# Patient Record
Sex: FEMALE | Race: BLACK OR AFRICAN AMERICAN | NOT HISPANIC OR LATINO | Employment: OTHER | URBAN - METROPOLITAN AREA
[De-identification: names, ages, dates, MRNs, and addresses within clinical notes are randomized per-mention and may not be internally consistent; named-entity substitution may affect disease eponyms.]

---

## 2017-06-09 DIAGNOSIS — M51.36 OTHER INTERVERTEBRAL DISC DEGENERATION, LUMBAR REGION: Primary | ICD-10-CM

## 2017-06-20 ENCOUNTER — HOSPITAL ENCOUNTER (OUTPATIENT)
Dept: RADIOLOGY | Facility: OTHER | Age: 72
Discharge: HOME | End: 2017-06-20
Attending: INTERNAL MEDICINE
Payer: MEDICARE

## 2017-06-20 DIAGNOSIS — M51.36 OTHER INTERVERTEBRAL DISC DEGENERATION, LUMBAR REGION: ICD-10-CM

## 2017-06-20 PROCEDURE — 72148 MRI LUMBAR SPINE W/O DYE: CPT | Mod: 26,,, | Performed by: RADIOLOGY

## 2017-06-20 PROCEDURE — 72148 MRI LUMBAR SPINE W/O DYE: CPT | Mod: TC

## 2018-01-09 ENCOUNTER — HOSPITAL ENCOUNTER (INPATIENT)
Facility: HOSPITAL | Age: 73
LOS: 9 days | Discharge: HOME HEALTH | DRG: 057 | End: 2018-01-18
Attending: EMERGENCY MEDICINE | Admitting: HOSPITALIST
Payer: MEDICARE

## 2018-01-09 DIAGNOSIS — K59.1 FUNCTIONAL DIARRHEA: ICD-10-CM

## 2018-01-09 DIAGNOSIS — R29.2 AREFLEXIA: ICD-10-CM

## 2018-01-09 DIAGNOSIS — E86.1 ACUTE RENAL INJURY DUE TO HYPOVOLEMIA: ICD-10-CM

## 2018-01-09 DIAGNOSIS — R48.2 APRAXIA: ICD-10-CM

## 2018-01-09 DIAGNOSIS — I63.9 STROKE: ICD-10-CM

## 2018-01-09 DIAGNOSIS — G89.4 CHRONIC PAIN DISORDER: ICD-10-CM

## 2018-01-09 DIAGNOSIS — E51.9 VITAMIN B1 DEFICIENCY: ICD-10-CM

## 2018-01-09 DIAGNOSIS — R29.898 WEAKNESS OF BOTH LOWER EXTREMITIES: ICD-10-CM

## 2018-01-09 DIAGNOSIS — G20.A1 PD (PARKINSON'S DISEASE): ICD-10-CM

## 2018-01-09 DIAGNOSIS — G61.0 AIDP (ACUTE INFLAMMATORY DEMYELINATING POLYNEUROPATHY): ICD-10-CM

## 2018-01-09 DIAGNOSIS — R06.02 SOB (SHORTNESS OF BREATH): ICD-10-CM

## 2018-01-09 DIAGNOSIS — N17.9 ACUTE RENAL INJURY DUE TO HYPOVOLEMIA: ICD-10-CM

## 2018-01-09 DIAGNOSIS — G60.8 PERIPHERAL SENSORY NEUROPATHY: ICD-10-CM

## 2018-01-09 DIAGNOSIS — R53.1 WEAKNESS: Primary | ICD-10-CM

## 2018-01-09 DIAGNOSIS — E51.11 VITAMIN B1 DEFICIENCY NEUROPATHY: ICD-10-CM

## 2018-01-09 PROBLEM — H90.3 SENSORY HEARING LOSS, BILATERAL: Status: ACTIVE | Noted: 2018-01-09

## 2018-01-09 LAB
ALBUMIN SERPL BCP-MCNC: 3.2 G/DL
ALP SERPL-CCNC: 78 U/L
ALT SERPL W/O P-5'-P-CCNC: 10 U/L
ANION GAP SERPL CALC-SCNC: 7 MMOL/L
AST SERPL-CCNC: 26 U/L
BACTERIA #/AREA URNS AUTO: ABNORMAL /HPF
BASOPHILS # BLD AUTO: 0.03 K/UL
BASOPHILS NFR BLD: 0.6 %
BILIRUB SERPL-MCNC: 0.6 MG/DL
BILIRUB UR QL STRIP: NEGATIVE
BUN SERPL-MCNC: 21 MG/DL
CALCIUM SERPL-MCNC: 8.4 MG/DL
CHLORIDE SERPL-SCNC: 107 MMOL/L
CHOLEST SERPL-MCNC: 165 MG/DL
CHOLEST/HDLC SERPL: 3.1 {RATIO}
CK MB SERPL-MCNC: 1.4 NG/ML
CK MB SERPL-RTO: 1.3 %
CK SERPL-CCNC: 105 U/L
CLARITY UR REFRACT.AUTO: ABNORMAL
CO2 SERPL-SCNC: 23 MMOL/L
COLOR UR AUTO: YELLOW
CREAT SERPL-MCNC: 1.3 MG/DL
CRP SERPL-MCNC: 9.6 MG/L
DIFFERENTIAL METHOD: ABNORMAL
EOSINOPHIL # BLD AUTO: 0.2 K/UL
EOSINOPHIL NFR BLD: 3.2 %
ERYTHROCYTE [DISTWIDTH] IN BLOOD BY AUTOMATED COUNT: 13.3 %
ERYTHROCYTE [SEDIMENTATION RATE] IN BLOOD BY WESTERGREN METHOD: 20 MM/HR
EST. GFR  (AFRICAN AMERICAN): 47.7 ML/MIN/1.73 M^2
EST. GFR  (NON AFRICAN AMERICAN): 41.4 ML/MIN/1.73 M^2
GLUCOSE SERPL-MCNC: 101 MG/DL
GLUCOSE UR QL STRIP: NEGATIVE
HCT VFR BLD AUTO: 33.2 %
HDLC SERPL-MCNC: 53 MG/DL
HDLC SERPL: 32.1 %
HGB BLD-MCNC: 11.2 G/DL
HGB UR QL STRIP: ABNORMAL
IMM GRANULOCYTES # BLD AUTO: 0.04 K/UL
IMM GRANULOCYTES NFR BLD AUTO: 0.7 %
INR PPP: 1.1
KETONES UR QL STRIP: NEGATIVE
LDLC SERPL CALC-MCNC: 101.2 MG/DL
LEUKOCYTE ESTERASE UR QL STRIP: ABNORMAL
LYMPHOCYTES # BLD AUTO: 1.2 K/UL
LYMPHOCYTES NFR BLD: 21.6 %
MAGNESIUM SERPL-MCNC: 2.5 MG/DL
MCH RBC QN AUTO: 33.4 PG
MCHC RBC AUTO-ENTMCNC: 33.7 G/DL
MCV RBC AUTO: 99 FL
MICROSCOPIC COMMENT: ABNORMAL
MONOCYTES # BLD AUTO: 0.7 K/UL
MONOCYTES NFR BLD: 13.4 %
NEUTROPHILS # BLD AUTO: 3.2 K/UL
NEUTROPHILS NFR BLD: 60.5 %
NITRITE UR QL STRIP: NEGATIVE
NONHDLC SERPL-MCNC: 112 MG/DL
NRBC BLD-RTO: 0 /100 WBC
PH UR STRIP: 5 [PH] (ref 5–8)
PHOSPHATE SERPL-MCNC: 2.9 MG/DL
PLATELET # BLD AUTO: 164 K/UL
PMV BLD AUTO: 10.5 FL
POCT GLUCOSE: 137 MG/DL (ref 70–110)
POTASSIUM SERPL-SCNC: 5.1 MMOL/L
PROT SERPL-MCNC: 7.8 G/DL
PROT UR QL STRIP: NEGATIVE
PROTHROMBIN TIME: 11.2 SEC
RBC # BLD AUTO: 3.35 M/UL
RBC #/AREA URNS AUTO: 1 /HPF (ref 0–4)
SODIUM SERPL-SCNC: 137 MMOL/L
SP GR UR STRIP: 1.01 (ref 1–1.03)
SQUAMOUS #/AREA URNS AUTO: 6 /HPF
T4 FREE SERPL-MCNC: 1.19 NG/DL
TRIGL SERPL-MCNC: 54 MG/DL
TSH SERPL DL<=0.005 MIU/L-ACNC: 0.06 UIU/ML
URN SPEC COLLECT METH UR: ABNORMAL
UROBILINOGEN UR STRIP-ACNC: 4 EU/DL
WBC # BLD AUTO: 5.36 K/UL
WBC #/AREA URNS AUTO: 50 /HPF (ref 0–5)

## 2018-01-09 PROCEDURE — 84439 ASSAY OF FREE THYROXINE: CPT

## 2018-01-09 PROCEDURE — 83735 ASSAY OF MAGNESIUM: CPT

## 2018-01-09 PROCEDURE — 99233 SBSQ HOSP IP/OBS HIGH 50: CPT | Mod: ,,, | Performed by: PSYCHIATRY & NEUROLOGY

## 2018-01-09 PROCEDURE — 80061 LIPID PANEL: CPT

## 2018-01-09 PROCEDURE — 25500020 PHARM REV CODE 255: Performed by: HOSPITALIST

## 2018-01-09 PROCEDURE — 87077 CULTURE AEROBIC IDENTIFY: CPT

## 2018-01-09 PROCEDURE — 87186 SC STD MICRODIL/AGAR DIL: CPT

## 2018-01-09 PROCEDURE — 93005 ELECTROCARDIOGRAM TRACING: CPT

## 2018-01-09 PROCEDURE — 99285 EMERGENCY DEPT VISIT HI MDM: CPT | Mod: ,,, | Performed by: PHYSICIAN ASSISTANT

## 2018-01-09 PROCEDURE — 87088 URINE BACTERIA CULTURE: CPT

## 2018-01-09 PROCEDURE — 84443 ASSAY THYROID STIM HORMONE: CPT

## 2018-01-09 PROCEDURE — 85651 RBC SED RATE NONAUTOMATED: CPT

## 2018-01-09 PROCEDURE — 81001 URINALYSIS AUTO W/SCOPE: CPT

## 2018-01-09 PROCEDURE — 82553 CREATINE MB FRACTION: CPT

## 2018-01-09 PROCEDURE — 87086 URINE CULTURE/COLONY COUNT: CPT

## 2018-01-09 PROCEDURE — 99223 1ST HOSP IP/OBS HIGH 75: CPT | Mod: AI,,, | Performed by: HOSPITALIST

## 2018-01-09 PROCEDURE — 99285 EMERGENCY DEPT VISIT HI MDM: CPT | Mod: 25

## 2018-01-09 PROCEDURE — 20600001 HC STEP DOWN PRIVATE ROOM

## 2018-01-09 PROCEDURE — 80053 COMPREHEN METABOLIC PANEL: CPT

## 2018-01-09 PROCEDURE — 85610 PROTHROMBIN TIME: CPT

## 2018-01-09 PROCEDURE — 25000003 PHARM REV CODE 250: Performed by: HOSPITALIST

## 2018-01-09 PROCEDURE — 93010 ELECTROCARDIOGRAM REPORT: CPT | Mod: ,,, | Performed by: INTERNAL MEDICINE

## 2018-01-09 PROCEDURE — 84100 ASSAY OF PHOSPHORUS: CPT

## 2018-01-09 PROCEDURE — A9585 GADOBUTROL INJECTION: HCPCS | Performed by: HOSPITALIST

## 2018-01-09 PROCEDURE — 86140 C-REACTIVE PROTEIN: CPT

## 2018-01-09 PROCEDURE — 82962 GLUCOSE BLOOD TEST: CPT

## 2018-01-09 PROCEDURE — 85025 COMPLETE CBC W/AUTO DIFF WBC: CPT

## 2018-01-09 RX ORDER — SODIUM CHLORIDE 0.9 % (FLUSH) 0.9 %
3 SYRINGE (ML) INJECTION
Status: DISCONTINUED | OUTPATIENT
Start: 2018-01-09 | End: 2018-01-19 | Stop reason: HOSPADM

## 2018-01-09 RX ORDER — OXYCODONE AND ACETAMINOPHEN 10; 325 MG/1; MG/1
1 TABLET ORAL EVERY 4 HOURS PRN
Status: ON HOLD | COMMUNITY
End: 2018-01-17 | Stop reason: HOSPADM

## 2018-01-09 RX ORDER — LEVOTHYROXINE SODIUM 50 UG/1
50 TABLET ORAL
Status: DISCONTINUED | OUTPATIENT
Start: 2018-01-10 | End: 2018-01-19 | Stop reason: HOSPADM

## 2018-01-09 RX ORDER — LISINOPRIL 20 MG/1
40 TABLET ORAL DAILY
Status: DISCONTINUED | OUTPATIENT
Start: 2018-01-10 | End: 2018-01-15

## 2018-01-09 RX ORDER — GADOBUTROL 604.72 MG/ML
10 INJECTION INTRAVENOUS
Status: COMPLETED | OUTPATIENT
Start: 2018-01-09 | End: 2018-01-09

## 2018-01-09 RX ORDER — AMITRIPTYLINE HYDROCHLORIDE 25 MG/1
100 TABLET, FILM COATED ORAL NIGHTLY
Status: DISCONTINUED | OUTPATIENT
Start: 2018-01-09 | End: 2018-01-19 | Stop reason: HOSPADM

## 2018-01-09 RX ORDER — HYDROCODONE BITARTRATE AND ACETAMINOPHEN 7.5; 325 MG/1; MG/1
1 TABLET ORAL EVERY 6 HOURS PRN
Status: DISCONTINUED | OUTPATIENT
Start: 2018-01-09 | End: 2018-01-10

## 2018-01-09 RX ADMIN — AMITRIPTYLINE HYDROCHLORIDE 100 MG: 25 TABLET, FILM COATED ORAL at 09:01

## 2018-01-09 RX ADMIN — HYDROCODONE BITARTRATE AND ACETAMINOPHEN 1 TABLET: 7.5; 325 TABLET ORAL at 11:01

## 2018-01-09 RX ADMIN — GADOBUTROL 10 ML: 604.72 INJECTION INTRAVENOUS at 05:01

## 2018-01-09 NOTE — ED NOTES
"Pt found eating popeyes in room and stated that she "was fucking hungry and my child brought me this"   "

## 2018-01-09 NOTE — ASSESSMENT & PLAN NOTE
-Exam is most concerning for AIDP vs transverse myelitis.  -MRI Lumbar Spine w/ w/o contrast pending.  -LP 01/10/18 with Neurology--cell count & diff, protein, glucose, VDRL, MS profile, freeze & hold.  -ESR, CRP, CPK pending.  Low suspicion for myopathy or myositis causing weakness.  -Recommend PT/OT for ROM, deconditioning.

## 2018-01-09 NOTE — ED NOTES
LOC:   · The pt is awake, alert, and aware of environment; AAOx3 to person, place, and situation  APPEARANCE:   · Pt resting comfortably and in no acute distress; clean and well groomed  SKIN:   · Skin is warm and dry; color consistent with ethnicity; pt has normal skin turgor and moist mucus membranes  MUSCULOSKELETAL:   · absent of obvious swelling or deformities; amputated left great toe; pt presents with an inability to move LE bilaterally   RESPIRATORY:   · Airway is open and patent; respirations are spontaneous; normal effort and rate noted; absent of accessory-muscle use; breath sounds auscultated in all lung fields are noted to be clear and absent of adventitious sounds  CARDIAC:   · Pt denies chest pain; normal heart sounds auscultated; Pt has no peripheral edema noted; capillary refill < 3 seconds  ABDOMEN:   · Soft and non-tender to palpation; no abnormal distention noted/reported; bowel sounds present x 4  NEUROLOGIC:   · PERRL, 3mm bilaterally, eyes open spontaneously; behavior appropriate to situation and pt follows commands; facial expression weak; equal hand  bilaterally; diminished touch sensation noted to lower extremities

## 2018-01-09 NOTE — SUBJECTIVE & OBJECTIVE
Past Medical History:   Diagnosis Date    Anemia     Colitis     hospitalized July 2014    Diabetes mellitus     Encounter for blood transfusion     Goiter     Hypertension     Left hip pain 10/12/14    Syncope      Past Surgical History:   Procedure Laterality Date    BACK SURGERY      THYROIDECTOMY      TOE AMPUTATION Left      Review of patient's allergies indicates:  No Known Allergies    Current Neurological Medications: Gabapentin 300 mg po qAM/600 mg po qHS, amitriptyline 100 mg po qHS, quetiapine 400 mg po qHS, tramadol 50 mg po bid    No current facility-administered medications on file prior to encounter.      Current Outpatient Prescriptions on File Prior to Encounter   Medication Sig    gabapentin (NEURONTIN) 300 mg tablet Take 300 mg by mouth every morning.     lisinopril (PRINIVIL,ZESTRIL) 40 MG tablet Take 1 tablet (40 mg total) by mouth once daily.    amitriptyline (ELAVIL) 100 MG tablet Take 100 mg by mouth every evening.    gabapentin (NEURONTIN) 600 MG tablet Take 600 mg by mouth every evening.    hydrocodone-acetaminophen 7.5-325mg (NORCO) 7.5-325 mg per tablet Take 1 tablet by mouth every 8 (eight) hours as needed for Pain.    lactobacillus acidophilus & bulgar (LACTINEX) 100 million cell packet Take 1 packet (1 each total) by mouth 2 (two) times daily.    levothyroxine (SYNTHROID) 50 MCG tablet Take 1 tablet (50 mcg total) by mouth before breakfast.    quetiapine (SEROQUEL) 300 MG Tab Take 400 mg by mouth every evening.     tramadol (ULTRAM) 50 mg tablet Take 50 mg by mouth 2 (two) times daily.     Family History     Problem Relation (Age of Onset)    Cancer Son, Mother    Coronary artery disease     Diabetes         Social History Main Topics    Smoking status: Former Smoker     Years: 30.00     Types: Cigarettes     Quit date: 5/20/2014    Smokeless tobacco: Not on file    Alcohol use Yes      Comment: occassionally     Drug use: No    Sexual activity: Not Currently      Review of Systems   Constitutional: Negative for chills and fever.   HENT: Negative for congestion and sore throat.    Eyes: Negative for photophobia and visual disturbance.   Respiratory: Negative for cough and shortness of breath.    Cardiovascular: Negative for chest pain and palpitations.   Gastrointestinal: Negative for constipation, diarrhea, nausea and vomiting.   Genitourinary: Negative for dysuria and frequency.   Musculoskeletal: Positive for back pain and gait problem. Negative for arthralgias and myalgias.   Skin: Negative for rash and wound.   Neurological: Positive for weakness.   Hematological: Negative for adenopathy. Does not bruise/bleed easily.   Psychiatric/Behavioral: Negative for agitation and confusion.     Objective:     Vital Signs (Most Recent):  Temp: 97.7 °F (36.5 °C) (01/09/18 1013)  Pulse: 88 (01/09/18 1446)  Resp: 18 (01/09/18 1446)  BP: (!) 171/75 (01/09/18 1446)  SpO2: 100 % (01/09/18 1446) Vital Signs (24h Range):  Temp:  [97.7 °F (36.5 °C)] 97.7 °F (36.5 °C)  Pulse:  [83-88] 88  Resp:  [18] 18  SpO2:  [100 %] 100 %  BP: (141-171)/(62-77) 171/75     Weight: 99.8 kg (220 lb)  Body mass index is 33.45 kg/m².    Physical Exam  General:  Well-developed, well-nourished, nad  HEENT:  NCAT, PERRLA, EOMI, oropharyngeal membranes non-erythematous/without exudate  Neck:  Supple, normal ROM without nuchal rigidity  Resp:  Symmetric expansion, no increased wob  CVS:  No LE edema, peripheral pulses 2+ (radial, dorsalis pedis)  GI:  Abd soft, non-distended, non-tender to palpation  Neurologic Exam:  Mental Status:  AAOx3.  Speech, thought content appropriate.  Recent, remote recall 3/3.  Cranial Nerves:  VFs intact on moving fingers in all quadrants bilaterally.  PERRLA, EOMI.  Facial movement, sensation intact and symmetric.  Palate raises symmetrically, tongue protrudes midline.  Trapezius, SCM strength 5/5 bilaterally.  Motor:  Normal bulk and tone, no apparent atrophy or fasciculations.   BUE shoulder abduction, biceps/triceps, wrist flexion/extension,  strength 4-/5.  BLE hip flexors 2/5, knee flexion/extension 1/5, plantarflexion/dorsiflexion 1/5.    Sensory:  Patient has no sensation to light touch or temperature in BLE up to upper thigh.  Light touch at BUE intact without inattention.  Vibratory sensation diminished with patient reported no sensation at BLE knees.  Vibratory sensation intact at BUE digits.  Reflexes:  Areflexic patellar, Achilles. Biceps, brachioradialis 2+ and symmetric.  No ankle clonus.  Equivocal toe bilaterally.  Coordination:  FNF, KAMRON intact with no dysmetria/ataxia/dysdiadochokinesia.  HTS deferred 2/2 weakness.  No resting tremor.  Gait:  Deferred 2/2 fall precautions     Significant Labs:     Recent Labs  Lab 18  1258   WBC 5.36   RBC 3.35*   HGB 11.2*   HCT 33.2*      MCV 99*   MCH 33.4*   MCHC 33.7       Recent Labs  Lab 18  1258   CALCIUM 8.4*   PROT 7.8      K 5.1   CO2 23      BUN 21   CREATININE 1.3   ALKPHOS 78   ALT 10   AST 26   BILITOT 0.6     Significant Imagin18 MRI Lumbar Spine w/ w/o contrast:  PENDING

## 2018-01-09 NOTE — ASSESSMENT & PLAN NOTE
-Per above, concern for AIDP vs transverse myelitis  -Possible component of chronic peripheral neuropathy

## 2018-01-09 NOTE — PROVIDER PROGRESS NOTES - EMERGENCY DEPT.
Encounter Date: 1/9/2018    ED Physician Progress Notes         EKG - STEMI Decision  Initial Reading: No STEMI present.    I, Huma Tran, am scribing for, and in the presence of, Dr. Becerril. I performed the above scribed service and the documentation accurately describes the services I performed. I attest to the accuracy of the note.

## 2018-01-09 NOTE — CONSULTS
"[facility]  Neurology  Consult Note    Patient Name: Melissa Anand  MRN: [id number]  Admission Date: 1/9/2018  Hospital Length of Stay: 0 days  Code Status: Prior   Attending Provider: Loi Becerril, *   Consulting Provider: Ana Servin MD  Primary Care Physician: Dana Castellon MD  Principal Problem:<principal problem not specified>    Inpatient consult to neurology  Consult performed by: ANA SERVIN  Consult ordered by: SHAHEEN HOLLOWAY         Subjective:     Chief Complaint:  BLE weakness     HPI:   71 yo F with PMHx DM II not on medications, HTN, and chronic low back pain who presents with progression of BLE weakness.  Patient's daughter at bedside assists with history.  Patient has been walking with a cane at baseline for past 2 years and has had worsening of BLE weakness for past 2-3 months with inability to get out of bed over the past weekend.  She denies recent infections--no SOB/cough, no n/v/c/d, no sick contacts.  Has some diabetic peripheral neuropathy at baseline, but states that she hadn't had falls with her neuropathy prior to last two months.  Denies bowel/bladder incontinence, saddle anesthesia, sensory levels.  Patient has been getting injections in her back for chronic low back pain.  Daughter states that she had gotten some steroid injections but they did try an "epidural medication" a couple weeks ago.  She has been getting injections approximately every two weeks for past 3 months.  Patient states she still has low back pain with some radiation into the BLE but is unable to characterize the pain.       Past Medical History:   Diagnosis Date    Anemia     Colitis     hospitalized July 2014    Diabetes mellitus     Encounter for blood transfusion     Goiter     Hypertension     Left hip pain 10/12/14    Syncope      Past Surgical History:   Procedure Laterality Date    BACK SURGERY      THYROIDECTOMY      TOE AMPUTATION Left      Review of " patient's allergies indicates:  No Known Allergies    Current Neurological Medications: Gabapentin 300 mg po qAM/600 mg po qHS, amitriptyline 100 mg po qHS, quetiapine 400 mg po qHS, tramadol 50 mg po bid    No current facility-administered medications on file prior to encounter.      Current Outpatient Prescriptions on File Prior to Encounter   Medication Sig    gabapentin (NEURONTIN) 300 mg tablet Take 300 mg by mouth every morning.     lisinopril (PRINIVIL,ZESTRIL) 40 MG tablet Take 1 tablet (40 mg total) by mouth once daily.    amitriptyline (ELAVIL) 100 MG tablet Take 100 mg by mouth every evening.    gabapentin (NEURONTIN) 600 MG tablet Take 600 mg by mouth every evening.    hydrocodone-acetaminophen 7.5-325mg (NORCO) 7.5-325 mg per tablet Take 1 tablet by mouth every 8 (eight) hours as needed for Pain.    lactobacillus acidophilus & bulgar (LACTINEX) 100 million cell packet Take 1 packet (1 each total) by mouth 2 (two) times daily.    levothyroxine (SYNTHROID) 50 MCG tablet Take 1 tablet (50 mcg total) by mouth before breakfast.    quetiapine (SEROQUEL) 300 MG Tab Take 400 mg by mouth every evening.     tramadol (ULTRAM) 50 mg tablet Take 50 mg by mouth 2 (two) times daily.     Family History     Problem Relation (Age of Onset)    Cancer Son, Mother    Coronary artery disease     Diabetes         Social History Main Topics    Smoking status: Former Smoker     Years: 30.00     Types: Cigarettes     Quit date: 5/20/2014    Smokeless tobacco: Not on file    Alcohol use Yes      Comment: occassionally     Drug use: No    Sexual activity: Not Currently     Review of Systems   Constitutional: Negative for chills and fever.   HENT: Negative for congestion and sore throat.    Eyes: Negative for photophobia and visual disturbance.   Respiratory: Negative for cough and shortness of breath.    Cardiovascular: Negative for chest pain and palpitations.   Gastrointestinal: Negative for constipation, diarrhea,  nausea and vomiting.   Genitourinary: Negative for dysuria and frequency.   Musculoskeletal: Positive for back pain and gait problem. Negative for arthralgias and myalgias.   Skin: Negative for rash and wound.   Neurological: Positive for weakness.   Hematological: Negative for adenopathy. Does not bruise/bleed easily.   Psychiatric/Behavioral: Negative for agitation and confusion.     Objective:     Vital Signs (Most Recent):  Temp: 97.7 °F (36.5 °C) (01/09/18 1013)  Pulse: 88 (01/09/18 1446)  Resp: 18 (01/09/18 1446)  BP: (!) 171/75 (01/09/18 1446)  SpO2: 100 % (01/09/18 1446) Vital Signs (24h Range):  Temp:  [97.7 °F (36.5 °C)] 97.7 °F (36.5 °C)  Pulse:  [83-88] 88  Resp:  [18] 18  SpO2:  [100 %] 100 %  BP: (141-171)/(62-77) 171/75     Weight: 99.8 kg (220 lb)  Body mass index is 33.45 kg/m².    Physical Exam  General:  Well-developed, well-nourished, nad  HEENT:  NCAT, PERRLA, EOMI, oropharyngeal membranes non-erythematous/without exudate  Neck:  Supple, normal ROM without nuchal rigidity  Resp:  Symmetric expansion, no increased wob  CVS:  No LE edema, peripheral pulses 2+ (radial, dorsalis pedis)  GI:  Abd soft, non-distended, non-tender to palpation  Neurologic Exam:  Mental Status:  AAOx3.  Speech, thought content appropriate.  Recent, remote recall 3/3.  Cranial Nerves:  VFs intact on moving fingers in all quadrants bilaterally.  PERRLA, EOMI.  Facial movement, sensation intact and symmetric.  Palate raises symmetrically, tongue protrudes midline.  Trapezius, SCM strength 5/5 bilaterally.  Motor:  Normal bulk and tone, no apparent atrophy or fasciculations.  BUE shoulder abduction, biceps/triceps, wrist flexion/extension,  strength 4-/5.  BLE hip flexors 2/5, knee flexion/extension 1/5, plantarflexion/dorsiflexion 1/5.    Sensory:  Patient has no sensation to light touch or temperature in BLE up to upper thigh.  Light touch at BUE intact without inattention.  Vibratory sensation diminished with patient  reported no sensation at BLE knees.  Vibratory sensation intact at BUE digits.  Reflexes:  Areflexic patellar, Achilles. Biceps, brachioradialis 2+ and symmetric.  No ankle clonus.  Equivocal toe bilaterally.  Coordination:  FNF, KAMRON intact with no dysmetria/ataxia/dysdiadochokinesia.  HTS deferred 2/2 weakness.  No resting tremor.  Gait:  Deferred 2/2 fall precautions     Significant Labs:     Recent Labs  Lab 18  1258   WBC 5.36   RBC 3.35*   HGB 11.2*   HCT 33.2*      MCV 99*   MCH 33.4*   MCHC 33.7       Recent Labs  Lab 18  1258   CALCIUM 8.4*   PROT 7.8      K 5.1   CO2 23      BUN 21   CREATININE 1.3   ALKPHOS 78   ALT 10   AST 26   BILITOT 0.6     Significant Imagin18 MRI Lumbar Spine w/ w/o contrast:  PENDING    Assessment and Plan:     Weakness    -Exam is most concerning for AIDP vs transverse myelitis.  -MRI Lumbar Spine w/ w/o contrast pending.  -LP 01/10/18 with Neurology--cell count & diff, protein, glucose, VDRL, MS profile, freeze & hold.  -ESR, CRP, CPK pending.  Low suspicion for myopathy or myositis causing weakness.  -Recommend PT/OT for ROM, deconditioning.  -Further recs pending results of imaging and labs.      Areflexia    -Per above, concern for AIDP vs transverse myelitis  -Possible component of chronic peripheral neuropathy      Peripheral sensory neuropathy    -Chronic diabetic neuropathy with more severe acute sensory loss in BLE.  -Check Hgb A1c, vitamin B1, vitamin B12.  -ESR, CRP pending.  TSH low with normal FT4--appropriate for levothyroxine use.  -MRI Lumbar Spine w/ w/o contrast, LP 01/10/18 per above.     VTE Risk Mitigation     None        Thank you for your consult. I will follow-up with patient. Please contact us if you have any additional questions.    Elva Diana MD  Neurology  [facility]

## 2018-01-09 NOTE — HPI
"71 yo F with PMHx DM II not on medications, HTN, and chronic low back pain who presents with progression of BLE weakness.  Patient's daughter at bedside assists with history.  Patient has been walking with a cane at baseline for past 2 years and has had worsening of BLE weakness for past 2-3 months with inability to get out of bed over the past weekend.  She denies recent infections--no SOB/cough, no n/v/c/d, no sick contacts.  Has some diabetic peripheral neuropathy at baseline, but states that she hadn't had falls with her neuropathy prior to last two months.  Denies bowel/bladder incontinence, saddle anesthesia, sensory levels.  Patient has been getting injections in her back for chronic low back pain.  Daughter states that she had gotten some steroid injections but they did try an "epidural medication" a couple weeks ago.  She has been getting injections approximately every two weeks for past 3 months.  Patient states she still has low back pain with some radiation into the BLE but is unable to characterize the pain.    "

## 2018-01-09 NOTE — ASSESSMENT & PLAN NOTE
-Chronic diabetic neuropathy with more severe acute sensory loss in BLE.  -Check Hgb A1c, vitamin B1, vitamin B12.  -ESR, CRP pending.  TSH low with normal FT4--appropriate for levothyroxine use.  -MRI Lumbar Spine w/ w/o contrast, LP 01/10/18 per above.

## 2018-01-09 NOTE — ED PROVIDER NOTES
Encounter Date: 1/9/2018       History     Chief Complaint   Patient presents with    Fatigue     Generalized weakness and slurred speech started at 1700 yesterday.      68 yo F with PMH DM, HTN, chronic back pain seen by pain management and receives back injections monthly who presents to the emergency department due to a 2 day history of weakness.  Patient daughter who is at bedside states that since Sunday patient has progressively gotten more weak in her lower extremities bilaterally.  Patient's daughter states that this morning patient awoke and was unable to get out of bed or move her legs.  Daughter did wheel the patient to the restroom and she was able to pull herself onto the toilet, however after that was unable to ambulate.  Patient denies any fevers, chills, chest pain, shortness of breath, or any other complaints.  No recent infections.  No loss of bowel or bladder.          Review of patient's allergies indicates:  No Known Allergies  Past Medical History:   Diagnosis Date    Anemia     Colitis     hospitalized July 2014    Diabetes mellitus     Encounter for blood transfusion     Goiter     Hypertension     Left hip pain 10/12/14    Syncope      Past Surgical History:   Procedure Laterality Date    BACK SURGERY      THYROIDECTOMY      TOE AMPUTATION Left      Family History   Problem Relation Age of Onset    Diabetes      Coronary artery disease      Cancer Son      testicular    Cancer Mother      Social History   Substance Use Topics    Smoking status: Former Smoker     Years: 30.00     Types: Cigarettes     Quit date: 5/20/2014    Smokeless tobacco: Not on file    Alcohol use Yes      Comment: occassionally      Review of Systems   Constitutional: Positive for fatigue. Negative for activity change, appetite change, diaphoresis and fever.   HENT: Negative for congestion, dental problem, drooling, ear pain, facial swelling, sore throat and trouble swallowing.    Eyes: Negative for  pain, discharge and visual disturbance.   Respiratory: Negative for apnea, cough, chest tightness and shortness of breath.    Cardiovascular: Negative for chest pain and palpitations.   Gastrointestinal: Negative for abdominal distention, anal bleeding, blood in stool, diarrhea, nausea and vomiting.   Endocrine: Negative for cold intolerance and polydipsia.   Genitourinary: Negative for decreased urine volume, difficulty urinating, enuresis, frequency and hematuria.   Musculoskeletal: Positive for gait problem. Negative for arthralgias, myalgias and neck stiffness.   Skin: Negative for color change and pallor.   Allergic/Immunologic: Negative for environmental allergies.   Neurological: Positive for weakness. Negative for dizziness, syncope, numbness and headaches.   Psychiatric/Behavioral: Negative for agitation, confusion and dysphoric mood.       Physical Exam     Initial Vitals [01/09/18 1013]   BP Pulse Resp Temp SpO2   (!) 141/62 86 18 97.7 °F (36.5 °C) 100 %      MAP       88.33         Physical Exam    Nursing note and vitals reviewed.  Constitutional: She appears well-developed and well-nourished. She is not diaphoretic. No distress.   HENT:   Head: Normocephalic and atraumatic.   Neck: Normal range of motion. Neck supple.   Cardiovascular: Normal rate, regular rhythm and normal heart sounds. Exam reveals no gallop and no friction rub.    No murmur heard.  Pulmonary/Chest: Breath sounds normal. She has no wheezes. She has no rhonchi. She has no rales.   Abdominal: Soft. Bowel sounds are normal. There is no tenderness. There is no rebound and no guarding.   Neurological: She is alert and oriented to person, place, and time.   Skin: Skin is warm and dry. No rash noted. No erythema.   Psychiatric: She has a normal mood and affect.         ED Course   Procedures  Labs Reviewed   CBC W/ AUTO DIFFERENTIAL - Abnormal; Notable for the following:        Result Value    RBC 3.35 (*)     Hemoglobin 11.2 (*)      Hematocrit 33.2 (*)     MCV 99 (*)     MCH 33.4 (*)     Immature Granulocytes 0.7 (*)     All other components within normal limits    Narrative:     ADD ON PER DR SUSY ZIEGLER ORDER [id number] MAG, ORDER [id number] PHOS,   ORDER [id number] CPK, ORDER [id number] CRP @1346 1/9/18  Add on order [id number] ESR. Per Dr. Ziegler  01/09/2018  13:57    COMPREHENSIVE METABOLIC PANEL - Abnormal; Notable for the following:     Calcium 8.4 (*)     Albumin 3.2 (*)     Anion Gap 7 (*)     eGFR if  47.7 (*)     eGFR if non  41.4 (*)     All other components within normal limits    Narrative:     ADD ON PER DR SUSY ZIEGLER ORDER [id number] MAG, ORDER [id number] PHOS,   ORDER [id number] CPK, ORDER [id number] CRP @1346 1/9/18  Add on order [id number] ESR. Per Dr. Ziegler  01/09/2018  13:57    TSH - Abnormal; Notable for the following:     TSH 0.064 (*)     All other components within normal limits    Narrative:     ADD ON PER DR SUSY ZIEGLER ORDER [id number] MAG, ORDER [id number] PHOS,   ORDER [id number] CPK, ORDER [id number] CRP @1346 1/9/18  Add on order [id number] ESR. Per Dr. Ziegler  01/09/2018  13:57    URINALYSIS, REFLEX TO URINE CULTURE - Abnormal; Notable for the following:     Appearance, UA Cloudy (*)     Occult Blood UA 1+ (*)     Leukocytes, UA 2+ (*)     All other components within normal limits   C-REACTIVE PROTEIN - Abnormal; Notable for the following:     CRP 9.6 (*)     All other components within normal limits    Narrative:     ADD ON PER DR SUSY ZIEGLER ORDER [id number] MAG, ORDER [id number] PHOS,   ORDER [id number] CPK, ORDER [id number] CRP @1346 1/9/18  Add on order [id number] ESR. Per Dr. Ziegler  01/09/2018  13:57    URINALYSIS MICROSCOPIC - Abnormal; Notable for the following:     WBC, UA 50 (*)     Bacteria, UA Many (*)     All other components within normal limits   POCT GLUCOSE - Abnormal; Notable for the following:     POCT Glucose 137 (*)     All other components within normal limits   CULTURE,  URINE   PROTIME-INR    Narrative:     ADD ON PER DR SUSY ZIEGLER ORDER [id number] MAG, ORDER [id number] PHOS,   ORDER [id number] CPK, ORDER [id number] CRP @1346 1/9/18   LIPID PANEL    Narrative:     ADD ON PER DR SUSY ZIEGLER ORDER [id number] MAG, ORDER [id number] PHOS,   ORDER [id number] CPK, ORDER [id number] CRP @1346 1/9/18  Add on order [id number] ESR. Per Dr. Ziegler  01/09/2018  13:57    SEDIMENTATION RATE, MANUAL   PHOSPHORUS   MAGNESIUM   CK   C-REACTIVE PROTEIN   CK-MB    Narrative:     ADD ON PER DR SUSY ZIEGLER ORDER [id number] MAG, ORDER [id number] PHOS,   ORDER [id number] CPK, ORDER [id number] CRP @1346 1/9/18  Add on order [id number] ESR. Per Dr. Ziegler  01/09/2018  13:57    PHOSPHORUS    Narrative:     ADD ON PER DR SUSY ZIEGLER ORDER [id number] MAG, ORDER [id number] PHOS,   ORDER [id number] CPK, ORDER [id number] CRP @1346 1/9/18  Add on order [id number] ESR. Per Dr. iZegler  01/09/2018  13:57    MAGNESIUM    Narrative:     ADD ON PER DR SUSY ZIEGLER ORDER [id number] MAG, ORDER [id number] PHOS,   ORDER [id number] CPK, ORDER [id number] CRP @1346 1/9/18  Add on order [id number] ESR. Per Dr. Ziegler  01/09/2018  13:57    SEDIMENTATION RATE, MANUAL    Narrative:     ADD ON PER DR SUSY ZIEGLER ORDER [id number] MAG, ORDER [id number] PHOS,   ORDER [id number] CPK, ORDER [id number] CRP @1346 1/9/18  Add on order [id number] ESR. Per Dr. Ziegler  01/09/2018  13:57    T4, FREE    Narrative:     ADD ON PER DR SUSY ZIEGLER ORDER [id number] MAG, ORDER [id number] PHOS,   ORDER [id number] CPK, ORDER [id number] CRP @1346 1/9/18  Add on order [id number] ESR. Per Dr. Ziegler  01/09/2018  13:57    POCT GLUCOSE                   APC / Resident Notes:   70 yo F with PMH DM, HTN, chronic back pain seen by pain management and receives pain shots monthly presents to the emergency department due to a 2 day history of weakness.  Physical exam reveals female in no acute distress.  Heart regular rate and rhythm.  Lungs clear to auscultation bilaterally.  Abdomen soft  nontender nondistended.  Will obtain lab work and consult neurology.    Neurology did see and examine the patient and feel that the exam isconcerning for Guillain-Barré versus transverse myelitis.  Neurology is requesting a MRI and a lumbar puncture.  Patient will be admitted to medicine for further workup of bilateral lower extremity weakness.  Further treatment pending clinical course.           Attending Attestation:     Physician Attestation Statement for NP/PA:   I discussed this assessment and plan of this patient with the NP/PA, but I did not personally examine the patient. The face to face encounter was performed by the NP/PA.                  ED Course      Clinical Impression:   The primary encounter diagnosis was Weakness. Diagnoses of Stroke and SOB (shortness of breath) were also pertinent to this visit.    Disposition:   Disposition: Admitted  Condition: Serious                        Aliya Matamoros PA-C  01/09/18 1640       Loi Becerril MD  01/11/18 5793

## 2018-01-10 LAB
ANION GAP SERPL CALC-SCNC: 8 MMOL/L
BASOPHILS # BLD AUTO: 0.02 K/UL
BASOPHILS NFR BLD: 0.4 %
BUN SERPL-MCNC: 16 MG/DL
CALCIUM SERPL-MCNC: 8.1 MG/DL
CHLORIDE SERPL-SCNC: 107 MMOL/L
CO2 SERPL-SCNC: 26 MMOL/L
CREAT SERPL-MCNC: 0.9 MG/DL
DIFFERENTIAL METHOD: ABNORMAL
EOSINOPHIL # BLD AUTO: 0.2 K/UL
EOSINOPHIL NFR BLD: 4.9 %
ERYTHROCYTE [DISTWIDTH] IN BLOOD BY AUTOMATED COUNT: 12.7 %
EST. GFR  (AFRICAN AMERICAN): >60 ML/MIN/1.73 M^2
EST. GFR  (NON AFRICAN AMERICAN): >60 ML/MIN/1.73 M^2
ESTIMATED AVG GLUCOSE: 103 MG/DL
GLUCOSE SERPL-MCNC: 66 MG/DL
HBA1C MFR BLD HPLC: 5.2 %
HCT VFR BLD AUTO: 28.1 %
HGB BLD-MCNC: 9.2 G/DL
IMM GRANULOCYTES # BLD AUTO: 0.02 K/UL
IMM GRANULOCYTES NFR BLD AUTO: 0.4 %
LYMPHOCYTES # BLD AUTO: 1.1 K/UL
LYMPHOCYTES NFR BLD: 24.3 %
MCH RBC QN AUTO: 32.7 PG
MCHC RBC AUTO-ENTMCNC: 32.7 G/DL
MCV RBC AUTO: 100 FL
MONOCYTES # BLD AUTO: 0.6 K/UL
MONOCYTES NFR BLD: 12.8 %
NEUTROPHILS # BLD AUTO: 2.5 K/UL
NEUTROPHILS NFR BLD: 57.2 %
NRBC BLD-RTO: 0 /100 WBC
PLATELET # BLD AUTO: 168 K/UL
PMV BLD AUTO: 9.5 FL
POCT GLUCOSE: 117 MG/DL (ref 70–110)
POTASSIUM SERPL-SCNC: 3.9 MMOL/L
RBC # BLD AUTO: 2.81 M/UL
SODIUM SERPL-SCNC: 141 MMOL/L
VIT B12 SERPL-MCNC: 1228 PG/ML
WBC # BLD AUTO: 4.45 K/UL

## 2018-01-10 PROCEDURE — 85025 COMPLETE CBC W/AUTO DIFF WBC: CPT

## 2018-01-10 PROCEDURE — 00JU3ZZ INSPECTION OF SPINAL CANAL, PERCUTANEOUS APPROACH: ICD-10-PCS | Performed by: PSYCHIATRY & NEUROLOGY

## 2018-01-10 PROCEDURE — 99233 SBSQ HOSP IP/OBS HIGH 50: CPT | Mod: 25,,, | Performed by: PSYCHIATRY & NEUROLOGY

## 2018-01-10 PROCEDURE — 20600001 HC STEP DOWN PRIVATE ROOM

## 2018-01-10 PROCEDURE — 84425 ASSAY OF VITAMIN B-1: CPT

## 2018-01-10 PROCEDURE — 82607 VITAMIN B-12: CPT

## 2018-01-10 PROCEDURE — 99232 SBSQ HOSP IP/OBS MODERATE 35: CPT | Mod: ,,, | Performed by: HOSPITALIST

## 2018-01-10 PROCEDURE — 80048 BASIC METABOLIC PNL TOTAL CA: CPT

## 2018-01-10 PROCEDURE — 97161 PT EVAL LOW COMPLEX 20 MIN: CPT

## 2018-01-10 PROCEDURE — 97165 OT EVAL LOW COMPLEX 30 MIN: CPT

## 2018-01-10 PROCEDURE — 83036 HEMOGLOBIN GLYCOSYLATED A1C: CPT

## 2018-01-10 PROCEDURE — 25000003 PHARM REV CODE 250: Performed by: HOSPITALIST

## 2018-01-10 PROCEDURE — 62270 DX LMBR SPI PNXR: CPT | Mod: ,,, | Performed by: PSYCHIATRY & NEUROLOGY

## 2018-01-10 PROCEDURE — 36415 COLL VENOUS BLD VENIPUNCTURE: CPT

## 2018-01-10 RX ORDER — HYDROCODONE BITARTRATE AND ACETAMINOPHEN 10; 325 MG/1; MG/1
1 TABLET ORAL EVERY 6 HOURS PRN
Status: DISCONTINUED | OUTPATIENT
Start: 2018-01-10 | End: 2018-01-14

## 2018-01-10 RX ORDER — DIPHENOXYLATE HYDROCHLORIDE AND ATROPINE SULFATE 2.5; .025 MG/1; MG/1
1 TABLET ORAL 4 TIMES DAILY PRN
Status: DISCONTINUED | OUTPATIENT
Start: 2018-01-10 | End: 2018-01-19 | Stop reason: HOSPADM

## 2018-01-10 RX ADMIN — LEVOTHYROXINE SODIUM 50 MCG: 50 TABLET ORAL at 05:01

## 2018-01-10 RX ADMIN — DIPHENOXYLATE HYDROCHLORIDE AND ATROPINE SULFATE 1 TABLET: 2.5; .025 TABLET ORAL at 03:01

## 2018-01-10 RX ADMIN — HYDROCODONE BITARTRATE AND ACETAMINOPHEN 1 TABLET: 7.5; 325 TABLET ORAL at 12:01

## 2018-01-10 RX ADMIN — LISINOPRIL 40 MG: 20 TABLET ORAL at 09:01

## 2018-01-10 RX ADMIN — AMITRIPTYLINE HYDROCHLORIDE 100 MG: 25 TABLET, FILM COATED ORAL at 08:01

## 2018-01-10 NOTE — ASSESSMENT & PLAN NOTE
-Per above, concern for AIDP  -Possible component of chronic peripheral neuropathy though Hgb A1c 5.2%, appears to have been well-controlled in the past.  B12 wnl, B1 pending.

## 2018-01-10 NOTE — PROGRESS NOTES
[facility]  Neurology  Progress Note    Patient Name: Melissa Anand  MRN: [id number]  Admission Date: 1/9/2018  Hospital Length of Stay: 1 days  Code Status: Prior   Attending Provider: Titi Perez MD  Primary Care Physician: Dana Castellon MD   Principal Problem:<principal problem not specified>      Subjective:     Interval History:   Patient notes no changes this am.  Denies change in movement of BLE.  Discussed MRI L spine findings of post-operative changes and bilateral foraminal narrowing not consistent with exam findings. Plan for LP, patient and family member at bedside agreeable.      Current Neurological Medications: Amitriptyline 100 mg po qHS, hydrocodone-APAP  mg po q6h prn pain    Current Facility-Administered Medications   Medication Dose Route Frequency Provider Last Rate Last Dose    amitriptyline tablet 100 mg  100 mg Oral QHS Titi Perez MD   100 mg at 01/09/18 2109    diphenoxylate-atropine 2.5-0.025 mg per tablet 1 tablet  1 tablet Oral QID PRN Titi Perez MD   1 tablet at 01/10/18 1537    hydrocodone-acetaminophen 10-325mg per tablet 1 tablet  1 tablet Oral Q6H PRN Titi Perez MD        levothyroxine tablet 50 mcg  50 mcg Oral Before breakfast Titi Perez MD   50 mcg at 01/10/18 0532    lisinopril tablet 40 mg  40 mg Oral Daily Titi Perez MD   40 mg at 01/10/18 0922    sodium chloride 0.9% flush 3 mL  3 mL Intravenous PRN Titi Perez MD         Review of Systems   Constitutional: Negative for chills and fever.   Eyes: Negative for photophobia and visual disturbance.   Respiratory: Negative for cough and shortness of breath.    Gastrointestinal: Negative for constipation, diarrhea, nausea and vomiting.   Musculoskeletal: Positive for back pain and gait problem.   Skin: Negative for rash and wound.   Neurological: Positive for weakness and numbness. Negative for headaches.   Hematological: Negative for adenopathy. Does not bruise/bleed  easily.   Psychiatric/Behavioral: Positive for decreased concentration. Negative for agitation and confusion.     Objective:     Vital Signs (Most Recent):  Temp: 97.5 °F (36.4 °C) (01/10/18 1548)  Pulse: 78 (01/10/18 1548)  Resp: 18 (01/10/18 1548)  BP: (!) 121/57 (01/10/18 1548)  SpO2: 95 % (01/10/18 1548) Vital Signs (24h Range):  Temp:  [97.5 °F (36.4 °C)-98.6 °F (37 °C)] 97.5 °F (36.4 °C)  Pulse:  [71-88] 78  Resp:  [18-20] 18  SpO2:  [95 %-98 %] 95 %  BP: (121-170)/(57-74) 121/57     Weight: 112.6 kg (248 lb 3.8 oz)  Body mass index is 34.62 kg/m².    Physical Exam  General:  Well-developed, well-nourished, nad  HEENT:  NCAT, PERRLA, EOMI, oropharyngeal membranes non-erythematous/without exudate  Neck:  Supple, normal ROM without nuchal rigidity  Resp:  Symmetric expansion, no increased wob  CVS:  No LE edema, peripheral pulses 2+ (radial, dorsalis pedis)  GI:  Abd soft, non-distended, non-tender to palpation  Neurologic Exam:  Mental Status:  AAOx3.  Speech, thought content appropriate.  Recent, remote recall 3/3.  Cranial Nerves:  VFs intact on moving fingers in all quadrants bilaterally.  PERRLA, EOMI.  Facial movement, sensation intact and symmetric.  Palate raises symmetrically, tongue protrudes midline.  Trapezius, SCM strength 5/5 bilaterally.  Motor:  Normal bulk and tone, no apparent atrophy or fasciculations.  BUE shoulder abduction, biceps/triceps, wrist flexion/extension,  strength 4-/5.  BLE hip flexors 2/5, knee flexion/extension 1/5, plantarflexion/dorsiflexion 1/5.    Sensory:  Patient has no sensation to light touch or temperature in BLE up to upper thigh.  Light touch at BUE intact without inattention.  Vibratory sensation diminished with patient reported no sensation at BLE knees.  Vibratory sensation intact at BUE digits.  Reflexes:  Areflexic patellar L, Achilles bilaterally. R patellar reflex 1+ today, not appreciated on initial exam. Biceps, brachioradialis 2+ and symmetric.  No  ankle clonus.  Equivocal toe bilaterally.  Coordination:  FNF, KAMRON intact with no dysmetria/ataxia/dysdiadochokinesia.  HTS deferred 2/2 weakness.  No resting tremor.  Gait:  Deferred 2/2 fall precautions     Significant Labs:     Recent Labs  Lab 01/10/18  0447   WBC 4.45   RBC 2.81*   HGB 9.2*   HCT 28.1*      *   MCH 32.7*   MCHC 32.7       Recent Labs  Lab 01/10/18  0447   CALCIUM 8.1*      K 3.9   CO2 26      BUN 16   CREATININE 0.9     Significant Imagin18 MRI L spine w/ w/o contrast:  Postoperative changes from L3-L4 laminectomy.  Multilevel degenerative changes of the lumbar spine most significant at L3-L4 with moderate to severe bilateral neuroforaminal narrowing.  Additional findings discussed level by level above.     18 CT head w/o contrast:  No evidence of acute hemorrhage or major vascular distribution infarct.  Mild chronic ischemic change as above.  Prominence ossification lateral to the left anterior clinoid process and cavernous sinus suggesting the possibility of a calcified meningioma. This is without significant mass effect on the brain parenchyma.    Assessment and Plan:     Weakness    -Exam is most concerning for AIDP, less likely transverse myelitis.  -MRI Lumbar Spine w/ w/o contrast with chronic changes, none consistent with current exam.  -LP 18 under Fluoro--cell count & diff, protein, glucose, VDRL, MS profile, freeze & hold.  -CRP elevated to 9.6.  Low suspicion for myopathy or myositis causing weakness.     -CPK, ESR ordered in ED never collected.  -Recommend PT/OT for ROM, deconditioning.      Areflexia    -Per above, concern for AIDP  -Possible component of chronic peripheral neuropathy though Hgb A1c 5.2%, appears well-controlled in the past.  B12 wnl, B1 pending.      Peripheral sensory neuropathy    -Chronic diabetic neuropathy reported by pt, recent severe sensory loss in BLE.  -Hgb A1c 5.2%, vitamin B12 wnl.  Vitamin B1  appropriate.    -TSH low with normal FT4--appropriate for levothyroxine use.     VTE Risk Mitigation         Ordered     Low Risk of VTE  Once      01/09/18 2057        Elva Diana MD  Neurology  [facility]

## 2018-01-10 NOTE — PT/OT/SLP EVAL
Occupational Therapy   Evaluation    Name: Melissa Anand  MRN: [id number]  Admitting Diagnosis:  <principal problem not specified>      Recommendations:     Discharge Recommendations: nursing facility, skilled  Discharge Equipment Recommendations:  none  Barriers to discharge:  None    History:     Occupational Profile:  Living Environment: Pt lives in a [facility] w/ daughter, granddaughter, and son-in-law.   Previous level of function: assist w/ ADLs and mod I w/ spc for household distances.   Roles and Routines: N/A  Equipment Owned:  cane, straight, 3-in-1 commode  Assistance upon Discharge: pt's frediyl can assist    Past Medical History:   Diagnosis Date    Anemia     Colitis     hospitalized July 2014    Diabetes mellitus     Encounter for blood transfusion     Goiter     Hypertension     Left hip pain 10/12/14    Syncope        Past Surgical History:   Procedure Laterality Date    BACK SURGERY      THYROIDECTOMY      TOE AMPUTATION Left        Subjective     Chief Complaint: debility  Patient/Family stated goals: return to PLOF  Communicated with: RN prior to session.  Pain/Comfort:  · Pain Rating 1: 0/10  · Pain Rating Post-Intervention 1: 0/10    Objective:     Patient found with:      General Precautions: Standard, fall   Orthopedic Precautions:N/A   Braces: N/A     Occupational Performance:    Bed Mobility:    · Patient completed Rolling/Turning to Left with  total assistance  · Patient completed Rolling/Turning to Right with total assistance  · Patient completed Scooting/Bridging with total assistance  · Patient completed Supine to Sit with total assistance  · Patient completed Sit to Supine with total assistance    Functional Mobility/Transfers:  · Not appropriate at this time d/t poor trunk control.     Activities of Daily Living:  · LB Dressing: total assistance donned/doffed socks    Cognitive/Visual Perceptual:  Cognitive/Psychosocial Skills:     -       Oriented to: Person and Place   -        "Follows Commands/attention:Follows one-step commands  -       Communication: clear/fluent and pt did appear confused   -       Memory: pt appeared confused and disoriented  -       Safety awareness/insight to disability: impaired   -       Mood/Affect/Coping skills/emotional control: Flat affect  Visual/Perceptual:      -Intact    Physical Exam:  Balance:    -       pt displayed poor overall sitting balance   Postural examination/scapula alignment:    -       Rounded shoulders  -       Forward head  Skin integrity: Visible skin intact  Upper Extremity Range of Motion:     -       Right Upper Extremity: ~10 degrees of AROM for flx  -       Left Upper Extremity: ~5 degrees of AROM for flx  Upper Extremity Strength:    -       Right Upper Extremity: 2+/5  -       Left Upper Extremity: 2+/5    Patient left HOB elevated with all lines intact, call button in reach and daughter present    [facility] 6 Click:  [facility] Total Score: 6    Treatment & Education:  Pt sat EOB ~89 minutes at total assist.  Pt and pt's daughter were educated on POC.  Education:    Assessment:     Melissa Anand is a 72 y.o. female with a medical diagnosis of <principal problem not specified>.  She presents with impairments listed below.  Pt would benefit from skilled OT services to improve independence and overall occupational functioning.      Performance deficits affecting function are weakness, impaired endurance, impaired self care skills, impaired functional mobilty, gait instability, impaired balance, decreased upper extremity function, decreased lower extremity function.      Rehab Prognosis:  fair; patient would benefit from acute skilled OT services to address these deficits and reach maximum level of function.         Clinical Decision Makin.  OT Low:  "Pt evaluation falls under low complexity for evaluation coding due to performance deficits noted in 1-3 areas as stated above and 0 co-morbities affecting current functional status. Data " "obtained from problem focused assessments. No modifications or assistance was required for completion of evaluation. Only brief occupational profile and history review completed."     Plan:     Patient to be seen 3 x/week to address the above listed problems via self-care/home management, therapeutic exercises, therapeutic activities  · Plan of Care Expires: 02/10/18  · Plan of Care Reviewed with: patient, daughter    This Plan of care has been discussed with the patient who was involved in its development and understands and is in agreement with the identified goals and treatment plan    GOALS:    Occupational Therapy Goals        Problem: Occupational Therapy Goal    Goal Priority Disciplines Outcome Interventions   Occupational Therapy Goal     OT, PT/OT     Description:  Goals to be met by: 1/17/2017     Patient will increase functional independence with ADLs by performing:    UE Dressing with Moderate Assistance.  LE Dressing with Maximum Assistance.  Grooming while seated with Minimal Assistance.  Toileting from bedside commode with Maximum Assistance for hygiene and clothing management.   Toilet transfer to bedside commode with Maximum Assistance.                      Time Tracking:     OT Date of Treatment: 01/10/18  OT Start Time: 0821  OT Stop Time: 0840  OT Total Time (min): 19 min    Billable Minutes:Evaluation 19 minutes    Roc Cruz, OT  1/10/2018    "

## 2018-01-10 NOTE — PT/OT/SLP EVAL
Physical Therapy Evaluation    Patient Name:  Melissa Anand   MRN:  [id number]    Recommendations:     Discharge Recommendations:  nursing facility, skilled   Discharge Equipment Recommendations: wheelchair, walker, rolling   Barriers to discharge: Inaccessible home and Decreased caregiver support    Assessment:     Melissa Anand is a 72 y.o. female admitted with a medical diagnosis of <principal problem not specified>.  She presents with the following impairments/functional limitations:  weakness, impaired functional mobilty, impaired endurance, impaired sensation, impaired self care skills, gait instability, impaired balance, decreased lower extremity function, decreased safety awareness, decreased ROM.  Pt demonstrates decreased functional mobility and decreased tolerance for activity secondary to BLE weakness and fatigue.  Pt required total assist for all bed mobility during evaluation.  Pt demonstrates no active movement of BLE and unable to sit EOB without total assistance.  Pt also demonstrates impaired light touch sensation below B knees.  Pt would benefit from skilled acute PT while admitted to improve functional mobility and independence with activities.  Pt would also benefit from SNF following d/c from hospital once medically cleared to improve to PLOF.    Rehab Prognosis:  fair; patient would benefit from acute skilled PT services to address these deficits and reach maximum level of function.      Recent Surgery: * No surgery found *      Plan:     During this hospitalization, patient to be seen 3 x/week to address the above listed problems via gait training, therapeutic activities, therapeutic exercises, neuromuscular re-education  · Plan of Care Expires:  02/10/18   Plan of Care Reviewed with: patient, daughter    Subjective     Communicated with nursing and OT prior to session.  Patient found supine and daughter in room upon PT entry to room, agreeable to evaluation.      Chief Complaint: BLE weakness;  decreased functional  mobility  Patient comments/goals: to get better and return to PLOF  Pain/Comfort:  · Pain Rating 1: 0/10    Patients cultural, spiritual, Mormon conflicts given the current situation: none stated    Living Environment:  Pt lives in 1 story home [address] with daughter, son-in-law and granddaughter.    Prior to admission, patients level of function was mod I with mobility of household distances w cane.  Patient has the following equipment: 3-in-1 commode, cane, straight.  DME owned (not currently used): none.  Upon discharge, patient will have assistance from daughter as needed.    Objective:     Patient found with: peripheral IV     General Precautions: Standard, fall   Orthopedic Precautions:N/A   Braces: N/A     Exams:  · Cognitive Exam:  Patient is oriented to Person and Place and follows 100% of 1 step  commands   · Sensation:    · -       Impaired  light/touch below B knees  · RLE ROM: WFL  · RLE Strength: 1/5  · LLE ROM: WFL  · LLE Strength: 1/5    Functional Mobility:  · Bed Mobility:     · Rolling Left:  total assistance  · Rolling Right: total assistance  · Scooting: total assistance  · Supine to Sit: total assistance    AM-PAC 6 CLICK MOBILITY  Total Score:6       Patient left supine with all lines intact, call button in reach, nursing notified and daughter present.    GOALS:    Physical Therapy Goals        Problem: Physical Therapy Goal    Goal Priority Disciplines Outcome Goal Variances Interventions   Physical Therapy Goal     PT/OT, PT Ongoing (interventions implemented as appropriate)     Description:  Goals to be met by: 2018     Patient will increase functional independence with mobility by performin. Supine to sit with Maximum Assistance  2. Rolling to Left with Maximum Assistance.  3. Sit to stand transfer with Maximum Assistance  4. Sitting at edge of bed x5 minutes with Moderate Assistance                      History:     Past Medical History:   Diagnosis  Date    Anemia     Colitis     hospitalized July 2014    Diabetes mellitus     Encounter for blood transfusion     Goiter     Hypertension     Left hip pain 10/12/14    Syncope        Past Surgical History:   Procedure Laterality Date    BACK SURGERY      THYROIDECTOMY      TOE AMPUTATION Left        Clinical Decision Making:     History  Co-morbidities and personal factors that may impact the plan of care Examination  Body Structures and Functions, activity limitations and participation restrictions that may impact the plan of care Clinical Presentation   Decision Making/ Complexity Score   Co-morbidities:   [] Time since onset of injury / illness / exacerbation  [] Status of current condition  []Patient's cognitive status and safety concerns    [] Multiple Medical Problems (see med hx)  Personal Factors:   [] Patient's age  [x] Prior Level of function   [] Patient's home situation (environment and family support)  [] Patient's level of motivation  [] Expected progression of patient      HISTORY:(criteria)    [x] 26682 - no personal factors/history    [] 96488 - has 1-2 personal factor/comorbidity     [] 14806 - has >3 personal factor/comorbidity     Body Regions:  [] Objective examination findings  [] Head     []  Neck  [] Trunk   [] Upper Extremity  [x] Lower Extremity    Body Systems:  [] For communication ability, affect, cognition, language, and learning style: the assessment of the ability to make needs known, consciousness, orientation (person, place, and time), expected emotional /behavioral responses, and learning preferences (eg, learning barriers, education  needs)  [] For the neuromuscular system: a general assessment of gross coordinated movement (eg, balance, gait, locomotion, transfers, and transitions) and motor function  (motor control and motor learning)  [x] For the musculoskeletal system: the assessment of gross symmetry, gross range of motion, gross strength, height, and weight  []  For the integumentary system: the assessment of pliability(texture), presence of scar formation, skin color, and skin integrity  [] For cardiovascular/pulmonary system: the assessment of heart rate, respiratory rate, blood pressure, and edema     Activity limitations:    [] Patient's cognitive status and saf ety concerns          [] Status of current condition      [] Weight bearing restriction  [] Cardiopulmunary Restriction    Participation Restrictions:   [] Goals and goal agreement with the patient     [] Rehab potential (prognosis) and probable outcome      Examination of Body System: (criteria)    [x] 92183 - addressing 1-2 elements    [] 27092 - addressing a total of 3 or more elements     [] 67675 -  Addressing a total of 4 or more elements         Clinical Presentation: (criteria)  Stable - 64788     On examination of body system using standardized tests and measures patient presents with 1-2 elements from any of the following: body structures and functions, activity limitations, and/or participation restrictions.  Leading to a clinical presentation that is considered stable and/or uncomplicated                              Clinical Decision Making  (Eval Complexity):  Low- 61662     Time Tracking:     PT Received On: 01/10/18  PT Start Time: 0820     PT Stop Time: 0840  PT Total Time (min): 20 min     Billable Minutes: Evaluation 20 min      Mike Calvin, PT  01/10/2018

## 2018-01-10 NOTE — PLAN OF CARE
Problem: Occupational Therapy Goal  Goal: Occupational Therapy Goal  Goals to be met by: 1/17/2017     Patient will increase functional independence with ADLs by performing:    UE Dressing with Moderate Assistance.  LE Dressing with Maximum Assistance.  Grooming while seated with Minimal Assistance.  Toileting from bedside commode with Maximum Assistance for hygiene and clothing management.   Toilet transfer to bedside commode with Maximum Assistance.    Initiate OT POC     Comments: Roc Cruz OTR/L  1/10/2018

## 2018-01-10 NOTE — ASSESSMENT & PLAN NOTE
-Chronic diabetic neuropathy reported by pt, recent severe sensory loss in BLE.  -Hgb A1c 5.2%, vitamin B12 wnl.  Vitamin B1 appropriate.    -TSH low with normal FT4--appropriate for levothyroxine use.

## 2018-01-10 NOTE — PLAN OF CARE
Spoke w pt and daughter. Daughter Marilia reports that pt has services  [phone] cm left ms and somebody is going to call either me or MSW back w more info about services provided to the pt. ?Medicaid Waiver perhaps  Pt wants a rollator; dr rogers notified at [facility]  Pt lives w daughter, daughter's , and grandchildren x 2  Pt has ride when needed  Uses a walker  PT/OT ordered . Neuro cx    Update: 11:04 CM spoke with rep from [phone] who provide 120 hrs a month or 30 hrs a week of basic care/ADLs needs service. They see pt qd  They provide longterm care from her insurance provider  HEAVEN Velázquez notified       01/10/18 1027   Discharge Assessment   Assessment Type Discharge Planning Assessment   Confirmed/corrected address and phone number on facesheet? Yes   Assessment information obtained from? Patient;Caregiver   Communicated expected length of stay with patient/caregiver yes   Prior to hospitilization cognitive status: Alert/Oriented   Prior to hospitalization functional status: Assistive Equipment;Needs Assistance   Current cognitive status: Alert/Oriented   Current Functional Status: Assistive Equipment;Needs Assistance   Lives With child(autumn), adult   Able to Return to Prior Arrangements yes   Is patient able to care for self after discharge? No   Who are your caregiver(s) and their phone number(s)? rené washington daughters  [phone]       Readmission Within The Last 30 Days no previous admission in last 30 days   Patient currently being followed by outpatient case management? No   Patient currently receives any other outside agency services? Yes   Equipment Currently Used at Home 3-in-1 commode   Do you have any problems affording any of your prescribed medications? No   Is the patient taking medications as prescribed? yes   Does the patient have transportation home? Yes  (medicaid transportation/family)   Does the patient receive services at the [facility]? No    Discharge Plan A Other   Discharge Plan B Other   Patient/Family In Agreement With Plan yes        01/10/18 1027   Discharge Assessment   Assessment Type Discharge Planning Assessment   Confirmed/corrected address and phone number on facesheet? Yes   Assessment information obtained from? Patient;Caregiver   Communicated expected length of stay with patient/caregiver yes   Prior to hospitilization cognitive status: Alert/Oriented   Prior to hospitalization functional status: Assistive Equipment;Needs Assistance   Current cognitive status: Alert/Oriented   Current Functional Status: Assistive Equipment;Needs Assistance   Lives With child(autumn), adult   Able to Return to Prior Arrangements yes   Is patient able to care for self after discharge? No   Who are your caregiver(s) and their phone number(s)? shanda jennifer iglesia zamarripa  [phone]       Readmission Within The Last 30 Days no previous admission in last 30 days   Patient currently being followed by outpatient case management? No   Patient currently receives any other outside agency services? Yes   Equipment Currently Used at Home 3-in-1 commode   Do you have any problems affording any of your prescribed medications? No   Is the patient taking medications as prescribed? yes   Does the patient have transportation home? Yes  (medicaid transportation/family)   Does the patient receive services at the [facility]? No   Discharge Plan A Other   Discharge Plan B Other   Patient/Family In Agreement With Plan yes

## 2018-01-10 NOTE — NURSING
Pt arrived via transport by hospital stretcher via transport. Pt oriented to room and new surroundings, no acute distress noted. No complaints of pain or discomfort.

## 2018-01-10 NOTE — PROCEDURES
"Melissa Anand is a 72 y.o. female patient.    Temp: 98.6 °F (37 °C) (01/10/18 0811)  Pulse: 74 (01/10/18 0811)  Resp: 18 (01/10/18 0811)  BP: 123/60 (01/10/18 0811)  SpO2: 96 % (01/10/18 0811)  Weight: 112.6 kg (248 lb 3.8 oz) (01/09/18 2100)  Height: 5' 11" (180.3 cm) (01/09/18 2100)       Lumbar Puncture  Date/Time: 1/10/2018 11:59 AM  Location procedure was performed: [facility] NEUROLOGY  Performed by: ANA DIANA  Authorized by: ANA DIANA   Assisting provider: MALISSA PEREZ III  Pre-operative diagnosis:  BLE weakness  Post-operative diagnosis: unchanged  Consent Done: Yes  Indications: evaluation for infection (Possible AIDP)  Anesthesia: local infiltration    Anesthesia:  Local Anesthetic: lidocaine 2% without epinephrine  Anesthetic total: 5 mL  Patient sedated: no  Lumbar space: L4-L5 interspace  Patient's position: left lateral decubitus  Needle gauge: 18  Needle type: spinal needle - Quincke tip  Needle length: 3.5 in  Number of attempts: 1  Fluid appearance: Did not obtain CSF, blood return from Corrie's plexus.  Tubes of fluid: 0.  Post-procedure: site cleaned and pressure dressing applied  Complications: No  Estimated blood loss (mL): 0.5  Specimens: No  Implants: No  Patient tolerance: Patient tolerated the procedure well with no immediate complications  Comments: Will recommend IR Lumbar Puncture due to technically difficult procedure related to body habitus, previous surgeries        Ana Diana  1/10/2018    "

## 2018-01-10 NOTE — H&P
"[facility] Hospital Medicine  History & Physical    Patient Name: Melissa Anand  MRN: [id number]  Admission Date: 1/9/2018  Attending Physician: Titi Perez MD  Primary Care Provider: Dana Castellon MD    [facility] Medicine Team: [facility] HOSP MED A Titi Perez MD     Patient information was obtained from patient and ER records.     Subjective:     Principal Problem:<principal problem not specified>    Chief Complaint:   Chief Complaint   Patient presents with    Fatigue     Generalized weakness and slurred speech started at 1700 yesterday.         HPI: 73 yo F with PMHx pre-DM, HTN, and chronic low back pain who presents with progression of BLE weakness.  Patient's daughter at bedside assists with history.  Patient has been walking with a cane at baseline for past 2 years and has had worsening of BLE weakness for past 2-3 months with inability to get out of bed over the past weekend.  She denies recent infections--no SOB/cough, no n/v/c/d, no sick contacts, no URI Sx. Pt w/ acute worsening this am, she could not move her Les and needed assistance to raise them off the bed. Called EMS when daughter had difficulty assisting.     Has some diabetic peripheral neuropathy at baseline, but states that she hadn't had falls with her neuropathy prior to last two months.  Denies bowel/bladder incontinence (has chronic problems w/ constipation and Bms often loose when able to go, will go days w/o urinating and has gone 3x times since ED presentation), no saddle anesthesia, has sensory level to knee b/l. Patient has been getting injections in her back for chronic low back pain every 2 wks, last was few wks ago.  Daughter states that she had gotten some steroid injections but they did try an "epidural medication" a couple weeks ago.  She has been getting injections approximately every two weeks for past 3 months.  Patient states she still has low back pain with some radiation into the BLE but is unable to " characterize the pain. States injections not working, but no sig pain now.     Past Medical History:   Diagnosis Date    Anemia     Colitis     hospitalized July 2014    Diabetes mellitus     Encounter for blood transfusion     Goiter     Hypertension     Left hip pain 10/12/14    Syncope        Past Surgical History:   Procedure Laterality Date    BACK SURGERY      THYROIDECTOMY      TOE AMPUTATION Left        Review of patient's allergies indicates:  No Known Allergies    No current facility-administered medications on file prior to encounter.      Current Outpatient Prescriptions on File Prior to Encounter   Medication Sig    gabapentin (NEURONTIN) 300 mg tablet Take 300 mg by mouth every morning.     lisinopril (PRINIVIL,ZESTRIL) 40 MG tablet Take 1 tablet (40 mg total) by mouth once daily.    amitriptyline (ELAVIL) 100 MG tablet Take 100 mg by mouth every evening.    gabapentin (NEURONTIN) 600 MG tablet Take 600 mg by mouth every evening.    hydrocodone-acetaminophen 7.5-325mg (NORCO) 7.5-325 mg per tablet Take 1 tablet by mouth every 8 (eight) hours as needed for Pain.    lactobacillus acidophilus & bulgar (LACTINEX) 100 million cell packet Take 1 packet (1 each total) by mouth 2 (two) times daily.    levothyroxine (SYNTHROID) 50 MCG tablet Take 1 tablet (50 mcg total) by mouth before breakfast.    quetiapine (SEROQUEL) 300 MG Tab Take 400 mg by mouth every evening.     tramadol (ULTRAM) 50 mg tablet Take 50 mg by mouth 2 (two) times daily.     Family History     Problem Relation (Age of Onset)    Cancer Son, Mother    Coronary artery disease     Diabetes         Social History Main Topics    Smoking status: Former Smoker     Years: 30.00     Types: Cigarettes     Quit date: 5/20/2014    Smokeless tobacco: Not on file    Alcohol use Yes      Comment: occassionally     Drug use: No    Sexual activity: Not Currently     Review of Systems   Constitutional: Positive for activity change.  Negative for appetite change, chills, diaphoresis and fever.   HENT: Negative for congestion, rhinorrhea, sinus pain, sneezing and sore throat.    Eyes: Negative for itching.   Respiratory: Negative for cough and shortness of breath.    Cardiovascular: Negative for chest pain.   Gastrointestinal: Positive for constipation. Negative for abdominal pain, blood in stool, nausea and vomiting.   Genitourinary: Negative for dysuria and flank pain.   Musculoskeletal: Positive for back pain.   Skin: Negative for color change and rash.   Neurological: Positive for weakness and numbness. Negative for dizziness, facial asymmetry, light-headedness and headaches.     Objective:     Vital Signs (Most Recent):  Temp: 97.6 °F (36.4 °C) (01/09/18 2006)  Pulse: 88 (01/09/18 2006)  Resp: 18 (01/09/18 2006)  BP: (!) 170/74 (01/09/18 2006)  SpO2: 98 % (01/09/18 2006) Vital Signs (24h Range):  Temp:  [97.6 °F (36.4 °C)-97.7 °F (36.5 °C)] 97.6 °F (36.4 °C)  Pulse:  [83-92] 88  Resp:  [18] 18  SpO2:  [98 %-100 %] 98 %  BP: (131-171)/(61-77) 170/74     Weight: 99.8 kg (220 lb)  Body mass index is 33.45 kg/m².    Physical Exam    General:  Well-developed, well-nourished, nad  HEENT:  NCAT, PERRLA, EOMI, oropharyngeal membranes non-erythematous/without exudate  Neck:  Supple, normal ROM without nuchal rigidity  Resp:  Symmetric expansion, no increased wob  CVS:  No LE edema, peripheral pulses 2+ (radial, dorsalis pedis)  GI:  Abd soft, non-distended, non-tender to palpation  Neurologic Exam:  Mental Status:  AAOx3.  Speech, thought content appropriate.   Cranial Nerves:  mostly intact. Weak trapezius, SCM strength bilaterally.  Motor:  Normal bulk and tone, no apparent atrophy or fasciculations.  BUE shoulder abduction, biceps/triceps, wrist flexion/extension,  strength 4-/5.  BLE hip flexors 2/5, knee flexion/extension 1/5, plantarflexion/dorsiflexion 1/5.    Sensory:  Patient has no sensation to light touch or temperature in BLE up to  knee (lower on R, upper on L)  Reflexes:  Areflexic patellar, Achilles  Gait not assessed    Significant Labs: All pertinent labs within the past 24 hours have been reviewed.    Significant Imaging: I have reviewed all pertinent imaging results/findings within the past 24 hours.    Assessment/Plan:     Active Diagnoses:    Diagnosis Date Noted POA    Weakness [R53.1] 01/09/2018 Yes    Peripheral sensory neuropathy [G62.9] 01/09/2018 Unknown    Areflexia [R29.2] 01/09/2018 Unknown      Problems Resolved During this Admission:    Diagnosis Date Noted Date Resolved POA     VTE Risk Mitigation         Ordered     Low Risk of VTE  Once      01/09/18 2057        Weakness     -Exam is most concerning for AIDP vs transverse myelitis. Possible epidural or verterbal abscess related to recent injection but ESR nl and CRP elevated to 9 w/o tenderness at back.   -MRI Lumbar Spine w/ w/o contrast: degen changes, e/o prior laminectomy (greater than 7y ago), mod-severe b/l foraminal narrowing at L3-4.   -LP 01/10/18 with Neurology--cell count & diff, protein, glucose, VDRL, MS profile, freeze & hold.  -PT/OT for ROM, deconditioning.  -Further recs pending results of imaging and labs.                 Peripheral sensory neuropathy     -Neuro concerned for chronic diabetic neuropathy with more severe acute sensory loss in BLE. But pt never w/ elevated A1c in our system above 5.8  -Hgb A1c, vitamin B1, vitamin B12.  -TSH low with normal FT4--appropriate for levothyroxine use.      Hypothyroid  Synthroid 50    Chronic pain  Gabapentin 300mg tid, elavil 100 QHS, percocet 7.5 Q6h prn - home meds listed in system, pt and daughter not entirely clear on dosing.     HTN  Lisinopril 40          VTE Risk Mitigation      None         Titi Perez MD  Department of Hospital Medicine   [facility]

## 2018-01-10 NOTE — ASSESSMENT & PLAN NOTE
-Exam is most concerning for AIDP, less likely transverse myelitis.  -MRI Lumbar Spine w/ w/o contrast with chronic changes, none consistent with current exam.  -LP 01/11/18 under Fluoro--cell count & diff, protein, glucose, VDRL, MS profile, freeze & hold.  -CRP elevated to 9.6.  Low suspicion for myopathy or myositis causing weakness.     -CPK, ESR ordered in ED never collected.  -Recommend PT/OT for ROM, deconditioning.

## 2018-01-10 NOTE — PLAN OF CARE
Problem: Patient Care Overview  Goal: Plan of Care Review  Outcome: Ongoing (interventions implemented as appropriate)  Patient remains free from falls and injury this shift. Bed in low, locked position with call light in reach. Family at bedside. Patient encouraged to call for assistance when getting out of bed. Patient verbalized understanding. Pt had complaints of left leg/foot pain, given pain medication per MAR. Pt continued to experience chronic pain. Heat packs applied with mild relief. Pure wick started as pt was uncomfortable using the blue pads. All belongings within reach will continue to monitor.'

## 2018-01-10 NOTE — SUBJECTIVE & OBJECTIVE
Subjective:     Interval History:   Patient notes no changes this am.  Denies change in movement of BLE.  Discussed MRI L spine findings of post-operative changes and bilateral foraminal narrowing not consistent with exam findings. Plan for LP, patient and family member at bedside agreeable.      Current Neurological Medications: Amitriptyline 100 mg po qHS, hydrocodone-APAP  mg po q6h prn pain    Current Facility-Administered Medications   Medication Dose Route Frequency Provider Last Rate Last Dose    amitriptyline tablet 100 mg  100 mg Oral QHS Titi Perez MD   100 mg at 01/09/18 2109    diphenoxylate-atropine 2.5-0.025 mg per tablet 1 tablet  1 tablet Oral QID PRN Titi Perez MD   1 tablet at 01/10/18 1537    hydrocodone-acetaminophen 10-325mg per tablet 1 tablet  1 tablet Oral Q6H PRN Titi Perez MD        levothyroxine tablet 50 mcg  50 mcg Oral Before breakfast Titi Perez MD   50 mcg at 01/10/18 0532    lisinopril tablet 40 mg  40 mg Oral Daily Titi Perez MD   40 mg at 01/10/18 0922    sodium chloride 0.9% flush 3 mL  3 mL Intravenous PRN Titi Perez MD         Review of Systems   Constitutional: Negative for chills and fever.   Eyes: Negative for photophobia and visual disturbance.   Respiratory: Negative for cough and shortness of breath.    Gastrointestinal: Negative for constipation, diarrhea, nausea and vomiting.   Musculoskeletal: Positive for back pain and gait problem.   Skin: Negative for rash and wound.   Neurological: Positive for weakness and numbness. Negative for headaches.   Hematological: Negative for adenopathy. Does not bruise/bleed easily.   Psychiatric/Behavioral: Positive for decreased concentration. Negative for agitation and confusion.     Objective:     Vital Signs (Most Recent):  Temp: 97.5 °F (36.4 °C) (01/10/18 1548)  Pulse: 78 (01/10/18 1548)  Resp: 18 (01/10/18 1548)  BP: (!) 121/57 (01/10/18 1548)  SpO2: 95 % (01/10/18 1548) Vital Signs  (24h Range):  Temp:  [97.5 °F (36.4 °C)-98.6 °F (37 °C)] 97.5 °F (36.4 °C)  Pulse:  [71-88] 78  Resp:  [18-20] 18  SpO2:  [95 %-98 %] 95 %  BP: (121-170)/(57-74) 121/57     Weight: 112.6 kg (248 lb 3.8 oz)  Body mass index is 34.62 kg/m².    Physical Exam  General:  Well-developed, well-nourished, nad  HEENT:  NCAT, PERRLA, EOMI, oropharyngeal membranes non-erythematous/without exudate  Neck:  Supple, normal ROM without nuchal rigidity  Resp:  Symmetric expansion, no increased wob  CVS:  No LE edema, peripheral pulses 2+ (radial, dorsalis pedis)  GI:  Abd soft, non-distended, non-tender to palpation  Neurologic Exam:  Mental Status:  AAOx3.  Speech, thought content appropriate.  Recent, remote recall 3/3.  Cranial Nerves:  VFs intact on moving fingers in all quadrants bilaterally.  PERRLA, EOMI.  Facial movement, sensation intact and symmetric.  Palate raises symmetrically, tongue protrudes midline.  Trapezius, SCM strength 5/5 bilaterally.  Motor:  Normal bulk and tone, no apparent atrophy or fasciculations.  BUE shoulder abduction, biceps/triceps, wrist flexion/extension,  strength 4-/5.  BLE hip flexors 2/5, knee flexion/extension 1/5, plantarflexion/dorsiflexion 1/5.    Sensory:  Patient has no sensation to light touch or temperature in BLE up to upper thigh.  Light touch at BUE intact without inattention.  Vibratory sensation diminished with patient reported no sensation at BLE knees.  Vibratory sensation intact at BUE digits.  Reflexes:  Areflexic patellar L, Achilles bilaterally. R patellar reflex 1+ today, not appreciated on initial exam. Biceps, brachioradialis 2+ and symmetric.  No ankle clonus.  Equivocal toe bilaterally.  Coordination:  FNF, KAMRON intact with no dysmetria/ataxia/dysdiadochokinesia.  HTS deferred 2/2 weakness.  No resting tremor.  Gait:  Deferred 2/2 fall precautions     Significant Labs:     Recent Labs  Lab 01/10/18  0447   WBC 4.45   RBC 2.81*   HGB 9.2*   HCT 28.1*      MCV  100*   MCH 32.7*   MCHC 32.7       Recent Labs  Lab 01/10/18  0447   CALCIUM 8.1*      K 3.9   CO2 26      BUN 16   CREATININE 0.9     Significant Imagin18 MRI L spine w/ w/o contrast:  Postoperative changes from L3-L4 laminectomy.  Multilevel degenerative changes of the lumbar spine most significant at L3-L4 with moderate to severe bilateral neuroforaminal narrowing.  Additional findings discussed level by level above.     18 CT head w/o contrast:  No evidence of acute hemorrhage or major vascular distribution infarct.  Mild chronic ischemic change as above.  Prominence ossification lateral to the left anterior clinoid process and cavernous sinus suggesting the possibility of a calcified meningioma. This is without significant mass effect on the brain parenchyma.

## 2018-01-10 NOTE — PROGRESS NOTES
"[facility] Hospital Medicine  Progress Note    Patient Name: Melissa Anand  MRN: [id number]  Patient Class: IP- Inpatient   Admission Date: 1/9/2018  Length of Stay: 1 days  Attending Physician: Titi Perez MD  Primary Care Provider: Dana Castellon MD    [facility] Medicine Team: [facility] HOSP MED A Titi Perez MD    Subjective:     Principal Problem:<principal problem not specified>    HPI: 73 yo F with PMHx pre-DM, HTN, and chronic low back pain who presents with progression of BLE weakness.  Patient's daughter at bedside assists with history.  Patient has been walking with a cane at baseline for past 2 years and has had worsening of BLE weakness for past 2-3 months with inability to get out of bed over the past weekend.  She denies recent infections--no SOB/cough, no n/v/c/d, no sick contacts, no URI Sx. Pt w/ acute worsening this am, she could not move her Les and needed assistance to raise them off the bed. Called EMS when daughter had difficulty assisting.      Has some diabetic peripheral neuropathy at baseline, but states that she hadn't had falls with her neuropathy prior to last two months.  Denies bowel/bladder incontinence (has chronic problems w/ constipation and Bms often loose when able to go, will go days w/o urinating and has gone 3x times since ED presentation), no saddle anesthesia, has sensory level to knee b/l. Patient has been getting injections in her back for chronic low back pain every 2 wks, last was few wks ago.  Daughter states that she had gotten some steroid injections but they did try an "epidural medication" a couple weeks ago.  She has been getting injections approximately every two weeks for past 3 months.  Patient states she still has low back pain with some radiation into the BLE but is unable to characterize the pain. States injections not working, but no sig pain now.     1/10: no change in lack of sensation or inability to move Les. Pt w/ worsening of chronic L " heel pain and having severe pain not controlled by home percocet and worse w/ palpation. Having frequent diarrhea, which she states is typical in appearance, non-bloody. Pt take OTC meds for this at home and would like med now.     Review of Systems  Objective:     Vital Signs (Most Recent):  Temp: 97.8 °F (36.6 °C) (01/10/18 1221)  Pulse: 71 (01/10/18 1221)  Resp: 18 (01/10/18 1221)  BP: 138/60 (01/10/18 1221)  SpO2: 98 % (01/10/18 1221) Vital Signs (24h Range):  Temp:  [97.6 °F (36.4 °C)-98.6 °F (37 °C)] 97.8 °F (36.6 °C)  Pulse:  [71-92] 71  Resp:  [18-20] 18  SpO2:  [95 %-99 %] 98 %  BP: (123-170)/(60-74) 138/60     Weight: 112.6 kg (248 lb 3.8 oz)  Body mass index is 34.62 kg/m².    Intake/Output Summary (Last 24 hours) at 01/10/18 1527  Last data filed at 01/10/18 0800   Gross per 24 hour   Intake              490 ml   Output                0 ml   Net              490 ml      Physical Exam   General:  Well-developed, well-nourished, nad  HEENT:  NCAT, PERRLA, EOMI, oropharyngeal membranes non-erythematous/without exudate  Neck:  Supple, normal ROM without nuchal rigidity  Resp:  Symmetric expansion, no increased wob  CVS:  No LE edema, peripheral pulses 2+ (radial, dorsalis pedis)  GI:  Abd soft, non-distended, non-tender to palpation. Yellow-brown, loose stool in bed and on hospital gown  Neurologic Exam:  Mental Status:  AAOx3.  Speech, thought content appropriate.   Cranial Nerves:  mostly intact. Weak trapezius, SCM strength bilaterally.  Motor:  Normal bulk and tone, no apparent atrophy or fasciculations.  BUE shoulder abduction, biceps/triceps, wrist flexion/extension,  strength 4-/5.  BLE hip flexors 2/5, knee flexion/extension 1/5, plantarflexion/dorsiflexion 1/5.    Sensory:  Patient has no sensation to light touch or temperature in BLE up to knee (lower on R, upper on L). Pain worse w/ palpation on L heel, no skin breaks or e/o infection  Reflexes:  Areflexic patellar, Achilles  Gait not  assessed    Significant Labs: All pertinent labs within the past 24 hours have been reviewed.    Significant Imaging: I have reviewed all pertinent imaging results/findings within the past 24 hours.    Assessment/Plan:      Active Diagnoses:    Diagnosis Date Noted POA    Weakness [R53.1] 01/09/2018 Yes    Peripheral sensory neuropathy [G62.9] 01/09/2018 Unknown    Areflexia [R29.2] 01/09/2018 Unknown      Problems Resolved During this Admission:    Diagnosis Date Noted Date Resolved POA     VTE Risk Mitigation         Ordered     Low Risk of VTE  Once      01/09/18 2057           Weakness     -Exam is most concerning for AIDP vs transverse myelitis. Possible epidural or verterbal abscess related to recent injection but ESR nl and CRP elevated to 9 w/o tenderness at back.   -MRI Lumbar Spine w/ w/o contrast: degen changes, e/o prior laminectomy (greater than 7y ago), mod-severe b/l foraminal narrowing at L3-4.   -LP 01/10/18 with Neurology; unable to obtain d/t prev surg. Radiology consulted to perform under flouro--cell count & diff, protein, glucose, VDRL, MS profile, freeze & hold.  -PT/OT for ROM, deconditioning.  -Further recs pending results of imaging and labs.                     Peripheral sensory neuropathy     -Neuro concerned for chronic diabetic neuropathy with more severe acute sensory loss in BLE. But pt never w/ elevated A1c in our system above 5.8  -Hgb A1c 5.2, vitamin B1 in process, vitamin B12 nl. TSH low with normal FT4--appropriate for levothyroxine use.      Diarrhea  Pt states this is chronic problem and no worse today. Non-bloody and no other concerning changes. Takes OTC meds at home. Wishes to take lomitil and not imodium; ordered.     Hypothyroid  Synthroid 50     Chronic pain  Gabapentin 300mg tid, elavil 100 QHS, percocet 7.5 Q6h prn (inc to 10 today d/t inc pain) - home meds listed in system, pt and daughter not entirely clear on dosing.      HTN  Lisinopril 40      Titi S Chris,  MD  Department of Hospital Medicine   [facility]

## 2018-01-11 LAB
ANION GAP SERPL CALC-SCNC: 8 MMOL/L
BASOPHILS # BLD AUTO: 0.02 K/UL
BASOPHILS NFR BLD: 0.4 %
BUN SERPL-MCNC: 18 MG/DL
CALCIUM SERPL-MCNC: 8.3 MG/DL
CHLORIDE SERPL-SCNC: 107 MMOL/L
CLARITY CSF: CLEAR
CO2 SERPL-SCNC: 24 MMOL/L
COLOR CSF: COLORLESS
CREAT SERPL-MCNC: 1 MG/DL
DIFFERENTIAL METHOD: ABNORMAL
EOSINOPHIL # BLD AUTO: 0.2 K/UL
EOSINOPHIL NFR BLD: 4.3 %
ERYTHROCYTE [DISTWIDTH] IN BLOOD BY AUTOMATED COUNT: 12.8 %
EST. GFR  (AFRICAN AMERICAN): >60 ML/MIN/1.73 M^2
EST. GFR  (NON AFRICAN AMERICAN): 56.8 ML/MIN/1.73 M^2
GLUCOSE CSF-MCNC: 55 MG/DL
GLUCOSE SERPL-MCNC: 104 MG/DL
HCT VFR BLD AUTO: 29.5 %
HGB BLD-MCNC: 9.4 G/DL
IMM GRANULOCYTES # BLD AUTO: 0.01 K/UL
IMM GRANULOCYTES NFR BLD AUTO: 0.2 %
LYMPHOCYTES # BLD AUTO: 1.2 K/UL
LYMPHOCYTES NFR BLD: 27.1 %
LYMPHOCYTES NFR CSF MANUAL: 92 %
MCH RBC QN AUTO: 32.5 PG
MCHC RBC AUTO-ENTMCNC: 31.9 G/DL
MCV RBC AUTO: 102 FL
MONOCYTES # BLD AUTO: 0.7 K/UL
MONOCYTES NFR BLD: 16.6 %
MONOS+MACROS NFR CSF MANUAL: 5 %
NEUTROPHILS # BLD AUTO: 2.3 K/UL
NEUTROPHILS NFR BLD: 51.4 %
NEUTROPHILS NFR CSF MANUAL: 3 %
NRBC BLD-RTO: 0 /100 WBC
PLATELET # BLD AUTO: 166 K/UL
PMV BLD AUTO: 9.6 FL
POTASSIUM SERPL-SCNC: 4.2 MMOL/L
PROT CSF-MCNC: 56 MG/DL
RBC # BLD AUTO: 2.89 M/UL
RBC # CSF: 285 /CU MM
SODIUM SERPL-SCNC: 139 MMOL/L
SPECIMEN VOL CSF: 2 ML
VIT B1 SERPL-MCNC: 25 UG/L (ref 38–122)
WBC # BLD AUTO: 4.46 K/UL
WBC # CSF: 22 /CU MM

## 2018-01-11 PROCEDURE — 99000 SPECIMEN HANDLING OFFICE-LAB: CPT

## 2018-01-11 PROCEDURE — 36415 COLL VENOUS BLD VENIPUNCTURE: CPT

## 2018-01-11 PROCEDURE — 99233 SBSQ HOSP IP/OBS HIGH 50: CPT | Mod: ,,, | Performed by: PSYCHIATRY & NEUROLOGY

## 2018-01-11 PROCEDURE — 25000003 PHARM REV CODE 250: Performed by: HOSPITALIST

## 2018-01-11 PROCEDURE — 86580 TB INTRADERMAL TEST: CPT | Performed by: HOSPITALIST

## 2018-01-11 PROCEDURE — 009U3ZX DRAINAGE OF SPINAL CANAL, PERCUTANEOUS APPROACH, DIAGNOSTIC: ICD-10-PCS | Performed by: RADIOLOGY

## 2018-01-11 PROCEDURE — 89051 BODY FLUID CELL COUNT: CPT

## 2018-01-11 PROCEDURE — 87205 SMEAR GRAM STAIN: CPT

## 2018-01-11 PROCEDURE — 99232 SBSQ HOSP IP/OBS MODERATE 35: CPT | Mod: ,,, | Performed by: HOSPITALIST

## 2018-01-11 PROCEDURE — 20600001 HC STEP DOWN PRIVATE ROOM

## 2018-01-11 PROCEDURE — 87070 CULTURE OTHR SPECIMN AEROBIC: CPT

## 2018-01-11 PROCEDURE — 80048 BASIC METABOLIC PNL TOTAL CA: CPT

## 2018-01-11 PROCEDURE — 63600175 PHARM REV CODE 636 W HCPCS: Performed by: HOSPITALIST

## 2018-01-11 PROCEDURE — 86592 SYPHILIS TEST NON-TREP QUAL: CPT

## 2018-01-11 PROCEDURE — 82945 GLUCOSE OTHER FLUID: CPT

## 2018-01-11 PROCEDURE — 85025 COMPLETE CBC W/AUTO DIFF WBC: CPT

## 2018-01-11 PROCEDURE — 84157 ASSAY OF PROTEIN OTHER: CPT

## 2018-01-11 PROCEDURE — 82040 ASSAY OF SERUM ALBUMIN: CPT

## 2018-01-11 RX ORDER — THIAMINE HCL 100 MG
500 TABLET ORAL NIGHTLY
Status: DISCONTINUED | OUTPATIENT
Start: 2018-01-11 | End: 2018-01-11

## 2018-01-11 RX ORDER — SODIUM CHLORIDE 9 MG/ML
INJECTION, SOLUTION INTRAVENOUS CONTINUOUS
Status: ACTIVE | OUTPATIENT
Start: 2018-01-11 | End: 2018-01-11

## 2018-01-11 RX ORDER — THIAMINE HCL 100 MG
500 TABLET ORAL NIGHTLY
Status: COMPLETED | OUTPATIENT
Start: 2018-01-11 | End: 2018-01-13

## 2018-01-11 RX ADMIN — HYDROCODONE BITARTRATE AND ACETAMINOPHEN 1 TABLET: 10; 325 TABLET ORAL at 09:01

## 2018-01-11 RX ADMIN — DIPHENOXYLATE HYDROCHLORIDE AND ATROPINE SULFATE 1 TABLET: 2.5; .025 TABLET ORAL at 11:01

## 2018-01-11 RX ADMIN — LEVOTHYROXINE SODIUM 50 MCG: 50 TABLET ORAL at 05:01

## 2018-01-11 RX ADMIN — TUBERCULIN PURIFIED PROTEIN DERIVATIVE 5 UNITS: 5 INJECTION, SOLUTION INTRADERMAL at 10:01

## 2018-01-11 RX ADMIN — SODIUM CHLORIDE: 0.9 INJECTION, SOLUTION INTRAVENOUS at 10:01

## 2018-01-11 RX ADMIN — AMITRIPTYLINE HYDROCHLORIDE 100 MG: 25 TABLET, FILM COATED ORAL at 09:01

## 2018-01-11 RX ADMIN — LISINOPRIL 40 MG: 20 TABLET ORAL at 09:01

## 2018-01-11 RX ADMIN — DIPHENOXYLATE HYDROCHLORIDE AND ATROPINE SULFATE 1 TABLET: 2.5; .025 TABLET ORAL at 05:01

## 2018-01-11 RX ADMIN — Medication 500 MG: at 10:01

## 2018-01-11 NOTE — PLAN OF CARE
Therapy rec snf  snf cx entered        Discharge Recommendations:  nursing facility, skilled   Discharge Equipment Recommendations: wheelchair, walker, rolling   Barriers to discharge: Inaccessible home and Decreased caregiver support     01/11/18 0708   Right Care Assessment   Can the patient answer the patient profile reliably? Yes, cognitively intact   How often would a person be available to care for the patient? Whenever needed   Describe the patient's ability to walk at the present time. Major restrictions/daily assistance from another person   How does the patient rate their overall health at the present time? Fair   Number of comorbid conditions (as recorded on the chart) Two   During the past month, has the patient often been bothered by feeling down, depressed or hopeless? No   During the past month, has the patient often been bothered by little interest or pleasure in doing things? No

## 2018-01-11 NOTE — PLAN OF CARE
Problem: Patient Care Overview  Goal: Plan of Care Review  Outcome: Ongoing (interventions implemented as appropriate)  Side rails up x2; call bell in place; bed in lowest, locked position; skid proof socks on; no evidence of skin breakdown; care plan explained to patient; pt remains free of injury. Pt NPO most of day, LP completed without incident, pt is incontinent, purewick applied, lomotil given for diarrhea prevention, MD aware pt has not had a stool today.  Pt continues with no movement to ble. VSS and afebrile. Pt turned and repositioned every 2 hours and prn, barrier cream applied to sacral skin shearing.

## 2018-01-11 NOTE — SUBJECTIVE & OBJECTIVE
Subjective:     Interval History:   Patient had an episode of incontinence since yesterday, involving bowel and bladder.  Feels that the weakness has possibly worsened a bit.  Discussed plan for LP under fluoro scheduled today.  Understands that treatment will likely depend on results of the LP.  In the interim, denies any worsening of low back pain.  No additional questions or concerns at this time.       Current Neurological Medications: Amitriptyline 100 mg po qHS, hydrocodone-APAP  mg po q6h prn pain    Current Facility-Administered Medications   Medication Dose Route Frequency Provider Last Rate Last Dose    0.9%  NaCl infusion   Intravenous Continuous Titi Perez  mL/hr at 01/11/18 1053      amitriptyline tablet 100 mg  100 mg Oral QHS Titi Perez MD   100 mg at 01/10/18 2040    diphenoxylate-atropine 2.5-0.025 mg per tablet 1 tablet  1 tablet Oral QID PRN Titi Perez MD   1 tablet at 01/11/18 1145    hydrocodone-acetaminophen 10-325mg per tablet 1 tablet  1 tablet Oral Q6H PRN Titi Perez MD        levothyroxine tablet 50 mcg  50 mcg Oral Before breakfast Titi Perez MD   50 mcg at 01/11/18 0530    lisinopril tablet 40 mg  40 mg Oral Daily Titi Perez MD   40 mg at 01/11/18 0918    sodium chloride 0.9% flush 3 mL  3 mL Intravenous PRN Titi Perez MD         Review of Systems   Constitutional: Negative for chills and fever.   Eyes: Negative for photophobia and visual disturbance.   Respiratory: Negative for cough and shortness of breath.    Gastrointestinal: Negative for constipation, diarrhea, nausea and vomiting.   Musculoskeletal: Positive for back pain and gait problem.   Skin: Negative for rash and wound.   Neurological: Positive for weakness and numbness. Negative for headaches.   Hematological: Negative for adenopathy. Does not bruise/bleed easily.   Psychiatric/Behavioral: Positive for decreased concentration. Negative for agitation and confusion.      Objective:     Vital Signs (Most Recent):  Temp: 98.5 °F (36.9 °C) (01/11/18 1156)  Pulse: 78 (01/11/18 1156)  Resp: 20 (01/11/18 1156)  BP: (!) 144/63 (01/11/18 1156)  SpO2: 95 % (01/11/18 1156) Vital Signs (24h Range):  Temp:  [97.5 °F (36.4 °C)-99.3 °F (37.4 °C)] 98.5 °F (36.9 °C)  Pulse:  [75-80] 78  Resp:  [18-20] 20  SpO2:  [93 %-97 %] 95 %  BP: (121-153)/(57-70) 144/63     Weight: 112.6 kg (248 lb 3.8 oz)  Body mass index is 34.62 kg/m².    Physical Exam  General:  Well-developed, well-nourished, nad  HEENT:  NCAT, PERRLA, EOMI, oropharyngeal membranes non-erythematous/without exudate  Neck:  Supple, normal ROM without nuchal rigidity  Resp:  Symmetric expansion, no increased wob  CVS:  No LE edema, peripheral pulses 2+ (radial, dorsalis pedis)  GI:  Abd soft, non-distended, non-tender to palpation  Neurologic Exam:  Mental Status:  AAOx3.  Speech, thought content appropriate.  Recent, remote recall 3/3.  Cranial Nerves:  VFs intact on moving fingers in all quadrants bilaterally.  PERRLA, EOMI.  Facial movement, sensation intact and symmetric.  Palate raises symmetrically, tongue protrudes midline.  Trapezius 4/5 bilaterally.  Motor:  Normal bulk and tone, no apparent atrophy or fasciculations.  BUE shoulder abduction, biceps/triceps, wrist flexion/extension,  strength 4-/5.  BLE hip flexors 2/5, knee flexion/extension 1/5, plantarflexion/dorsiflexion 1/5.    Sensory:  Patient has no sensation to light touch or temperature in BLE up to upper thigh.  Light touch at BUE intact without inattention.  Vibratory sensation diminished with patient reported no sensation at BLE knees.  Vibratory sensation intact at BUE digits.  Reflexes:  Areflexic patellar, Achilles bilaterally. Biceps, brachioradialis 2+ and symmetric.  No ankle clonus.  Equivocal toe bilaterally.  Coordination:  FNF, KAMRON intact with no dysmetria/ataxia/dysdiadochokinesia.  HTS deferred 2/2 weakness.  No resting tremor.  Gait:  Deferred 2/2  fall precautions     Significant Labs:     Recent Labs  Lab 18  0533   WBC 4.46   RBC 2.89*   HGB 9.4*   HCT 29.5*      *   MCH 32.5*   MCHC 31.9*       Recent Labs  Lab 18  0533   CALCIUM 8.3*      K 4.2   CO2 24      BUN 18   CREATININE 1.0     Significant Imagin18 MRI L spine w/ w/o contrast:  Postoperative changes from L3-L4 laminectomy.  Multilevel degenerative changes of the lumbar spine most significant at L3-L4 with moderate to severe bilateral neuroforaminal narrowing.  Additional findings discussed level by level above.     18 CT head w/o contrast:  No evidence of acute hemorrhage or major vascular distribution infarct.  Mild chronic ischemic change as above.  Prominence ossification lateral to the left anterior clinoid process and cavernous sinus suggesting the possibility of a calcified meningioma. This is without significant mass effect on the brain parenchyma.

## 2018-01-11 NOTE — PROGRESS NOTES
[facility]  Neurology  Progress Note    Patient Name: Melissa Anand  MRN: [id number]  Admission Date: 1/9/2018  Hospital Length of Stay: 2 days  Code Status: Prior   Attending Provider: Titi Perez MD  Primary Care Physician: Dana Castellon MD   Principal Problem:<principal problem not specified>      Subjective:     Interval History:   Patient had an episode of incontinence since yesterday, involving bowel and bladder.  Feels that the weakness has possibly worsened a bit.  Discussed plan for LP under fluoro scheduled today.  Understands that treatment will likely depend on results of the LP.  In the interim, denies any worsening of low back pain.  No additional questions or concerns at this time.       Current Neurological Medications: Amitriptyline 100 mg po qHS, hydrocodone-APAP  mg po q6h prn pain    Current Facility-Administered Medications   Medication Dose Route Frequency Provider Last Rate Last Dose    0.9%  NaCl infusion   Intravenous Continuous Titi Perez  mL/hr at 01/11/18 1053      amitriptyline tablet 100 mg  100 mg Oral QHS Titi Perez MD   100 mg at 01/10/18 2040    diphenoxylate-atropine 2.5-0.025 mg per tablet 1 tablet  1 tablet Oral QID PRN Titi Perez MD   1 tablet at 01/11/18 1145    hydrocodone-acetaminophen 10-325mg per tablet 1 tablet  1 tablet Oral Q6H PRN Titi Perez MD        levothyroxine tablet 50 mcg  50 mcg Oral Before breakfast Titi Perez MD   50 mcg at 01/11/18 0530    lisinopril tablet 40 mg  40 mg Oral Daily Titi Perez MD   40 mg at 01/11/18 0918    sodium chloride 0.9% flush 3 mL  3 mL Intravenous PRN Titi Perez MD         Review of Systems   Constitutional: Negative for chills and fever.   Eyes: Negative for photophobia and visual disturbance.   Respiratory: Negative for cough and shortness of breath.    Gastrointestinal: Negative for constipation, diarrhea, nausea and vomiting.   Musculoskeletal: Positive for  back pain and gait problem.   Skin: Negative for rash and wound.   Neurological: Positive for weakness and numbness. Negative for headaches.   Hematological: Negative for adenopathy. Does not bruise/bleed easily.   Psychiatric/Behavioral: Positive for decreased concentration. Negative for agitation and confusion.     Objective:     Vital Signs (Most Recent):  Temp: 98.5 °F (36.9 °C) (01/11/18 1156)  Pulse: 78 (01/11/18 1156)  Resp: 20 (01/11/18 1156)  BP: (!) 144/63 (01/11/18 1156)  SpO2: 95 % (01/11/18 1156) Vital Signs (24h Range):  Temp:  [97.5 °F (36.4 °C)-99.3 °F (37.4 °C)] 98.5 °F (36.9 °C)  Pulse:  [75-80] 78  Resp:  [18-20] 20  SpO2:  [93 %-97 %] 95 %  BP: (121-153)/(57-70) 144/63     Weight: 112.6 kg (248 lb 3.8 oz)  Body mass index is 34.62 kg/m².    Physical Exam  General:  Well-developed, well-nourished, nad  HEENT:  NCAT, PERRLA, EOMI, oropharyngeal membranes non-erythematous/without exudate  Neck:  Supple, normal ROM without nuchal rigidity  Resp:  Symmetric expansion, no increased wob  CVS:  No LE edema, peripheral pulses 2+ (radial, dorsalis pedis)  GI:  Abd soft, non-distended, non-tender to palpation  Neurologic Exam:  Mental Status:  AAOx3.  Speech, thought content appropriate.  Recent, remote recall 3/3.  Cranial Nerves:  VFs intact on moving fingers in all quadrants bilaterally.  PERRLA, EOMI.  Facial movement, sensation intact and symmetric.  Palate raises symmetrically, tongue protrudes midline.  Trapezius  4/5 bilaterally.  Motor:  Normal bulk and tone, no apparent atrophy or fasciculations.  BUE shoulder abduction, biceps/triceps, wrist flexion/extension,  strength 4-/5.  BLE hip flexors 2/5, knee flexion/extension 1/5, plantarflexion/dorsiflexion 1/5.    Sensory:  Patient has no sensation to light touch or temperature in BLE up to upper thigh.  Light touch at BUE intact without inattention.  Vibratory sensation diminished with patient reported no sensation at BLE knees.  Vibratory  sensation intact at BUE digits.  Reflexes:  Areflexic patellar, Achilles bilaterally. Biceps, brachioradialis 2+ and symmetric.  No ankle clonus.  Equivocal toe bilaterally.  Coordination:  FNF, KAMRON intact with no dysmetria/ataxia/dysdiadochokinesia.  HTS deferred 2/2 weakness.  No resting tremor.  Gait:  Deferred 2/2 fall precautions     Significant Labs:     Recent Labs  Lab 18  0533   WBC 4.46   RBC 2.89*   HGB 9.4*   HCT 29.5*      *   MCH 32.5*   MCHC 31.9*       Recent Labs  Lab 18  0533   CALCIUM 8.3*      K 4.2   CO2 24      BUN 18   CREATININE 1.0     Significant Imagin18 MRI L spine w/ w/o contrast:  Postoperative changes from L3-L4 laminectomy.  Multilevel degenerative changes of the lumbar spine most significant at L3-L4 with moderate to severe bilateral neuroforaminal narrowing.  Additional findings discussed level by level above.     18 CT head w/o contrast:  No evidence of acute hemorrhage or major vascular distribution infarct.  Mild chronic ischemic change as above.  Prominence ossification lateral to the left anterior clinoid process and cavernous sinus suggesting the possibility of a calcified meningioma. This is without significant mass effect on the brain parenchyma.    Assessment and Plan:     Weakness    -Exam is most concerning for AIDP, less likely transverse myelitis.  -MRI Lumbar Spine w/ w/o contrast with chronic changes, none consistent with current exam.  -LP 18 under Fluoro--cell count & diff, protein, glucose, VDRL, MS profile, freeze & hold.  Will watch for results to guide further recs.  -CRP elevated to 9.6.  Low suspicion for myopathy or myositis causing weakness.  CPK, ESR ordered in ED never collected.  -Recommend PT/OT for ROM, deconditioning.      Areflexia    -Per above, concern for AIDP  -Possible component of chronic peripheral neuropathy though Hgb A1c 5.2%, appears to have been well-controlled in the past.  B12  wnl.  -B1 25--deficient.  Recommend replacing with thiamine 500 mg qd x 3 d, thiamine 250 mg x 5 d, thiamine 100 mg po qd after.      Peripheral sensory neuropathy    -Chronic diabetic neuropathy reported by pt, recent severe sensory loss in BLE.  -Hgb A1c 5.2%, vitamin B12 wnl. TSH low with normal FT4--appropriate for levothyroxine use.  -Vitamin B1 deficient, replace per above with 500 mg daily followed by 250 mg daily.  Maintain on discharge with thiamine 100 mg po qd.       VTE Risk Mitigation         Ordered     Low Risk of VTE  Once      01/09/18 2057        Elva Diana MD  Neurology  [facility]

## 2018-01-11 NOTE — PROCEDURES
Radiology Post-Procedure Note    Pre Op Diagnosis: GBS    Post Op Diagnosis: Same    Procedure: lumbar puncture    Procedure performed by: Nikolas Phillips MD and Deshaun Vega MD    Written Informed Consent Obtained: Yes    Specimen Removed: 14 mL CSF    Estimated Blood Loss: Minimal    Findings: Following written informed consent and sterile prep and drape, a 22 gauge spinal needle was inserted at L4 - L5 intralaminar space under fluoroscopic surveillance.  14 mL blood-tinged CSF removed and sent to the lab for further analysis.  Of note, first attempt was made at L5-S1 with no CSF obtained. There were no complications.    Patient tolerated procedure well.    Deshaun Vega M.D.  PGY-2 Radiology  Pager# [phone]

## 2018-01-11 NOTE — ASSESSMENT & PLAN NOTE
-Per above, concern for AIDP  -Possible component of chronic peripheral neuropathy though Hgb A1c 5.2%, appears to have been well-controlled in the past.  B12 wnl.  -B1 25--deficient.  Recommend replacing with thiamine 500 mg qd x 3 d, thiamine 250 mg x 5 d, thiamine 100 mg po qd after.

## 2018-01-11 NOTE — ASSESSMENT & PLAN NOTE
-Exam is most concerning for AIDP, less likely transverse myelitis.  -MRI Lumbar Spine w/ w/o contrast with chronic changes, none consistent with current exam.  -LP 01/11/18 under Fluoro--cell count & diff, protein, glucose, VDRL, MS profile, freeze & hold.  Will watch for results to make any further recs.  -CRP elevated to 9.6.  Low suspicion for myopathy or myositis causing weakness.  CPK, ESR ordered in ED never collected.  -Recommend PT/OT for ROM, deconditioning.

## 2018-01-11 NOTE — H&P
Inpatient Radiology Pre-procedure Note    History of Present Illness:    Melissa Anand is a 72 y.o. female who presents for fluoroscopically guided lumbar puncture.    Admission H&P reviewed.  Past Medical History:   Diagnosis Date    Anemia     Colitis     hospitalized July 2014    Diabetes mellitus     Encounter for blood transfusion     Goiter     Hypertension     Left hip pain 10/12/14    Syncope      Past Surgical History:   Procedure Laterality Date    BACK SURGERY      THYROIDECTOMY      TOE AMPUTATION Left        Review of Systems:   As documented in primary team H&P    Home Meds:   Prior to Admission medications    Medication Sig Start Date End Date Taking? Authorizing Provider   gabapentin (NEURONTIN) 300 mg tablet Take 300 mg by mouth every morning.    Yes Historical Provider, MD   lisinopril (PRINIVIL,ZESTRIL) 40 MG tablet Take 1 tablet (40 mg total) by mouth once daily. 1/29/15  Yes Nino Clayton MD   oxyCODONE-acetaminophen (PERCOCET)  mg per tablet Take 1 tablet by mouth every 4 (four) hours as needed for Pain.   Yes Historical Provider, MD   amitriptyline (ELAVIL) 100 MG tablet Take 100 mg by mouth every evening.    Historical Provider, MD   gabapentin (NEURONTIN) 600 MG tablet Take 600 mg by mouth every evening.    Historical Provider, MD   hydrocodone-acetaminophen 7.5-325mg (NORCO) 7.5-325 mg per tablet Take 1 tablet by mouth every 8 (eight) hours as needed for Pain. 1/29/15   Nino Clayton MD   lactobacillus acidophilus & bulgar (LACTINEX) 100 million cell packet Take 1 packet (1 each total) by mouth 2 (two) times daily. 1/29/15   Nino Clayton MD   levothyroxine (SYNTHROID) 50 MCG tablet Take 1 tablet (50 mcg total) by mouth before breakfast. 10/22/14 10/22/15  Casey Maloney MD   quetiapine (SEROQUEL) 300 MG Tab Take 400 mg by mouth every evening.     Historical Provider, MD   tramadol (ULTRAM) 50 mg tablet Take 50 mg by mouth 2 (two) times daily.     Historical Provider, MD     Scheduled Meds:    amitriptyline  100 mg Oral QHS    levothyroxine  50 mcg Oral Before breakfast    lisinopril  40 mg Oral Daily     Continuous Infusions:    sodium chloride 0.9% 250 mL/hr at 01/11/18 1053     PRN Meds:diphenoxylate-atropine 2.5-0.025 mg, hydrocodone-acetaminophen 10-325mg, sodium chloride 0.9%  Anticoagulants/Antiplatelets: no anticoagulation    Allergies: Review of patient's allergies indicates:  No Known Allergies  Sedation Hx: have not been any systemic reactions    Labs:    Recent Labs  Lab 01/09/18  1258   INR 1.1       Recent Labs  Lab 01/11/18  0533   WBC 4.46   HGB 9.4*   HCT 29.5*   *         Recent Labs  Lab 01/09/18  1258  01/11/18  0533     < > 104     < > 139   K 5.1  < > 4.2     < > 107   CO2 23  < > 24   BUN 21  < > 18   CREATININE 1.3  < > 1.0   CALCIUM 8.4*  < > 8.3*   MG 2.5  --   --    ALT 10  --   --    AST 26  --   --    ALBUMIN 3.2*  --   --    BILITOT 0.6  --   --    < > = values in this interval not displayed.      Vitals:  Temp: 98.5 °F (36.9 °C) (01/11/18 1156)  Pulse: 78 (01/11/18 1156)  Resp: 20 (01/11/18 1156)  BP: (!) 144/63 (01/11/18 1156)  SpO2: 95 % (01/11/18 1156)     Physical Exam:  ASA: 3  Mallampati: 2    General: no acute distress  Mental Status: alert and oriented to person, place and time  HEENT: normocephalic, atraumatic  Chest: unlabored breathing  Abdomen: nondistended  Extremity: moves all extremities    Plan: Fluoroscopically guided lumbar puncture  Sedation Plan: Local    Deshaun Vega M.D.  PGY-2 Radiology  Pager# [phone]

## 2018-01-11 NOTE — PLAN OF CARE
Problem: Patient Care Overview  Goal: Plan of Care Review  Outcome: Unable to achieve outcome(s) by discharge  Side rails up x2; call bell in place; bed in lowest, locked position; skid proof socks on; no evidence of skin breakdown; care plan explained to patient; pt remains free of injury. Pt with poor appetite but tolerates po, pt is incontinent, purewick applied, large loose BM X 1 and incontinent, lomotil given. LP schedule for tomorrow. Pt continues with no movement to ble. VSS and afebrile. Pt with c/o pain norco given.

## 2018-01-11 NOTE — ASSESSMENT & PLAN NOTE
-Chronic diabetic neuropathy reported by pt, recent severe sensory loss in BLE.  -Hgb A1c 5.2%, vitamin B12 wnl. TSH low with normal FT4--appropriate for levothyroxine use.  -Vitamin B1 deficient, replace per above with 500 mg daily followed by 250 mg daily.  Maintain on discharge with thiamine 100 mg po qd.

## 2018-01-12 PROBLEM — R68.89 SUSPECTED DEEP TISSUE INJURY: Status: ACTIVE | Noted: 2018-01-12

## 2018-01-12 PROBLEM — L89.92 PRESSURE INJURY OF SKIN, STAGE 2: Status: ACTIVE | Noted: 2018-01-12

## 2018-01-12 LAB
ANION GAP SERPL CALC-SCNC: 6 MMOL/L
BACTERIA UR CULT: NORMAL
BASOPHILS # BLD AUTO: 0.02 K/UL
BASOPHILS NFR BLD: 0.4 %
BUN SERPL-MCNC: 13 MG/DL
CALCIUM SERPL-MCNC: 8.1 MG/DL
CHLORIDE SERPL-SCNC: 108 MMOL/L
CLARITY CSF: ABNORMAL
CO2 SERPL-SCNC: 25 MMOL/L
COLOR CSF: ABNORMAL
CREAT SERPL-MCNC: 1.1 MG/DL
DIFFERENTIAL METHOD: ABNORMAL
EOSINOPHIL # BLD AUTO: 0.3 K/UL
EOSINOPHIL NFR BLD: 5.7 %
ERYTHROCYTE [DISTWIDTH] IN BLOOD BY AUTOMATED COUNT: 12.5 %
EST. GFR  (AFRICAN AMERICAN): 58.4 ML/MIN/1.73 M^2
EST. GFR  (NON AFRICAN AMERICAN): 50.6 ML/MIN/1.73 M^2
FOLATE SERPL-MCNC: 5.7 NG/ML
GLUCOSE SERPL-MCNC: 151 MG/DL
HCT VFR BLD AUTO: 29.8 %
HGB BLD-MCNC: 9.8 G/DL
IMM GRANULOCYTES # BLD AUTO: 0.01 K/UL
IMM GRANULOCYTES NFR BLD AUTO: 0.2 %
LYMPHOCYTES # BLD AUTO: 1.5 K/UL
LYMPHOCYTES NFR BLD: 32.7 %
LYMPHOCYTES NFR CSF MANUAL: 40 %
MCH RBC QN AUTO: 32.8 PG
MCHC RBC AUTO-ENTMCNC: 32.9 G/DL
MCV RBC AUTO: 100 FL
MONOCYTES # BLD AUTO: 0.8 K/UL
MONOCYTES NFR BLD: 16.4 %
MONOS+MACROS NFR CSF MANUAL: 20 %
NEUTROPHILS # BLD AUTO: 2 K/UL
NEUTROPHILS NFR BLD: 44.6 %
NEUTROPHILS NFR CSF MANUAL: 40 %
NRBC BLD-RTO: 0 /100 WBC
PLATELET # BLD AUTO: 160 K/UL
PMV BLD AUTO: 9.5 FL
POTASSIUM SERPL-SCNC: 4.4 MMOL/L
RBC # BLD AUTO: 2.99 M/UL
RBC # CSF: 3000 /CU MM
SODIUM SERPL-SCNC: 139 MMOL/L
SPECIMEN VOL CSF: 6 ML
WBC # BLD AUTO: 4.56 K/UL
WBC # CSF: 5 /CU MM

## 2018-01-12 PROCEDURE — 25000003 PHARM REV CODE 250: Performed by: HOSPITALIST

## 2018-01-12 PROCEDURE — 36415 COLL VENOUS BLD VENIPUNCTURE: CPT

## 2018-01-12 PROCEDURE — 80048 BASIC METABOLIC PNL TOTAL CA: CPT

## 2018-01-12 PROCEDURE — 99232 SBSQ HOSP IP/OBS MODERATE 35: CPT | Mod: ,,, | Performed by: HOSPITALIST

## 2018-01-12 PROCEDURE — 82746 ASSAY OF FOLIC ACID SERUM: CPT

## 2018-01-12 PROCEDURE — 63600175 PHARM REV CODE 636 W HCPCS: Performed by: HOSPITALIST

## 2018-01-12 PROCEDURE — 85025 COMPLETE CBC W/AUTO DIFF WBC: CPT

## 2018-01-12 PROCEDURE — 99233 SBSQ HOSP IP/OBS HIGH 50: CPT | Mod: GC,,, | Performed by: PSYCHIATRY & NEUROLOGY

## 2018-01-12 PROCEDURE — 20600001 HC STEP DOWN PRIVATE ROOM

## 2018-01-12 RX ORDER — ENOXAPARIN SODIUM 100 MG/ML
40 INJECTION SUBCUTANEOUS EVERY 24 HOURS
Status: DISCONTINUED | OUTPATIENT
Start: 2018-01-12 | End: 2018-01-19 | Stop reason: HOSPADM

## 2018-01-12 RX ORDER — GADOBUTROL 604.72 MG/ML
10 INJECTION INTRAVENOUS
Status: COMPLETED | OUTPATIENT
Start: 2018-01-13 | End: 2018-01-12

## 2018-01-12 RX ADMIN — ENOXAPARIN SODIUM 40 MG: 100 INJECTION SUBCUTANEOUS at 05:01

## 2018-01-12 RX ADMIN — LISINOPRIL 40 MG: 20 TABLET ORAL at 09:01

## 2018-01-12 RX ADMIN — HYDROCODONE BITARTRATE AND ACETAMINOPHEN 1 TABLET: 10; 325 TABLET ORAL at 12:01

## 2018-01-12 RX ADMIN — HYDROCODONE BITARTRATE AND ACETAMINOPHEN 1 TABLET: 10; 325 TABLET ORAL at 08:01

## 2018-01-12 RX ADMIN — DIPHENOXYLATE HYDROCHLORIDE AND ATROPINE SULFATE 1 TABLET: 2.5; .025 TABLET ORAL at 08:01

## 2018-01-12 RX ADMIN — AMITRIPTYLINE HYDROCHLORIDE 100 MG: 25 TABLET, FILM COATED ORAL at 08:01

## 2018-01-12 RX ADMIN — GADOBUTROL 10 ML: 604.72 INJECTION INTRAVENOUS at 11:01

## 2018-01-12 RX ADMIN — LEVOTHYROXINE SODIUM 50 MCG: 50 TABLET ORAL at 04:01

## 2018-01-12 NOTE — PROGRESS NOTES
[facility]  Neurology  Progress Note    Patient Name: Melissa Anand  MRN: [id number]  Admission Date: 1/9/2018  Hospital Length of Stay: 3 days  Code Status: Prior   Attending Provider: Titi Perez MD  Primary Care Physician: Dana Castellon MD   Principal Problem:<principal problem not specified>      Subjective:     Interval History:   Patient notes no major changes.  Discussed LP results with patient and resulting uncertainty about current diagnosis.  Patient and daughter at bedside understanding that clinically she look more consistent with an AIDP, but her WBC count on the LP was concerning for infection, lymphocyte predominant.  We will check MRI Brain, C/T spine prior to proceeding with IV Ig.  Patient and daughter at bedside both note some small improvements in movement of BLE as well as sensation of BLE.  This would also be more consistent with an AIDP as opposed to infection.  Patient agreeable to additional testing prior to decision on treatment, no further questions/concerns at this time.    Current Neurological Medications: Amitriptyline 100 mg po qHS, hydrocodone-APAP  mg po q6h prn pain    Current Facility-Administered Medications   Medication Dose Route Frequency Provider Last Rate Last Dose    amitriptyline tablet 100 mg  100 mg Oral QHS Titi Perez MD   100 mg at 01/11/18 2131    diphenoxylate-atropine 2.5-0.025 mg per tablet 1 tablet  1 tablet Oral QID PRN Titi Perez MD   1 tablet at 01/11/18 1753    enoxaparin injection 40 mg  40 mg Subcutaneous Daily Titi Perez MD   40 mg at 01/12/18 1731    hydrocodone-acetaminophen 10-325mg per tablet 1 tablet  1 tablet Oral Q6H PRN Titi Perez MD   1 tablet at 01/12/18 1218    levothyroxine tablet 50 mcg  50 mcg Oral Before breakfast Titi Perez MD   50 mcg at 01/12/18 0414    lisinopril tablet 40 mg  40 mg Oral Daily Titi Perez MD   40 mg at 01/12/18 0905    sodium chloride 0.9% flush 3 mL  3 mL  Intravenous PRN Titi Perez MD        thiamine tablet 500 mg  500 mg Oral QHS Titi Perez MD   500 mg at 01/11/18 4557     Review of Systems   Constitutional: Negative for chills and fever.   Eyes: Negative for photophobia and visual disturbance.   Respiratory: Negative for cough and shortness of breath.    Gastrointestinal: Negative for constipation, diarrhea, nausea and vomiting.   Musculoskeletal: Positive for back pain and gait problem.   Skin: Negative for rash and wound.   Neurological: Positive for weakness and numbness. Negative for headaches.   Hematological: Negative for adenopathy. Does not bruise/bleed easily.   Psychiatric/Behavioral: Positive for decreased concentration. Negative for agitation and confusion.     Objective:     Vital Signs (Most Recent):  Temp: 98.6 °F (37 °C) (01/12/18 1534)  Pulse: 71 (01/12/18 1534)  Resp: 18 (01/12/18 1534)  BP: (!) 123/57 (01/12/18 1534)  SpO2: (!) 93 % (01/12/18 1534) Vital Signs (24h Range):  Temp:  [97.4 °F (36.3 °C)-98.9 °F (37.2 °C)] 98.6 °F (37 °C)  Pulse:  [71-83] 71  Resp:  [18-20] 18  SpO2:  [91 %-94 %] 93 %  BP: (121-194)/(57-78) 123/57     Weight: 112.6 kg (248 lb 3.8 oz)  Body mass index is 34.62 kg/m².    Physical Exam  General:  Well-developed, well-nourished, nad  HEENT:  NCAT, PERRLA, EOMI, oropharyngeal membranes non-erythematous/without exudate  Neck:  Supple, normal ROM without nuchal rigidity  Resp:  Symmetric expansion, no increased wob  CVS:  No LE edema, peripheral pulses 2+ (radial, dorsalis pedis)  GI:  Abd soft, non-distended, non-tender to palpation  Neurologic Exam:  Mental Status:  AAOx3.  Speech, thought content appropriate.  Recent, remote recall 3/3.  Cranial Nerves:  VFs intact on moving fingers in all quadrants bilaterally.  PERRLA, EOMI.  Facial movement, sensation intact and symmetric.  Palate raises symmetrically, tongue protrudes midline.  Trapezius 4/5 bilaterally.  Motor:  Normal bulk and tone, no apparent atrophy or  fasciculations.  BUE shoulder abduction, biceps/triceps, wrist flexion/extension,  strength 4-/5.  BLE hip flexors 2/5, knee flexion/extension 1/5, plantarflexion/dorsiflexion 1/5.    Sensory:  Patient has no sensation to light touch or temperature in BLE up to upper thigh.  Light touch at BUE intact without inattention.  Vibratory sensation diminished with patient reported no sensation at BLE knees.  Vibratory sensation intact at BUE digits.  Reflexes:  Areflexic patellar, Achilles bilaterally. Biceps, brachioradialis 2+ and symmetric.  No ankle clonus.  Equivocal toe bilaterally.  Coordination:  FNF, KAMRON intact with no dysmetria/ataxia/dysdiadochokinesia.  HTS deferred 2/2 weakness.  No resting tremor.  Gait:  Deferred 2/2 fall precautions     Significant Labs:     Recent Labs  Lab 18  0507   WBC 4.56   RBC 2.99*   HGB 9.8*   HCT 29.8*      *   MCH 32.8*   MCHC 32.9       Recent Labs  Lab 18  0507   CALCIUM 8.1*      K 4.4   CO2 25      BUN 13   CREATININE 1.1     CSF:   Tube 1--WBCs 5, RBCs 3000, 40% Seg Neutrophils, 40% Lymphocytes, 20% Monos/Macros  Tube 2--WBCs 22, RBCs 285, 3% Seg Neutrophils, 92% Lymphocytes, 5% Monos/Macros.  Protein 56 (corrected for RBCs = 56), Glucose 55.    Significant Imagin18 MRI L spine w/ w/o contrast:  Postoperative changes from L3-L4 laminectomy.  Multilevel degenerative changes of the lumbar spine most significant at L3-L4 with moderate to severe bilateral neuroforaminal narrowing.  Additional findings discussed level by level above.     18 CT head w/o contrast:  No evidence of acute hemorrhage or major vascular distribution infarct.  Mild chronic ischemic change as above.  Prominence ossification lateral to the left anterior clinoid process and cavernous sinus suggesting the possibility of a calcified meningioma. This is without significant mass effect on the brain parenchyma.    Assessment and Plan:     Weakness     -Exam is most concerning for AIDP, less likely transverse myelitis.  -MRI Lumbar Spine w/ w/o contrast with chronic changes, none consistent with current exam.  -LP 01/11/18 under Fluoro:  CSF concerning for possible infection on cell count/diff in Tube 2 analysis   -MRI Brain, C/T spine w/ w/o contrast to look for infection.     -If work up negative, will likely proceed with IV Ig to treat presumed AIDP.  -Continue PT/OT for ROM, deconditioning, dispo recs.      Areflexia    -Per above, concern for AIDP  -Possible component of chronic peripheral neuropathy though Hgb A1c 5.2%, well-controlled in the past.  B12 wnl.  -B1 25--deficient.  Recommend replacing with thiamine 500 mg qd x 3 d, thiamine 250 mg x 5 d, thiamine 100 mg po qd after.      Peripheral sensory neuropathy    -Chronic diabetic neuropathy reported by pt, recent severe sensory loss in BLE.  -Hgb A1c 5.2%, vitamin B12 wnl. TSH low with normal FT4--appropriate for levothyroxine use.  -Vitamin B1 deficient, replace per above--500 mg qd followed by 250 mg qd. Maintain on discharge w/ thiamine 100 mg po qd.  -Peripheral neuropathy is improving per patient report     VTE Risk Mitigation         Ordered     enoxaparin injection 40 mg  Daily     Route:  Subcutaneous        01/12/18 1615     Low Risk of VTE  Once      01/09/18 2057        Elva Diana MD  Neurology  [facility]

## 2018-01-12 NOTE — ASSESSMENT & PLAN NOTE
-Chronic diabetic neuropathy reported by pt, recent severe sensory loss in BLE.  -Hgb A1c 5.2%, vitamin B12 wnl. TSH low with normal FT4--appropriate for levothyroxine use.  -Vitamin B1 deficient, replace per above with 500 mg daily followed by 250 mg daily.  Maintain on discharge with thiamine 100 mg po qd.  -Peripheral neuropathy is improving per patient report

## 2018-01-12 NOTE — SUBJECTIVE & OBJECTIVE
Subjective:     Interval History:   Patient notes no major changes.  Discussed LP results with patient and resulting uncertainty about current diagnosis.  Patient and daughter at bedside understanding that clinically she look more consistent with an AIDP, but her WBC count on the LP was concerning for infection, lymphocyte predominant.  We will check MRI Brain, C/T spine prior to proceeding with IV Ig.  Patient and daughter at bedside both note some small improvements in movement of BLE as well as sensation of BLE.  This would also be more consistent with an AIDP as opposed to infection.  Patient agreeable to additional testing prior to decision on treatment, no further questions/concerns at this time.    Current Neurological Medications: Amitriptyline 100 mg po qHS, hydrocodone-APAP  mg po q6h prn pain    Current Facility-Administered Medications   Medication Dose Route Frequency Provider Last Rate Last Dose    amitriptyline tablet 100 mg  100 mg Oral QHS Titi Perez MD   100 mg at 01/11/18 2131    diphenoxylate-atropine 2.5-0.025 mg per tablet 1 tablet  1 tablet Oral QID PRN Titi Perez MD   1 tablet at 01/11/18 1753    enoxaparin injection 40 mg  40 mg Subcutaneous Daily Titi Perez MD   40 mg at 01/12/18 1731    hydrocodone-acetaminophen 10-325mg per tablet 1 tablet  1 tablet Oral Q6H PRN Titi Perez MD   1 tablet at 01/12/18 1218    levothyroxine tablet 50 mcg  50 mcg Oral Before breakfast Titi Perez MD   50 mcg at 01/12/18 0414    lisinopril tablet 40 mg  40 mg Oral Daily Titi Perez MD   40 mg at 01/12/18 0905    sodium chloride 0.9% flush 3 mL  3 mL Intravenous PRN Titi Perez MD        thiamine tablet 500 mg  500 mg Oral QHS Titi Perez MD   500 mg at 01/11/18 2219     Review of Systems   Constitutional: Negative for chills and fever.   Eyes: Negative for photophobia and visual disturbance.   Respiratory: Negative for cough and shortness of breath.     Gastrointestinal: Negative for constipation, diarrhea, nausea and vomiting.   Musculoskeletal: Positive for back pain and gait problem.   Skin: Negative for rash and wound.   Neurological: Positive for weakness and numbness. Negative for headaches.   Hematological: Negative for adenopathy. Does not bruise/bleed easily.   Psychiatric/Behavioral: Positive for decreased concentration. Negative for agitation and confusion.     Objective:     Vital Signs (Most Recent):  Temp: 98.6 °F (37 °C) (01/12/18 1534)  Pulse: 71 (01/12/18 1534)  Resp: 18 (01/12/18 1534)  BP: (!) 123/57 (01/12/18 1534)  SpO2: (!) 93 % (01/12/18 1534) Vital Signs (24h Range):  Temp:  [97.4 °F (36.3 °C)-98.9 °F (37.2 °C)] 98.6 °F (37 °C)  Pulse:  [71-83] 71  Resp:  [18-20] 18  SpO2:  [91 %-94 %] 93 %  BP: (121-194)/(57-78) 123/57     Weight: 112.6 kg (248 lb 3.8 oz)  Body mass index is 34.62 kg/m².    Physical Exam  General:  Well-developed, well-nourished, nad  HEENT:  NCAT, PERRLA, EOMI, oropharyngeal membranes non-erythematous/without exudate  Neck:  Supple, normal ROM without nuchal rigidity  Resp:  Symmetric expansion, no increased wob  CVS:  No LE edema, peripheral pulses 2+ (radial, dorsalis pedis)  GI:  Abd soft, non-distended, non-tender to palpation  Neurologic Exam:  Mental Status:  AAOx3.  Speech, thought content appropriate.  Recent, remote recall 3/3.  Cranial Nerves:  VFs intact on moving fingers in all quadrants bilaterally.  PERRLA, EOMI.  Facial movement, sensation intact and symmetric.  Palate raises symmetrically, tongue protrudes midline.  Trapezius 4/5 bilaterally.  Motor:  Normal bulk and tone, no apparent atrophy or fasciculations.  BUE shoulder abduction, biceps/triceps, wrist flexion/extension,  strength 4-/5.  BLE hip flexors 2/5, knee flexion/extension 1/5, plantarflexion/dorsiflexion 1/5.    Sensory:  Patient has no sensation to light touch or temperature in BLE up to upper thigh.  Light touch at BUE intact without  inattention.  Vibratory sensation diminished with patient reported no sensation at BLE knees.  Vibratory sensation intact at BUE digits.  Reflexes:  Areflexic patellar, Achilles bilaterally. Biceps, brachioradialis 2+ and symmetric.  No ankle clonus.  Equivocal toe bilaterally.  Coordination:  FNF, KAMRON intact with no dysmetria/ataxia/dysdiadochokinesia.  HTS deferred 2/2 weakness.  No resting tremor.  Gait:  Deferred 2/2 fall precautions     Significant Labs:     Recent Labs  Lab 18  0507   WBC 4.56   RBC 2.99*   HGB 9.8*   HCT 29.8*      *   MCH 32.8*   MCHC 32.9       Recent Labs  Lab 18  0507   CALCIUM 8.1*      K 4.4   CO2 25      BUN 13   CREATININE 1.1     CSF:   Tube 1--WBCs 5, RBCs 3000, 40% Seg Neutrophils, 40% Lymphocytes, 20% Monos/Macros  Tube 2--WBCs 22, RBCs 285, 3% Seg Neutrophils, 92% Lymphocytes, 5% Monos/Macros.  Protein 56 (corrected for RBCs = 56), Glucose 55.    Significant Imagin18 MRI L spine w/ w/o contrast:  Postoperative changes from L3-L4 laminectomy.  Multilevel degenerative changes of the lumbar spine most significant at L3-L4 with moderate to severe bilateral neuroforaminal narrowing.  Additional findings discussed level by level above.     18 CT head w/o contrast:  No evidence of acute hemorrhage or major vascular distribution infarct.  Mild chronic ischemic change as above.  Prominence ossification lateral to the left anterior clinoid process and cavernous sinus suggesting the possibility of a calcified meningioma. This is without significant mass effect on the brain parenchyma.

## 2018-01-12 NOTE — PROGRESS NOTES
Wound care consult received for sacrum ulceration.  Pt presents with daughter at bedside reporting pt has had 2 pressure injuries to coccyx and left heel.  Coccyx- Stage 2, left heel healing DTI.  Recommended pressure injury prevention interventions to include MELISSA bed surface, heel protectors, mepilex border to left and right heel, and Crit[employer] ointment to coccyx.  Discussed plan of care with daughter and pt and will notified Nurse Geraldine.  Wound care team to follow prn.  [id number]       01/12/18 1228       Pressure Ulcer 01/12/18 coccyx Stage II   Date First Assessed: 01/12/18   Location: coccyx  Staging: Stage II  Wound Length (cm): 1  Wound Width (cm): 0.3  Depth (cm): .1   Wound Image    Staging Stage II   Pressure Ulcer Risk Factors shear/friction;moisture;nutrition;mobility;activity   Dressing Appearance no dressing   Drainage Amount none   Drainage Characteristics/Odor no odor   Appearance moist;pink   Periwound Area intact   Wound Edges intact   Wound Length (cm) 1   Wound Width (cm) 0.3   Depth (cm) .1   Cleansed W/ sterile normal saline   Interventions barrier applied   Dressing Change Due 01/12/18       Pressure Ulcer 01/12/18 Left heel suspected deep tissue injury   Date First Assessed: 01/12/18   Pressure Ulcer Present on Admission: yes  Side: Left  Location: heel  Staging: suspected deep tissue injury  Wound Length (cm): 5  Wound Width (cm): 5  Depth (cm): 0   Wound Image    Staging suspected deep tissue injury   Healing Pressure Ulcer yes   Pressure Ulcer Risk Factors nutrition;mobility;activity;shear/friction   Dressing Appearance no dressing   Drainage Amount none   Drainage Characteristics/Odor no odor   Appearance other (see comments)  (dark discoloration)   Periwound Area intact   Wound Edges intact   Wound Length (cm) 5   Wound Width (cm) 5   Depth (cm) 0   Dressing other (see comments)  (ordered mepilex border)   Dressing Change Due 01/12/18

## 2018-01-12 NOTE — PLAN OF CARE
Pt has medicaid of [place] insurance but therapy is rec snf  Per dr rogers snf vs rehab  Perhaps single case agreement  Melania gonzalez to make referrals to both.  Not medically stable     01/12/18 1007   Right Care Assessment   Can the patient answer the patient profile reliably? Yes, cognitively intact   How often would a person be available to care for the patient? Whenever needed   Describe the patient's ability to walk at the present time. Major restrictions/daily assistance from another person   How does the patient rate their overall health at the present time? Fair   Number of comorbid conditions (as recorded on the chart) Two   During the past month, has the patient often been bothered by feeling down, depressed or hopeless? No   During the past month, has the patient often been bothered by little interest or pleasure in doing things? No

## 2018-01-12 NOTE — ASSESSMENT & PLAN NOTE
-Per above, concern for AIDP  -Possible component of chronic peripheral neuropathy though Hgb A1c 5.2%, well-controlled in the past.  B12 wnl.  -B1 25--deficient.  Recommend replacing with thiamine 500 mg qd x 3 d, thiamine 250 mg x 5 d, thiamine 100 mg po qd after.

## 2018-01-12 NOTE — PLAN OF CARE
2:30 PM  SW received notification that pt will need SNF at discharge. Pt has medicaid, meaning most SNF's will not be in contract unless one is willing to do a case by case contract with medicaid of [place]. Faxed ref[facility] to [facility], [facility], [facility], [facility], [facility], [facility], [facility], [facility] and [facility].     Melania Crawley, LULÚ   [facility]  Ext [phone]

## 2018-01-12 NOTE — ASSESSMENT & PLAN NOTE
-Exam is most concerning for AIDP, less likely transverse myelitis.  -MRI Lumbar Spine w/ w/o contrast with chronic changes, none consistent with current exam.  -LP 01/11/18 under Fluoro:  CSF concerning for possible infection on cell count/diff in Tube 2 analysis   -MRI Brain, C/T spine w/ w/o contrast to look for infection.     -If work up negative, will likely proceed with IV Ig to treat presumed AIDP.  -Continue PT/OT for ROM, deconditioning, dispo recs.

## 2018-01-12 NOTE — PLAN OF CARE
Problem: Patient Care Overview  Goal: Plan of Care Review  Outcome: Ongoing (interventions implemented as appropriate)  AAOX4.  At beginning of shift, BP was high.  Pt complained of headache, prn pain med administered and BP checked one hour later.  BP went down from 194/98 to 145/66.  Pt using purewick.  Sister is at the bedside.  Bed is in lowest position, call bell is in reach, and pt instructed to call nurse when needing assistance.  Will continue to monitor.

## 2018-01-12 NOTE — PROGRESS NOTES
"[facility] Hospital Medicine  Progress Note    Patient Name: Melissa Anand  MRN: [id number]  Patient Class: IP- Inpatient   Admission Date: 1/9/2018  Length of Stay: 2 days  Attending Physician: Titi Perez MD  Primary Care Provider: Dana Castellon MD    [facility] Medicine Team: [facility]P MED A Titi Perez MD    Subjective:     Principal Problem:<principal problem not specified>    HPI: 73 yo F with PMHx pre-DM, HTN, and chronic low back pain who presents with progression of BLE weakness.  Patient's daughter at bedside assists with history.  Patient has been walking with a cane at baseline for past 2 years and has had worsening of BLE weakness for past 2-3 months with inability to get out of bed over the past weekend.  She denies recent infections--no SOB/cough, no n/v/c/d, no sick contacts, no URI Sx. Pt w/ acute worsening this am, she could not move her Les and needed assistance to raise them off the bed. Called EMS when daughter had difficulty assisting.      Has some diabetic peripheral neuropathy at baseline, but states that she hadn't had falls with her neuropathy prior to last two months.  Denies bowel/bladder incontinence (has chronic problems w/ constipation and Bms often loose when able to go, will go days w/o urinating and has gone 3x times since ED presentation), no saddle anesthesia, has sensory level to knee b/l. Patient has been getting injections in her back for chronic low back pain every 2 wks, last was few wks ago.  Daughter states that she had gotten some steroid injections but they did try an "epidural medication" a couple weeks ago.  She has been getting injections approximately every two weeks for past 3 months.  Patient states she still has low back pain with some radiation into the BLE but is unable to characterize the pain. States injections not working, but no sig pain now.     1/10: no change in lack of sensation or inability to move Les. Pt w/ worsening of chronic L " heel pain and having severe pain not controlled by home percocet and worse w/ palpation. Having frequent diarrhea, which she states is typical in appearance, non-bloody. Pt take OTC meds for this at home and would like med now.     1/11: less loose Bms, lomotil working. Sensation inconsistent and pt feeling pinches intermittently w/ loss of sensation above and below area. No return of motor function. Daughter states that no movement in feet for 4wks.     Review of Systems  Objective:     Vital Signs (Most Recent):  Temp: 98.3 °F (36.8 °C) (01/11/18 1643)  Pulse: 79 (01/11/18 1643)  Resp: 18 (01/11/18 1643)  BP: (!) 150/60 (01/11/18 1643)  SpO2: (!) 93 % (01/11/18 1643) Vital Signs (24h Range):  Temp:  [98.3 °F (36.8 °C)-99.3 °F (37.4 °C)] 98.3 °F (36.8 °C)  Pulse:  [75-80] 79  Resp:  [18-20] 18  SpO2:  [93 %-97 %] 93 %  BP: (144-153)/(60-70) 150/60     Weight: 112.6 kg (248 lb 3.8 oz)  Body mass index is 34.62 kg/m².    Intake/Output Summary (Last 24 hours) at 01/11/18 2054  Last data filed at 01/11/18 1800   Gross per 24 hour   Intake             1120 ml   Output             1450 ml   Net             -330 ml      Physical Exam   General:  Well-developed, well-nourished, nad  HEENT:  NCAT, PERRLA, EOMI, oropharyngeal membranes non-erythematous/without exudate  Neck:  Supple, normal ROM without nuchal rigidity  Resp:  Symmetric expansion, no increased wob  CVS:  No LE edema, peripheral pulses 2+ (radial, dorsalis pedis)  GI:  Abd soft, non-distended, non-tender to palpation. Yellow-brown, loose stool in bed and on hospital gown  Neurologic Exam:  Mental Status:  AAOx3.  Speech, thought content appropriate.   Cranial Nerves:  mostly intact. Weak trapezius, SCM strength bilaterally.  Motor:  Normal bulk and tone, no apparent atrophy or fasciculations.  BUE shoulder abduction, biceps/triceps, wrist flexion/extension,  strength 4-/5.  BLE hip flexors 2/5, knee flexion/extension 1/5, plantarflexion/dorsiflexion 1/5.  "   Sensory:  Patient had no sensation to light touch or temperature in BLE up to knee (lower on R, upper on L), now occasional "ouch" to pinch but not consistent.No pain w/ palpation on L heel, no skin breaks or e/o infection  Reflexes:  Areflexic patellar, Achilles  Gait not assessed    Significant Labs: All pertinent labs within the past 24 hours have been reviewed.    Significant Imaging: I have reviewed all pertinent imaging results/findings within the past 24 hours.    Assessment/Plan:      Active Diagnoses:    Diagnosis Date Noted POA    Weakness [R53.1] 01/09/2018 Yes    Peripheral sensory neuropathy [G62.9] 01/09/2018 Unknown    Areflexia [R29.2] 01/09/2018 Unknown      Problems Resolved During this Admission:    Diagnosis Date Noted Date Resolved POA     VTE Risk Mitigation         Ordered     Low Risk of VTE  Once      01/09/18 2057           Weakness     -Exam is most concerning for AIDP vs transverse myelitis. Possible epidural or verterbal abscess related to recent injection but ESR nl and CRP elevated to 9 w/o tenderness at back.   -MRI Lumbar Spine w/ w/o contrast: degen changes, e/o prior laminectomy (greater than 7y ago), mod-severe b/l foraminal narrowing at L3-4.   -LP 01/10/18 with Neurology; unable to obtain d/t prev surg. Radiology consulted to perform under flouro--cell count & diff (lymphocyte predominant w/ WBC 22 and , 3k on first result), protein 56, glucose 55, VDRL, MS profile, freeze & hold ordered. L5-S1 w/o fluid.   -PT/OT for ROM, deconditioning: SNF w/ RW  -Further recs pending results of imaging and labs.                     Peripheral sensory neuropathy     -Neuro concerned for chronic diabetic neuropathy with more severe acute sensory loss in BLE. But pt never w/ elevated A1c in our system above 5.8  -Hgb A1c 5.2, vitamin B1 deficient, vitamin B12 nl. TSH low with normal FT4--appropriate for levothyroxine use.  -thiamine replacement ordered      Diarrhea  Pt states this " is chronic problem and no worse than usual. Non-bloody and no other concerning changes. Takes OTC meds at home. Wishes to take lomitil and not imodium; ordered. Improved w/ lomotil    Hypothyroid  Synthroid 50     Chronic pain  Gabapentin 300mg tid, elavil 100 QHS, percocet 7.5 Q6h prn (inc to 10 1/11 d/t inc pain, Sx better and may tolerate dec again) - home meds listed in system, pt and daughter not entirely clear on dosing.      HTN  Lisinopril 40      Titi Perez MD  Department of Hospital Medicine   [facility]

## 2018-01-12 NOTE — PROGRESS NOTES
"[facility] Hospital Medicine  Progress Note    Patient Name: Melissa Anand  MRN: [id number]  Patient Class: IP- Inpatient   Admission Date: 1/9/2018  Length of Stay: 3 days  Attending Physician: Titi Perez MD  Primary Care Provider: Dana Castellon MD    [facility] Medicine Team: [facility] MED A Titi Perez MD    Subjective:     Principal Problem:<principal problem not specified>    HPI: 73 yo F with PMHx pre-DM, HTN, and chronic low back pain who presents with progression of BLE weakness.  Patient's daughter at bedside assists with history.  Patient has been walking with a cane at baseline for past 2 years and has had worsening of BLE weakness for past 2-3 months with inability to get out of bed over the past weekend.  She denies recent infections--no SOB/cough, no n/v/c/d, no sick contacts, no URI Sx. Pt w/ acute worsening this am, she could not move her Les and needed assistance to raise them off the bed. Called EMS when daughter had difficulty assisting.      Has some diabetic peripheral neuropathy at baseline, but states that she hadn't had falls with her neuropathy prior to last two months.  Denies bowel/bladder incontinence (has chronic problems w/ constipation and Bms often loose when able to go, will go days w/o urinating and has gone 3x times since ED presentation), no saddle anesthesia, has sensory level to knee b/l. Patient has been getting injections in her back for chronic low back pain every 2 wks, last was few wks ago.  Daughter states that she had gotten some steroid injections but they did try an "epidural medication" a couple weeks ago.  She has been getting injections approximately every two weeks for past 3 months.  Patient states she still has low back pain with some radiation into the BLE but is unable to characterize the pain. States injections not working, but no sig pain now.     1/10: no change in lack of sensation or inability to move Les. Pt w/ worsening of chronic L " heel pain and having severe pain not controlled by home percocet and worse w/ palpation. Having frequent diarrhea, which she states is typical in appearance, non-bloody. Pt take OTC meds for this at home and would like med now.     1/11: less loose Bms, lomotil working. Sensation inconsistent and pt feeling pinches intermittently w/ loss of sensation above and below area. No return of motor function. Daughter states that no movement in feet for 4wks.     1/12: pt and daughter noticed flicker of movement w/ R foot. Inc sensation on right side as well. Bms dec in freq. Deneis neck stiff, photophobia, f/c, rash    Review of Systems  Objective:     Vital Signs (Most Recent):  Temp: 98.3 °F (36.8 °C) (01/12/18 0351)  Pulse: 72 (01/12/18 0351)  Resp: 20 (01/12/18 0351)  BP: (!) 121/58 (01/12/18 0351)  SpO2: (!) 91 % (01/12/18 0351) Vital Signs (24h Range):  Temp:  [98.2 °F (36.8 °C)-99.3 °F (37.4 °C)] 98.3 °F (36.8 °C)  Pulse:  [72-83] 72  Resp:  [18-20] 20  SpO2:  [91 %-95 %] 91 %  BP: (121-194)/(58-78) 121/58     Weight: 112.6 kg (248 lb 3.8 oz)  Body mass index is 34.62 kg/m².    Intake/Output Summary (Last 24 hours) at 01/12/18 0723  Last data filed at 01/12/18 0400   Gross per 24 hour   Intake             1280 ml   Output             2350 ml   Net            -1070 ml      Physical Exam   General:  Well-developed, well-nourished, nad  HEENT:  NCAT, PERRLA, EOMI, oropharyngeal membranes non-erythematous/without exudate  Neck:  Supple, normal ROM without nuchal rigidity  Resp:  Symmetric expansion, no increased wob  CVS:  No LE edema, peripheral pulses 2+ (radial, dorsalis pedis)  GI:  Abd soft, non-distended, non-tender to palpation. Yellow-brown, loose stool in bed and on hospital gown  Neurologic Exam:  Mental Status:  AAOx3.  Speech, thought content appropriate.   Cranial Nerves:  mostly intact. Weak trapezius, SCM strength bilaterally.  Motor:  Normal bulk and tone, no apparent atrophy or fasciculations.  ADRIAN  "shoulder abduction, biceps/triceps, wrist flexion/extension,  strength 4-/5.  BLE hip flexors 2/5, knee flexion/extension 1/5, plantarflexion/dorsiflexion 1/5, inc to 2/5 on .    Sensory:  Patient had no sensation to light touch or temperature in BLE up to knee (lower on R, upper on L), now occasional "ouch" to pinch on RLE.No pain w/ palpation on L heel, no skin breaks or e/o infection  Reflexes:  Areflexic patellar, Achilles  Gait not assessed    Significant Labs: All pertinent labs within the past 24 hours have been reviewed.    Significant Imaging: I have reviewed all pertinent imaging results/findings within the past 24 hours.    Assessment/Plan:      Active Diagnoses:    Diagnosis Date Noted POA    Weakness [R53.1] 01/09/2018 Yes    Peripheral sensory neuropathy [G62.9] 01/09/2018 Unknown    Areflexia [R29.2] 01/09/2018 Unknown      Problems Resolved During this Admission:    Diagnosis Date Noted Date Resolved POA     VTE Risk Mitigation         Ordered     Low Risk of VTE  Once      01/09/18 2057           Weakness     -Exam is most concerning for AIDP vs transverse myelitis. Possible epidural or verterbal abscess related to recent injection but ESR nl and CRP elevated to 9 w/o tenderness at back.   -MRI Lumbar Spine w/ w/o contrast: degen changes, e/o prior laminectomy (greater than 7y ago), mod-severe b/l foraminal narrowing at L3-4.   -LP 01/10/18 with Neurology; unable to obtain d/t prev surg. Radiology consulted to perform under flouro--cell count & diff (lymphocyte predominant w/ WBC 22 and , 3k on first result), protein 56, glucose 55, VDRL, MS profile, freeze & hold ordered. L5-S1 w/o fluid. Unclear significance. Protein only mildly elevated above 40 w/ 3k RBC present. WBC elevated w/ lymph predominance but clinical picture not c/w viral meningitis. May need further viral testing w/ additional CSF sample that was frozen and held. Will discuss w/ neuro. Ordered MRI head, cervical and " thoracic spine. Will hold off on IVIG now.   -PT/OT for ROM, deconditioning: SNF w/ RW  -Further recs pending results of imaging and labs.                     Peripheral sensory neuropathy     -Neuro concerned for chronic diabetic neuropathy with more severe acute sensory loss in BLE. But pt never w/ elevated A1c in our system above 5.8  -Hgb A1c 5.2, vitamin B1 deficient, vitamin B12 nl. TSH low with normal FT4--appropriate for levothyroxine use.  -thiamine replacement ordered      Diarrhea  Pt states this is chronic problem and no worse than usual. Non-bloody and no other concerning changes. Takes OTC meds at home. Wishes to take lomitil and not imodium; ordered. Improved w/ lomotil    Hypothyroid  Synthroid 50     Chronic pain  Gabapentin 300mg tid, elavil 100 QHS, percocet 7.5 Q6h prn (inc to 10 1/11 d/t inc pain, Sx better and may tolerate dec again) - home meds listed in system, pt and daughter not entirely clear on dosing.      HTN  Lisinopril 40. Mostly controlled. Single SBP reading to 194, most recent 121/58    Macrocytic anemia   B12 nl, folate pending (previous results in 2012 nl    Titi Perez MD  Department of Hospital Medicine   [facility]

## 2018-01-13 LAB
ANION GAP SERPL CALC-SCNC: 7 MMOL/L
ANISOCYTOSIS BLD QL SMEAR: SLIGHT
BASOPHILS # BLD AUTO: 0.02 K/UL
BASOPHILS NFR BLD: 0.5 %
BUN SERPL-MCNC: 13 MG/DL
CALCIUM SERPL-MCNC: 8.3 MG/DL
CHLORIDE SERPL-SCNC: 106 MMOL/L
CO2 SERPL-SCNC: 24 MMOL/L
CREAT SERPL-MCNC: 1 MG/DL
DIFFERENTIAL METHOD: ABNORMAL
EOSINOPHIL # BLD AUTO: 0.3 K/UL
EOSINOPHIL NFR BLD: 6.6 %
ERYTHROCYTE [DISTWIDTH] IN BLOOD BY AUTOMATED COUNT: 13.2 %
EST. GFR  (AFRICAN AMERICAN): >60 ML/MIN/1.73 M^2
EST. GFR  (NON AFRICAN AMERICAN): 56.8 ML/MIN/1.73 M^2
GLUCOSE SERPL-MCNC: 150 MG/DL
HCT VFR BLD AUTO: 28.5 %
HGB BLD-MCNC: 9.8 G/DL
IGA SERPL-MCNC: 234 MG/DL
IMM GRANULOCYTES # BLD AUTO: 0.02 K/UL
IMM GRANULOCYTES NFR BLD AUTO: 0.5 %
LYMPHOCYTES # BLD AUTO: 1.2 K/UL
LYMPHOCYTES NFR BLD: 31 %
MCH RBC QN AUTO: 33.6 PG
MCHC RBC AUTO-ENTMCNC: 34.4 G/DL
MCV RBC AUTO: 98 FL
MONOCYTES # BLD AUTO: 0.5 K/UL
MONOCYTES NFR BLD: 12.7 %
NEUTROPHILS # BLD AUTO: 1.9 K/UL
NEUTROPHILS NFR BLD: 48.7 %
NRBC BLD-RTO: 4 /100 WBC
OVALOCYTES BLD QL SMEAR: ABNORMAL
PLATELET # BLD AUTO: 218 K/UL
PLATELET BLD QL SMEAR: ABNORMAL
PMV BLD AUTO: 11.5 FL
POIKILOCYTOSIS BLD QL SMEAR: SLIGHT
POTASSIUM SERPL-SCNC: 4.6 MMOL/L
RBC # BLD AUTO: 2.92 M/UL
SODIUM SERPL-SCNC: 137 MMOL/L
WBC # BLD AUTO: 3.94 K/UL

## 2018-01-13 PROCEDURE — 25000003 PHARM REV CODE 250: Performed by: HOSPITALIST

## 2018-01-13 PROCEDURE — 99232 SBSQ HOSP IP/OBS MODERATE 35: CPT | Mod: ,,, | Performed by: HOSPITALIST

## 2018-01-13 PROCEDURE — A9585 GADOBUTROL INJECTION: HCPCS | Performed by: HOSPITALIST

## 2018-01-13 PROCEDURE — 20600001 HC STEP DOWN PRIVATE ROOM

## 2018-01-13 PROCEDURE — 25500020 PHARM REV CODE 255: Performed by: HOSPITALIST

## 2018-01-13 PROCEDURE — 82784 ASSAY IGA/IGD/IGG/IGM EACH: CPT

## 2018-01-13 PROCEDURE — 63600175 PHARM REV CODE 636 W HCPCS: Performed by: HOSPITALIST

## 2018-01-13 PROCEDURE — 80048 BASIC METABOLIC PNL TOTAL CA: CPT

## 2018-01-13 PROCEDURE — 36415 COLL VENOUS BLD VENIPUNCTURE: CPT

## 2018-01-13 PROCEDURE — 85025 COMPLETE CBC W/AUTO DIFF WBC: CPT

## 2018-01-13 PROCEDURE — 99233 SBSQ HOSP IP/OBS HIGH 50: CPT | Mod: ,,, | Performed by: PSYCHIATRY & NEUROLOGY

## 2018-01-13 RX ORDER — AMLODIPINE BESYLATE 5 MG/1
5 TABLET ORAL DAILY
Status: DISCONTINUED | OUTPATIENT
Start: 2018-01-13 | End: 2018-01-15

## 2018-01-13 RX ADMIN — LISINOPRIL 40 MG: 20 TABLET ORAL at 09:01

## 2018-01-13 RX ADMIN — HYDROCODONE BITARTRATE AND ACETAMINOPHEN 1 TABLET: 10; 325 TABLET ORAL at 12:01

## 2018-01-13 RX ADMIN — ENOXAPARIN SODIUM 40 MG: 100 INJECTION SUBCUTANEOUS at 04:01

## 2018-01-13 RX ADMIN — AMITRIPTYLINE HYDROCHLORIDE 100 MG: 25 TABLET, FILM COATED ORAL at 09:01

## 2018-01-13 RX ADMIN — HYDROCODONE BITARTRATE AND ACETAMINOPHEN 1 TABLET: 10; 325 TABLET ORAL at 04:01

## 2018-01-13 RX ADMIN — LEVOTHYROXINE SODIUM 50 MCG: 50 TABLET ORAL at 06:01

## 2018-01-13 RX ADMIN — Medication 500 MG: at 09:01

## 2018-01-13 RX ADMIN — Medication 500 MG: at 01:01

## 2018-01-13 RX ADMIN — AMLODIPINE BESYLATE 5 MG: 5 TABLET ORAL at 12:01

## 2018-01-13 NOTE — PROGRESS NOTES
"[facility] Hospital Medicine  Progress Note    Patient Name: Melissa Anand  MRN: [id number]  Patient Class: IP- Inpatient   Admission Date: 1/9/2018  Length of Stay: 4 days  Attending Physician: Titi Perez MD  Primary Care Provider: Dana Castellon MD    [facility] Medicine Team: [facility] MED A Titi Perez MD    Subjective:     Principal Problem:<principal problem not specified>    HPI: 73 yo F with PMHx pre-DM, HTN, and chronic low back pain who presents with progression of BLE weakness.  Patient's daughter at bedside assists with history.  Patient has been walking with a cane at baseline for past 2 years and has had worsening of BLE weakness for past 2-3 months with inability to get out of bed over the past weekend.  She denies recent infections--no SOB/cough, no n/v/c/d, no sick contacts, no URI Sx. Pt w/ acute worsening this am, she could not move her Les and needed assistance to raise them off the bed. Called EMS when daughter had difficulty assisting.      Has some diabetic peripheral neuropathy at baseline, but states that she hadn't had falls with her neuropathy prior to last two months.  Denies bowel/bladder incontinence (has chronic problems w/ constipation and Bms often loose when able to go, will go days w/o urinating and has gone 3x times since ED presentation), no saddle anesthesia, has sensory level to knee b/l. Patient has been getting injections in her back for chronic low back pain every 2 wks, last was few wks ago.  Daughter states that she had gotten some steroid injections but they did try an "epidural medication" a couple weeks ago.  She has been getting injections approximately every two weeks for past 3 months.  Patient states she still has low back pain with some radiation into the BLE but is unable to characterize the pain. States injections not working, but no sig pain now.     1/10: no change in lack of sensation or inability to move Les. Pt w/ worsening of chronic L " heel pain and having severe pain not controlled by home percocet and worse w/ palpation. Having frequent diarrhea, which she states is typical in appearance, non-bloody. Pt take OTC meds for this at home and would like med now.     1/11: less loose Bms, lomotil working. Sensation inconsistent and pt feeling pinches intermittently w/ loss of sensation above and below area. No return of motor function. Daughter states that no movement in feet for 4wks.     1/12: pt and daughter noticed flicker of movement w/ R foot. Inc sensation on right side as well. Bms dec in freq. Deneis neck stiff, photophobia, f/c, rash    1/13: better in better spirits and feeling better overall. No diarrhea. Inc movement in R foot. Dec pain in L foot.     Review of Systems  Objective:     Vital Signs (Most Recent):  Temp: 98.5 °F (36.9 °C) (01/13/18 0826)  Pulse: 78 (01/13/18 1012)  Resp: 18 (01/13/18 1012)  BP: (!) 175/76 (01/13/18 1012)  SpO2: 97 % (01/13/18 1012) Vital Signs (24h Range):  Temp:  [98.1 °F (36.7 °C)-98.9 °F (37.2 °C)] 98.5 °F (36.9 °C)  Pulse:  [71-78] 78  Resp:  [18] 18  SpO2:  [93 %-98 %] 97 %  BP: (123-195)/(57-79) 175/76     Weight: 112.6 kg (248 lb 3.8 oz)  Body mass index is 34.62 kg/m².    Intake/Output Summary (Last 24 hours) at 01/13/18 1110  Last data filed at 01/13/18 0530   Gross per 24 hour   Intake             1120 ml   Output             1350 ml   Net             -230 ml      Physical Exam   General:  Well-developed, well-nourished, nad  HEENT:  NCAT, PERRLA, EOMI, oropharyngeal membranes non-erythematous/without exudate  Neck:  Supple, normal ROM without nuchal rigidity  Resp:  Symmetric expansion, no increased wob  CVS:  No LE edema, peripheral pulses 2+ (radial, dorsalis pedis)  GI:  Abd soft, non-distended, non-tender to palpation. Yellow-brown, loose stool in bed and on hospital gown  Neurologic Exam:  Mental Status:  AAOx3.  Speech, thought content appropriate.   Cranial Nerves:  mostly intact.  "Weak trapezius, SCM strength bilaterally.  Motor:  Normal bulk and tone, no apparent atrophy or fasciculations.  BUE shoulder abduction, biceps/triceps, wrist flexion/extension,  strength 4-/5.  BLE hip flexors 2/5, knee flexion/extension 1/5, plantarflexion/dorsiflexion 1/5, inc to 2/5 on R w/ more movement today .    Sensory:  Patient had no sensation to light touch or temperature in BLE up to knee (lower on R, upper on L), occasional "ouch" to pinch on RLE.No pain w/ palpation on L heel, no skin breaks or e/o infection  Reflexes:  Areflexic patellar, Achilles  Gait not assessed    Significant Labs: All pertinent labs within the past 24 hours have been reviewed.    Significant Imaging: I have reviewed all pertinent imaging results/findings within the past 24 hours.    Assessment/Plan:      Active Diagnoses:    Diagnosis Date Noted POA    Pressure injury of skin, stage 2 [L89.92] 01/12/2018 Yes    Suspected deep tissue injury [Z04.9] 01/12/2018 Not Applicable    Weakness [R53.1] 01/09/2018 Yes    Peripheral sensory neuropathy [G62.9] 01/09/2018 Unknown    Areflexia [R29.2] 01/09/2018 Unknown      Problems Resolved During this Admission:    Diagnosis Date Noted Date Resolved POA     VTE Risk Mitigation         Ordered     enoxaparin injection 40 mg  Daily     Route:  Subcutaneous        01/12/18 1615     Low Risk of VTE  Once      01/09/18 2057           Weakness     -Exam is most concerning for AIDP vs transverse myelitis. Possible epidural or verterbal abscess related to recent injection but ESR nl and CRP elevated to 9 w/o tenderness at back.   -MRI Lumbar Spine w/ w/o contrast: degen changes, e/o prior laminectomy (greater than 7y ago), mod-severe b/l foraminal narrowing at L3-4.   -LP 01/10/18 with Neurology; unable to obtain d/t prev surg. Radiology consulted to perform under flouro--cell count & diff (lymphocyte predominant w/ WBC 22 and , 3k on first result), protein 56, glucose 55, VDRL, MS " profile, freeze & hold ordered. L5-S1 w/o fluid. Unclear significance. Protein only mildly elevated above 40 w/ 3k RBC present. WBC elevated w/ lymph predominance but clinical picture not c/w viral meningitis. May need further viral testing w/ additional CSF sample that was frozen and held. MRI head, cervical and thoracic spine w/o e/o acute findings. IVIG for 5d, IgA 254.  -PT/OT for ROM, deconditioning: SNF w/ RW  -Further recs pending results of imaging and labs.                     Peripheral sensory neuropathy     -Neuro concerned for chronic diabetic neuropathy with more severe acute sensory loss in BLE. But pt never w/ elevated A1c in our system above 5.8  -Hgb A1c 5.2, vitamin B1 deficient, vitamin B12 nl. TSH low with normal FT4--appropriate for levothyroxine use.  -thiamine 500 mg qd x 3 d, thiamine 250 mg x 5 d, thiamine 100 mg po qd after.      Diarrhea  Pt states this is chronic problem and no worse than usual. Non-bloody and no other concerning changes. Takes OTC meds at home. Wishes to take lomitil and not imodium; ordered. Improved w/ lomotil    Hypothyroid  Synthroid 50. R thyroid lobe enlarged on MRI     Chronic pain  Gabapentin 300mg tid, elavil 100 QHS, percocet 7.5 Q6h prn (inc to 10 1/11 d/t inc pain, Sx better and may tolerate dec again) - home meds listed in system, pt and daughter not entirely clear on dosing.      HTN  Lisinopril 40. Mostly controlled. Lability seen, likely from GBS. Will add norvasc 5    Macrocytic anemia   B12 nl, folate 5.7    Titi Perez MD  Department of Hospital Medicine   [facility]

## 2018-01-13 NOTE — SUBJECTIVE & OBJECTIVE
Subjective:     Interval History:   Patient notes minor improvements in distal weakness.  Discussed MRI Brain/C/T spine findings with patient.  She was informed about small T2 hyperintense, but non-enhancing lesion around T4-T5 but we discussed the lack of findings in brain or spine concerning for infection and our opinion to go ahead with IV Ig treatment.  Patient agreeable, no additional questions/concerns at this time, expresses relief about lack of findings in brain/spine consistent with infection or malignancy.    Current Neurological Medications: Amitriptyline 100 mg po qHS, hydrocodone-APAP  mg po q6h prn pain    Current Facility-Administered Medications   Medication Dose Route Frequency Provider Last Rate Last Dose    amitriptyline tablet 100 mg  100 mg Oral QHS Titi Perez MD   100 mg at 01/12/18 2011    diphenoxylate-atropine 2.5-0.025 mg per tablet 1 tablet  1 tablet Oral QID PRN Titi Perez MD   1 tablet at 01/12/18 2036    enoxaparin injection 40 mg  40 mg Subcutaneous Daily Titi Perez MD   40 mg at 01/12/18 1731    hydrocodone-acetaminophen 10-325mg per tablet 1 tablet  1 tablet Oral Q6H PRN Titi Perez MD   1 tablet at 01/13/18 0056    levothyroxine tablet 50 mcg  50 mcg Oral Before breakfast Titi Perez MD   50 mcg at 01/13/18 0627    lisinopril tablet 40 mg  40 mg Oral Daily Titi Perez MD   40 mg at 01/13/18 0919    sodium chloride 0.9% flush 3 mL  3 mL Intravenous PRN Titi Perez MD        thiamine tablet 500 mg  500 mg Oral QHS Titi Perez MD   500 mg at 01/13/18 0101     Review of Systems   Constitutional: Negative for chills and fever.   Eyes: Negative for photophobia and visual disturbance.   Respiratory: Negative for cough and shortness of breath.    Gastrointestinal: Negative for constipation, diarrhea, nausea and vomiting.   Musculoskeletal: Positive for back pain and gait problem.   Skin: Negative for rash and wound.   Neurological: Positive  for weakness and numbness. Negative for headaches.   Hematological: Negative for adenopathy. Does not bruise/bleed easily.   Psychiatric/Behavioral: Positive for decreased concentration. Negative for agitation and confusion.     Objective:     Vital Signs (Most Recent):  Temp: 98.5 °F (36.9 °C) (01/13/18 0826)  Pulse: 78 (01/13/18 1012)  Resp: 18 (01/13/18 1012)  BP: (!) 175/76 (01/13/18 1012)  SpO2: 97 % (01/13/18 1012) Vital Signs (24h Range):  Temp:  [98.1 °F (36.7 °C)-98.9 °F (37.2 °C)] 98.5 °F (36.9 °C)  Pulse:  [71-78] 78  Resp:  [18] 18  SpO2:  [93 %-98 %] 97 %  BP: (123-195)/(57-79) 175/76     Weight: 112.6 kg (248 lb 3.8 oz)  Body mass index is 34.62 kg/m².    Physical Exam  General:  Well-developed, well-nourished, nad  HEENT:  NCAT, PERRLA, EOMI, oropharyngeal membranes non-erythematous/without exudate  Neck:  Supple, normal ROM without nuchal rigidity  Resp:  Symmetric expansion, no increased wob  CVS:  No LE edema, peripheral pulses 2+ (radial, dorsalis pedis)  GI:  Abd soft, non-distended, non-tender to palpation  Neurologic Exam:  Mental Status:  AAOx3.  Speech, thought content appropriate.  Recent, remote recall 3/3.  Cranial Nerves:  VFs intact on moving fingers in all quadrants bilaterally.  PERRLA, EOMI.  Facial movement, sensation intact and symmetric.  Palate raises symmetrically, tongue protrudes midline.  Trapezius 4/5 bilaterally.  Motor:  Normal bulk and tone, no apparent atrophy or fasciculations.  BUE shoulder abduction, biceps/triceps, wrist flexion/extension,  strength 4-/5.  BLE hip flexors 2/5, knee flexion/extension 1/5, plantarflexion/dorsiflexion 1/5.    Sensory:  Patient has no sensation to light touch or temperature in BLE up to upper thigh.  Light touch at BUE intact without inattention.  Vibratory sensation diminished with patient reported no sensation at BLE knees.  Vibratory sensation intact at BUE digits.  Reflexes:  Areflexic patellar, Achilles bilaterally. Biceps,  brachioradialis 2+ and symmetric.  No ankle clonus.  Equivocal toe bilaterally.  Coordination:  FNF, KAMRON intact with no dysmetria/ataxia/dysdiadochokinesia.  HTS deferred 2/2 weakness.  No resting tremor.  Gait:  Deferred 2/2 fall precautions     Significant Labs:     Recent Labs  Lab 18  0401   WBC 3.94   RBC 2.92*   HGB 9.8*   HCT 28.5*      MCV 98   MCH 33.6*   MCHC 34.4       Recent Labs  Lab 18  0401   CALCIUM 8.3*      K 4.6   CO2 24      BUN 13   CREATININE 1.0     CSF:   Tube 1--WBCs 5, RBCs 3000, 40% Seg Neutrophils, 40% Lymphocytes, 20% Monos/Macros  Tube 2--WBCs 22, RBCs 285, 3% Seg Neutrophils, 92% Lymphocytes, 5% Monos/Macros.  Protein 56 (corrected for RBCs = 56), Glucose 55.    Significant Imagin18 MRI L spine w/ w/o contrast:  Postoperative changes from L3-L4 laminectomy.  Multilevel degenerative changes of the lumbar spine most significant at L3-L4 with moderate to severe bilateral neuroforaminal narrowing.  Additional findings discussed level by level above.     18 CT head w/o contrast:  No evidence of acute hemorrhage or major vascular distribution infarct.  Mild chronic ischemic change as above.  Prominence ossification lateral to the left anterior clinoid process and cavernous sinus suggesting the possibility of a calcified meningioma. This is without significant mass effect on the brain parenchyma.    18 MRI brain w/ w/o contrast:  1.  No acute intracranial abnormality identified on today's examination.  Specifically, no evidence of acute infarction.  2.  Multiple foci of T2/FLAIR hyperintensity in the supratentorial and periventricular white matter as well as mick, suggestive of chronic microvascular ischemic change. Remote right cerebellar infarct.    18 MRI C/T spine w/ w/o contrast:  1. Mild degenerative change of the cervical spine.  No abnormal cervical spinal cord signal or enhancement.  2. Mild degenerative change of thoracic  spine most pronounced at the T10-T11 level where there is a posterior disc bulge resulting in mild/moderate spinal canal stenosis.  There is a questionable focus of T2 cord signal hyperintensity without associated enhancement at this level possibly reflecting component of myelomalacia.  Additional questionable focus of T2 cord signal intensity at the T4-T5 level as detailed above.    3.  Enlargement heterogeneity of the right thyroid lobe.

## 2018-01-13 NOTE — NURSING
Returned from MRI - pt anxious  -talking about the test / noises ect.. - rest encouraged - medicated for pain - monitored

## 2018-01-13 NOTE — PROGRESS NOTES
[facility]  Neurology  Progress Note    Patient Name: Melissa Anand  MRN: [id number]  Admission Date: 1/9/2018  Hospital Length of Stay: 4 days  Code Status: Prior   Attending Provider: Titi Perez MD  Primary Care Physician: Dana Castellon MD   Principal Problem:<principal problem not specified>    Subjective:     Interval History:   Patient notes minor improvements in distal weakness.  Discussed MRI Brain/C/T spine findings with patient.  She was informed about small T2 hyperintense, but non-enhancing lesion around T4-T5 but we discussed the lack of findings in brain or spine concerning for infection and our opinion to go ahead with IV Ig treatment.  Patient agreeable, no additional questions/concerns at this time, expresses relief about lack of findings in brain/spine consistent with infection or malignancy.    Current Neurological Medications: Amitriptyline 100 mg po qHS, hydrocodone-APAP  mg po q6h prn pain    Current Facility-Administered Medications   Medication Dose Route Frequency Provider Last Rate Last Dose    amitriptyline tablet 100 mg  100 mg Oral QHS Titi Perez MD   100 mg at 01/12/18 2011    diphenoxylate-atropine 2.5-0.025 mg per tablet 1 tablet  1 tablet Oral QID PRN Titi Perez MD   1 tablet at 01/12/18 2036    enoxaparin injection 40 mg  40 mg Subcutaneous Daily Titi Perez MD   40 mg at 01/12/18 1731    hydrocodone-acetaminophen 10-325mg per tablet 1 tablet  1 tablet Oral Q6H PRN Titi Perez MD   1 tablet at 01/13/18 0056    levothyroxine tablet 50 mcg  50 mcg Oral Before breakfast Titi Perez MD   50 mcg at 01/13/18 0627    lisinopril tablet 40 mg  40 mg Oral Daily Titi Perez MD   40 mg at 01/13/18 0919    sodium chloride 0.9% flush 3 mL  3 mL Intravenous PRN Titi Perez MD        thiamine tablet 500 mg  500 mg Oral QHS Titi Perez MD   500 mg at 01/13/18 0101     Review of Systems   Constitutional: Negative for chills and fever.    Eyes: Negative for photophobia and visual disturbance.   Respiratory: Negative for cough and shortness of breath.    Gastrointestinal: Negative for constipation, diarrhea, nausea and vomiting.   Musculoskeletal: Positive for back pain and gait problem.   Skin: Negative for rash and wound.   Neurological: Positive for weakness and numbness. Negative for headaches.   Hematological: Negative for adenopathy. Does not bruise/bleed easily.   Psychiatric/Behavioral: Positive for decreased concentration. Negative for agitation and confusion.     Objective:     Vital Signs (Most Recent):  Temp: 98.5 °F (36.9 °C) (01/13/18 0826)  Pulse: 78 (01/13/18 1012)  Resp: 18 (01/13/18 1012)  BP: (!) 175/76 (01/13/18 1012)  SpO2: 97 % (01/13/18 1012) Vital Signs (24h Range):  Temp:  [98.1 °F (36.7 °C)-98.9 °F (37.2 °C)] 98.5 °F (36.9 °C)  Pulse:  [71-78] 78  Resp:  [18] 18  SpO2:  [93 %-98 %] 97 %  BP: (123-195)/(57-79) 175/76     Weight: 112.6 kg (248 lb 3.8 oz)  Body mass index is 34.62 kg/m².    Physical Exam  General:  Well-developed, well-nourished, nad  HEENT:  NCAT, PERRLA, EOMI, oropharyngeal membranes non-erythematous/without exudate  Neck:  Supple, normal ROM without nuchal rigidity  Resp:  Symmetric expansion, no increased wob  CVS:  No LE edema, peripheral pulses 2+ (radial, dorsalis pedis)  GI:  Abd soft, non-distended, non-tender to palpation  Neurologic Exam:  Mental Status:  AAOx3.  Speech, thought content appropriate.  Recent, remote recall 3/3.  Cranial Nerves:  VFs intact on moving fingers in all quadrants bilaterally.  PERRLA, EOMI.  Facial movement, sensation intact and symmetric.  Palate raises symmetrically, tongue protrudes midline.  Trapezius 4/5 bilaterally.  Motor:  Normal bulk and tone, no apparent atrophy or fasciculations.  BUE shoulder abduction, biceps/triceps, wrist flexion/extension,  strength 4-/5.  BLE hip flexors 2/5, knee flexion/extension 1/5, plantarflexion/dorsiflexion 1/5.    Sensory:   Patient has no sensation to light touch or temperature in BLE up to upper thigh.  Light touch at BUE intact without inattention.  Vibratory sensation diminished with patient reported no sensation at BLE knees.  Vibratory sensation intact at BUE digits.  Reflexes:  Areflexic patellar, Achilles bilaterally. Biceps, brachioradialis 2+ and symmetric.  No ankle clonus.  Equivocal toe bilaterally.  Coordination:  FNF, KAMRON intact with no dysmetria/ataxia/dysdiadochokinesia.  HTS deferred 2/2 weakness.  No resting tremor.  Gait:  Deferred 2/2 fall precautions     Significant Labs:     Recent Labs  Lab 18  0401   WBC 3.94   RBC 2.92*   HGB 9.8*   HCT 28.5*      MCV 98   MCH 33.6*   MCHC 34.4       Recent Labs  Lab 18  0401   CALCIUM 8.3*      K 4.6   CO2 24      BUN 13   CREATININE 1.0     CSF:   Tube 1--WBCs 5, RBCs 3000, 40% Seg Neutrophils, 40% Lymphocytes, 20% Monos/Macros  Tube 2--WBCs 22, RBCs 285, 3% Seg Neutrophils, 92% Lymphocytes, 5% Monos/Macros.  Protein 56 (corrected for RBCs = 56), Glucose 55.    Significant Imagin18 MRI L spine w/ w/o contrast:  Postoperative changes from L3-L4 laminectomy.  Multilevel degenerative changes of the lumbar spine most significant at L3-L4 with moderate to severe bilateral neuroforaminal narrowing.  Additional findings discussed level by level above.     18 CT head w/o contrast:  No evidence of acute hemorrhage or major vascular distribution infarct.  Mild chronic ischemic change as above.  Prominence ossification lateral to the left anterior clinoid process and cavernous sinus suggesting the possibility of a calcified meningioma. This is without significant mass effect on the brain parenchyma.    18 MRI brain w/ w/o contrast:  1.  No acute intracranial abnormality identified on today's examination.  Specifically, no evidence of acute infarction.  2.  Multiple foci of T2/FLAIR hyperintensity in the supratentorial and  periventricular white matter as well as mick, suggestive of chronic microvascular ischemic change. Remote right cerebellar infarct.    01/12/18 MRI C/T spine w/ w/o contrast:  1. Mild degenerative change of the cervical spine.  No abnormal cervical spinal cord signal or enhancement.  2. Mild degenerative change of thoracic spine most pronounced at the T10-T11 level where there is a posterior disc bulge resulting in mild/moderate spinal canal stenosis.  There is a questionable focus of T2 cord signal hyperintensity without associated enhancement at this level possibly reflecting component of myelomalacia.  Additional questionable focus of T2 cord signal intensity at the T4-T5 level as detailed above.    3.  Enlargement heterogeneity of the right thyroid lobe.      Assessment and Plan:     Weakness    -Exam is most concerning for AIDP, less likely transverse myelitis.  -MRI Brain and C/T/L spine with no signs concerning for infection despite elevated WBC count in Tube 2 CSF analysis.  -Start IV Ig 400 mg/kg/d for a 5 day course  -Continue PT/OT for ROM, deconditioning, dispo recs.      Areflexia    -Per above, concern for AIDP.  Starting IV Ig, will continue to monitor for improvement.  -Possible component of chronic peripheral neuropathy though Hgb A1c 5.2%, well-controlled in the past.  B12 wnl.  -B1 25--deficient.  Recommend replacing with thiamine 500 mg qd x 3 d, thiamine 250 mg x 5 d, thiamine 100 mg po qd after.      Peripheral sensory neuropathy    -Chronic diabetic neuropathy reported by pt, recent severe sensory loss in BLE.  -Hgb A1c 5.2%, vitamin B12 wnl. TSH low with normal FT4--appropriate for levothyroxine use.  -Vitamin B1 deficient, replace per above--500 mg qd followed by 250 mg qd.  Maintain on discharge w/ thiamine 100 mg po qd.  -Peripheral neuropathy is improving per recent patient report, will continue to evaluate as IV Ig is started.        VTE Risk Mitigation         Ordered     enoxaparin  injection 40 mg  Daily     Route:  Subcutaneous        01/12/18 1615     Low Risk of VTE  Once      01/09/18 2057        Elva Diana MD  Neurology  [facility]

## 2018-01-13 NOTE — PLAN OF CARE
Problem: Patient Care Overview  Goal: Plan of Care Review  Outcome: Ongoing (interventions implemented as appropriate)  Side rails up x2; call bell in place; bed in lowest, locked position; skid proof socks on; no evidence of skin breakdown; care plan explained to patient; pt remains free of injury. Purewick secured draining clear yellow urine, pt tolerated po, turned and repositioned frequently, wound care consult complete, air mattress applied to bed and heel protectors on. Pt with stage II decubitus to coccyx barrier cream ordered.  Pt continues with no movement to LLE, RLE with minimal movement to foot. VSS and afebrile.

## 2018-01-13 NOTE — ASSESSMENT & PLAN NOTE
-Chronic diabetic neuropathy reported by pt, recent severe sensory loss in BLE.  -Hgb A1c 5.2%, vitamin B12 wnl. TSH low with normal FT4--appropriate for levothyroxine use.  -Vitamin B1 deficient, replace per above with 500 mg daily followed by 250 mg daily.  Maintain on discharge with thiamine 100 mg po qd.  -Peripheral neuropathy is improving per recent patient report, will continue to evaluate as IV Ig is started.

## 2018-01-13 NOTE — ASSESSMENT & PLAN NOTE
-Per above, concern for AIDP.  Starting IV Ig, will continue to monitor for improvement.  -Possible component of chronic peripheral neuropathy though Hgb A1c 5.2%, well-controlled in the past.  B12 wnl.  -B1 25--deficient.  Recommend replacing with thiamine 500 mg qd x 3 d, thiamine 250 mg x 5 d, thiamine 100 mg po qd after.

## 2018-01-13 NOTE — ASSESSMENT & PLAN NOTE
-Exam is most concerning for AIDP, less likely transverse myelitis.  -MRI Brain and C/T/L spine with no signs concerning for infection despite elevated WBC count in Tube 2 CSF analysis.  -Start IV Ig 400 mg/kg/d for a 5 day course  -Continue PT/OT for ROM, deconditioning, dispo recs.

## 2018-01-13 NOTE — NURSING
MRI - patient taken down for MRI - medicated for pain / pt is anxious - emotional support provided

## 2018-01-14 LAB
ANION GAP SERPL CALC-SCNC: 6 MMOL/L
BASOPHILS # BLD AUTO: 0.03 K/UL
BASOPHILS NFR BLD: 0.6 %
BUN SERPL-MCNC: 14 MG/DL
CALCIUM SERPL-MCNC: 8.9 MG/DL
CHLORIDE SERPL-SCNC: 106 MMOL/L
CO2 SERPL-SCNC: 26 MMOL/L
CREAT SERPL-MCNC: 1 MG/DL
DIFFERENTIAL METHOD: ABNORMAL
EOSINOPHIL # BLD AUTO: 0.4 K/UL
EOSINOPHIL NFR BLD: 7.3 %
ERYTHROCYTE [DISTWIDTH] IN BLOOD BY AUTOMATED COUNT: 12.3 %
EST. GFR  (AFRICAN AMERICAN): >60 ML/MIN/1.73 M^2
EST. GFR  (NON AFRICAN AMERICAN): 56.8 ML/MIN/1.73 M^2
GLUCOSE SERPL-MCNC: 123 MG/DL
HCT VFR BLD AUTO: 31.2 %
HGB BLD-MCNC: 10.4 G/DL
IMM GRANULOCYTES # BLD AUTO: 0.02 K/UL
IMM GRANULOCYTES NFR BLD AUTO: 0.4 %
LYMPHOCYTES # BLD AUTO: 1.7 K/UL
LYMPHOCYTES NFR BLD: 34.6 %
MCH RBC QN AUTO: 32.7 PG
MCHC RBC AUTO-ENTMCNC: 33.3 G/DL
MCV RBC AUTO: 98 FL
MONOCYTES # BLD AUTO: 0.6 K/UL
MONOCYTES NFR BLD: 11.9 %
NEUTROPHILS # BLD AUTO: 2.2 K/UL
NEUTROPHILS NFR BLD: 45.2 %
NRBC BLD-RTO: 0 /100 WBC
PLATELET # BLD AUTO: 176 K/UL
PMV BLD AUTO: 8.9 FL
POTASSIUM SERPL-SCNC: 4.4 MMOL/L
RBC # BLD AUTO: 3.18 M/UL
SODIUM SERPL-SCNC: 138 MMOL/L
TB INDURATION 48 - 72 HR READ: 0 MM
WBC # BLD AUTO: 4.8 K/UL

## 2018-01-14 PROCEDURE — 63600175 PHARM REV CODE 636 W HCPCS: Mod: JG | Performed by: STUDENT IN AN ORGANIZED HEALTH CARE EDUCATION/TRAINING PROGRAM

## 2018-01-14 PROCEDURE — 80048 BASIC METABOLIC PNL TOTAL CA: CPT

## 2018-01-14 PROCEDURE — 30233S1 TRANSFUSION OF NONAUTOLOGOUS GLOBULIN INTO PERIPHERAL VEIN, PERCUTANEOUS APPROACH: ICD-10-PCS | Performed by: HOSPITALIST

## 2018-01-14 PROCEDURE — 20600001 HC STEP DOWN PRIVATE ROOM

## 2018-01-14 PROCEDURE — 85025 COMPLETE CBC W/AUTO DIFF WBC: CPT

## 2018-01-14 PROCEDURE — 99232 SBSQ HOSP IP/OBS MODERATE 35: CPT | Mod: ,,, | Performed by: HOSPITALIST

## 2018-01-14 PROCEDURE — 63600175 PHARM REV CODE 636 W HCPCS: Performed by: HOSPITALIST

## 2018-01-14 PROCEDURE — 25000003 PHARM REV CODE 250: Performed by: HOSPITALIST

## 2018-01-14 PROCEDURE — 36415 COLL VENOUS BLD VENIPUNCTURE: CPT

## 2018-01-14 RX ORDER — HYDROCODONE BITARTRATE AND ACETAMINOPHEN 7.5; 325 MG/1; MG/1
1 TABLET ORAL EVERY 6 HOURS PRN
Status: DISCONTINUED | OUTPATIENT
Start: 2018-01-14 | End: 2018-01-19 | Stop reason: HOSPADM

## 2018-01-14 RX ADMIN — LISINOPRIL 40 MG: 20 TABLET ORAL at 10:01

## 2018-01-14 RX ADMIN — AMLODIPINE BESYLATE 5 MG: 5 TABLET ORAL at 10:01

## 2018-01-14 RX ADMIN — HYDROCODONE BITARTRATE AND ACETAMINOPHEN 1 TABLET: 7.5; 325 TABLET ORAL at 07:01

## 2018-01-14 RX ADMIN — AMITRIPTYLINE HYDROCHLORIDE 100 MG: 25 TABLET, FILM COATED ORAL at 09:01

## 2018-01-14 RX ADMIN — Medication 250 MG: at 10:01

## 2018-01-14 RX ADMIN — HYDROCODONE BITARTRATE AND ACETAMINOPHEN 1 TABLET: 10; 325 TABLET ORAL at 01:01

## 2018-01-14 RX ADMIN — ENOXAPARIN SODIUM 40 MG: 100 INJECTION SUBCUTANEOUS at 05:01

## 2018-01-14 RX ADMIN — LEVOTHYROXINE SODIUM 50 MCG: 50 TABLET ORAL at 06:01

## 2018-01-14 RX ADMIN — HYDROCODONE BITARTRATE AND ACETAMINOPHEN 1 TABLET: 10; 325 TABLET ORAL at 06:01

## 2018-01-14 RX ADMIN — HUMAN IMMUNOGLOBULIN G 45 G: 20 LIQUID INTRAVENOUS at 11:01

## 2018-01-14 NOTE — PROGRESS NOTES
"[facility] Medicine  Progress Note    Patient Name: Melissa Anand  MRN: [id number]  Patient Class: IP- Inpatient   Admission Date: 1/9/2018  Length of Stay: 5 days  Attending Physician: Titi Perez MD  Primary Care Provider: Dana Castellon MD    [facility] Medicine Team: [facility] MED A Titi Perez MD    Subjective:     Principal Problem:<principal problem not specified>    HPI: 71 yo F with PMHx pre-DM, HTN, and chronic low back pain who presents with progression of BLE weakness.  Patient's daughter at bedside assists with history.  Patient has been walking with a cane at baseline for past 2 years and has had worsening of BLE weakness for past 2-3 months with inability to get out of bed over the past weekend.  She denies recent infections--no SOB/cough, no n/v/c/d, no sick contacts, no URI Sx. Pt w/ acute worsening this am, she could not move her Les and needed assistance to raise them off the bed. Called EMS when daughter had difficulty assisting.      Has some diabetic peripheral neuropathy at baseline, but states that she hadn't had falls with her neuropathy prior to last two months.  Denies bowel/bladder incontinence (has chronic problems w/ constipation and Bms often loose when able to go, will go days w/o urinating and has gone 3x times since ED presentation), no saddle anesthesia, has sensory level to knee b/l. Patient has been getting injections in her back for chronic low back pain every 2 wks, last was few wks ago.  Daughter states that she had gotten some steroid injections but they did try an "epidural medication" a couple weeks ago.  She has been getting injections approximately every two weeks for past 3 months.  Patient states she still has low back pain with some radiation into the BLE but is unable to characterize the pain. States injections not working, but no sig pain now.     1/10: no change in lack of sensation or inability to move Les. Pt w/ worsening of chronic L " heel pain and having severe pain not controlled by home percocet and worse w/ palpation. Having frequent diarrhea, which she states is typical in appearance, non-bloody. Pt take OTC meds for this at home and would like med now.     1/11: less loose Bms, lomotil working. Sensation inconsistent and pt feeling pinches intermittently w/ loss of sensation above and below area. No return of motor function. Daughter states that no movement in feet for 4wks.     1/12: pt and daughter noticed flicker of movement w/ R foot. Inc sensation on right side as well. Bms dec in freq. Deneis neck stiff, photophobia, f/c, rash    1/13: better in better spirits and feeling better overall. No diarrhea. Inc movement in R foot. Dec pain in L foot.     1/14: improved movement, daughter states pt able to swing R leg over side of bed. Pt does not recall doing this. Feeling sleepy w/ starting IVIG. BP dropped during initiation but improved after. No BM    Review of Systems  Objective:     Vital Signs (Most Recent):  Temp: 98.1 °F (36.7 °C) (01/14/18 1337)  Pulse: 76 (01/14/18 1337)  Resp: 18 (01/14/18 1337)  BP: (!) 126/59 (01/14/18 1337)  SpO2: 96 % (01/14/18 1337) Vital Signs (24h Range):  Temp:  [97.8 °F (36.6 °C)-98.6 °F (37 °C)] 98.1 °F (36.7 °C)  Pulse:  [71-80] 76  Resp:  [18-20] 18  SpO2:  [92 %-98 %] 96 %  BP: (116-192)/(56-79) 126/59     Weight: 112.6 kg (248 lb 3.8 oz)  Body mass index is 34.62 kg/m².    Intake/Output Summary (Last 24 hours) at 01/14/18 1426  Last data filed at 01/14/18 1106   Gross per 24 hour   Intake              200 ml   Output              900 ml   Net             -700 ml      Physical Exam   General:  Well-developed, well-nourished, nad  HEENT:  NCAT, PERRLA, EOMI, oropharyngeal membranes non-erythematous/without exudate  Neck:  Supple, normal ROM without nuchal rigidity  Resp:  Symmetric expansion, no increased wob  CVS:  No LE edema, peripheral pulses 2+ (radial, dorsalis pedis)  GI:  Abd soft,  "non-distended, non-tender to palpation. Yellow-brown, loose stool in bed and on hospital gown  Neurologic Exam:  Mental Status:  AAOx3.  Speech, thought content appropriate.   Cranial Nerves:  mostly intact. Weak trapezius, SCM strength bilaterally.  Motor:  Normal bulk and tone, no apparent atrophy or fasciculations.  BUE shoulder abduction, biceps/triceps, wrist flexion/extension,  strength 4-/5.  BLE hip flexors 2/5, knee flexion/extension 1/5, plantarflexion/dorsiflexion 1/5, inc to 2/5 on R w/ more movement today. Contraction of anterior calf muscle.   Sensory:  Patient had no sensation to light touch or temperature in BLE up to knee (lower on R, upper on L), occasional "ouch" to pinch on RLE.No pain w/ palpation on L heel, no skin breaks or e/o infection  Reflexes:  Areflexic patellar, Achilles  Gait not assessed    Significant Labs: All pertinent labs within the past 24 hours have been reviewed.    Significant Imaging: I have reviewed all pertinent imaging results/findings within the past 24 hours.    Assessment/Plan:      Active Diagnoses:    Diagnosis Date Noted POA    Pressure injury of skin, stage 2 [L89.92] 01/12/2018 Yes    Suspected deep tissue injury [Z04.9] 01/12/2018 Not Applicable    Weakness [R53.1] 01/09/2018 Yes    Peripheral sensory neuropathy [G62.9] 01/09/2018 Unknown    Areflexia [R29.2] 01/09/2018 Unknown      Problems Resolved During this Admission:    Diagnosis Date Noted Date Resolved POA     VTE Risk Mitigation         Ordered     enoxaparin injection 40 mg  Daily     Route:  Subcutaneous        01/12/18 1615     Low Risk of VTE  Once      01/09/18 2057           Weakness     -Exam is most concerning for AIDP vs transverse myelitis. Possible epidural or verterbal abscess related to recent injection but ESR nl and CRP elevated to 9 w/o tenderness at back.   -MRI Lumbar Spine w/ w/o contrast: degen changes, e/o prior laminectomy (greater than 7y ago), mod-severe b/l foraminal " narrowing at L3-4.   -LP 01/10/18 with Neurology; unable to obtain d/t prev surg. Radiology consulted to perform under flouro--cell count & diff (lymphocyte predominant w/ WBC 22 and , 3k on first result), protein 56, glucose 55, VDRL, MS profile, freeze & hold ordered. L5-S1 w/o fluid. Unclear significance. Protein only mildly elevated above 40 w/ 3k RBC present. WBC elevated w/ lymph predominance but clinical picture not c/w viral meningitis. May need further viral testing w/ additional CSF sample that was frozen and held. MRI head, cervical and thoracic spine w/o e/o acute findings. IVIG for 5d day 1 1/14, IgA 254.  -PT/OT for ROM, deconditioning: SNF w/ RW  -Further recs pending results of imaging and labs.                     Peripheral sensory neuropathy     -Neuro concerned for chronic diabetic neuropathy with more severe acute sensory loss in BLE. But pt never w/ elevated A1c in our system above 5.8  -Hgb A1c 5.2, vitamin B1 deficient, vitamin B12 nl. TSH low with normal FT4--appropriate for levothyroxine use.  -thiamine 500 mg qd x 3 d, thiamine 250 mg x 5 d, thiamine 100 mg po qd after.      Diarrhea  Pt states this is chronic problem and no worse than usual. Non-bloody and no other concerning changes. Takes OTC meds at home. Wishes to take lomitil and not imodium; ordered. Improved w/ lomotil    Hypothyroid  Synthroid 50. R thyroid lobe enlarged on MRI     Chronic pain  Gabapentin 300mg tid, elavil 100 QHS, percocet 7.5 Q6h prn (inc to 10 1/11 d/t inc pain, Sx better and may tolerate dec again) - home meds listed in system, pt and daughter not entirely clear on dosing.      HTN  Lisinopril 40. Mostly controlled. Lability seen, likely from GBS. Will add norvasc 5    Macrocytic anemia   B12 nl, folate 5.7    Titi Perez MD  Department of Hospital Medicine   [facility]

## 2018-01-14 NOTE — PROGRESS NOTES
01/14/18 1148   Vital Signs   Temp 97.9 °F (36.6 °C)   Temp src Oral   Pulse 71   Heart Rate Source Monitor   Resp 18   SpO2 97 %   O2 Device (Oxygen Therapy) room air   BP (!) 116/56   MAP (mmHg) 81   Patient Position Lying   MD notified of BP drop. Neuro resident Ades instructed to keep infusing IVIG and continue to monitor.

## 2018-01-14 NOTE — NURSING
Care plan - resting in bed - noted no c/o pain - purwick in use - turned Q2H - barrier cream applied to sacrum - educated patient and family of fall precautions and prevention - VS WNL - call bell in reach - monitored

## 2018-01-14 NOTE — PLAN OF CARE
Problem: Patient Care Overview  Goal: Plan of Care Review  Outcome: Ongoing (interventions implemented as appropriate)  Patient remains free from falls and injury this shift. Bed in low, locked position with call bell in reach. Family at bedside. Bed alarm active. Patient encouraged to call for assistance when getting out of bed. Patient verbalized understanding. Purwick in place. Wound care performed. All belongings within reach will continue to monitor.

## 2018-01-15 PROBLEM — G89.4 CHRONIC PAIN DISORDER: Status: ACTIVE | Noted: 2018-01-15

## 2018-01-15 LAB
ALBUMIN CSF-MCNC: 25.2 MG/DL
ALBUMIN SERPL-MCNC: 3230 MG/DL
ANION GAP SERPL CALC-SCNC: 6 MMOL/L
BASOPHILS # BLD AUTO: 0.04 K/UL
BASOPHILS NFR BLD: 1.1 %
BUN SERPL-MCNC: 17 MG/DL
CALCIUM SERPL-MCNC: 8.3 MG/DL
CHLORIDE SERPL-SCNC: 106 MMOL/L
CO2 SERPL-SCNC: 23 MMOL/L
CREAT SERPL-MCNC: 1 MG/DL
DIFFERENTIAL METHOD: ABNORMAL
EOSINOPHIL # BLD AUTO: 0.3 K/UL
EOSINOPHIL NFR BLD: 7.3 %
ERYTHROCYTE [DISTWIDTH] IN BLOOD BY AUTOMATED COUNT: 12.4 %
EST. GFR  (AFRICAN AMERICAN): >60 ML/MIN/1.73 M^2
EST. GFR  (NON AFRICAN AMERICAN): 56.8 ML/MIN/1.73 M^2
GLUCOSE SERPL-MCNC: 120 MG/DL
HCT VFR BLD AUTO: 30.7 %
HGB BLD-MCNC: 9.8 G/DL
IGG CSF-MCNC: 7.5 MG/DL
IGG SERPL-MCNC: 1820 MG/DL
IGG SYNTH RATE SER+CSF CALC-MRATE: 3.44 MG/24 H
IGG/ALB CLEAR SER+CSF-RTO: 0.54
IGG/ALB CSF: 0.3 {RATIO}
IGG/ALB SER: 0.56 {RATIO}
IMM GRANULOCYTES # BLD AUTO: 0.01 K/UL
IMM GRANULOCYTES NFR BLD AUTO: 0.3 %
LYMPHOCYTES # BLD AUTO: 1.2 K/UL
LYMPHOCYTES NFR BLD: 32.3 %
MCH RBC QN AUTO: 32.7 PG
MCHC RBC AUTO-ENTMCNC: 31.9 G/DL
MCV RBC AUTO: 102 FL
MONOCYTES # BLD AUTO: 0.5 K/UL
MONOCYTES NFR BLD: 12.5 %
NEUTROPHILS # BLD AUTO: 1.7 K/UL
NEUTROPHILS NFR BLD: 46.5 %
NRBC BLD-RTO: 0 /100 WBC
OLIGOCLONAL BANDS CSF ELPH-IMP: 0 BANDS
OLIGOCLONAL BANDS CSF ELPH-IMP: 0 BANDS
OLIGOCLONAL BANDS SERPL: 0 BANDS
PLATELET # BLD AUTO: 154 K/UL
PMV BLD AUTO: 9.5 FL
POTASSIUM SERPL-SCNC: 4.3 MMOL/L
RBC # BLD AUTO: 3 M/UL
SODIUM SERPL-SCNC: 135 MMOL/L
WBC # BLD AUTO: 3.68 K/UL

## 2018-01-15 PROCEDURE — 80048 BASIC METABOLIC PNL TOTAL CA: CPT

## 2018-01-15 PROCEDURE — 97110 THERAPEUTIC EXERCISES: CPT

## 2018-01-15 PROCEDURE — 99233 SBSQ HOSP IP/OBS HIGH 50: CPT | Mod: GC,,, | Performed by: PSYCHIATRY & NEUROLOGY

## 2018-01-15 PROCEDURE — 20600001 HC STEP DOWN PRIVATE ROOM

## 2018-01-15 PROCEDURE — 63600175 PHARM REV CODE 636 W HCPCS: Performed by: HOSPITALIST

## 2018-01-15 PROCEDURE — 97535 SELF CARE MNGMENT TRAINING: CPT

## 2018-01-15 PROCEDURE — 97530 THERAPEUTIC ACTIVITIES: CPT

## 2018-01-15 PROCEDURE — 25000003 PHARM REV CODE 250: Performed by: HOSPITALIST

## 2018-01-15 PROCEDURE — 63600175 PHARM REV CODE 636 W HCPCS: Mod: JG | Performed by: STUDENT IN AN ORGANIZED HEALTH CARE EDUCATION/TRAINING PROGRAM

## 2018-01-15 PROCEDURE — 99232 SBSQ HOSP IP/OBS MODERATE 35: CPT | Mod: ,,, | Performed by: HOSPITALIST

## 2018-01-15 PROCEDURE — 85025 COMPLETE CBC W/AUTO DIFF WBC: CPT

## 2018-01-15 PROCEDURE — 25000003 PHARM REV CODE 250: Performed by: NURSE PRACTITIONER

## 2018-01-15 PROCEDURE — 36415 COLL VENOUS BLD VENIPUNCTURE: CPT

## 2018-01-15 RX ORDER — IBUPROFEN 400 MG/1
400 TABLET ORAL ONCE
Status: COMPLETED | OUTPATIENT
Start: 2018-01-15 | End: 2018-01-15

## 2018-01-15 RX ORDER — LISINOPRIL 20 MG/1
20 TABLET ORAL DAILY
Status: DISCONTINUED | OUTPATIENT
Start: 2018-01-16 | End: 2018-01-15

## 2018-01-15 RX ADMIN — SODIUM CHLORIDE 500 ML: 0.9 INJECTION, SOLUTION INTRAVENOUS at 05:01

## 2018-01-15 RX ADMIN — IBUPROFEN 400 MG: 400 TABLET, FILM COATED ORAL at 10:01

## 2018-01-15 RX ADMIN — HYDROCODONE BITARTRATE AND ACETAMINOPHEN 1 TABLET: 7.5; 325 TABLET ORAL at 02:01

## 2018-01-15 RX ADMIN — AMLODIPINE BESYLATE 5 MG: 5 TABLET ORAL at 08:01

## 2018-01-15 RX ADMIN — LISINOPRIL 40 MG: 20 TABLET ORAL at 08:01

## 2018-01-15 RX ADMIN — AMITRIPTYLINE HYDROCHLORIDE 100 MG: 25 TABLET, FILM COATED ORAL at 09:01

## 2018-01-15 RX ADMIN — HUMAN IMMUNOGLOBULIN G 45 G: 20 LIQUID INTRAVENOUS at 11:01

## 2018-01-15 RX ADMIN — Medication 250 MG: at 08:01

## 2018-01-15 RX ADMIN — SODIUM CHLORIDE 500 ML: 900 INJECTION, SOLUTION INTRAVENOUS at 10:01

## 2018-01-15 RX ADMIN — LEVOTHYROXINE SODIUM 50 MCG: 50 TABLET ORAL at 07:01

## 2018-01-15 RX ADMIN — HYDROCODONE BITARTRATE AND ACETAMINOPHEN 1 TABLET: 7.5; 325 TABLET ORAL at 05:01

## 2018-01-15 RX ADMIN — ENOXAPARIN SODIUM 40 MG: 100 INJECTION SUBCUTANEOUS at 05:01

## 2018-01-15 NOTE — HPI
"71 yo F with PMHx pre-DM, HTN, and chronic low back pain who presents with progression of BLE weakness.  Patient's daughter at bedside assists with history.  Patient has been walking with a cane at baseline for past 2 years and has had worsening of BLE weakness for past 2-3 months with inability to get out of bed over the past weekend.  She denies recent infections--no SOB/cough, no n/v/c/d, no sick contacts, no URI Sx. Pt w/ acute worsening this am, she could not move her Les and needed assistance to raise them off the bed. Called EMS when daughter had difficulty assisting.      Has some diabetic peripheral neuropathy at baseline, but states that she hadn't had falls with her neuropathy prior to last two months.  Denies bowel/bladder incontinence (has chronic problems w/ constipation and Bms often loose when able to go, will go days w/o urinating and has gone 3x times since ED presentation), no saddle anesthesia, has sensory level to knee b/l. Patient has been getting injections in her back for chronic low back pain every 2 wks, last was few wks ago.  Daughter states that she had gotten some steroid injections but they did try an "epidural medication" a couple weeks ago.  She has been getting injections approximately every two weeks for past 3 months.  Patient states she still has low back pain with some radiation into the BLE but is unable to characterize the pain. States injections not working, but no sig pain now.   "

## 2018-01-15 NOTE — ASSESSMENT & PLAN NOTE
Gabapentin 300mg tid, elavil 100 QHS, percocet 7.5 Q6h prn (inc to 10 1/11 d/t inc pain, Sx better and may tolerate dec again) - home meds listed in system, pt and daughter not entirely clear on dosing.

## 2018-01-15 NOTE — ASSESSMENT & PLAN NOTE
-Exam is most concerning for AIDP, less likely transverse myelitis.  -MRI Brain and C/T/L spine with no signs concerning for infection despite 22 WBCs in Tube 2 CSF.  -Day 2 of 5 IV Ig 400 mg/kg/d.  Continue PT/OT for ROM, deconditioning, dispo recs.

## 2018-01-15 NOTE — NURSING
Patient set off bed alarm - found sitting on the side of the bed stuck between the side rails - assisted back in the bed - pt talking about the [employer] game and thinks that it is day time and that she is in her home - reoriented patient to time and place - instructed edita to not climb OOB - bed alarm placed on - monitored

## 2018-01-15 NOTE — HOSPITAL COURSE
1/10: no change in lack of sensation or inability to move Les. Pt w/ worsening of chronic L heel pain and having severe pain not controlled by home percocet and worse w/ palpation. Having frequent diarrhea, which she states is typical in appearance, non-bloody. Pt take OTC meds for this at home and would like med now.      1/11: less loose Bms, lomotil working. Sensation inconsistent and pt feeling pinches intermittently w/ loss of sensation above and below area. No return of motor function. Daughter states that no movement in feet for 4wks.      1/12: pt and daughter noticed flicker of movement w/ R foot. Inc sensation on right side as well. Bms dec in freq. Deneis neck stiff, photophobia, f/c, rash     1/13: better in better spirits and feeling better overall. No diarrhea. Inc movement in R foot. Dec pain in L foot.      1/14: improved movement, daughter states pt able to swing R leg over side of bed. Pt does not recall doing this. Feeling sleepy w/ starting IVIG. BP dropped during initiation but improved after. No BM    1/15: strength impressively improved and pt able to move to side of bed for PT session. Pt helped getting pt up and she was able to stand on her own, able to side step as well.     1/16: pt continue to report improvement in LE. Pt with asymptomatic hypotension and MADHAVI noted on lab work today. Start her on IV fluid replacement.     1/17: patient report continue improvement in lower extremities. Hypotension and MADHAVI resolved with IVF yesterday. 4/5 IVIG today, neuro is ok with discharge home tomorrow after last dose of IVIG. Discussed with patient, she wants to go home rather go to SNF with her daughter. CM/SW are contact daughter at this time.     1/18: Continue improvement, pt sat up at chair overnight per nursing report. Day 5/5 of IVIG, neurology is concern for possible parkinson dx and started her on a trial of Carbadopa-levadopa. Stable to be discharge per their stand point. Pt declines SNF and  rehab. Will D/C with home with home health, will need follow up with neuro in clinic.

## 2018-01-15 NOTE — ASSESSMENT & PLAN NOTE
Lisinopril 40 home med. Mostly controlled.   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   meds stopped on 1/15

## 2018-01-15 NOTE — NURSING
Pt found sitting on the side of the bed - confused about where she was trying to go - assisted back into the bed - bed alarm placed on for safety

## 2018-01-15 NOTE — SUBJECTIVE & OBJECTIVE
Subjective:     Interval History:   Patient notes improvements in movement and sensation since starting IV Ig.  Per RN notes and per patient, she was up early am sitting on the side of her bed with legs crossed and talking about the football game, disoriented, saying something about going home.  Patient does not recall these events.  Had headache with IV Ig infusion 01/14/18, will slow rate of infusion with prns for headache.  Also received a call about hypotension during infusion which is not typical for IV Ig--appears that BP has been fairly labile and hypotension reported was SBP 110s and soon returned to SBP 130s.    Current Neurological Medications: Amitriptyline 100 mg po qHS, hydrocodone-APAP  mg po q6h prn pain    Current Facility-Administered Medications   Medication Dose Route Frequency Provider Last Rate Last Dose    amitriptyline tablet 100 mg  100 mg Oral QHS Titi Perez MD   100 mg at 01/14/18 2144    amLODIPine tablet 5 mg  5 mg Oral Daily Titi Perez MD   5 mg at 01/15/18 0815    diphenoxylate-atropine 2.5-0.025 mg per tablet 1 tablet  1 tablet Oral QID PRN Titi Perez MD   1 tablet at 01/12/18 2036    enoxaparin injection 40 mg  40 mg Subcutaneous Daily Titi Perez MD   40 mg at 01/14/18 1756    hydrocodone-acetaminophen 7.5-325mg per tablet 1 tablet  1 tablet Oral Q6H PRN Titi Perez MD   1 tablet at 01/15/18 0229    Immune Globulin G (IGG)-PRO-IGA 10 % injection (Privigen) 10 % injection 45 g  400 mg/kg/day Intravenous Q24H Elva Diana  mL/hr at 01/15/18 1245 45 g at 01/15/18 1245    levothyroxine tablet 50 mcg  50 mcg Oral Before breakfast Titi Perez MD   50 mcg at 01/15/18 0746    lisinopril tablet 40 mg  40 mg Oral Daily Titi Perez MD   40 mg at 01/15/18 0816    sodium chloride 0.9% flush 3 mL  3 mL Intravenous PRN Titi Perez MD        thiamine tablet 250 mg  250 mg Oral Daily Titi Perez MD   250 mg at 01/15/18 0816      Review of Systems   Constitutional: Negative for chills and fever.   Eyes: Negative for photophobia and visual disturbance.   Respiratory: Negative for cough and shortness of breath.    Gastrointestinal: Negative for constipation, diarrhea, nausea and vomiting.   Musculoskeletal: Positive for back pain and gait problem.   Skin: Negative for rash and wound.   Neurological: Positive for weakness and numbness. Negative for headaches.   Hematological: Negative for adenopathy. Does not bruise/bleed easily.   Psychiatric/Behavioral: Positive for decreased concentration. Negative for agitation and confusion.     Objective:     Vital Signs (Most Recent):  Temp: 98.6 °F (37 °C) (01/15/18 1325)  Pulse: 72 (01/15/18 1325)  Resp: 16 (01/15/18 1325)  BP: (!) 140/65 (01/15/18 1325)  SpO2: (!) 94 % (01/15/18 1325) Vital Signs (24h Range):  Temp:  [97.3 °F (36.3 °C)-98.6 °F (37 °C)] 98.6 °F (37 °C)  Pulse:  [69-82] 72  Resp:  [14-20] 16  SpO2:  [92 %-98 %] 94 %  BP: (108-154)/(54-68) 140/65     Weight: 112.6 kg (248 lb 3.8 oz)  Body mass index is 34.62 kg/m².    Physical Exam  General:  Well-developed, well-nourished, nad  HEENT:  NCAT, PERRLA, EOMI, oropharyngeal membranes non-erythematous/without exudate  Neck:  Supple, normal ROM without nuchal rigidity  Resp:  Symmetric expansion, no increased wob  CVS:  No LE edema, peripheral pulses 2+ (radial, dorsalis pedis)  GI:  Abd soft, non-distended, non-tender to palpation  Neurologic Exam:  Mental Status:  AAOx3.  Speech, thought content appropriate.  Recent, remote recall 3/3.  Cranial Nerves:  VFs intact on moving fingers in all quadrants bilaterally.  PERRLA, EOMI.  Facial movement, sensation intact and symmetric.  Palate raises symmetrically, tongue protrudes midline.  Trapezius 4/5 bilaterally.  Motor:  Normal bulk and tone, no apparent atrophy or fasciculations.  BUE shoulder abduction, biceps/triceps, wrist flexion/extension,  strength 4-/5.  BLE hip flexors 2/5, knee  flexion/extension 1/5, plantarflexion/dorsiflexion 1/5.    Sensory:  Patient has some sensation to light touch in BLE with some inattention to RLE.  Light touch at BUE intact without inattention.  Vibratory sensation diminished to mid-shin bilaterally.  Vibratory sensation intact at BUE digits.  Reflexes:  Areflexic patellar, Achilles bilaterally. Biceps, brachioradialis 2+ and symmetric. No ankle clonus. Equivocal toe bilaterally.  Coordination:  FNF, KAMRON intact with no dysmetria/ataxia/dysdiadochokinesia.  HTS deferred 2/2 weakness.  No resting tremor.  Gait:  Deferred 2/2 fall precautions     Significant Labs:     Recent Labs  Lab 01/15/18  0334   WBC 3.68*   RBC 3.00*   HGB 9.8*   HCT 30.7*      *   MCH 32.7*   MCHC 31.9*       Recent Labs  Lab 01/15/18  0334   CALCIUM 8.3*   *   K 4.3   CO2 23      BUN 17   CREATININE 1.0     CSF:   Tube 1--WBCs 5, RBCs 3000, 40% Seg Neutrophils, 40% Lymphocytes, 20% Monos/Macros  Tube 2--WBCs 22, RBCs 285, 3% Seg Neutrophils, 92% Lymphocytes, 5% Monos/Macros.  Protein 56 (corrected for RBCs = 56), Glucose 55.    Significant Imagin18 MRI L spine w/ w/o contrast:  Postoperative changes from L3-L4 laminectomy.  Multilevel degenerative changes of the lumbar spine most significant at L3-L4 with moderate to severe bilateral neuroforaminal narrowing.  Additional findings discussed level by level above.     18 CT head w/o contrast:  No evidence of acute hemorrhage or major vascular distribution infarct.  Mild chronic ischemic change as above.  Prominence ossification lateral to the left anterior clinoid process and cavernous sinus suggesting the possibility of a calcified meningioma. This is without significant mass effect on the brain parenchyma.    18 MRI brain w/ w/o contrast:  1.  No acute intracranial abnormality identified on today's examination.  Specifically, no evidence of acute infarction.  2.  Multiple foci of T2/FLAIR  hyperintensity in the supratentorial and periventricular white matter as well as mick, suggestive of chronic microvascular ischemic change. Remote right cerebellar infarct.    01/12/18 MRI C/T spine w/ w/o contrast:  1. Mild degenerative change of the cervical spine.  No abnormal cervical spinal cord signal or enhancement.  2. Mild degenerative change of thoracic spine most pronounced at the T10-T11 level where there is a posterior disc bulge resulting in mild/moderate spinal canal stenosis.  There is a questionable focus of T2 cord signal hyperintensity without associated enhancement at this level possibly reflecting component of myelomalacia.  Additional questionable focus of T2 cord signal intensity at the T4-T5 level as detailed above.    3.  Enlargement heterogeneity of the right thyroid lobe.

## 2018-01-15 NOTE — PT/OT/SLP PROGRESS
"Physical Therapy Treatment    Patient Name:  Melissa Anand   MRN:  [id number]    Recommendations:     Discharge Recommendations:  nursing facility, skilled   Discharge Equipment Recommendations: wheelchair, walker, rolling   Barriers to discharge: Inaccessible home and Decreased caregiver support    Assessment:     Melissa Anand is a 72 y.o. female admitted with a medical diagnosis of <principal problem not specified>.  She presents with the following impairments/functional limitations:  weakness, impaired functional mobilty, gait instability, impaired endurance, impaired balance, impaired sensation, impaired self care skills, decreased lower extremity function, decreased upper extremity function, decreased safety awareness, impaired cognition.  Pt demonstrates improvements in functional mobility in today's session.  Pt able to move BLE minimally. Pt performed bed mobility c max A and transfer c RW total A.  Pt amb 4 lateral steps towards HOB c Rw, max A.  Pt shift weight well in standing c RW and able to maintain static standing x2min c RW max A.  Pt tolerated treatment session well and would benefit from continued skilled acute PT while admitted.    Rehab Prognosis:  fair; patient would benefit from acute skilled PT services to address these deficits and reach maximum level of function.      Recent Surgery: * No surgery found *      Plan:     During this hospitalization, patient to be seen 3 x/week to address the above listed problems via gait training, therapeutic activities, therapeutic exercises, neuromuscular re-education  · Plan of Care Expires:  02/10/18   Plan of Care Reviewed with: patient    Subjective     Communicated with nursing and OT prior to session.  Patient found supine upon PT entry to room, agreeable to treatment.      Chief Complaint: decreased functional mobility  Patient comments/goals: "i'm tired"  Pain/Comfort:  · Pain Rating 1: 0/10    Patients cultural, spiritual, Yarsani conflicts given the " current situation: none stated    Objective:     Patient found with: Herson     General Precautions: Standard, fall   Orthopedic Precautions:N/A   Braces: N/A     Functional Mobility:  · Bed Mobility:     · Rolling Left:  maximal assistance  · Scooting: maximal assistance  · Supine to Sit: maximal assistance  · Sit to Supine: maximal assistance  · Transfers:     · Sit to Stand:  total assistance with rolling walker  · Gait: 4 lateral steps towards HOB c RW, max A  · Balance: fair static/dynamic sitting (min A); poor static/dynamic standing c RW (mod-max A)      AM-PAC 6 CLICK MOBILITY  Turning over in bed (including adjusting bedclothes, sheets and blankets)?: 2  Sitting down on and standing up from a chair with arms (e.g., wheelchair, bedside commode, etc.): 2  Moving from lying on back to sitting on the side of the bed?: 2  Moving to and from a bed to a chair (including a wheelchair)?: 2  Need to walk in hospital room?: 2  Climbing 3-5 steps with a railing?: 1  Total Score: 11       Therapeutic Activities and Exercises:   Pt educated on BLE exercises (hip flex, LAQ, AP) to perform while in bed and sitting EOB c assistance. Family also educated to help encourage pt to perform exercises.    BLE exercises 2x5 ea.  Sit<>Stand x3 c RW total A  Static standing c RW max A  4 lateral steps c RW max A      Patient left supine with all lines intact, call button in reach, RN notified and daughters present..    GOALS:    Physical Therapy Goals        Problem: Physical Therapy Goal    Goal Priority Disciplines Outcome Goal Variances Interventions   Physical Therapy Goal     PT/OT, PT Ongoing (interventions implemented as appropriate)     Description:  Goals to be met by: 2018     Patient will increase functional independence with mobility by performin. Supine to sit with Maximum Assistance - met  2. Rolling to Left with Maximum Assistance. - met  3. Sit to stand transfer with Maximum Assistance  4. Sitting at  edge of bed x5 minutes with Moderate Assistance - met  5. Bed to chair transfer with Maximum Assistance using Rolling Walker  6. Stand for 3 minutes with Moderate Assistance using Rolling Walker                         Time Tracking:     PT Received On: 01/15/18  PT Start Time: 1104     PT Stop Time: 1138  PT Total Time (min): 34 min     Billable Minutes: Therapeutic Activity 20 min and Therapeutic Exercise 14 min    Treatment Type: Treatment  PT/PTA: PT           Mike Calvin, PT  01/15/2018

## 2018-01-15 NOTE — PROGRESS NOTES
[facility]  Neurology  Progress Note    Patient Name: Melissa Anand  MRN: [id number]  Admission Date: 1/9/2018  Hospital Length of Stay: 6 days  Code Status: Prior   Attending Provider: Titi Perez MD  Primary Care Physician: Dana Castellon MD   Principal Problem:<principal problem not specified>    Subjective:     Interval History:   Patient notes improvements in movement and sensation since starting IV Ig.  Per RN notes and per patient, she was up early am sitting on the side of her bed with legs crossed and talking about the football game, disoriented, saying something about going home.  Patient does not recall these events.  Had headache with IV Ig infusion 01/14/18, will slow rate of infusion with prns for headache.  Also received a call about hypotension during infusion which is not typical for IV Ig--appears that BP has been fairly labile and hypotension reported was SBP 110s and soon returned to SBP 130s.    Current Neurological Medications: Amitriptyline 100 mg po qHS, hydrocodone-APAP  mg po q6h prn pain    Current Facility-Administered Medications   Medication Dose Route Frequency Provider Last Rate Last Dose    amitriptyline tablet 100 mg  100 mg Oral QHS Titi Perez MD   100 mg at 01/14/18 2144    amLODIPine tablet 5 mg  5 mg Oral Daily Titi Perez MD   5 mg at 01/15/18 0815    diphenoxylate-atropine 2.5-0.025 mg per tablet 1 tablet  1 tablet Oral QID PRN Titi Perez MD   1 tablet at 01/12/18 2036    enoxaparin injection 40 mg  40 mg Subcutaneous Daily Titi Perez MD   40 mg at 01/14/18 1756    hydrocodone-acetaminophen 7.5-325mg per tablet 1 tablet  1 tablet Oral Q6H PRN Titi Perez MD   1 tablet at 01/15/18 0229    Immune Globulin G (IGG)-PRO-IGA 10 % injection (Privigen) 10 % injection 45 g  400 mg/kg/day Intravenous Q24H Elva Diana  mL/hr at 01/15/18 1245 45 g at 01/15/18 1245    levothyroxine tablet 50 mcg  50 mcg Oral Before  breakfast Titi Perez MD   50 mcg at 01/15/18 0746    lisinopril tablet 40 mg  40 mg Oral Daily Titi Perez MD   40 mg at 01/15/18 0816    sodium chloride 0.9% flush 3 mL  3 mL Intravenous PRN Titi Perez MD        thiamine tablet 250 mg  250 mg Oral Daily Titi Perez MD   250 mg at 01/15/18 0816     Review of Systems   Constitutional: Negative for chills and fever.   Eyes: Negative for photophobia and visual disturbance.   Respiratory: Negative for cough and shortness of breath.    Gastrointestinal: Negative for constipation, diarrhea, nausea and vomiting.   Musculoskeletal: Positive for back pain and gait problem.   Skin: Negative for rash and wound.   Neurological: Positive for weakness and numbness. Negative for headaches.   Hematological: Negative for adenopathy. Does not bruise/bleed easily.   Psychiatric/Behavioral: Positive for decreased concentration. Negative for agitation and confusion.     Objective:     Vital Signs (Most Recent):  Temp: 98.6 °F (37 °C) (01/15/18 1325)  Pulse: 72 (01/15/18 1325)  Resp: 16 (01/15/18 1325)  BP: (!) 140/65 (01/15/18 1325)  SpO2: (!) 94 % (01/15/18 1325) Vital Signs (24h Range):  Temp:  [97.3 °F (36.3 °C)-98.6 °F (37 °C)] 98.6 °F (37 °C)  Pulse:  [69-82] 72  Resp:  [14-20] 16  SpO2:  [92 %-98 %] 94 %  BP: (108-154)/(54-68) 140/65     Weight: 112.6 kg (248 lb 3.8 oz)  Body mass index is 34.62 kg/m².    Physical Exam  General:  Well-developed, well-nourished, nad  HEENT:  NCAT, PERRLA, EOMI, oropharyngeal membranes non-erythematous/without exudate  Neck:  Supple, normal ROM without nuchal rigidity  Resp:  Symmetric expansion, no increased wob  CVS:  No LE edema, peripheral pulses 2+ (radial, dorsalis pedis)  GI:  Abd soft, non-distended, non-tender to palpation  Neurologic Exam:  Mental Status:  AAOx3.  Speech, thought content appropriate.  Recent, remote recall 3/3.  Cranial Nerves:  VFs intact on moving fingers in all quadrants bilaterally.  JEAN FOX.   Facial movement, sensation intact and symmetric.  Palate raises symmetrically, tongue protrudes midline.  Trapezius 4/5 bilaterally.  Motor:  Normal bulk and tone, no apparent atrophy or fasciculations.  BUE shoulder abduction, biceps/triceps, wrist flexion/extension,  strength 4-/5.  BLE hip flexors 2/5, knee flexion/extension 1/5, plantarflexion/dorsiflexion 1/5.    Sensory:  Patient has some sensation to light touch in BLE with some inattention to RLE.  Light touch at BUE intact without inattention.  Vibratory sensation diminished to mid-shin bilaterally.  Vibratory sensation intact at BUE digits.  Reflexes:  Areflexic patellar, Achilles bilaterally. Biceps, brachioradialis 2+ and symmetric. No ankle clonus. Equivocal toe bilaterally.  Coordination:  FNF, KAMRON intact with no dysmetria/ataxia/dysdiadochokinesia.  HTS deferred 2/2 weakness.  No resting tremor.  Gait:  Deferred 2/2 fall precautions     Significant Labs:     Recent Labs  Lab 01/15/18  0334   WBC 3.68*   RBC 3.00*   HGB 9.8*   HCT 30.7*      *   MCH 32.7*   MCHC 31.9*       Recent Labs  Lab 01/15/18  0334   CALCIUM 8.3*   *   K 4.3   CO2 23      BUN 17   CREATININE 1.0     CSF:   Tube 1--WBCs 5, RBCs 3000, 40% Seg Neutrophils, 40% Lymphocytes, 20% Monos/Macros  Tube 2--WBCs 22, RBCs 285, 3% Seg Neutrophils, 92% Lymphocytes, 5% Monos/Macros.  Protein 56 (corrected for RBCs = 56), Glucose 55.    Significant Imagin18 MRI L spine w/ w/o contrast:  Postoperative changes from L3-L4 laminectomy.  Multilevel degenerative changes of the lumbar spine most significant at L3-L4 with moderate to severe bilateral neuroforaminal narrowing.  Additional findings discussed level by level above.     18 CT head w/o contrast:  No evidence of acute hemorrhage or major vascular distribution infarct.  Mild chronic ischemic change as above.  Prominence ossification lateral to the left anterior clinoid process and cavernous sinus  suggesting the possibility of a calcified meningioma. This is without significant mass effect on the brain parenchyma.    01/12/18 MRI brain w/ w/o contrast:  1.  No acute intracranial abnormality identified on today's examination.  Specifically, no evidence of acute infarction.  2.  Multiple foci of T2/FLAIR hyperintensity in the supratentorial and periventricular white matter as well as mick, suggestive of chronic microvascular ischemic change. Remote right cerebellar infarct.    01/12/18 MRI C/T spine w/ w/o contrast:  1. Mild degenerative change of the cervical spine.  No abnormal cervical spinal cord signal or enhancement.  2. Mild degenerative change of thoracic spine most pronounced at the T10-T11 level where there is a posterior disc bulge resulting in mild/moderate spinal canal stenosis.  There is a questionable focus of T2 cord signal hyperintensity without associated enhancement at this level possibly reflecting component of myelomalacia.  Additional questionable focus of T2 cord signal intensity at the T4-T5 level as detailed above.    3.  Enlargement heterogeneity of the right thyroid lobe.      Assessment and Plan:     Weakness    -Exam is most concerning for AIDP, less likely transverse myelitis.  -MRI Brain and C/T/L spine with no signs concerning for infection despite 22 WBCs in Tube 2 CSF.  -Day 2 of 5 IV Ig 400 mg/kg/d.  Continue PT/OT for ROM, deconditioning, dispo recs.      Areflexia    -Per above, concern for AIDP.  Starting IV Ig, will continue to monitor for improvement.  -Possible component of chronic peripheral neuropathy though Hgb A1c 5.2%, well-controlled in the past.  B12 wnl.  -B1 25--deficient.  Recommend replacing with thiamine 500 mg qd x 3 d, thiamine 250 mg x 5 d, thiamine 100 mg po qd after.      Peripheral sensory neuropathy    -Chronic diabetic neuropathy reported by pt, recent severe sensory loss in BLE above previous baseline.  -Hgb A1c 5.2%, vitamin B12 wnl. TSH low with  normal FT4--appropriate for levothyroxine use.  -Vitamin B1 deficient, replace per above with 500 mg qd followed by 250 mg qd.  Discharge with thiamine 100 mg po qd.  -Peripheral neuropathy is improving per recent patient report, will continue to evaluate with continued IV Ig treatments.     VTE Risk Mitigation         Ordered     enoxaparin injection 40 mg  Daily     Route:  Subcutaneous        01/12/18 1615     Low Risk of VTE  Once      01/09/18 2057        Elva Diana MD  Neurology  [facility]

## 2018-01-15 NOTE — ASSESSMENT & PLAN NOTE
-Chronic diabetic neuropathy reported by pt, recent severe sensory loss in BLE above previous baseline.  -Hgb A1c 5.2%, vitamin B12 wnl. TSH low with normal FT4--appropriate for levothyroxine use.  -Vitamin B1 deficient, replace per above with 500 mg qd followed by 250 mg qd.  Discharge with thiamine 100 mg po qd.  -Peripheral neuropathy is improving per recent patient report, will continue to evaluate with continued IV Ig treatments.

## 2018-01-15 NOTE — ASSESSMENT & PLAN NOTE
-Neuro concerned for chronic diabetic neuropathy with more severe acute sensory loss in BLE. But pt never w/ elevated A1c in our system above 5.8  -Chronic diabetic neuropathy reported by pt, recent severe sensory loss in BLE above previous baseline.  -Hgb A1c 5.2, vitamin B1 deficient, vitamin B12 nl. TSH low with normal FT4--appropriate for levothyroxine use.  -thiamine 500 mg qd x 3 d, thiamine 250 mg x 5 d (first does Sun 1/15), thiamine 100 mg po qd after.  -Peripheral neuropathy is improving per recent patient report, will continue to evaluate with continued IV Ig treatments.

## 2018-01-15 NOTE — NURSING
Patient found sitting on side of the bed - set off the bed alarm - unable to explain where she was going - thought she could walk - assisted back to bed - bed alarm placed on again - monitored

## 2018-01-15 NOTE — PLAN OF CARE
01/15/18 1012   Right Care Assessment   Can the patient answer the patient profile reliably? (per nurse's notes, pt confused early this am)   Pt has [place] of [place] insurance but therapy is rec snf  Per dr rogers snf vs rehab  Perhaps single case agreement  Melania gonzalez to make referrals to both.  Not medically stable       01/12/18 1007   Right Care Assessment   Can the patient answer the patient profile reliably? Yes, cognitively intact   How often would a person be available to care for the patient? Whenever needed   Describe the patient's ability to walk at the present time. Major restrictions/daily assistance from another person   How does the patient rate their overall health at the present time? Fair   Number of comorbid conditions (as recorded on the chart) Two   During the past month, has the patient often been bothered by feeling down, depressed or hopeless? No   During the past month, has the patient often been bothered by little interest or pleasure in doing things? No

## 2018-01-15 NOTE — PLAN OF CARE
Problem: Occupational Therapy Goal  Goal: Occupational Therapy Goal  Goals to be met by: 1/17/2017     Patient will increase functional independence with ADLs by performing:    UE Dressing with Moderate Assistance.  LE Dressing with Maximum Assistance.  Grooming while seated with Minimal Assistance. - MET 1/15  Revised to standing with min A   Toileting from bedside commode with Maximum Assistance for hygiene and clothing management.   Toilet transfer to bedside commode with Maximum Assistance.     Outcome: Ongoing (interventions implemented as appropriate)  Pt progressing towards goals   Continue POC     Marci Medeiros OT  1/15/2018

## 2018-01-15 NOTE — PROGRESS NOTES
"[facility] Hospital Medicine  Progress Note    Patient Name: Melissa Anand  MRN: [id number]  Patient Class: IP- Inpatient   Admission Date: 1/9/2018  Length of Stay: 6 days  Attending Physician: Titi Perez MD  Primary Care Provider: Dana Castellon MD    [facility] Medicine Team: [facility] Titi Perez MD    Subjective:     Principal Problem:<principal problem not specified>    HPI: 73 yo F with PMHx pre-DM, HTN, and chronic low back pain who presents with progression of BLE weakness.  Patient's daughter at bedside assists with history.  Patient has been walking with a cane at baseline for past 2 years and has had worsening of BLE weakness for past 2-3 months with inability to get out of bed over the past weekend.  She denies recent infections--no SOB/cough, no n/v/c/d, no sick contacts, no URI Sx. Pt w/ acute worsening this am, she could not move her Les and needed assistance to raise them off the bed. Called EMS when daughter had difficulty assisting.      Has some diabetic peripheral neuropathy at baseline, but states that she hadn't had falls with her neuropathy prior to last two months.  Denies bowel/bladder incontinence (has chronic problems w/ constipation and Bms often loose when able to go, will go days w/o urinating and has gone 3x times since ED presentation), no saddle anesthesia, has sensory level to knee b/l. Patient has been getting injections in her back for chronic low back pain every 2 wks, last was few wks ago.  Daughter states that she had gotten some steroid injections but they did try an "epidural medication" a couple weeks ago.  She has been getting injections approximately every two weeks for past 3 months.  Patient states she still has low back pain with some radiation into the BLE but is unable to characterize the pain. States injections not working, but no sig pain now.     1/10: no change in lack of sensation or inability to move Les. Pt w/ worsening of chronic L " heel pain and having severe pain not controlled by home percocet and worse w/ palpation. Having frequent diarrhea, which she states is typical in appearance, non-bloody. Pt take OTC meds for this at home and would like med now.     1/11: less loose Bms, lomotil working. Sensation inconsistent and pt feeling pinches intermittently w/ loss of sensation above and below area. No return of motor function. Daughter states that no movement in feet for 4wks.     1/12: pt and daughter noticed flicker of movement w/ R foot. Inc sensation on right side as well. Bms dec in freq. Deneis neck stiff, photophobia, f/c, rash    1/13: better in better spirits and feeling better overall. No diarrhea. Inc movement in R foot. Dec pain in L foot.     1/14: improved movement, daughter states pt able to swing R leg over side of bed. Pt does not recall doing this. Feeling sleepy w/ starting IVIG. BP dropped during initiation but improved after. No BM    1/15 strength impressively improved and pt able to move to side of bed for PT session. Pt helped getting pt up and she was able to stand on her own, able to side step as well.     Review of Systems  Objective:     Vital Signs (Most Recent):  Temp: 98.3 °F (36.8 °C) (01/15/18 1652)  Pulse: 78 (01/15/18 1652)  Resp: 18 (01/15/18 1652)  BP: (!) 92/55 (01/15/18 1652)  SpO2: 95 % (01/15/18 1652) Vital Signs (24h Range):  Temp:  [97.3 °F (36.3 °C)-98.6 °F (37 °C)] 98.3 °F (36.8 °C)  Pulse:  [69-82] 78  Resp:  [14-20] 18  SpO2:  [92 %-98 %] 95 %  BP: ()/(54-70) 92/55     Weight: 112.6 kg (248 lb 3.8 oz)  Body mass index is 34.62 kg/m².    Intake/Output Summary (Last 24 hours) at 01/15/18 1654  Last data filed at 01/15/18 1328   Gross per 24 hour   Intake             1050 ml   Output              400 ml   Net              650 ml      Physical Exam   General:  Well-developed, well-nourished, nad  HEENT:  NCAT, PERRLA, EOMI, oropharyngeal membranes non-erythematous/without exudate  Neck:  " Supple, normal ROM without nuchal rigidity  Resp:  Symmetric expansion, no increased wob  CVS:  No LE edema, peripheral pulses 2+ (radial, dorsalis pedis)  GI:  Abd soft, non-distended, non-tender to palpation. Yellow-brown, loose stool in bed and on hospital gown  Neurologic Exam:  Mental Status:  AAOx3.  Speech, thought content appropriate.   Cranial Nerves:  mostly intact. Weak trapezius, SCM strength bilaterally.  Motor:  Normal bulk and tone, no apparent atrophy or fasciculations.  BUE shoulder abduction, biceps/triceps, wrist flexion/extension,  strength 4-/5.  BLE hip flexors 2/5, knee flexion/extension 1/5, plantarflexion/dorsiflexion 1/5, inc to 2/5 on R w/ more movement today. Contraction of anterior calf muscle.   Sensory:  Patient had no sensation to light touch or temperature in BLE up to knee (lower on R, upper on L), occasional "ouch" to pinch on RLE.No pain w/ palpation on L heel, no skin breaks or e/o infection  Reflexes:  Areflexic patellar, Achilles  Gait not assessed    Significant Labs: All pertinent labs within the past 24 hours have been reviewed.    Significant Imaging: I have reviewed all pertinent imaging results/findings within the past 24 hours.    Assessment/Plan:      Active Diagnoses:    Diagnosis Date Noted POA    Pressure injury of skin, stage 2 [L89.92] 01/12/2018 Yes    Suspected deep tissue injury [Z04.9] 01/12/2018 Not Applicable    Weakness [R53.1] 01/09/2018 Yes    Peripheral sensory neuropathy [G62.9] 01/09/2018 Unknown    Areflexia [R29.2] 01/09/2018 Unknown      Problems Resolved During this Admission:    Diagnosis Date Noted Date Resolved POA     VTE Risk Mitigation         Ordered     enoxaparin injection 40 mg  Daily     Route:  Subcutaneous        01/12/18 1615     Low Risk of VTE  Once      01/09/18 2057           Weakness     -Exam is most concerning for AIDP vs transverse myelitis. Possible epidural or verterbal abscess related to recent injection but ESR nl " and CRP elevated to 9 w/o tenderness at back.   -MRI Lumbar Spine w/ w/o contrast: degen changes, e/o prior laminectomy (greater than 7y ago), mod-severe b/l foraminal narrowing at L3-4.   -LP 01/10/18 with Neurology; unable to obtain d/t prev surg. Radiology consulted to perform under flouro--cell count & diff (lymphocyte predominant w/ WBC 22 and , 3k on first result), protein 56, glucose 55, VDRL, MS profile, freeze & hold ordered. L5-S1 w/o fluid. Unclear significance. Protein only mildly elevated above 40 w/ 3k RBC present. WBC elevated w/ lymph predominance but clinical picture not c/w viral meningitis. May need further viral testing w/ additional CSF sample that was frozen and held. MRI head, cervical and thoracic spine w/o e/o acute findings. IVIG for 5d day 1 1/14, IgA 254.  -PT/OT for ROM, deconditioning: SNF w/ RW  -Further recs pending results of imaging and labs.                     Peripheral sensory neuropathy     -Neuro concerned for chronic diabetic neuropathy with more severe acute sensory loss in BLE. But pt never w/ elevated A1c in our system above 5.8  -Hgb A1c 5.2, vitamin B1 deficient, vitamin B12 nl. TSH low with normal FT4--appropriate for levothyroxine use.  -thiamine 500 mg qd x 3 d, thiamine 250 mg x 5 d (first does Sun 1/15), thiamine 100 mg po qd after.      Diarrhea  Pt states this is chronic problem and no worse than usual. Non-bloody and no other concerning changes. Takes OTC meds at home. Wishes to take lomitil and not imodium; ordered. Improved w/ lomotil    Hypothyroid  Synthroid 50. R thyroid lobe enlarged on MRI     Chronic pain  Gabapentin 300mg tid, elavil 100 QHS, percocet 7.5 Q6h prn (inc to 10 1/11 d/t inc pain, Sx better and may tolerate dec again) - home meds listed in system, pt and daughter not entirely clear on dosing.      HTN  Lisinopril 40 home med. Mostly controlled. Lability seen, likely from GBS. Will add norvasc 5. Both meds stopped on 1/15 d/t BP 90/50.  Fluid bolus ordered.     Macrocytic anemia   B12 nl, folate 5.7    Titi Perez MD  Department of Hospital Medicine   [facility]

## 2018-01-15 NOTE — PT/OT/SLP PROGRESS
Occupational Therapy   Treatment    Name: Melissa Anand  MRN: [id number]  Admitting Diagnosis:  <principal problem not specified>       Recommendations:     Discharge Recommendations: nursing facility, skilled  Discharge Equipment Recommendations:  wheelchair, walker, rolling  Barriers to discharge:  None    Subjective     Communicated with: RN prior to session.  Pain/Comfort:  · Pain Rating 1: 0/10  · Pain Rating Post-Intervention 1: 0/10    Objective:     Patient found with: peripheral IV, PureWick    General Precautions: Standard, fall   Orthopedic Precautions:N/A   Braces: N/A     Occupational Performance:    Bed Mobility:    · Patient completed Rolling/Turning to Left with  moderate assistance and with side rail  · Patient completed Scooting/Bridging with moderate assistance and towards EOB  · Patient completed Supine to Sit with maximal assistance  · Patient completed Sit to Supine with maximal assistance and with leg lift     Functional Mobility/Transfers:  · Patient completed Sit <> Stand Transfer with total assistance for 1st trial and max A for 2nd trial with  rolling walker. Verbal cues provided for AD management and hand placement   · Pt took x 3 side steps towards HOB with physical assist for wt shifting through B feet for side steps and AD management.     Activities of Daily Living:  · Grooming: minimum assistance Pt completed oral care and washed face sitting EOB with set-up (A). Pt demo'd slightly limited ROM when instructed to wash back of neck and face while sitting EOB. Pt required physical assist to bring BUEs up into shld flexion positon   · Toileting: total assistance pt found with pure wick. Pt unable to perform juan carlos-care due to body habitus     Patient left supine with all lines intact, call button in reach, RN notified and family members  present    [facility] 6 Click:  [facility] Total Score: 9    Treatment & Education:  Pt educated by therapist on:   - Pt educated on role of OT, POC, and goals for  therapy.    -  Patient agreed to participate in session.   - Patient and family aware of patient's deficits and therapy progression.   -Educated pt on being appropriate to transfer with nsg and PCT  - Time provided for therapeutic counseling and discussion of health disposition.   - Importance of OOB ax's with staff member assistance and sitting OOB majority of day.   - Pt completed ADLs and functional mobility for treatment session as noted above   - Pt verbalized understanding. Pt expressed no further concerns/questions.  - whiteboard updated     Pt completed 1 sets of 3 reps of B UE AAROM exercise program sitting unsupported EOB in order to work towards increasing UB strength/endurance and to maximize safety and (I) with mobility and self-care skills.  Pt completed the following exercises with initial demonstration from therapist: shld flexion/extension, elbow flexion/extension, and chest press. Pt required frequent rest breaks in between sets and required physical assist to complete exercises due to fatigue and decreased strength. Pt and family members educated on all exercises and instructed to perform exercises 2-3 times a day.   Education:    Assessment:     Melissa Anand is a 72 y.o. female with a medical diagnosis of <principal problem not specified>.  She presents with performance deficits affecting function are weakness, impaired endurance, impaired functional mobilty, impaired self care skills, impaired balance, decreased upper extremity function, decreased lower extremity function, gait instability, impaired cognition.  Pt demo'd improved functional mobility this date taking x 3 side steps towards HOB. Pt will continue to benefit from skilled OT in order to maximize pt's overall safety and (I) with functional mobility and ADLs. At this time pt would greatly benefit from SNF placement in order to improve return to PLOF. If pt continues to progress with mobility and ADLs pt may be able to tolerate IP Rehab.  Pt will continue to benefit from skilled OT in order to address the previously stated deficits.     Rehab Prognosis:  good; patient would benefit from acute skilled OT services to address these deficits and reach maximum level of function.       Plan:     Patient to be seen 3 x/week to address the above listed problems via self-care/home management, therapeutic activities, therapeutic exercises  · Plan of Care Expires: 02/10/18  · Plan of Care Reviewed with: patient    This Plan of care has been discussed with the patient who was involved in its development and understands and is in agreement with the identified goals and treatment plan    GOALS:    Occupational Therapy Goals        Problem: Occupational Therapy Goal    Goal Priority Disciplines Outcome Interventions   Occupational Therapy Goal     OT, PT/OT Ongoing (interventions implemented as appropriate)    Description:  Goals to be met by: 1/17/2017     Patient will increase functional independence with ADLs by performing:    UE Dressing with Moderate Assistance.  LE Dressing with Maximum Assistance.  Grooming while seated with Minimal Assistance. - MET 1/15  Revised to standing with min A   Toileting from bedside commode with Maximum Assistance for hygiene and clothing management.   Toilet transfer to bedside commode with Maximum Assistance.                       Time Tracking:     OT Date of Treatment: 01/15/18  OT Start Time: 1104  OT Stop Time: 1136  OT Total Time (min): 32 min    Billable Minutes:Self Care/Home Management 15  Therapeutic Exercise 15    Marci Medeiros OT  1/15/2018

## 2018-01-15 NOTE — PROGRESS NOTES
01/15/18 1652   Vital Signs   Temp 98.3 °F (36.8 °C)   Temp src Oral   Pulse 78   Heart Rate Source Monitor   Resp 18   SpO2 95 %   O2 Device (Oxygen Therapy) room air   BP (!) 92/55   MAP (mmHg) 71   BP Location Left arm   Patient Position Lying   Dr. Perez notified of BP. MD to order 500cc bolus NS x 1. Pt. Is asymptomatic. Will continue to monitor.

## 2018-01-15 NOTE — PLAN OF CARE
Problem: Patient Care Overview  Goal: Plan of Care Review  Outcome: Ongoing (interventions implemented as appropriate)  Patient remains free from falls and injury this shift. Bed in low, locked position with call bell in reach. Family at bedside. Bed alarm active. Patient encouraged to call for assistance when getting out of bed. Patient verbalized understanding. Purwick in place. Wound care performed. IVIG given, frequent vitals taken. One instance of blood pressure drop (pt asymptomatic but drowsy), MD notified - infusion continued. Pt complained of headache jail through infusion, MD notified  - Norco given and drip rate decreased. Headache resolved from 9/10 to 5/10. All belongings within reach will continue to monitor.

## 2018-01-15 NOTE — ASSESSMENT & PLAN NOTE
Pt states this is chronic problem and no worse than usual. Non-bloody and no other concerning changes. Takes OTC meds at home. Wishes to take lomitil and not imodium; ordered.  - Improved w/ lomotil

## 2018-01-15 NOTE — ASSESSMENT & PLAN NOTE
- Exam is most concerning for AIDP vs transverse myelitis. Possible epidural or verterbal abscess related to recent injection but ESR nl and CRP elevated to 9 w/o tenderness at back.   - MRI Lumbar Spine w/ w/o contrast: degen changes, e/o prior laminectomy (greater than 7y ago), mod-severe b/l foraminal narrowing at L3-4.   - LP 01/10/18 with Neurology; unable to obtain d/t prev surg. Radiology consulted to perform under flouro--cell count & diff (lymphocyte predominant w/ WBC 22 and , 3k on first result), protein 56, glucose 55, VDRL, MS profile, freeze & hold ordered. L5-S1 w/o fluid. Unclear significance. Protein only mildly elevated above 40 w/ 3k RBC present. WBC elevated w/ lymph predominance but clinical picture not c/w viral meningitis. May need further viral testing w/ additional CSF sample that was frozen and held. MRI head, cervical and thoracic spine w/o e/o acute findings. IVIG for 5d day 1 1/14, IgA 254.  - PT/OT for ROM, deconditioning: SNF w/ RW   - Appreciate neurology recommendations

## 2018-01-15 NOTE — PLAN OF CARE
Problem: Physical Therapy Goal  Goal: Physical Therapy Goal  Goals to be met by: 2018     Patient will increase functional independence with mobility by performin. Supine to sit with Maximum Assistance - met  2. Rolling to Left with Maximum Assistance. - met  3. Sit to stand transfer with Maximum Assistance  4. Sitting at edge of bed x5 minutes with Moderate Assistance - met  5. Bed to chair transfer with Maximum Assistance using Rolling Walker  6. Stand for 3 minutes with Moderate Assistance using Rolling Walker       Outcome: Ongoing (interventions implemented as appropriate)  Pt met 3/4 goals on this date; progressing well towards remaining goals; additional goals added to POC.    Mike Calvin DPT  1/15/2018

## 2018-01-15 NOTE — ASSESSMENT & PLAN NOTE
Per above, concern for AIDP.  Starting IV Ig, will continue to monitor for improvement.  -Possible component of chronic peripheral neuropathy though Hgb A1c 5.2%, well-controlled in the past.  B12 wnl.  -B1 25--deficient.  Replacing with thiamine 500 mg qd x 3 d, thiamine 250 mg x 5 d, thiamine 100 mg po qd after.

## 2018-01-15 NOTE — PLAN OF CARE
Problem: Patient Care Overview  Goal: Plan of Care Review  Outcome: Ongoing (interventions implemented as appropriate)  Pt remained free from falls during shift. Pt awake and alert, oriented to person and place. Unsure of month and year. Pt unable to ambulate. Pt verbalized understanding that she should call for assistance prior to attempting movement. Neuro checks performed Q4. Purwick in place. Wound care provided. IVIG given with frequent VS. Pt's BP low, asymptomatic. Physician ordered 500cc bolus. Worked with PT/OT. Bed in lowest position, bed alarm on, call light within reach. Will continue to monitor.

## 2018-01-16 PROBLEM — E51.11: Status: ACTIVE | Noted: 2018-01-16

## 2018-01-16 PROBLEM — G61.0 AIDP (ACUTE INFLAMMATORY DEMYELINATING POLYNEUROPATHY): Status: ACTIVE | Noted: 2018-01-16

## 2018-01-16 PROBLEM — N17.9 ACUTE RENAL INJURY DUE TO HYPOVOLEMIA: Status: ACTIVE | Noted: 2018-01-16

## 2018-01-16 PROBLEM — R29.898 WEAKNESS OF BOTH LOWER EXTREMITIES: Status: ACTIVE | Noted: 2018-01-09

## 2018-01-16 PROBLEM — E86.1 ACUTE RENAL INJURY DUE TO HYPOVOLEMIA: Status: ACTIVE | Noted: 2018-01-16

## 2018-01-16 LAB
ALBUMIN SERPL BCP-MCNC: 2.5 G/DL
ALP SERPL-CCNC: 55 U/L
ALT SERPL W/O P-5'-P-CCNC: 11 U/L
ANION GAP SERPL CALC-SCNC: 7 MMOL/L
ANION GAP SERPL CALC-SCNC: 7 MMOL/L
AST SERPL-CCNC: 20 U/L
BASOPHILS # BLD AUTO: 0.02 K/UL
BASOPHILS NFR BLD: 0.3 %
BILIRUB SERPL-MCNC: 0.5 MG/DL
BUN SERPL-MCNC: 30 MG/DL
BUN SERPL-MCNC: 30 MG/DL
CALCIUM SERPL-MCNC: 7.8 MG/DL
CALCIUM SERPL-MCNC: 7.8 MG/DL
CHLORIDE SERPL-SCNC: 108 MMOL/L
CHLORIDE SERPL-SCNC: 108 MMOL/L
CMV SPEC QL SHELL VIAL CULT: NO GROWTH
CO2 SERPL-SCNC: 22 MMOL/L
CO2 SERPL-SCNC: 22 MMOL/L
CREAT SERPL-MCNC: 1.9 MG/DL
CREAT SERPL-MCNC: 1.9 MG/DL
DIFFERENTIAL METHOD: ABNORMAL
EOSINOPHIL # BLD AUTO: 0.1 K/UL
EOSINOPHIL NFR BLD: 1.8 %
ERYTHROCYTE [DISTWIDTH] IN BLOOD BY AUTOMATED COUNT: 12.5 %
EST. GFR  (AFRICAN AMERICAN): 30.1 ML/MIN/1.73 M^2
EST. GFR  (AFRICAN AMERICAN): 30.1 ML/MIN/1.73 M^2
EST. GFR  (NON AFRICAN AMERICAN): 26.1 ML/MIN/1.73 M^2
EST. GFR  (NON AFRICAN AMERICAN): 26.1 ML/MIN/1.73 M^2
GLUCOSE SERPL-MCNC: 163 MG/DL
GLUCOSE SERPL-MCNC: 163 MG/DL
GRAM STN SPEC: NORMAL
GRAM STN SPEC: NORMAL
HCT VFR BLD AUTO: 32.9 %
HGB BLD-MCNC: 10.8 G/DL
IMM GRANULOCYTES # BLD AUTO: 0.03 K/UL
IMM GRANULOCYTES NFR BLD AUTO: 0.5 %
LYMPHOCYTES # BLD AUTO: 1.5 K/UL
LYMPHOCYTES NFR BLD: 23.2 %
MCH RBC QN AUTO: 32.4 PG
MCHC RBC AUTO-ENTMCNC: 32.8 G/DL
MCV RBC AUTO: 99 FL
MONOCYTES # BLD AUTO: 0.6 K/UL
MONOCYTES NFR BLD: 9.6 %
NEUTROPHILS # BLD AUTO: 4.2 K/UL
NEUTROPHILS NFR BLD: 64.6 %
NRBC BLD-RTO: 0 /100 WBC
PLATELET # BLD AUTO: 190 K/UL
PMV BLD AUTO: 9.5 FL
POCT GLUCOSE: 213 MG/DL (ref 70–110)
POTASSIUM SERPL-SCNC: 4.6 MMOL/L
POTASSIUM SERPL-SCNC: 4.6 MMOL/L
PROT SERPL-MCNC: 7.6 G/DL
RBC # BLD AUTO: 3.33 M/UL
SODIUM SERPL-SCNC: 137 MMOL/L
SODIUM SERPL-SCNC: 137 MMOL/L
WBC # BLD AUTO: 6.55 K/UL

## 2018-01-16 PROCEDURE — 85025 COMPLETE CBC W/AUTO DIFF WBC: CPT

## 2018-01-16 PROCEDURE — 25000003 PHARM REV CODE 250: Performed by: HOSPITALIST

## 2018-01-16 PROCEDURE — 25000003 PHARM REV CODE 250: Performed by: NURSE PRACTITIONER

## 2018-01-16 PROCEDURE — 63600175 PHARM REV CODE 636 W HCPCS: Performed by: HOSPITALIST

## 2018-01-16 PROCEDURE — 99233 SBSQ HOSP IP/OBS HIGH 50: CPT | Mod: GC,,, | Performed by: PSYCHIATRY & NEUROLOGY

## 2018-01-16 PROCEDURE — 36415 COLL VENOUS BLD VENIPUNCTURE: CPT

## 2018-01-16 PROCEDURE — 80053 COMPREHEN METABOLIC PANEL: CPT

## 2018-01-16 PROCEDURE — 20600001 HC STEP DOWN PRIVATE ROOM

## 2018-01-16 PROCEDURE — 63600175 PHARM REV CODE 636 W HCPCS: Mod: JG | Performed by: STUDENT IN AN ORGANIZED HEALTH CARE EDUCATION/TRAINING PROGRAM

## 2018-01-16 PROCEDURE — 99233 SBSQ HOSP IP/OBS HIGH 50: CPT | Mod: ,,, | Performed by: HOSPITALIST

## 2018-01-16 RX ORDER — ACETAMINOPHEN 325 MG/1
650 TABLET ORAL EVERY 6 HOURS PRN
Status: DISCONTINUED | OUTPATIENT
Start: 2018-01-16 | End: 2018-01-19 | Stop reason: HOSPADM

## 2018-01-16 RX ORDER — SODIUM CHLORIDE 9 MG/ML
INJECTION, SOLUTION INTRAVENOUS CONTINUOUS
Status: ACTIVE | OUTPATIENT
Start: 2018-01-16 | End: 2018-01-16

## 2018-01-16 RX ADMIN — AMITRIPTYLINE HYDROCHLORIDE 100 MG: 25 TABLET, FILM COATED ORAL at 08:01

## 2018-01-16 RX ADMIN — HYDROCODONE BITARTRATE AND ACETAMINOPHEN 1 TABLET: 7.5; 325 TABLET ORAL at 11:01

## 2018-01-16 RX ADMIN — ACETAMINOPHEN 325 MG: 325 TABLET, FILM COATED ORAL at 08:01

## 2018-01-16 RX ADMIN — LEVOTHYROXINE SODIUM 50 MCG: 50 TABLET ORAL at 06:01

## 2018-01-16 RX ADMIN — Medication 250 MG: at 08:01

## 2018-01-16 RX ADMIN — ENOXAPARIN SODIUM 40 MG: 100 INJECTION SUBCUTANEOUS at 05:01

## 2018-01-16 RX ADMIN — SODIUM CHLORIDE: 0.9 INJECTION, SOLUTION INTRAVENOUS at 03:01

## 2018-01-16 RX ADMIN — HYDROCODONE BITARTRATE AND ACETAMINOPHEN 1 TABLET: 7.5; 325 TABLET ORAL at 08:01

## 2018-01-16 RX ADMIN — SODIUM CHLORIDE: 0.9 INJECTION, SOLUTION INTRAVENOUS at 12:01

## 2018-01-16 RX ADMIN — SODIUM CHLORIDE 500 ML: 0.9 INJECTION, SOLUTION INTRAVENOUS at 11:01

## 2018-01-16 RX ADMIN — ACETAMINOPHEN 650 MG: 325 TABLET, FILM COATED ORAL at 11:01

## 2018-01-16 RX ADMIN — HUMAN IMMUNOGLOBULIN G 45 G: 20 LIQUID INTRAVENOUS at 12:01

## 2018-01-16 RX ADMIN — DIPHENOXYLATE HYDROCHLORIDE AND ATROPINE SULFATE 1 TABLET: 2.5; .025 TABLET ORAL at 11:01

## 2018-01-16 RX ADMIN — SODIUM CHLORIDE 500 ML: 900 INJECTION, SOLUTION INTRAVENOUS at 04:01

## 2018-01-16 NOTE — PROGRESS NOTES
01/16/18 1131   Vital Signs   Temp 98.3 °F (36.8 °C)   Temp src Oral   Pulse 79   Heart Rate Source Monitor   Resp 16   SpO2 (!) 94 %   BP (!) 98/48   BP Location Left arm   BP Method Manual   Patient Position Lying   Dr. Hernandez notified of pt blood pressure. Pt also reporting a headache and feeling lightheaded. MD ordered IV bolus and continuous fluids, and tylenol for pt's headache. Will continue to monitor

## 2018-01-16 NOTE — ASSESSMENT & PLAN NOTE
-Patient with chronic hx of diarrhea, less concerning for acute event triggering an AIDP  -Will plan for follow up in clinic.  Fluctuating BP on current admission, possibly dysautonomia.

## 2018-01-16 NOTE — ASSESSMENT & PLAN NOTE
-Per above, replace vitamin B1, currently on thiamine 250 mg po qd dosing  -Would like patient to continue thiamine 100 mg po qd on discharge  -Will follow up in Neurology clinic on discharge

## 2018-01-16 NOTE — SUBJECTIVE & OBJECTIVE
Interval History: Patient report continue improvement in LE strength. Denies abd pain    Review of Systems   Constitutional: Negative for chills and fever.   Eyes: Negative for photophobia and visual disturbance.   Respiratory: Negative for cough and shortness of breath.    Gastrointestinal: Negative for constipation, diarrhea, nausea and vomiting.   Musculoskeletal: Positive for back pain.   Skin: Negative for rash and wound.   Neurological: Positive for weakness, numbness and headaches.   Hematological: Negative for adenopathy. Does not bruise/bleed easily.     Objective:     Vital Signs (Most Recent):  Temp: 97.7 °F (36.5 °C) (01/16/18 0704)  Pulse: 76 (01/16/18 0704)  Resp: 16 (01/16/18 0704)  BP: 139/65 (01/16/18 0704)  SpO2: (!) 94 % (01/16/18 0704) Vital Signs (24h Range):  Temp:  [97.7 °F (36.5 °C)-98.6 °F (37 °C)] 97.7 °F (36.5 °C)  Pulse:  [69-84] 76  Resp:  [14-18] 16  SpO2:  [92 %-97 %] 94 %  BP: ()/(50-70) 139/65     Weight: 112.6 kg (248 lb 3.8 oz)  Body mass index is 34.62 kg/m².    Intake/Output Summary (Last 24 hours) at 01/16/18 1129  Last data filed at 01/16/18 0938   Gross per 24 hour   Intake             1670 ml   Output              900 ml   Net              770 ml      Physical Exam  General:  Well-developed, well-nourished, nad  HEENT:  NCAT, PERRLA, EOMI, oropharyngeal membranes non-erythematous/without exudate  Neck:  Supple, normal ROM without nuchal rigidity  Resp:  Symmetric expansion, no increased wob  CVS:  No LE edema, peripheral pulses 2+ (radial, dorsalis pedis)  GI:  Abd soft, non-distended, non-tender to palpation. Yellow-brown, loose stool in bed and on hospital gown  Neurologic Exam:  Mental Status:  AAOx3.  Speech, thought content appropriate.   Cranial Nerves:  mostly intact. Weak trapezius, SCM strength bilaterally.  Motor:  Normal bulk and tone, no apparent atrophy or fasciculations.  BUE shoulder abduction, biceps/triceps, wrist flexion/extension,  strength 4-/5.  " BLE hip flexors 2/5, knee flexion/extension 1/5, plantarflexion/dorsiflexion 1/5, inc to 2/5 on R w/ more movement today. Contraction of anterior calf muscle.   Sensory:  Patient had no sensation to light touch or temperature in BLE up to knee (lower on R, upper on L), occasional "ouch" to pinch on RLE.No pain w/ palpation on L heel, no skin breaks or e/o infection  Reflexes:  Areflexic patellar, Achilles  Gait not assessed    MELD-Na score: 7 at 1/11/2018  5:33 AM  MELD score: 7 at 1/11/2018  5:33 AM  Calculated from:  Serum Creatinine: 1.0 mg/dL at 1/11/2018  5:33 AM  Serum Sodium: 139 mmol/L (Rounded to 137) at 1/11/2018  5:33 AM  Total Bilirubin: 0.6 mg/dL (Rounded to 1) at 1/9/2018 12:58 PM  INR(ratio): 1.1 at 1/9/2018 12:58 PM  Age: 72 years    Significant Labs:  CBC:    Recent Labs  Lab 01/15/18  0334 01/16/18  0330   WBC 3.68* 6.55   HGB 9.8* 10.8*   HCT 30.7* 32.9*    190     CMP:    Recent Labs  Lab 01/15/18  0334 01/16/18  0330   * 137  137   K 4.3 4.6  4.6    108  108   CO2 23 22*  22*   * 163*  163*   BUN 17 30*  30*   CREATININE 1.0 1.9*  1.9*   CALCIUM 8.3* 7.8*  7.8*   PROT  --  7.6   ALBUMIN  --  2.5*   BILITOT  --  0.5   ALKPHOS  --  55   AST  --  20   ALT  --  11   ANIONGAP 6* 7*  7*   EGFRNONAA 56.8* 26.1*  26.1*     PTINR:  No results for input(s): INR in the last 48 hours.     "

## 2018-01-16 NOTE — PROGRESS NOTES
[facility]  Neurology  Progress Note    Patient Name: Melissa Anand  MRN: [id number]  Admission Date: 1/9/2018  Hospital Length of Stay: 7 days  Code Status: Prior   Attending Provider: Rod Hernandez MD  Primary Care Physician: Dana Castellon MD   Principal Problem:AIDP (acute inflammatory demyelinating polyneuropathy)      Subjective:     Interval History:   Patient has improvement in BLE weakness and sensation.  No events overnight as she had 01/15/18 am with confusion and sitting up on side of the bed.  Discussed plan for 2 more days IV Ig after today with continued PT/OT and being seen in outpatient clinic.  Patient agreeable, eager to get home otherwise.  Patient curious about AIDP diagnosis and wondering how she may have gotten this, discussed typical infectious source followed by autoimmune reaction.  Patient notes diarrhea but states this has been a chronic issue. No additional concerns or questions.    Current Neurological Medications: Amitriptyline 100 mg po qHS, hydrocodone-APAP  mg po q6h prn pain    Current Facility-Administered Medications   Medication Dose Route Frequency Provider Last Rate Last Dose    amitriptyline tablet 100 mg  100 mg Oral QHS Titi Perez MD   100 mg at 01/15/18 2112    diphenoxylate-atropine 2.5-0.025 mg per tablet 1 tablet  1 tablet Oral QID PRN Titi Perez MD   1 tablet at 01/12/18 2036    enoxaparin injection 40 mg  40 mg Subcutaneous Daily Titi Perez MD   40 mg at 01/15/18 1751    hydrocodone-acetaminophen 7.5-325mg per tablet 1 tablet  1 tablet Oral Q6H PRN Titi Perez MD   1 tablet at 01/15/18 1755    Immune Globulin G (IGG)-PRO-IGA 10 % injection (Privigen) 10 % injection 45 g  400 mg/kg/day Intravenous Q24H Elva Diana MD   Stopped at 01/15/18 1441    levothyroxine tablet 50 mcg  50 mcg Oral Before breakfast Titi Perez MD   50 mcg at 01/16/18 0618    sodium chloride 0.9% flush 3 mL  3 mL Intravenous PRN  Titi Perez MD        thiamine tablet 250 mg  250 mg Oral Daily Titi Perez MD   250 mg at 01/16/18 0821     Review of Systems   Constitutional: Negative for chills and fever.   Eyes: Negative for photophobia and visual disturbance.   Respiratory: Negative for cough and shortness of breath.    Gastrointestinal: Negative for constipation, diarrhea, nausea and vomiting.   Musculoskeletal: Positive for back pain and gait problem.   Skin: Negative for rash and wound.   Neurological: Positive for weakness and numbness. Negative for headaches.   Hematological: Negative for adenopathy. Does not bruise/bleed easily.   Psychiatric/Behavioral: Positive for decreased concentration. Negative for agitation and confusion.     Objective:     Vital Signs (Most Recent):  Temp: 97.7 °F (36.5 °C) (01/16/18 0704)  Pulse: 76 (01/16/18 0704)  Resp: 16 (01/16/18 0704)  BP: 139/65 (01/16/18 0704)  SpO2: (!) 94 % (01/16/18 0704) Vital Signs (24h Range):  Temp:  [97.7 °F (36.5 °C)-98.6 °F (37 °C)] 97.7 °F (36.5 °C)  Pulse:  [69-84] 76  Resp:  [14-18] 16  SpO2:  [92 %-97 %] 94 %  BP: ()/(50-70) 139/65     Weight: 112.6 kg (248 lb 3.8 oz)  Body mass index is 34.62 kg/m².    Physical Exam  General:  Well-developed, well-nourished, nad  HEENT:  NCAT, PERRLA, EOMI, oropharyngeal membranes non-erythematous/without exudate  Neck:  Supple, normal ROM without nuchal rigidity  Resp:  Symmetric expansion, no increased wob  CVS:  No LE edema, peripheral pulses 2+ (radial, dorsalis pedis)  GI:  Abd soft, non-distended, non-tender to palpation  Neurologic Exam:  Mental Status:  AAOx3.  Speech, thought content appropriate.  Recent, remote recall 3/3.  Cranial Nerves:  VFs intact on moving fingers in all quadrants bilaterally.  PERRLA, EOMI.  Facial movement, sensation intact and symmetric.  Palate raises symmetrically, tongue protrudes midline.  Trapezius 4/5 bilaterally.  Motor:  Normal bulk, increased BLE rigidity, no apparent atrophy or  fasciculations.  BUE shoulder abduction, biceps/triceps, wrist flexion/extension,  strength 4-/5.  BLE hip flexors 2/5, knee flexion/extension 1/5, plantarflexion/dorsiflexion 1/5.    Sensory:  Patient has some sensation to light touch in BLE with some inattention to RLE.  Light touch at BUE intact without inattention.  Vibratory sensation diminished to mid-shin bilaterally.  Vibratory sensation intact at BUE digits.  Reflexes:  Areflexic patellar, Achilles bilaterally. Biceps, brachioradialis 2+ and symmetric. No ankle clonus. Equivocal toe bilaterally.  Coordination:  FNF, KAMRON intact with no dysmetria/ataxia/dysdiadochokinesia.  HTS deferred 2/2 weakness.  No resting tremor.  Gait:  Deferred 2/2 fall precautions     Significant Labs:     Recent Labs  Lab 18  0330   WBC 6.55   RBC 3.33*   HGB 10.8*   HCT 32.9*      MCV 99*   MCH 32.4*   MCHC 32.8       Recent Labs  Lab 18  0330   CALCIUM 7.8*  7.8*   PROT 7.6     137   K 4.6  4.6   CO2 22*  22*     108   BUN 30*  30*   CREATININE 1.9*  1.9*   ALKPHOS 55   ALT 11   AST 20   BILITOT 0.5     CSF:   Tube 1--WBCs 5, RBCs 3000, 40% Seg Neutrophils, 40% Lymphocytes, 20% Monos/Macros  Tube 2--WBCs 22, RBCs 285, 3% Seg Neutrophils, 92% Lymphocytes, 5% Monos/Macros.  Protein 56 (corrected for RBCs = 56), Glucose 55.    Significant Imagin18 MRI L spine w/ w/o contrast:  Postoperative changes from L3-L4 laminectomy.  Multilevel degenerative changes of the lumbar spine most significant at L3-L4 with moderate to severe bilateral neuroforaminal narrowing.  Additional findings discussed level by level above.     18 CT head w/o contrast:  No evidence of acute hemorrhage or major vascular distribution infarct.  Mild chronic ischemic change as above.  Prominence ossification lateral to the left anterior clinoid process and cavernous sinus suggesting the possibility of a calcified meningioma. This is without significant mass  effect on the brain parenchyma.    01/12/18 MRI brain w/ w/o contrast:  1.  No acute intracranial abnormality identified on today's examination.  Specifically, no evidence of acute infarction.  2.  Multiple foci of T2/FLAIR hyperintensity in the supratentorial and periventricular white matter as well as mick, suggestive of chronic microvascular ischemic change. Remote right cerebellar infarct.    01/12/18 MRI C/T spine w/ w/o contrast:  1. Mild degenerative change of the cervical spine.  No abnormal cervical spinal cord signal or enhancement.  2. Mild degenerative change of thoracic spine most pronounced at the T10-T11 level where there is a posterior disc bulge resulting in mild/moderate spinal canal stenosis.  There is a questionable focus of T2 cord signal hyperintensity without associated enhancement at this level possibly reflecting component of myelomalacia.  Additional questionable focus of T2 cord signal intensity at the T4-T5 level as detailed above.    3.  Enlargement heterogeneity of the right thyroid lobe.      Assessment and Plan:     * AIDP (acute inflammatory demyelinating polyneuropathy)    -Presumed diagnosis based on elevated protein in CSF, improvement in sxs with IV Ig  -Will follow up in clinic  -Per above, day 3/5 of IV Ig      Areflexia    -Per above, concern for AIDP.  Starting IV Ig, will continue to monitor for improvement.  -Possible component of chronic peripheral neuropathy though Hgb A1c 5.2%, well-controlled in the past.  B12 wnl.  -B1 25--deficient.  Recommend replacing with thiamine 500 mg qd x 3 d, thiamine 250 mg x 5 d, thiamine 100 mg po qd after.      Weakness of both lower extremities    -Exam is most concerning for AIDP.  Component of rigidity in BLE on exam, will plan follow up in clinic.  -MRI Brain and C/T/L spine with no signs concerning for infection despite 22 WBCs in Tube 2 CSF.  -Day 3 of 5 IV Ig 400 mg/kg/d.  Continue PT/OT for gait training, deconditioning, dispo  recs.      Peripheral sensory neuropathy    -Chronic diabetic neuropathy reported by pt, recent severe sensory loss in BLE above previous baseline.  -Hgb A1c 5.2%, vitamin B12 wnl. TSH low with normal FT4--appropriate for levothyroxine use.  -Vitamin B1 deficient, replace per above with 500 mg qd followed by 250 mg qd.  Discharge with thiamine 100 mg po qd.  -Peripheral neuropathy is improving per recent patient report, will continue to evaluate with continued IV Ig treatments.      Functional diarrhea    -Patient with chronic hx of diarrhea, less concerning for acute event triggering an AIDP  -Will plan for follow up in clinic.  Fluctuating BP on current admission, possibly dysautonomia.      Vitamin B1 deficiency neuropathy    -Per above, replace vitamin B1, currently on thiamine 250 mg po qd dosing  -Would like patient to continue thiamine 100 mg po qd on discharge  -Will follow up in Neurology clinic on discharge     VTE Risk Mitigation         Ordered     enoxaparin injection 40 mg  Daily     Route:  Subcutaneous        01/12/18 1615     Low Risk of VTE  Once      01/09/18 2057        Elva Diana MD  Neurology  [facility]

## 2018-01-16 NOTE — ASSESSMENT & PLAN NOTE
Exam is most concerning for AIDP. MRI head, cervical and thoracic spine w/o e/o acute findings.   - IVIG for 5d day 1 1/14, IgA 254.  - PT/OT for ROM, deconditioning: SNF w/ RW   - Appreciate neurology recommendations

## 2018-01-16 NOTE — ASSESSMENT & PLAN NOTE
- Cr increased to 1.7, pt with hypotension noted recently also contrasted Imaging  - Suspect hypovolemia as she is fluid responsive  - aggressive IV hydration 1/16  - avoid nephrotoxin  - CTM

## 2018-01-16 NOTE — SUBJECTIVE & OBJECTIVE
Subjective:     Interval History:   Patient has improvement in BLE weakness and sensation.  No events overnight as she had 01/15/18 am with confusion and sitting up on side of the bed.  Discussed plan for 2 more days IV Ig after today with continued PT/OT and being seen in outpatient clinic.  Patient agreeable, eager to get home otherwise.  Patient curious about AIDP diagnosis and wondering how she may have gotten this, discussed typical infectious source followed by autoimmune reaction.  Patient notes diarrhea but states this has been a chronic issue. No additional concerns or questions.    Current Neurological Medications: Amitriptyline 100 mg po qHS, hydrocodone-APAP  mg po q6h prn pain    Current Facility-Administered Medications   Medication Dose Route Frequency Provider Last Rate Last Dose    amitriptyline tablet 100 mg  100 mg Oral QHS Titi Perez MD   100 mg at 01/15/18 2112    diphenoxylate-atropine 2.5-0.025 mg per tablet 1 tablet  1 tablet Oral QID PRN Titi Perez MD   1 tablet at 01/12/18 2036    enoxaparin injection 40 mg  40 mg Subcutaneous Daily Titi Perez MD   40 mg at 01/15/18 1751    hydrocodone-acetaminophen 7.5-325mg per tablet 1 tablet  1 tablet Oral Q6H PRN Titi Perez MD   1 tablet at 01/15/18 1755    Immune Globulin G (IGG)-PRO-IGA 10 % injection (Privigen) 10 % injection 45 g  400 mg/kg/day Intravenous Q24H Elva Diana MD   Stopped at 01/15/18 1441    levothyroxine tablet 50 mcg  50 mcg Oral Before breakfast Titi Perez MD   50 mcg at 01/16/18 0618    sodium chloride 0.9% flush 3 mL  3 mL Intravenous PRN Titi Perez MD        thiamine tablet 250 mg  250 mg Oral Daily Titi Perez MD   250 mg at 01/16/18 0821     Review of Systems   Constitutional: Negative for chills and fever.   Eyes: Negative for photophobia and visual disturbance.   Respiratory: Negative for cough and shortness of breath.    Gastrointestinal: Negative for  constipation, diarrhea, nausea and vomiting.   Musculoskeletal: Positive for back pain and gait problem.   Skin: Negative for rash and wound.   Neurological: Positive for weakness and numbness. Negative for headaches.   Hematological: Negative for adenopathy. Does not bruise/bleed easily.   Psychiatric/Behavioral: Positive for decreased concentration. Negative for agitation and confusion.     Objective:     Vital Signs (Most Recent):  Temp: 97.7 °F (36.5 °C) (01/16/18 0704)  Pulse: 76 (01/16/18 0704)  Resp: 16 (01/16/18 0704)  BP: 139/65 (01/16/18 0704)  SpO2: (!) 94 % (01/16/18 0704) Vital Signs (24h Range):  Temp:  [97.7 °F (36.5 °C)-98.6 °F (37 °C)] 97.7 °F (36.5 °C)  Pulse:  [69-84] 76  Resp:  [14-18] 16  SpO2:  [92 %-97 %] 94 %  BP: ()/(50-70) 139/65     Weight: 112.6 kg (248 lb 3.8 oz)  Body mass index is 34.62 kg/m².    Physical Exam  General:  Well-developed, well-nourished, nad  HEENT:  NCAT, PERRLA, EOMI, oropharyngeal membranes non-erythematous/without exudate  Neck:  Supple, normal ROM without nuchal rigidity  Resp:  Symmetric expansion, no increased wob  CVS:  No LE edema, peripheral pulses 2+ (radial, dorsalis pedis)  GI:  Abd soft, non-distended, non-tender to palpation  Neurologic Exam:  Mental Status:  AAOx3.  Speech, thought content appropriate.  Recent, remote recall 3/3.  Cranial Nerves:  VFs intact on moving fingers in all quadrants bilaterally.  PERRLA, EOMI.  Facial movement, sensation intact and symmetric.  Palate raises symmetrically, tongue protrudes midline.  Trapezius 4/5 bilaterally.  Motor:  Normal bulk and tone, no apparent atrophy or fasciculations.  BUE shoulder abduction, biceps/triceps, wrist flexion/extension,  strength 4-/5.  BLE hip flexors 2/5, knee flexion/extension 1/5, plantarflexion/dorsiflexion 1/5.    Sensory:  Patient has some sensation to light touch in BLE with some inattention to RLE.  Light touch at BUE intact without inattention.  Vibratory sensation  diminished to mid-shin bilaterally.  Vibratory sensation intact at BUE digits.  Reflexes:  Areflexic patellar, Achilles bilaterally. Biceps, brachioradialis 2+ and symmetric. No ankle clonus. Equivocal toe bilaterally.  Coordination:  FNF, KAMRON intact with no dysmetria/ataxia/dysdiadochokinesia.  HTS deferred 2/2 weakness.  No resting tremor.  Gait:  Deferred 2/2 fall precautions     Significant Labs:     Recent Labs  Lab 18  0330   WBC 6.55   RBC 3.33*   HGB 10.8*   HCT 32.9*      MCV 99*   MCH 32.4*   MCHC 32.8       Recent Labs  Lab 18  0330   CALCIUM 7.8*  7.8*   PROT 7.6     137   K 4.6  4.6   CO2 22*  22*     108   BUN 30*  30*   CREATININE 1.9*  1.9*   ALKPHOS 55   ALT 11   AST 20   BILITOT 0.5     CSF:   Tube 1--WBCs 5, RBCs 3000, 40% Seg Neutrophils, 40% Lymphocytes, 20% Monos/Macros  Tube 2--WBCs 22, RBCs 285, 3% Seg Neutrophils, 92% Lymphocytes, 5% Monos/Macros.  Protein 56 (corrected for RBCs = 56), Glucose 55.    Significant Imagin18 MRI L spine w/ w/o contrast:  Postoperative changes from L3-L4 laminectomy.  Multilevel degenerative changes of the lumbar spine most significant at L3-L4 with moderate to severe bilateral neuroforaminal narrowing.  Additional findings discussed level by level above.     18 CT head w/o contrast:  No evidence of acute hemorrhage or major vascular distribution infarct.  Mild chronic ischemic change as above.  Prominence ossification lateral to the left anterior clinoid process and cavernous sinus suggesting the possibility of a calcified meningioma. This is without significant mass effect on the brain parenchyma.    18 MRI brain w/ w/o contrast:  1.  No acute intracranial abnormality identified on today's examination.  Specifically, no evidence of acute infarction.  2.  Multiple foci of T2/FLAIR hyperintensity in the supratentorial and periventricular white matter as well as mick, suggestive of chronic microvascular  ischemic change. Remote right cerebellar infarct.    01/12/18 MRI C/T spine w/ w/o contrast:  1. Mild degenerative change of the cervical spine.  No abnormal cervical spinal cord signal or enhancement.  2. Mild degenerative change of thoracic spine most pronounced at the T10-T11 level where there is a posterior disc bulge resulting in mild/moderate spinal canal stenosis.  There is a questionable focus of T2 cord signal hyperintensity without associated enhancement at this level possibly reflecting component of myelomalacia.  Additional questionable focus of T2 cord signal intensity at the T4-T5 level as detailed above.    3.  Enlargement heterogeneity of the right thyroid lobe.

## 2018-01-16 NOTE — ASSESSMENT & PLAN NOTE
-Presumed diagnosis based on elevated protein in CSF, improvement in sxs with IV Ig  -Will follow up in clinic  -Per above, day 3/5 of IV Ig

## 2018-01-16 NOTE — PROGRESS NOTES
"[facility] Hospital Medicine  Progress Note    Patient Name: Melissa Anand  MRN: [id number]  Patient Class: IP- Inpatient   Admission Date: 1/9/2018  Length of Stay: 7 days  Attending Physician: Rod Hernandez MD  Primary Care Provider: Dana Castellon MD    [facility] Medicine Team: [facility] Rod Hernandez MD    Subjective:     Principal Problem:AIDP (acute inflammatory demyelinating polyneuropathy)    HPI:  73 yo F with PMHx pre-DM, HTN, and chronic low back pain who presents with progression of BLE weakness.  Patient's daughter at bedside assists with history.  Patient has been walking with a cane at baseline for past 2 years and has had worsening of BLE weakness for past 2-3 months with inability to get out of bed over the past weekend.  She denies recent infections--no SOB/cough, no n/v/c/d, no sick contacts, no URI Sx. Pt w/ acute worsening this am, she could not move her Les and needed assistance to raise them off the bed. Called EMS when daughter had difficulty assisting.      Has some diabetic peripheral neuropathy at baseline, but states that she hadn't had falls with her neuropathy prior to last two months.  Denies bowel/bladder incontinence (has chronic problems w/ constipation and Bms often loose when able to go, will go days w/o urinating and has gone 3x times since ED presentation), no saddle anesthesia, has sensory level to knee b/l. Patient has been getting injections in her back for chronic low back pain every 2 wks, last was few wks ago.  Daughter states that she had gotten some steroid injections but they did try an "epidural medication" a couple weeks ago.  She has been getting injections approximately every two weeks for past 3 months.  Patient states she still has low back pain with some radiation into the BLE but is unable to characterize the pain. States injections not working, but no sig pain now.     Hospital Course:  1/10: no change in lack of sensation or inability to " move Les. Pt w/ worsening of chronic L heel pain and having severe pain not controlled by home percocet and worse w/ palpation. Having frequent diarrhea, which she states is typical in appearance, non-bloody. Pt take OTC meds for this at home and would like med now.      1/11: less loose Bms, lomotil working. Sensation inconsistent and pt feeling pinches intermittently w/ loss of sensation above and below area. No return of motor function. Daughter states that no movement in feet for 4wks.      1/12: pt and daughter noticed flicker of movement w/ R foot. Inc sensation on right side as well. Bms dec in freq. Deneis neck stiff, photophobia, f/c, rash     1/13: better in better spirits and feeling better overall. No diarrhea. Inc movement in R foot. Dec pain in L foot.      1/14: improved movement, daughter states pt able to swing R leg over side of bed. Pt does not recall doing this. Feeling sleepy w/ starting IVIG. BP dropped during initiation but improved after. No BM    1/15 strength impressively improved and pt able to move to side of bed for PT session. Pt helped getting pt up and she was able to stand on her own, able to side step as well.     1/16: pt continue to report improvement in LE. Pt with asymptomatic hypotension and MADHAVI noted on lab work today. Start her on IV fluid replacement.     Interval History: Patient report continue improvement in LE strength. Denies abd pain    Review of Systems   Constitutional: Negative for chills and fever.   Eyes: Negative for photophobia and visual disturbance.   Respiratory: Negative for cough and shortness of breath.    Gastrointestinal: Negative for constipation, diarrhea, nausea and vomiting.   Musculoskeletal: Positive for back pain.   Skin: Negative for rash and wound.   Neurological: Positive for weakness, numbness and headaches.   Hematological: Negative for adenopathy. Does not bruise/bleed easily.     Objective:     Vital Signs (Most Recent):  Temp: 97.7 °F  "(36.5 °C) (01/16/18 0704)  Pulse: 76 (01/16/18 0704)  Resp: 16 (01/16/18 0704)  BP: 139/65 (01/16/18 0704)  SpO2: (!) 94 % (01/16/18 0704) Vital Signs (24h Range):  Temp:  [97.7 °F (36.5 °C)-98.6 °F (37 °C)] 97.7 °F (36.5 °C)  Pulse:  [69-84] 76  Resp:  [14-18] 16  SpO2:  [92 %-97 %] 94 %  BP: ()/(50-70) 139/65     Weight: 112.6 kg (248 lb 3.8 oz)  Body mass index is 34.62 kg/m².    Intake/Output Summary (Last 24 hours) at 01/16/18 1129  Last data filed at 01/16/18 0938   Gross per 24 hour   Intake             1670 ml   Output              900 ml   Net              770 ml      Physical Exam  General:  Well-developed, well-nourished, nad  HEENT:  NCAT, PERRLA, EOMI, oropharyngeal membranes non-erythematous/without exudate  Neck:  Supple, normal ROM without nuchal rigidity  Resp:  Symmetric expansion, no increased wob  CVS:  No LE edema, peripheral pulses 2+ (radial, dorsalis pedis)  GI:  Abd soft, non-distended, non-tender to palpation. Yellow-brown, loose stool in bed and on hospital gown  Neurologic Exam:  Mental Status:  AAOx3.  Speech, thought content appropriate.   Cranial Nerves:  mostly intact. Weak trapezius, SCM strength bilaterally.  Motor:  Normal bulk and tone, no apparent atrophy or fasciculations.  BUE shoulder abduction, biceps/triceps, wrist flexion/extension,  strength 4-/5.  BLE hip flexors 2/5, knee flexion/extension 1/5, plantarflexion/dorsiflexion 1/5, inc to 2/5 on R w/ more movement today. Contraction of anterior calf muscle.   Sensory:  Patient had no sensation to light touch or temperature in BLE up to knee (lower on R, upper on L), occasional "ouch" to pinch on RLE.No pain w/ palpation on L heel, no skin breaks or e/o infection  Reflexes:  Areflexic patellar, Achilles  Gait not assessed    MELD-Na score: 7 at 1/11/2018  5:33 AM  MELD score: 7 at 1/11/2018  5:33 AM  Calculated from:  Serum Creatinine: 1.0 mg/dL at 1/11/2018  5:33 AM  Serum Sodium: 139 mmol/L (Rounded to 137) at " 1/11/2018  5:33 AM  Total Bilirubin: 0.6 mg/dL (Rounded to 1) at 1/9/2018 12:58 PM  INR(ratio): 1.1 at 1/9/2018 12:58 PM  Age: 72 years    Significant Labs:  CBC:    Recent Labs  Lab 01/15/18  0334 01/16/18  0330   WBC 3.68* 6.55   HGB 9.8* 10.8*   HCT 30.7* 32.9*    190     CMP:    Recent Labs  Lab 01/15/18  0334 01/16/18  0330   * 137  137   K 4.3 4.6  4.6    108  108   CO2 23 22*  22*   * 163*  163*   BUN 17 30*  30*   CREATININE 1.0 1.9*  1.9*   CALCIUM 8.3* 7.8*  7.8*   PROT  --  7.6   ALBUMIN  --  2.5*   BILITOT  --  0.5   ALKPHOS  --  55   AST  --  20   ALT  --  11   ANIONGAP 6* 7*  7*   EGFRNONAA 56.8* 26.1*  26.1*     PTINR:  No results for input(s): INR in the last 48 hours.       Assessment/Plan:      * AIDP (acute inflammatory demyelinating polyneuropathy)    Exam is most concerning for AIDP. MRI head, cervical and thoracic spine w/o e/o acute findings.   - IVIG for 5d day 1 1/14, IgA 254.  - PT/OT for ROM, deconditioning: SNF w/ RW   - Appreciate neurology recommendations              Weakness of both lower extremities    - Exam is most concerning for AIDP vs transverse myelitis. Possible epidural or verterbal abscess related to recent injection but ESR nl and CRP elevated to 9 w/o tenderness at back.   - MRI Lumbar Spine w/ w/o contrast: degen changes, e/o prior laminectomy (greater than 7y ago), mod-severe b/l foraminal narrowing at L3-4.   - LP 01/10/18 with Neurology; unable to obtain d/t prev surg. Radiology consulted to perform under flouro--cell count & diff (lymphocyte predominant w/ WBC 22 and , 3k on first result), protein 56, glucose 55, VDRL, MS profile, freeze & hold ordered. L5-S1 w/o fluid. Unclear significance. Protein only mildly elevated above 40 w/ 3k RBC present. WBC elevated w/ lymph predominance but clinical picture not c/w viral meningitis. May need further viral testing w/ additional CSF sample that was frozen and held. MRI head,  cervical and thoracic spine w/o e/o acute findings. IVIG for 5d day 1 1/14, IgA 254.  - PT/OT for ROM, deconditioning: SNF w/ RW   - Appreciate neurology recommendations            Acute renal injury due to hypovolemia    - Cr increased to 1.7, pt with hypotension noted recently also contrasted Imaging  - Suspect hypovolemia as she is fluid responsive  - aggressive IV hydration 1/16  - avoid nephrotoxin  - CTM          Peripheral sensory neuropathy    -Neuro concerned for chronic diabetic neuropathy with more severe acute sensory loss in BLE. But pt never w/ elevated A1c in our system above 5.8  -Chronic diabetic neuropathy reported by pt, recent severe sensory loss in BLE above previous baseline.  -Hgb A1c 5.2, vitamin B1 deficient, vitamin B12 nl. TSH low with normal FT4--appropriate for levothyroxine use.  -thiamine 500 mg qd x 3 d, thiamine 250 mg x 5 d (first does Sun 1/15), thiamine 100 mg po qd after.  -Peripheral neuropathy is improving per recent patient report, will continue to evaluate with continued IV Ig treatments.          Chronic pain disorder    Gabapentin 300mg tid, elavil 100 QHS, percocet 7.5 Q6h prn (inc to 10 1/11 d/t inc pain, Sx better and may tolerate dec again) - home meds listed in system, pt and daughter not entirely clear on dosing.         Pressure injury of skin, stage 2    - Present on admission  - wound care orders per wound care team          Areflexia    Per above, concern for AIDP.  Starting IV Ig, will continue to monitor for improvement.  -Possible component of chronic peripheral neuropathy though Hgb A1c 5.2%, well-controlled in the past.  B12 wnl.  -B1 25--deficient.  Replacing with thiamine 500 mg qd x 3 d, thiamine 250 mg x 5 d, thiamine 100 mg po qd after.          Functional diarrhea    Pt states this is chronic problem and no worse than usual. Non-bloody and no other concerning changes. Takes OTC meds at home. Wishes to take lomitil and not imodium; ordered.  - Improved  w/ lomotil          Benign essential hypertension    Lisinopril 40 home med. Mostly controlled.   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   meds stopped on 1/15            VTE Risk Mitigation         Ordered     enoxaparin injection 40 mg  Daily     Route:  Subcutaneous        01/12/18 1615     Low Risk of VTE  Once      01/09/18 2057              Rod Hernandez MD  Department of Hospital Medicine   [facility]

## 2018-01-16 NOTE — ASSESSMENT & PLAN NOTE
-Exam is most concerning for AIDP.  Component of rigidity in BLE on exam, will plan follow up in clinic.  -MRI Brain and C/T/L spine with no signs concerning for infection despite 22 WBCs in Tube 2 CSF.  -Day 3 of 5 IV Ig 400 mg/kg/d.  Continue PT/OT for gait training, deconditioning, dispo recs.

## 2018-01-17 PROBLEM — E51.9 VITAMIN B1 DEFICIENCY: Status: ACTIVE | Noted: 2018-01-16

## 2018-01-17 PROBLEM — R48.2 APRAXIA: Status: ACTIVE | Noted: 2018-01-16

## 2018-01-17 PROBLEM — G20.A1 PD (PARKINSON'S DISEASE): Status: ACTIVE | Noted: 2018-01-17

## 2018-01-17 PROBLEM — R29.898 WEAKNESS OF BOTH LOWER EXTREMITIES: Status: RESOLVED | Noted: 2018-01-09 | Resolved: 2018-01-17

## 2018-01-17 LAB
ANION GAP SERPL CALC-SCNC: 5 MMOL/L
BASOPHILS # BLD AUTO: 0.01 K/UL
BASOPHILS NFR BLD: 0.3 %
BUN SERPL-MCNC: 21 MG/DL
CALCIUM SERPL-MCNC: 8.1 MG/DL
CHLORIDE SERPL-SCNC: 113 MMOL/L
CO2 SERPL-SCNC: 18 MMOL/L
CREAT SERPL-MCNC: 0.9 MG/DL
DIFFERENTIAL METHOD: ABNORMAL
EOSINOPHIL # BLD AUTO: 0.2 K/UL
EOSINOPHIL NFR BLD: 7 %
ERYTHROCYTE [DISTWIDTH] IN BLOOD BY AUTOMATED COUNT: 12.6 %
EST. GFR  (AFRICAN AMERICAN): >60 ML/MIN/1.73 M^2
EST. GFR  (NON AFRICAN AMERICAN): >60 ML/MIN/1.73 M^2
GLUCOSE SERPL-MCNC: 89 MG/DL
HCT VFR BLD AUTO: 29 %
HGB BLD-MCNC: 9.3 G/DL
IMM GRANULOCYTES # BLD AUTO: 0.02 K/UL
IMM GRANULOCYTES NFR BLD AUTO: 0.6 %
LYMPHOCYTES # BLD AUTO: 1.1 K/UL
LYMPHOCYTES NFR BLD: 30.7 %
MCH RBC QN AUTO: 32.2 PG
MCHC RBC AUTO-ENTMCNC: 32.1 G/DL
MCV RBC AUTO: 100 FL
MONOCYTES # BLD AUTO: 0.5 K/UL
MONOCYTES NFR BLD: 14.5 %
NEUTROPHILS # BLD AUTO: 1.6 K/UL
NEUTROPHILS NFR BLD: 46.9 %
NRBC BLD-RTO: 0 /100 WBC
PLATELET # BLD AUTO: 186 K/UL
PMV BLD AUTO: 9.6 FL
POTASSIUM SERPL-SCNC: 4.5 MMOL/L
RBC # BLD AUTO: 2.89 M/UL
SODIUM SERPL-SCNC: 136 MMOL/L
VDRL CSF QL: NORMAL
WBC # BLD AUTO: 3.45 K/UL

## 2018-01-17 PROCEDURE — 80048 BASIC METABOLIC PNL TOTAL CA: CPT

## 2018-01-17 PROCEDURE — 20600001 HC STEP DOWN PRIVATE ROOM

## 2018-01-17 PROCEDURE — 25000003 PHARM REV CODE 250: Performed by: HOSPITALIST

## 2018-01-17 PROCEDURE — 99233 SBSQ HOSP IP/OBS HIGH 50: CPT | Mod: ,,, | Performed by: PSYCHIATRY & NEUROLOGY

## 2018-01-17 PROCEDURE — 97530 THERAPEUTIC ACTIVITIES: CPT

## 2018-01-17 PROCEDURE — 36415 COLL VENOUS BLD VENIPUNCTURE: CPT

## 2018-01-17 PROCEDURE — 99232 SBSQ HOSP IP/OBS MODERATE 35: CPT | Mod: ,,, | Performed by: HOSPITALIST

## 2018-01-17 PROCEDURE — 99222 1ST HOSP IP/OBS MODERATE 55: CPT | Mod: ,,, | Performed by: NURSE PRACTITIONER

## 2018-01-17 PROCEDURE — 63600175 PHARM REV CODE 636 W HCPCS: Mod: JG | Performed by: STUDENT IN AN ORGANIZED HEALTH CARE EDUCATION/TRAINING PROGRAM

## 2018-01-17 PROCEDURE — 63600175 PHARM REV CODE 636 W HCPCS: Performed by: HOSPITALIST

## 2018-01-17 PROCEDURE — 85025 COMPLETE CBC W/AUTO DIFF WBC: CPT

## 2018-01-17 PROCEDURE — 25000003 PHARM REV CODE 250: Performed by: STUDENT IN AN ORGANIZED HEALTH CARE EDUCATION/TRAINING PROGRAM

## 2018-01-17 RX ORDER — CARBIDOPA AND LEVODOPA 25; 100 MG/1; MG/1
1 TABLET ORAL DAILY
Status: DISCONTINUED | OUTPATIENT
Start: 2018-01-17 | End: 2018-01-18

## 2018-01-17 RX ORDER — LISINOPRIL 20 MG/1
40 TABLET ORAL DAILY
Status: DISCONTINUED | OUTPATIENT
Start: 2018-01-17 | End: 2018-01-19 | Stop reason: HOSPADM

## 2018-01-17 RX ORDER — CARBIDOPA AND LEVODOPA 25; 100 MG/1; MG/1
1 TABLET ORAL 2 TIMES DAILY
Qty: 60 TABLET | Refills: 3
Start: 2018-01-17 | End: 2018-01-18

## 2018-01-17 RX ORDER — THIAMINE HCL 250 MG
250 TABLET ORAL DAILY
Start: 2018-01-18 | End: 2018-01-18

## 2018-01-17 RX ORDER — CARBIDOPA AND LEVODOPA 25; 100 MG/1; MG/1
1 TABLET ORAL DAILY
Status: DISCONTINUED | OUTPATIENT
Start: 2018-01-17 | End: 2018-01-19 | Stop reason: HOSPADM

## 2018-01-17 RX ADMIN — CARBIDOPA AND LEVODOPA 1 TABLET: 25; 100 TABLET ORAL at 02:01

## 2018-01-17 RX ADMIN — ACETAMINOPHEN 650 MG: 325 TABLET, FILM COATED ORAL at 05:01

## 2018-01-17 RX ADMIN — HUMAN IMMUNOGLOBULIN G 45 G: 20 LIQUID INTRAVENOUS at 12:01

## 2018-01-17 RX ADMIN — AMITRIPTYLINE HYDROCHLORIDE 100 MG: 25 TABLET, FILM COATED ORAL at 09:01

## 2018-01-17 RX ADMIN — Medication 250 MG: at 11:01

## 2018-01-17 RX ADMIN — LISINOPRIL 40 MG: 20 TABLET ORAL at 02:01

## 2018-01-17 RX ADMIN — HYDROCODONE BITARTRATE AND ACETAMINOPHEN 1 TABLET: 7.5; 325 TABLET ORAL at 07:01

## 2018-01-17 RX ADMIN — LEVOTHYROXINE SODIUM 50 MCG: 50 TABLET ORAL at 05:01

## 2018-01-17 RX ADMIN — DIPHENOXYLATE HYDROCHLORIDE AND ATROPINE SULFATE 1 TABLET: 2.5; .025 TABLET ORAL at 06:01

## 2018-01-17 RX ADMIN — ENOXAPARIN SODIUM 40 MG: 100 INJECTION SUBCUTANEOUS at 04:01

## 2018-01-17 NOTE — HOSPITAL COURSE
1/10/18: Evaluated by therapy.  Bed mobility totalA. LBD: totalA.   1/15/18: Participated with therapy.  Bed mobility Max.  Sit to stand totalA & RW.  Ambulated 4 lateral steps MaxA & RW.  Grooming: Luis Felipe.  Toileting totalA.   1/17/18: Participated with therapy.  Bed mobility Min-totalA.  Sit to stand and transfers ModA.  UBD Luis Felipe and LBD totalA.

## 2018-01-17 NOTE — ASSESSMENT & PLAN NOTE
-Neurology following  -presumed dx based on elevated protein in CSF  -receiving IVIG and symptoms have improved  -on Sinemet 25/100  -f/u movement disorder clinic     Recommendations  -  Encourage mobility, OOB in chair at least 3 hours per day, and early ambulation as appropriate  -  PT/OT evaluate and treat  -  Pain management  -  Monitor for and prevent skin breakdown and pressure ulcers  · Early mobility, repositioning/weight shifting every 20-30 minutes when sitting, turn patient every 2 hours, proper mattress/overlay and chair cushioning, pressure relief/heel protector boots  -  DVT prophylaxis:  Lovenox SQ  -  Reviewed discharge options (IP rehab, SNF, HH therapy, and OP therapy)

## 2018-01-17 NOTE — CONSULTS
Inpatient consult to Physical Medicine Rehab  Consult performed by: JADEN CUMMINS  Consult ordered by: PATRICIA PICHARDO  Reason for consult: assess rehab needs        Reviewed patient history and current admission.  Rehab team following.  Full consult to follow.    DAPHNIE Bal, FNP-C  Physical Medicine & Rehabilitation   01/17/2018  Spectralink: [id number]

## 2018-01-17 NOTE — PLAN OF CARE
Spoke w Smooth, pt's daughter, who will speak w her mom re: SNF or hh. She will call HEAVEN Velázquez back w final decision.

## 2018-01-17 NOTE — SUBJECTIVE & OBJECTIVE
Past Medical History:   Diagnosis Date    Anemia     Colitis     hospitalized July 2014    Diabetes mellitus     Encounter for blood transfusion     Goiter     Hypertension     Left hip pain 10/12/14    Syncope      Past Surgical History:   Procedure Laterality Date    BACK SURGERY      THYROIDECTOMY      TOE AMPUTATION Left      Review of patient's allergies indicates:  No Known Allergies    Scheduled Medications:    amitriptyline  100 mg Oral QHS    carbidopa-levodopa  mg  1 tablet Oral Daily    carbidopa-levodopa  mg  1 tablet Oral Daily    enoxaparin  40 mg Subcutaneous Daily    Immune Globulin G (IGG)-PRO-IGA 10 % injection (Privigen)  400 mg/kg/day Intravenous Q24H    levothyroxine  50 mcg Oral Before breakfast    lisinopril  40 mg Oral Daily    thiamine  250 mg Oral Daily       PRN Medications: acetaminophen, diphenoxylate-atropine 2.5-0.025 mg, hydrocodone-acetaminophen 7.5-325mg, sodium chloride 0.9%    Family History     Problem Relation (Age of Onset)    Cancer Son, Mother    Coronary artery disease     Diabetes         Social History Main Topics    Smoking status: Former Smoker     Years: 30.00     Types: Cigarettes     Quit date: 5/20/2014    Smokeless tobacco: Not on file    Alcohol use Yes      Comment: occassionally     Drug use: No    Sexual activity: Not Currently     Review of Systems   Constitutional: Positive for fatigue. Negative for chills and fever.   HENT: Negative for trouble swallowing and voice change.    Eyes: Negative for photophobia and visual disturbance.   Respiratory: Negative for cough, shortness of breath and wheezing.    Cardiovascular: Negative for chest pain and palpitations.   Gastrointestinal: Positive for diarrhea. Negative for abdominal distention and vomiting.   Genitourinary: Negative for difficulty urinating and flank pain.   Musculoskeletal: Positive for gait problem. Negative for arthralgias, joint swelling and myalgias.   Skin:  Positive for wound (pressure ulcer). Negative for color change.   Neurological: Positive for weakness, numbness and headaches. Negative for facial asymmetry and speech difficulty.   Psychiatric/Behavioral: Positive for confusion. Negative for agitation.     Objective:     Vital Signs (Most Recent):  Temp: (P) 98.1 °F (36.7 °C) (01/17/18 1330)  Pulse: (P) 87 (01/17/18 1330)  Resp: (P) 14 (01/17/18 1330)  BP: (P) 137/64 (01/17/18 1330)  SpO2: (P) 100 % (01/17/18 1330)    Vital Signs (24h Range):  Temp:  [97.8 °F (36.6 °C)-98.5 °F (36.9 °C)] (P) 98.1 °F (36.7 °C)  Pulse:  [77-93] (P) 87  Resp:  [14-20] (P) 14  SpO2:  [94 %-100 %] (P) 100 %  BP: (120-178)/(56-81) (P) 137/64     Body mass index is 34.62 kg/m².    Physical Exam   Constitutional: She appears well-developed and well-nourished.   HENT:   Head: Normocephalic and atraumatic.   Eyes: EOM are normal. Pupils are equal, round, and reactive to light.   Neck: Normal range of motion.   Cardiovascular: Normal rate and regular rhythm.    Pulmonary/Chest: Effort normal. No respiratory distress.   Abdominal: Soft. There is no tenderness.   Rectal tube in place   Musculoskeletal: Normal range of motion. She exhibits no deformity.   Neurological:   -  Mental Status:  AAOx3.  Follows commands.  Answers incorrect age and year.  -  Coordination: severe apraxia of right leg and moderate of left. Mild apraxia (hand waving, open/close) of both hands, but worse on her left.  -  Motor:  RUE: 5/5.  LUE: 5/5.  RLE: 5/5.  LLE: 5/5.   -  Tone: rigidity   -  Sensation: decreased to BLE.          Skin: Skin is warm and dry.   Psychiatric: She has a normal mood and affect. Cognition and memory are impaired.   Vitals reviewed.    NEUROLOGICAL EXAMINATION:     CRANIAL NERVES     CN III, IV, VI   Pupils are equal, round, and reactive to light.  Extraocular motions are normal.       Diagnostic Results:   Labs: Reviewed  ECG: Reviewed  X-Ray: Reviewed  CT: Reviewed  MRI: Reviewed

## 2018-01-17 NOTE — CONSULTS
[facility]  Physical Medicine & Rehab  Consult Note    Patient Name: Melissa Anand  MRN: [id number]  Admission Date: 1/9/2018  Hospital Length of Stay: 8 days  Attending Physician: Rod Hernandez MD     Inpatient consult to Physical Medicine & Rehabilitation  Consult performed by: Nupur Lyman NP  Consult requested by:  Rod Hernandez MD    Reason for Consult:  assess rehabilitation needs  Consults  Subjective:     Principal Problem: AIDP (acute inflammatory demyelinating polyneuropathy)    HPI: Melissa Anand is a 72-year-old female with PMHx of stage 2 pressure ulcer, syncope, hip pain, HTN, goiter, DM, colitis, chronic low back pain (s/p laminectomy L3-L4 and s/p MYLENE), chronic diarrhea, and anemia.  Patient presented to [facility] on 1/9 with BLE weakness for past 2-3 months with inability to get out of bed over the weekend prior to her admission. Patient denies b/b incontinence.  Upon arrival, CTH negative for acute pathology.  MRI L-spine revealed degenerative changes most significant at L3-L4 with moderate to severe bilateral neuroforaminal narrowing.  MRI brain, C-spine, and T-spine were unremarkable for acute findings. Neurology was consulted has concern for AIDP given chronic BLE weakness with profound sensory neuropathy and areflexia (Patella and Achilles bilaterally).  A LP was performed on 1/10 in IR and resulted with elevated protein in CSF.  Receiving IVIG day 4/5 with much improvement in strength.  Hospital course was further complicated by Parkinson's disease (Per Neurology, exam is consistent with PD. Possibilities are that she has AIDP in addition to PD, in which case, her weakness is fully resolved without return of reflexes or that she never had AIDP, but rather severe apraxia/ inability to move 2/2 Parkinson's disease.  Started on Sinemet-needs outpatient EMG/NCS and f/u in Movement disorder clinic), functional diarrhea (rectal tube in place), MADHAVI 2/2 hypovolemia (improved), hypotension,  HA, & Vit B1 def. neuropathy (receiving Thiamine 250 mg).     Functional History: Patient lives in [place] with her daughter, son-in-law and grand daughter in a single story home with no steps to enter.  Prior to admission, Mod (I) with cane for household ambulation and needed assistance with ADLs.  DME: cane and 3-in-1 commode.      Hospital Course: 1/10/18: Evaluated by therapy.  Bed mobility totalA. LBD: totalA.   1/15/18: Participated with therapy.  Bed mobility Max.  Sit to stand totalA & RW.  Ambulated 4 lateral steps MaxA & RW.  Grooming: Luis Felipe.  Toileting totalA.   1/17/18: Participated with therapy.  Bed mobility Min-totalA (with assist of 2).  Sit to stand ModA.  Ambulated 5 steps ModA.  UBD Luis Feilpe and LBD totalA.      Past Medical History:   Diagnosis Date    Anemia     Colitis     hospitalized July 2014    Diabetes mellitus     Encounter for blood transfusion     Goiter     Hypertension     Left hip pain 10/12/14    Syncope      Past Surgical History:   Procedure Laterality Date    BACK SURGERY      THYROIDECTOMY      TOE AMPUTATION Left      Review of patient's allergies indicates:  No Known Allergies    Scheduled Medications:    amitriptyline  100 mg Oral QHS    carbidopa-levodopa  mg  1 tablet Oral Daily    carbidopa-levodopa  mg  1 tablet Oral Daily    enoxaparin  40 mg Subcutaneous Daily    Immune Globulin G (IGG)-PRO-IGA 10 % injection (Privigen)  400 mg/kg/day Intravenous Q24H    levothyroxine  50 mcg Oral Before breakfast    lisinopril  40 mg Oral Daily    thiamine  250 mg Oral Daily       PRN Medications: acetaminophen, diphenoxylate-atropine 2.5-0.025 mg, hydrocodone-acetaminophen 7.5-325mg, sodium chloride 0.9%    Family History     Problem Relation (Age of Onset)    Cancer Son, Mother    Coronary artery disease     Diabetes         Social History Main Topics    Smoking status: Former Smoker     Years: 30.00     Types: Cigarettes     Quit date: 5/20/2014     Smokeless tobacco: Not on file    Alcohol use Yes      Comment: occassionally     Drug use: No    Sexual activity: Not Currently     Review of Systems   Constitutional: Positive for fatigue. Negative for chills and fever.   HENT: Negative for trouble swallowing and voice change.    Eyes: Negative for photophobia and visual disturbance.   Respiratory: Negative for cough, shortness of breath and wheezing.    Cardiovascular: Negative for chest pain and palpitations.   Gastrointestinal: Positive for diarrhea. Negative for abdominal distention and vomiting.   Genitourinary: Negative for difficulty urinating and flank pain.   Musculoskeletal: Positive for gait problem. Negative for arthralgias, joint swelling and myalgias.   Skin: Positive for wound (pressure ulcer). Negative for color change.   Neurological: Positive for weakness, numbness and headaches. Negative for facial asymmetry and speech difficulty.   Psychiatric/Behavioral: Positive for confusion. Negative for agitation.     Objective:     Vital Signs (Most Recent):  Temp: (P) 98.1 °F (36.7 °C) (01/17/18 1330)  Pulse: (P) 87 (01/17/18 1330)  Resp: (P) 14 (01/17/18 1330)  BP: (P) 137/64 (01/17/18 1330)  SpO2: (P) 100 % (01/17/18 1330)    Vital Signs (24h Range):  Temp:  [97.8 °F (36.6 °C)-98.5 °F (36.9 °C)] (P) 98.1 °F (36.7 °C)  Pulse:  [77-93] (P) 87  Resp:  [14-20] (P) 14  SpO2:  [94 %-100 %] (P) 100 %  BP: (120-178)/(56-81) (P) 137/64     Body mass index is 34.62 kg/m².    Physical Exam   Constitutional: She appears well-developed and well-nourished.   HENT:   Head: Normocephalic and atraumatic.   Eyes: EOM are normal. Pupils are equal, round, and reactive to light.   Neck: Normal range of motion.   Cardiovascular: Normal rate and regular rhythm.    Pulmonary/Chest: Effort normal. No respiratory distress.   Abdominal: Soft. There is no tenderness.   Rectal tube in place   Musculoskeletal: Normal range of motion. She exhibits no deformity.   Neurological:    -  Mental Status:  AAOx3.  Follows commands.  Answers incorrect age and year.  -  Coordination: severe apraxia of right leg and moderate of left. Mild apraxia of both hands.  -  Motor:  RUE: 5/5.  LUE: 5/5.  RLE: 5/5.  LLE: 5/5.   -  Tone: rigidity   -  Sensation: decreased to BLE.   Skin: Skin is warm and dry.   Psychiatric: She has a normal mood and affect. Cognition and memory are impaired.   Vitals reviewed.          Diagnostic Results:   Labs: Reviewed  ECG: Reviewed  X-Ray: Reviewed  CT: Reviewed  MRI: Reviewed    Assessment/Plan:     * AIDP (acute inflammatory demyelinating polyneuropathy)    -Neurology following  -presumed dx based on elevated protein in CSF  -receiving IVIG day 4/5 and symptoms have improved  -on Sinemet 25/100  -f/u movement disorder clinic     Recommendations  -  Encourage mobility, OOB in chair at least 3 hours per day, and early ambulation as appropriate  -  PT/OT evaluate and treat  -  Pain management  -  Monitor for and prevent skin breakdown and pressure ulcers  · Early mobility, repositioning/weight shifting every 20-30 minutes when sitting, turn patient every 2 hours, proper mattress/overlay and chair cushioning, pressure relief/heel protector boots  -  DVT prophylaxis:  Lovenox SQ  -  Reviewed discharge options (IP rehab, SNF, HH therapy, and OP therapy)          PD (Parkinson's disease)    -per Neurology, exam findings consistent with PD  -on Sinemet 25/100        Acute renal injury due to hypovolemia    -BUN 21 and Cr. 0.9 on 1/17  -improving         Chronic pain disorder    - Gabapentin 300 mg TID, Elavil 100 mg, Percocet 7.5 mg per HM         Pressure injury of skin, stage 2    -present on admission  -wound care consulted         Areflexia    -concern for AIDP  -Areflexic to bilateral patellar and Achilles   -Neurology following  -day 4/5 IVIG and receiving Thiamine        Peripheral sensory neuropathy    -severe sensory loss reported by patient  -Neurology  following  -Vitamin B1 def and now on Thiamine 250 mg PO, currently improving  -receiving IVIG day 4/5        Functional diarrhea    -h/o chronic diarrhea   -rectal tube in place        Participating with therapy.  Day 4/5 IVIG with improvement in strength.  Rectal tube in place.  Will follow and discuss with rehab team for rehab recommendation.      Thank you for your consult.     Nupur Lyman NP  Department of Physical Medicine & Rehab  [facility]

## 2018-01-17 NOTE — ASSESSMENT & PLAN NOTE
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 meds stopped on 1/15  BP elevated 1/17, restart Lisinopril 40 and monitor

## 2018-01-17 NOTE — PT/OT/SLP PROGRESS
"Occupational Therapy   Treatment    Name: Melissa Anand  MRN: [id number]  Admitting Diagnosis:  AIDP (acute inflammatory demyelinating polyneuropathy)       Recommendations:     Discharge Recommendations: nursing facility, skilled (vs Home health with 24 hour care)  Discharge Equipment Recommendations:  walker, rolling, wheelchair, hospital bed  Barriers to discharge:  Other (Comment) (level of skilled assistance required)    Subjective     Communicated with: RN prior to session. Pt reported "I'm going home tomorrow. I just have one little step on the car port to get inside. I mainly stay in my room at home. I get up to go to the bathroom. They bring me food"  Pain/Comfort:  · Pain Rating 1: 0/10  · Pain Rating Post-Intervention 1: 0/10    Objective:     Patient found with: peripheral IV, PureWick (air mattress)    General Precautions: Standard, fall   Orthopedic Precautions:N/A   Braces: N/A     Occupational Performance:    Bed Mobility:    · Patient completed Rolling/Turning to Left with  minimum assistance and with side rail  · Patient completed Rolling/Turning to Right with moderate assistance and with side rail  · Patient completed Supine to Sit with moderate assistance  · Patient completed Sit to Supine with moderate assistance   · Scooting to HOB x1 trial via drawsheet with total(A) x2 persons    Functional Mobility/Transfers:  · Patient completed Sit <> Stand Transfer with moderate assistance  with  no assistive device  x2 trials from EOB  · Functional mobility: 5 steps at EOB with moderate assistance for weight shift; support at B UE    · RN requested pt not get up to chair on this date 2/2 her pressure going up while seated in chair     Activities of Daily Living:  · UB Dressing: minimum assistance while seated EOB to doff soiled gown/don clean gown  · LB Dressing: total assistance to don B socks while seated EOB; assistance for core control and ROM    Patient left with bed in chair position with all lines " intact, call button in reach and RN present    [facility] 6 Click:  [facility] Total Score: 16    Treatment & Education:  -Pt alert and oriented x4; agreeable to OT session after minimal encouragement  -Re-edu on OT role in care and POC  -EOB: Completed B UE chest press and shoulder press x10 reps x2 sets with rolled towel for endurance training with 1 rest break required  -in standing: Completed weight shifts B with > weakness noted for R LE  -Communication board updated; no family at bedside for education   Education:    Assessment:     Melissa Anand is a 72 y.o. female with a medical diagnosis of AIDP (acute inflammatory demyelinating polyneuropathy).  She presents with overall weakness in B LE limiting her functional indep and functional mobility at this time. She demo poor insight into current situation. She demo good participation in session once encouraged. She will cont to benefit from OT services at this time. She would benefit from nursing staff assistance up to chair for meals throughout the day for cont encouragement for mobility and endurance building.  Performance deficits affecting function are weakness, impaired endurance, impaired skin, impaired functional mobilty, gait instability, impaired self care skills, impaired balance, decreased safety awareness, decreased lower extremity function.      Rehab Prognosis:  Fair+; patient would benefit from acute skilled OT services to address these deficits and reach maximum level of function.       Plan:     Patient to be seen 3 x/week to address the above listed problems via self-care/home management, therapeutic activities, therapeutic exercises  · Plan of Care Expires: 02/10/18  · Plan of Care Reviewed with: patient    This Plan of care has been discussed with the patient who was involved in its development and understands and is in agreement with the identified goals and treatment plan    GOALS:    Occupational Therapy Goals        Problem: Occupational Therapy Goal     Goal Priority Disciplines Outcome Interventions   Occupational Therapy Goal     OT, PT/OT Ongoing (interventions implemented as appropriate)    Description:  Goals to be met by: 1/24/2018    Patient will increase functional independence with ADLs by performing:    Supine to sit & sit to supine with minimal assistance.  Sit to stand with minimal assistance.  UE Dressing while seated with set up.  LE Dressing with Maximum Assistance and AD as needed.  Toileting from bedside commode with Moderate Assistance for hygiene and clothing management.   Toilet transfer to bedside commode with Moderate Assistance.                    Time Tracking:     OT Date of Treatment: 01/17/18  OT Start Time: 1237  OT Stop Time: 1308  OT Total Time (min): 31 min    Billable Minutes:Therapeutic Activity 31    CHAIM Grullon  1/17/2018

## 2018-01-17 NOTE — PLAN OF CARE
Problem: Patient Care Overview  Goal: Plan of Care Review  Outcome: Ongoing (interventions implemented as appropriate)  Pt on day 4/5 of IVIG. Pt has multiple pressure ulcers, and is on a turn schedule.     Plan of care reviewed with pt. Educated pt on any medications, and procedures that the patient would be receiving. Discussed vital sign and I&O routine. Allowed time for pt to ask any questions, and told the patient to call for any assistance. Bed low & locked, call light and personal belongings within reach.  Progressing towards discharge.

## 2018-01-17 NOTE — PLAN OF CARE
[facility]  HOME HEALTH ORDERS  FACE TO FACE ENCOUNTER      Patient Name: Melissa Anand  YOB: 1945    PCP: Dana Castellon MD   PCP Address: [address] / [place] [address]  PCP Phone Number: [phone]  PCP Fax: [phone]    Encounter Date: 01/17/2018    Admit to Home Health    Diagnoses:  Active Hospital Problems    Diagnosis  POA    *Apraxia [R48.2]  Yes     Priority: 1 - High     BLE weakness--acute progression 01/09/18 on chronic progression of several months BLE weakness requiring patient to use a cane, then walker.  01/12/18 CSF with protein 56, initial WBCs wnl.  Tube 2 CSF with WBCs 22, no signs of CNS infection on imaging or exam.  IV Ig started 01/14/18.      Acute renal injury due to hypovolemia [N17.9, E86.1]  Clinically Undetermined     Priority: 3     Peripheral sensory neuropathy [G62.9]  Yes     Priority: 3      Presented with severe sensory loss of BLE 01/09/18.  Improvement in sensation s/p first few doses of IV Ig (also B1, B12).        Pressure injury of skin, stage 2 [L89.92]  Yes     Priority: 4     PD (Parkinson's disease) [G20]  Yes     1/17/18 Akinetic rigid-type, legs worse than arms, right leg most effected.      Vitamin B1 deficiency [E51.9]  Yes     Vitamin B1 25 on 01/09/18 hospital admission.      Chronic pain disorder [G89.4]  Yes    Suspected deep tissue injury [Z04.9]  Not Applicable    Areflexia [R29.2]  Yes     01/09/18 admission with BLE weakness as well as areflexia--bilateral patellar, Achilles.  Did not improve with IVIG.      Functional diarrhea [K59.1]  Yes     Chronic (since [id number]) history of diarrhea.      Benign essential hypertension [I10]  Yes      Resolved Hospital Problems    Diagnosis Date Resolved POA    Weakness of both lower extremities [R29.898] 01/17/2018 Yes     Priority: 2      Patient presented to [facility] ED 01/09/18 with acute BLE weakness in setting of progressive BLE weakness over past 4-5  months.  Patient had LP done with protein elevated to 56, WBCs wnl on first analysis and elevated to 22 per Tube 2 analysis.  MRI Brain, C/T/L spine not concerning for infection.  No clinical signs of infection.  Proceeded with IV Ig starting 01/14/18.         No future appointments.      I have seen and examined this patient face to face today. My clinical findings that support the need for the home health skilled services and home bound status are the following:  Weakness/numbness causing balance and gait disturbance due to Weakness/Debility and AIDP/Parkinson Disease making it taxing to leave home.  Requiring assistive device to leave home due to unsteady gait caused by  Weakness/Debility and AIDP/Parkinson Disease.    Allergies:Review of patient's allergies indicates:  No Known Allergies    Diet: diabetic diet: 2200 calorie    Activities: activity as tolerated    Nursing:   SN to complete comprehensive assessment including routine vital signs. Instruct on disease process and s/s of complications to report to MD. Review/verify medication list sent home with the patient at time of discharge  and instruct patient/caregiver as needed. Frequency may be adjusted depending on start of care date.    Notify MD if SBP > 160 or < 90; DBP > 90 or < 50; HR > 120 or < 50; Temp > 101       CONSULTS:    Physical Therapy to evaluate and treat. Evaluate for home safety and equipment needs; Establish/upgrade home exercise program. Perform / instruct on therapeutic exercises, gait training, transfer training, and Range of Motion.  Occupational Therapy to evaluate and treat. Evaluate home environment for safety and equipment needs. Perform/Instruct on transfers, ADL training, ROM, and therapeutic exercises.   to evaluate for community resources/long-range planning.  Aide to provide assistance with personal care, ADLs, and vital signs.    MISCELLANEOUS CARE:  Routine Skin for Bedridden Patients: Instruct  patient/caregiver to apply moisture barrier cream to all skin folds and wet areas in perineal area daily and after baths and all bowel movements.    WOUND CARE ORDERS  yes:     Pressure Ulcer 01/12/18 coccyx Stage II  Nursing to cleanse coccyx with cleansing wipes and apply Critic-Aid Clear moisture barrier (purple top) BID and prn cleaning.     Pressure Ulcer 01/12/18 Left heel suspected deep tissue injury  Cleanse right and left heel with cleansing wipes and apply Mepilex border to both heels to protect from skin breakdown. Change dressing every 5 days.         Medications: Review discharge medications with patient and family and provide education.      Current Discharge Medication List      START taking these medications    Details   carbidopa-levodopa  mg (SINEMET)  mg per tablet Take 1 tablet by mouth 2 (two) times daily. Contact Neurologist for refill  Qty: 60 tablet, Refills: 3      thiamine 250 MG tablet Take 1 tablet (250 mg total) by mouth once daily.         CONTINUE these medications which have NOT CHANGED    Details   !! gabapentin (NEURONTIN) 300 mg tablet Take 300 mg by mouth every morning.       lisinopril (PRINIVIL,ZESTRIL) 40 MG tablet Take 1 tablet (40 mg total) by mouth once daily.  Qty: 30 tablet, Refills: 0      amitriptyline (ELAVIL) 100 MG tablet Take 100 mg by mouth every evening.      !! gabapentin (NEURONTIN) 600 MG tablet Take 600 mg by mouth every evening.      hydrocodone-acetaminophen 7.5-325mg (NORCO) 7.5-325 mg per tablet Take 1 tablet by mouth every 8 (eight) hours as needed for Pain.  Qty: 15 tablet, Refills: 0      lactobacillus acidophilus & bulgar (LACTINEX) 100 million cell packet Take 1 packet (1 each total) by mouth 2 (two) times daily.  Qty: 30 packet, Refills: 1      levothyroxine (SYNTHROID) 50 MCG tablet Take 1 tablet (50 mcg total) by mouth before breakfast.  Qty: 30 tablet, Refills: 11      quetiapine (SEROQUEL) 300 MG Tab Take 400 mg by mouth every evening.        tramadol (ULTRAM) 50 mg tablet Take 50 mg by mouth 2 (two) times daily.       !! - Potential duplicate medications found. Please discuss with provider.      STOP taking these medications       oxyCODONE-acetaminophen (PERCOCET)  mg per tablet Comments:   Reason for Stopping:               I certify that this patient is confined to her home and needs intermittent skilled nursing care, physical therapy and occupational therapy.

## 2018-01-17 NOTE — SUBJECTIVE & OBJECTIVE
Subjective:     Interval History:   Patient notes continued improvement in BLE strength and sensation.  Able to stand patient up at bedside today but notable difficulty initiating movement to take a few steps.  Still notable rigidity in BLE, discussed trial of sinemet today given disparity between improvement in strength and improvement in walking.    Current Neurological Medications: Amitriptyline 100 mg po qHS, hydrocodone-APAP  mg po q6h prn pain    Current Facility-Administered Medications   Medication Dose Route Frequency Provider Last Rate Last Dose    acetaminophen tablet 650 mg  650 mg Oral Q6H PRN Rod Hernandez MD   325 mg at 01/16/18 2032    amitriptyline tablet 100 mg  100 mg Oral QHS Titi Perez MD   100 mg at 01/16/18 2033    diphenoxylate-atropine 2.5-0.025 mg per tablet 1 tablet  1 tablet Oral QID PRN Titi Perez MD   1 tablet at 01/16/18 2324    enoxaparin injection 40 mg  40 mg Subcutaneous Daily Titi Perez MD   40 mg at 01/16/18 1755    hydrocodone-acetaminophen 7.5-325mg per tablet 1 tablet  1 tablet Oral Q6H PRN Titi Perez MD   1 tablet at 01/16/18 2033    Immune Globulin G (IGG)-PRO-IGA 10 % injection (Privigen) 10 % injection 45 g  400 mg/kg/day Intravenous Q24H Elva Diaan  mL/hr at 01/16/18 1420 45 g at 01/16/18 1420    levothyroxine tablet 50 mcg  50 mcg Oral Before breakfast Titi Perez MD   50 mcg at 01/17/18 0523    sodium chloride 0.9% flush 3 mL  3 mL Intravenous PRN Titi Perez MD        thiamine tablet 250 mg  250 mg Oral Daily Titi Perez MD   250 mg at 01/16/18 0821     Review of Systems   Constitutional: Negative for chills and fever.   Eyes: Negative for photophobia and visual disturbance.   Respiratory: Negative for cough and shortness of breath.    Gastrointestinal: Negative for constipation, diarrhea, nausea and vomiting.   Musculoskeletal: Positive for back pain and gait problem.   Skin: Negative for rash  and wound.   Neurological: Positive for weakness and numbness. Negative for headaches.   Hematological: Negative for adenopathy. Does not bruise/bleed easily.   Psychiatric/Behavioral: Positive for decreased concentration. Negative for agitation and confusion.     Objective:     Vital Signs (Most Recent):  Temp: 98.3 °F (36.8 °C) (01/17/18 0734)  Pulse: 86 (01/17/18 0734)  Resp: 14 (01/17/18 0734)  BP: (!) 148/70 (01/17/18 0734)  SpO2: 96 % (01/17/18 0734) Vital Signs (24h Range):  Temp:  [97.6 °F (36.4 °C)-98.5 °F (36.9 °C)] 98.3 °F (36.8 °C)  Pulse:  [75-87] 86  Resp:  [14-20] 14  SpO2:  [94 %-98 %] 96 %  BP: ()/(48-81) 148/70     Weight: 112.6 kg (248 lb 3.8 oz)  Body mass index is 34.62 kg/m².    Physical Exam  General:  Well-developed, well-nourished, nad  HEENT:  NCAT, PERRLA, EOMI, oropharyngeal membranes non-erythematous/without exudate  Neck:  Supple, normal ROM without nuchal rigidity  Resp:  Symmetric expansion, no increased wob  CVS:  No LE edema, peripheral pulses 2+ (radial, dorsalis pedis)  GI:  Abd soft, non-distended, non-tender to palpation  Neurologic Exam:  Mental Status:  AAOx3.  Speech, thought content appropriate.  Recent, remote recall 3/3.  Cranial Nerves:  VFs intact on moving fingers in all quadrants bilaterally.  PERRLA, EOMI.  Facial movement, sensation intact and symmetric.  Palate raises symmetrically, tongue protrudes midline.  Trapezius 4/5 bilaterally.  Motor:  Normal bulk and tone, no apparent atrophy or fasciculations.  BUE shoulder abduction, biceps/triceps, wrist flexion/extension,  strength 4-/5.  BLE hip flexors 2/5, knee flexion/extension 1/5, plantarflexion/dorsiflexion 1/5.    Sensory:  Patient has some sensation to light touch in BLE with some inattention to RLE.  Light touch at BUE intact without inattention.  Vibratory sensation diminished to mid-shin bilaterally.  Vibratory sensation intact at BUE digits.  Reflexes:  Areflexic patellar, Achilles bilaterally.  Biceps, brachioradialis 2+ and symmetric. No ankle clonus. Equivocal toe bilaterally.  Coordination:  FNF, KAMRON intact with no dysmetria/ataxia/dysdiadochokinesia.  HTS deferred 2/2 weakness.  No resting tremor.  Gait:  Deferred 2/2 fall precautions     Significant Labs:     Recent Labs  Lab 18  0525   WBC 3.45*   RBC 2.89*   HGB 9.3*   HCT 29.0*      *   MCH 32.2*   MCHC 32.1       Recent Labs  Lab 18  0525   CALCIUM 8.1*      K 4.5   CO2 18*   *   BUN 21   CREATININE 0.9     CSF:   Tube 1--WBCs 5, RBCs 3000, 40% Seg Neutrophils, 40% Lymphocytes, 20% Monos/Macros  Tube 2--WBCs 22, RBCs 285, 3% Seg Neutrophils, 92% Lymphocytes, 5% Monos/Macros.  Protein 56 (corrected for RBCs = 56), Glucose 55.    Significant Imagin18 MRI L spine w/ w/o contrast:  Postoperative changes from L3-L4 laminectomy.  Multilevel degenerative changes of the lumbar spine most significant at L3-L4 with moderate to severe bilateral neuroforaminal narrowing.  Additional findings discussed level by level above.     18 CT head w/o contrast:  No evidence of acute hemorrhage or major vascular distribution infarct.  Mild chronic ischemic change as above.  Prominence ossification lateral to the left anterior clinoid process and cavernous sinus suggesting the possibility of a calcified meningioma. This is without significant mass effect on the brain parenchyma.    18 MRI brain w/ w/o contrast:  1.  No acute intracranial abnormality identified on today's examination.  Specifically, no evidence of acute infarction.  2.  Multiple foci of T2/FLAIR hyperintensity in the supratentorial and periventricular white matter as well as mick, suggestive of chronic microvascular ischemic change. Remote right cerebellar infarct.    18 MRI C/T spine w/ w/o contrast:  1. Mild degenerative change of the cervical spine.  No abnormal cervical spinal cord signal or enhancement.  2. Mild degenerative change of  thoracic spine most pronounced at the T10-T11 level where there is a posterior disc bulge resulting in mild/moderate spinal canal stenosis.  There is a questionable focus of T2 cord signal hyperintensity without associated enhancement at this level possibly reflecting component of myelomalacia.  Additional questionable focus of T2 cord signal intensity at the T4-T5 level as detailed above.    3.  Enlargement heterogeneity of the right thyroid lobe.

## 2018-01-17 NOTE — PLAN OF CARE
11:48 AM  SW called and left voicemail for daughter informing her that [facility]una[facility], [facility] and [facility] has accepted pt.     Spoke with pt who stated she is going home and that she is not going to a SNF. Updated physician.     Melania Crawley, MICHAEL   [facility]  Ext [phone]

## 2018-01-17 NOTE — ASSESSMENT & PLAN NOTE
-severe sensory loss reported by patient  -Neurology following  -Vitamin B1 def and now on Thiamine 250 mg PO, currently improving  -receiving IVIG day 4/5

## 2018-01-17 NOTE — ASSESSMENT & PLAN NOTE
-Presumed diagnosis based on elevated protein in CSF, improvement in sxs with IV Ig  -Patient with improvement in strength on exam, but disparity in ability to get up and take a few steps  -Trial carbidopa-levodopa  mg po at 10 am, 2 pm  -Will follow up in Movement Disorders clinic   -Day 4/5 of IV Ig, continue PT/OT daily

## 2018-01-17 NOTE — PLAN OF CARE
Problem: Patient Care Overview  Goal: Plan of Care Review  Outcome: Ongoing (interventions implemented as appropriate)  Pt remained free from falls during shift. AAOx4, VS stable, plantar flexion and dorsiflexion movement mild in bilateral lower extremities. Pt remains bedfast. OT was at bedside. Neuro checks Q4. Purwick and linens changed. Wound care performed.  Pt educated to call before attempting to ambulate. Pt verbalized understanding. IVIG administered with frequent VS checks performed. Call light within reach, bed in lowest position. Will continue to monitor.

## 2018-01-17 NOTE — HPI
Melissa Anand is a 72-year-old female with PMHx of stage 2 pressure ulcer, syncope, hip pain, HTN, goiter, DM, colitis, chronic low back pain (s/p laminectomy L3-L4 and s/p MYLENE ~2 weeks ago), chronic diarrhea, and anemia.  Patient presented to [facility] on 1/9 with BLE weakness for past 2-3 months with inability to get out of bed over the weekend prior to her admission without b/b incontinence.  CTH negative for acute pathology.  MRI L-spine revealed degenerative changes most significant at L3-L4 with moderate to severe bilateral neuroforaminal narrowing.  MRI brain, C-spine, and T-spine were unremarkable for acute findings. Neurology was consulted has concern for AIDP given chronic BLE weakness with profound sensory neuropathy and arreflexia.  A LP was performed on 1/10 in IR and resulted with elevated protein in CSF.  Receiving IVIG day 4/5 with much improvement with strength.  Hospital course was further complicated by Parkinson's disease (Per Neurology, exam is consistent with PD. Possibilities are that she has AIDP in addition to PD, in which case, her weakness is fully resolved without return of reflexes or that she never had AIDP, but rather severe apraxia/ inability to move due to PD.  Started on Sinemet-needs outpatient EMG/NCS and f/u in Movement disorder clinic), functional diarrhea (rectal tube in place), MADHAVI 2/2 hypovolemia, hypotension, HA, Vit B1 def. neuropathy (receiving Thiamine).     Functional History: Patient lives in [place] with her daughter, son-in-law and grand daughter in a single story home with no steps to enter.  Prior to admission, Mod (I) with cane for household ambulation and needed assistance with ADLs.  DME: cane and 3-in-1 commode.

## 2018-01-17 NOTE — ASSESSMENT & PLAN NOTE
-Exam has been concerning for AIDP.  Component of rigidity in BLE on exam, will plan follow up in clinic.  -MRI Brain and C/T/L spine with no signs concerning for infection despite 22 WBCs in Tube 2 CSF.  -Day 4 of 5 IV Ig 400 mg/kg/d.  Continue PT/OT for gait training, deconditioning, dispo recs.

## 2018-01-17 NOTE — PLAN OF CARE
Problem: Occupational Therapy Goal  Goal: Occupational Therapy Goal  Goals to be met by: 1/24/2018    Patient will increase functional independence with ADLs by performing:    Supine to sit & sit to supine with minimal assistance.  Sit to stand with minimal assistance.  UE Dressing while seated with set up.  LE Dressing with Maximum Assistance and AD as needed.  Toileting from bedside commode with Moderate Assistance for hygiene and clothing management.   Toilet transfer to bedside commode with Moderate Assistance.  Outcome: Ongoing (interventions implemented as appropriate)  Goals revised and updated. CHAIM Grullon 1/17/2018

## 2018-01-17 NOTE — ASSESSMENT & PLAN NOTE
- Cr increased to 1.7, pt with hypotension noted recently also contrasted Imaging  - Suspect hypovolemia as she is fluid responsive  - aggressive IV hydration 1/16  - avoid nephrotoxin  - resolved  - CTM

## 2018-01-17 NOTE — ASSESSMENT & PLAN NOTE
Exam is most concerning for Presumed AIDP, also based on elevated protein in CSF, improvement in sxs with IV Ig. Patient with improvement in strength on exam, but disparity in ability to get up and take a few steps. MRI head, cervical and thoracic spine w/o e/o acute findings.   - At this time, per neuro staff, currently on her exam consistent with parkinson's disease, now somewhat suspect of the diagnosis of AIDP.  The possibilities are that she has AIDP in addition to PD, in which case, her weakness is fully resolved without return of reflexes or that she never had AIDP, but rather severe apraxia/ inability to move due to PD.  - IVIG for 5d day 1 1/14, IgA 254.  - PT/OT for ROM, deconditioning: SNF w/ RW but pt at this time request to be discharge home  - Start sinemet 25/100 at 10am and 2pm daily, plan to D/C home with current regiment  - will need close follow up with neurology in clinic, Apt 1/25 set up with neurology

## 2018-01-17 NOTE — PROGRESS NOTES
"[facility] Hospital Medicine  Progress Note    Patient Name: Melissa Anand  MRN: [id number]  Patient Class: IP- Inpatient   Admission Date: 1/9/2018  Length of Stay: 8 days  Attending Physician: Rod Hernandez MD  Primary Care Provider: Dana Castellon MD    [facility] Medicine Team: [facility] HOSP MED A Rod Hernandez MD    Subjective:     Principal Problem:Apraxia    HPI:  71 yo F with PMHx pre-DM, HTN, and chronic low back pain who presents with progression of BLE weakness.  Patient's daughter at bedside assists with history.  Patient has been walking with a cane at baseline for past 2 years and has had worsening of BLE weakness for past 2-3 months with inability to get out of bed over the past weekend.  She denies recent infections--no SOB/cough, no n/v/c/d, no sick contacts, no URI Sx. Pt w/ acute worsening this am, she could not move her Les and needed assistance to raise them off the bed. Called EMS when daughter had difficulty assisting.      Has some diabetic peripheral neuropathy at baseline, but states that she hadn't had falls with her neuropathy prior to last two months.  Denies bowel/bladder incontinence (has chronic problems w/ constipation and Bms often loose when able to go, will go days w/o urinating and has gone 3x times since ED presentation), no saddle anesthesia, has sensory level to knee b/l. Patient has been getting injections in her back for chronic low back pain every 2 wks, last was few wks ago.  Daughter states that she had gotten some steroid injections but they did try an "epidural medication" a couple weeks ago.  She has been getting injections approximately every two weeks for past 3 months.  Patient states she still has low back pain with some radiation into the BLE but is unable to characterize the pain. States injections not working, but no sig pain now.     Hospital Course:  1/10: no change in lack of sensation or inability to move Les. Pt w/ worsening of chronic L heel " pain and having severe pain not controlled by home percocet and worse w/ palpation. Having frequent diarrhea, which she states is typical in appearance, non-bloody. Pt take OTC meds for this at home and would like med now.      1/11: less loose Bms, lomotil working. Sensation inconsistent and pt feeling pinches intermittently w/ loss of sensation above and below area. No return of motor function. Daughter states that no movement in feet for 4wks.      1/12: pt and daughter noticed flicker of movement w/ R foot. Inc sensation on right side as well. Bms dec in freq. Deneis neck stiff, photophobia, f/c, rash     1/13: better in better spirits and feeling better overall. No diarrhea. Inc movement in R foot. Dec pain in L foot.      1/14: improved movement, daughter states pt able to swing R leg over side of bed. Pt does not recall doing this. Feeling sleepy w/ starting IVIG. BP dropped during initiation but improved after. No BM    1/15: strength impressively improved and pt able to move to side of bed for PT session. Pt helped getting pt up and she was able to stand on her own, able to side step as well.     1/16: pt continue to report improvement in LE. Pt with asymptomatic hypotension and MADHAVI noted on lab work today. Start her on IV fluid replacement.     1/17: patient report continue improvement in lower extremities. Hypotension and MADHAVI resolved with IVF yesterday. 4/5 IVIG today, neuro is ok with discharge home tomorrow after last dose of IVIG. Discussed with patient, she wants to go home rather go to SNF with her daughter. CM/SW are contact daughter at this time.       Interval History: Doing well today, pt has no complains. Today, feeling better      Review of Systems   Constitutional: Negative for chills and fever.   Eyes: Negative for photophobia and visual disturbance.   Respiratory: Negative for cough and shortness of breath.    Gastrointestinal: Negative for constipation, diarrhea, nausea and vomiting.  "  Musculoskeletal: Positive for back pain.   Skin: Negative for rash and wound.   Neurological: Positive for weakness, numbness and headaches.   Hematological: Negative for adenopathy. Does not bruise/bleed easily.     Objective:     Vital Signs (Most Recent):  Temp: (P) 98.1 °F (36.7 °C) (01/17/18 1330)  Pulse: (P) 87 (01/17/18 1330)  Resp: (P) 14 (01/17/18 1330)  BP: (P) 137/64 (01/17/18 1330)  SpO2: (P) 100 % (01/17/18 1330) Vital Signs (24h Range):  Temp:  [97.8 °F (36.6 °C)-98.5 °F (36.9 °C)] (P) 98.1 °F (36.7 °C)  Pulse:  [77-93] (P) 87  Resp:  [14-20] (P) 14  SpO2:  [94 %-100 %] (P) 100 %  BP: (120-178)/(56-81) (P) 137/64     Weight: 112.6 kg (248 lb 3.8 oz)  Body mass index is 34.62 kg/m².    Intake/Output Summary (Last 24 hours) at 01/17/18 1400  Last data filed at 01/17/18 1146   Gross per 24 hour   Intake             2385 ml   Output             1750 ml   Net              635 ml      Physical Exam    General:  Well-developed, well-nourished, nad  HEENT:  NCAT, PERRLA, EOMI, oropharyngeal membranes non-erythematous/without exudate  Neck:  Supple, normal ROM without nuchal rigidity  Resp:  Symmetric expansion, no increased wob  CVS:  No LE edema, peripheral pulses 2+ (radial, dorsalis pedis)  GI:  Abd soft, non-distended, non-tender to palpation. Yellow-brown, loose stool in bed and on hospital gown  Neurologic Exam:  Mental Status:  AAOx3.  Speech, thought content appropriate.   Cranial Nerves:  mostly intact. Weak trapezius, SCM strength bilaterally.  Motor:  Normal bulk and tone, no apparent atrophy or fasciculations.  BUE shoulder abduction, biceps/triceps, wrist flexion/extension,  strength 4-/5.  BLE hip flexors 2/5, knee flexion/extension 1/5, plantarflexion/dorsiflexion 1/5, inc to 2/5 on R w/ more movement today. Contraction of anterior calf muscle.   Sensory:  Patient had no sensation to light touch or temperature in BLE up to knee (lower on R, upper on L), occasional "ouch" to pinch on RLE.No " pain w/ palpation on L heel, no skin breaks or e/o infection  Reflexes:  Areflexic patellar, Achilles  Gait not assessed    MELD-Na score: 7 at 1/11/2018  5:33 AM  MELD score: 7 at 1/11/2018  5:33 AM  Calculated from:  Serum Creatinine: 1.0 mg/dL at 1/11/2018  5:33 AM  Serum Sodium: 139 mmol/L (Rounded to 137) at 1/11/2018  5:33 AM  Total Bilirubin: 0.6 mg/dL (Rounded to 1) at 1/9/2018 12:58 PM  INR(ratio): 1.1 at 1/9/2018 12:58 PM  Age: 72 years    Significant Labs:  CBC:    Recent Labs  Lab 01/16/18  0330 01/17/18  0525   WBC 6.55 3.45*   HGB 10.8* 9.3*   HCT 32.9* 29.0*    186     CMP:    Recent Labs  Lab 01/16/18  0330 01/17/18  0525     137 136   K 4.6  4.6 4.5     108 113*   CO2 22*  22* 18*   *  163* 89   BUN 30*  30* 21   CREATININE 1.9*  1.9* 0.9   CALCIUM 7.8*  7.8* 8.1*   PROT 7.6  --    ALBUMIN 2.5*  --    BILITOT 0.5  --    ALKPHOS 55  --    AST 20  --    ALT 11  --    ANIONGAP 7*  7* 5*   EGFRNONAA 26.1*  26.1* >60.0     PTINR:  No results for input(s): INR in the last 48 hours.       Assessment/Plan:      * Apraxia    Exam is most concerning for Presumed AIDP, also based on elevated protein in CSF, improvement in sxs with IV Ig. Patient with improvement in strength on exam, but disparity in ability to get up and take a few steps. MRI head, cervical and thoracic spine w/o e/o acute findings.   - At this time, per neuro staff, currently on her exam consistent with parkinson's disease, now somewhat suspect of the diagnosis of AIDP.  The possibilities are that she has AIDP in addition to PD, in which case, her weakness is fully resolved without return of reflexes or that she never had AIDP, but rather severe apraxia/ inability to move due to PD.  - IVIG for 5d day 1 1/14, IgA 254.  - PT/OT for ROM, deconditioning: SNF w/ RW but pt at this time request to be discharge home  - Start sinemet 25/100 at 10am and 2pm daily  - will need close follow up with neurology in  clinic                Acute renal injury due to hypovolemia    - Cr increased to 1.7, pt with hypotension noted recently also contrasted Imaging  - Suspect hypovolemia as she is fluid responsive  - aggressive IV hydration 1/16  - avoid nephrotoxin  - resolved  - CTM          Peripheral sensory neuropathy    -Neuro concerned for chronic diabetic neuropathy with more severe acute sensory loss in BLE. But pt never w/ elevated A1c in our system above 5.8  -Chronic diabetic neuropathy reported by pt, recent severe sensory loss in BLE above previous baseline.  -Hgb A1c 5.2, vitamin B1 deficient, vitamin B12 nl. TSH low with normal FT4--appropriate for levothyroxine use.  -thiamine 500 mg qd x 3 d, thiamine 250 mg x 5 d (first does Sun 1/15), thiamine 100 mg po qd after.  -Peripheral neuropathy is improving per recent patient report, will continue to evaluate with continued IV Ig treatments.          Pressure injury of skin, stage 2    - Present on admission  - wound care orders per wound care team  - Nursing to cleanse coccyx with cleansing wipes and apply Critic-Aid Clear moisture barrier (purple top) BID and prn cleaning. Cleanse right and left heel with cleansing wipes and apply Mepilex border to both heels to protect from skin breakdown. Change dressing every 5 days.             PD (Parkinson's disease)    1/17/18 Akinetic rigid-type, legs worse than arms, right leg most effected.  - Start sinemet 25/100 at 10am and 2pm daily            Chronic pain disorder    Gabapentin 300mg tid, elavil 100 QHS, percocet 7.5 Q6h prn (inc to 10 1/11 d/t inc pain, Sx better and may tolerate dec again) - home meds listed in system, pt and daughter not entirely clear on dosing.         Suspected deep tissue injury    - see above          Areflexia    Per above, concern for AIDP.  Starting IV Ig, will continue to monitor for improvement.  -Possible component of chronic peripheral neuropathy though Hgb A1c 5.2%, well-controlled in the  past.  B12 wnl.  -B1 25--deficient.  Replacing with thiamine 500 mg qd x 3 d, thiamine 250 mg x 5 d, thiamine 100 mg po qd after.          Functional diarrhea    Pt states this is chronic problem and no worse than usual. Non-bloody and no other concerning changes. Takes OTC meds at home. Wishes to take lomitil and not imodium; ordered.  - Improved w/ lomotil          Benign essential hypertension    Lability BP, likely from GBS. meds stopped on 1/15  BP elevated 1/17, restart Lisinopril 40 and monitor               VTE Risk Mitigation         Ordered     enoxaparin injection 40 mg  Daily     Route:  Subcutaneous        01/12/18 1615     Low Risk of VTE  Once      01/09/18 2057              Rod Hernandez MD  Department of Hospital Medicine   [facility]

## 2018-01-17 NOTE — SUBJECTIVE & OBJECTIVE
Interval History: Doing well today, pt has no complains. Today, feeling better      Review of Systems   Constitutional: Negative for chills and fever.   Eyes: Negative for photophobia and visual disturbance.   Respiratory: Negative for cough and shortness of breath.    Gastrointestinal: Negative for constipation, diarrhea, nausea and vomiting.   Musculoskeletal: Positive for back pain.   Skin: Negative for rash and wound.   Neurological: Positive for weakness, numbness and headaches.   Hematological: Negative for adenopathy. Does not bruise/bleed easily.     Objective:     Vital Signs (Most Recent):  Temp: (P) 98.1 °F (36.7 °C) (01/17/18 1330)  Pulse: (P) 87 (01/17/18 1330)  Resp: (P) 14 (01/17/18 1330)  BP: (P) 137/64 (01/17/18 1330)  SpO2: (P) 100 % (01/17/18 1330) Vital Signs (24h Range):  Temp:  [97.8 °F (36.6 °C)-98.5 °F (36.9 °C)] (P) 98.1 °F (36.7 °C)  Pulse:  [77-93] (P) 87  Resp:  [14-20] (P) 14  SpO2:  [94 %-100 %] (P) 100 %  BP: (120-178)/(56-81) (P) 137/64     Weight: 112.6 kg (248 lb 3.8 oz)  Body mass index is 34.62 kg/m².    Intake/Output Summary (Last 24 hours) at 01/17/18 1400  Last data filed at 01/17/18 1146   Gross per 24 hour   Intake             2385 ml   Output             1750 ml   Net              635 ml      Physical Exam    General:  Well-developed, well-nourished, nad  HEENT:  NCAT, PERRLA, EOMI, oropharyngeal membranes non-erythematous/without exudate  Neck:  Supple, normal ROM without nuchal rigidity  Resp:  Symmetric expansion, no increased wob  CVS:  No LE edema, peripheral pulses 2+ (radial, dorsalis pedis)  GI:  Abd soft, non-distended, non-tender to palpation. Yellow-brown, loose stool in bed and on hospital gown  Neurologic Exam:  Mental Status:  AAOx3.  Speech, thought content appropriate.   Cranial Nerves:  mostly intact. Weak trapezius, SCM strength bilaterally.  Motor:  Normal bulk and tone, no apparent atrophy or fasciculations.  BUE shoulder abduction, biceps/triceps, wrist  "flexion/extension,  strength 4-/5.  BLE hip flexors 2/5, knee flexion/extension 1/5, plantarflexion/dorsiflexion 1/5, inc to 2/5 on R w/ more movement today. Contraction of anterior calf muscle.   Sensory:  Patient had no sensation to light touch or temperature in BLE up to knee (lower on R, upper on L), occasional "ouch" to pinch on RLE.No pain w/ palpation on L heel, no skin breaks or e/o infection  Reflexes:  Areflexic patellar, Achilles  Gait not assessed    MELD-Na score: 7 at 1/11/2018  5:33 AM  MELD score: 7 at 1/11/2018  5:33 AM  Calculated from:  Serum Creatinine: 1.0 mg/dL at 1/11/2018  5:33 AM  Serum Sodium: 139 mmol/L (Rounded to 137) at 1/11/2018  5:33 AM  Total Bilirubin: 0.6 mg/dL (Rounded to 1) at 1/9/2018 12:58 PM  INR(ratio): 1.1 at 1/9/2018 12:58 PM  Age: 72 years    Significant Labs:  CBC:    Recent Labs  Lab 01/16/18  0330 01/17/18  0525   WBC 6.55 3.45*   HGB 10.8* 9.3*   HCT 32.9* 29.0*    186     CMP:    Recent Labs  Lab 01/16/18  0330 01/17/18  0525     137 136   K 4.6  4.6 4.5     108 113*   CO2 22*  22* 18*   *  163* 89   BUN 30*  30* 21   CREATININE 1.9*  1.9* 0.9   CALCIUM 7.8*  7.8* 8.1*   PROT 7.6  --    ALBUMIN 2.5*  --    BILITOT 0.5  --    ALKPHOS 55  --    AST 20  --    ALT 11  --    ANIONGAP 7*  7* 5*   EGFRNONAA 26.1*  26.1* >60.0     PTINR:  No results for input(s): INR in the last 48 hours.     "

## 2018-01-17 NOTE — ASSESSMENT & PLAN NOTE
- Present on admission  - wound care orders per wound care team  - Nursing to cleanse coccyx with cleansing wipes and apply Critic-Aid Clear moisture barrier (purple top) BID and prn cleaning. Cleanse right and left heel with cleansing wipes and apply Mepilex border to both heels to protect from skin breakdown. Change dressing every 5 days.

## 2018-01-17 NOTE — PROGRESS NOTES
[facility]  Neurology  Progress Note    Patient Name: Melissa Anand  MRN: [id number]  Admission Date: 1/9/2018  Hospital Length of Stay: 8 days  Code Status: Prior   Attending Provider: Rod Hernandez MD  Primary Care Physician: Dana Castellon MD   Principal Problem:AIDP (acute inflammatory demyelinating polyneuropathy)    Subjective:     Interval History:   Patient notes continued improvement in BLE strength and sensation.  Able to stand patient up at bedside today but notable difficulty initiating movement to take a few steps.  Still notable rigidity in BLE, discussed trial of sinemet today given disparity between improvement in strength and improvement in walking.    Current Neurological Medications: Amitriptyline 100 mg po qHS, hydrocodone-APAP  mg po q6h prn pain    Current Facility-Administered Medications   Medication Dose Route Frequency Provider Last Rate Last Dose    acetaminophen tablet 650 mg  650 mg Oral Q6H PRN Rod Hernandez MD   325 mg at 01/16/18 2032    amitriptyline tablet 100 mg  100 mg Oral QHS Titi Perez MD   100 mg at 01/16/18 2033    diphenoxylate-atropine 2.5-0.025 mg per tablet 1 tablet  1 tablet Oral QID PRN Titi Perez MD   1 tablet at 01/16/18 2324    enoxaparin injection 40 mg  40 mg Subcutaneous Daily Titi Perez MD   40 mg at 01/16/18 1755    hydrocodone-acetaminophen 7.5-325mg per tablet 1 tablet  1 tablet Oral Q6H PRN Titi Perez MD   1 tablet at 01/16/18 2033    Immune Globulin G (IGG)-PRO-IGA 10 % injection (Privigen) 10 % injection 45 g  400 mg/kg/day Intravenous Q24H Elva Diana  mL/hr at 01/16/18 1420 45 g at 01/16/18 1420    levothyroxine tablet 50 mcg  50 mcg Oral Before breakfast Titi Perez MD   50 mcg at 01/17/18 0523    sodium chloride 0.9% flush 3 mL  3 mL Intravenous PRN Titi Perez MD        thiamine tablet 250 mg  250 mg Oral Daily Titi Perez MD   250 mg at 01/16/18 0821     Review of  Systems   Constitutional: Negative for chills and fever.   Eyes: Negative for photophobia and visual disturbance.   Respiratory: Negative for cough and shortness of breath.    Gastrointestinal: Negative for constipation, diarrhea, nausea and vomiting.   Musculoskeletal: Positive for back pain and gait problem.   Skin: Negative for rash and wound.   Neurological: Positive for weakness and numbness. Negative for headaches.   Hematological: Negative for adenopathy. Does not bruise/bleed easily.   Psychiatric/Behavioral: Positive for decreased concentration. Negative for agitation and confusion.     Objective:     Vital Signs (Most Recent):  Temp: 98.3 °F (36.8 °C) (01/17/18 0734)  Pulse: 86 (01/17/18 0734)  Resp: 14 (01/17/18 0734)  BP: (!) 148/70 (01/17/18 0734)  SpO2: 96 % (01/17/18 0734) Vital Signs (24h Range):  Temp:  [97.6 °F (36.4 °C)-98.5 °F (36.9 °C)] 98.3 °F (36.8 °C)  Pulse:  [75-87] 86  Resp:  [14-20] 14  SpO2:  [94 %-98 %] 96 %  BP: ()/(48-81) 148/70     Weight: 112.6 kg (248 lb 3.8 oz)  Body mass index is 34.62 kg/m².    Physical Exam  General:  Well-developed, well-nourished, nad  HEENT:  NCAT, PERRLA, EOMI, oropharyngeal membranes non-erythematous/without exudate  Neck:  Supple, normal ROM without nuchal rigidity  Resp:  Symmetric expansion, no increased wob  CVS:  No LE edema, peripheral pulses 2+ (radial, dorsalis pedis)  GI:  Abd soft, non-distended, non-tender to palpation  Neurologic Exam:  Mental Status:  AAOx3.  Speech, thought content appropriate.  Recent, remote recall 3/3.  Cranial Nerves:  VFs intact on moving fingers in all quadrants bilaterally.  PERRLA, EOMI.  Facial movement, sensation intact and symmetric.  Palate raises symmetrically, tongue protrudes midline.  Trapezius 4/5 bilaterally.  Motor:  Normal bulk and tone, no apparent atrophy or fasciculations.  BUE shoulder abduction, biceps/triceps, wrist flexion/extension,  strength 4-/5.  BLE hip flexors 2/5, knee  flexion/extension 1/5, plantarflexion/dorsiflexion 1/5.    Sensory:  Patient has some sensation to light touch in BLE with some inattention to RLE.  Light touch at BUE intact without inattention.  Vibratory sensation diminished to mid-shin bilaterally.  Vibratory sensation intact at BUE digits.  Reflexes:  Areflexic patellar, Achilles bilaterally. Biceps, brachioradialis 2+ and symmetric. No ankle clonus. Equivocal toe bilaterally.  Coordination:  FNF, KAMRON intact with no dysmetria/ataxia/dysdiadochokinesia.  HTS deferred 2/2 weakness.  No resting tremor.  Gait:  Deferred 2/2 fall precautions     Significant Labs:     Recent Labs  Lab 18  0525   WBC 3.45*   RBC 2.89*   HGB 9.3*   HCT 29.0*      *   MCH 32.2*   MCHC 32.1       Recent Labs  Lab 18  0525   CALCIUM 8.1*      K 4.5   CO2 18*   *   BUN 21   CREATININE 0.9     CSF:   Tube 1--WBCs 5, RBCs 3000, 40% Seg Neutrophils, 40% Lymphocytes, 20% Monos/Macros  Tube 2--WBCs 22, RBCs 285, 3% Seg Neutrophils, 92% Lymphocytes, 5% Monos/Macros.  Protein 56 (corrected for RBCs = 56), Glucose 55.    Significant Imagin18 MRI L spine w/ w/o contrast:  Postoperative changes from L3-L4 laminectomy.  Multilevel degenerative changes of the lumbar spine most significant at L3-L4 with moderate to severe bilateral neuroforaminal narrowing.  Additional findings discussed level by level above.     18 CT head w/o contrast:  No evidence of acute hemorrhage or major vascular distribution infarct.  Mild chronic ischemic change as above.  Prominence ossification lateral to the left anterior clinoid process and cavernous sinus suggesting the possibility of a calcified meningioma. This is without significant mass effect on the brain parenchyma.    18 MRI brain w/ w/o contrast:  1.  No acute intracranial abnormality identified on today's examination.  Specifically, no evidence of acute infarction.  2.  Multiple foci of T2/FLAIR  hyperintensity in the supratentorial and periventricular white matter as well as mick, suggestive of chronic microvascular ischemic change. Remote right cerebellar infarct.    01/12/18 MRI C/T spine w/ w/o contrast:  1. Mild degenerative change of the cervical spine.  No abnormal cervical spinal cord signal or enhancement.  2. Mild degenerative change of thoracic spine most pronounced at the T10-T11 level where there is a posterior disc bulge resulting in mild/moderate spinal canal stenosis.  There is a questionable focus of T2 cord signal hyperintensity without associated enhancement at this level possibly reflecting component of myelomalacia.  Additional questionable focus of T2 cord signal intensity at the T4-T5 level as detailed above.    3.  Enlargement heterogeneity of the right thyroid lobe.      Assessment and Plan:     * AIDP (acute inflammatory demyelinating polyneuropathy)    -Presumed diagnosis based on elevated protein in CSF, improvement in sxs with IV Ig  -Patient with improvement in strength on exam, but disparity in ability to get up and take a few steps  -Trial carbidopa-levodopa  mg po at 10 am, 2 pm  -Will follow up in Movement Disorders clinic   -Day 4/5 of IV Ig, continue PT/OT daily      Areflexia    -Per above, concern for AIDP.  Day 4/5 IV Ig, will continue to monitor for improvement.  -Possible component of chronic peripheral neuropathy though Hgb A1c 5.2%, well-controlled in the past.  B12 wnl.  -B1 25--deficient.  Recommend replacing with thiamine 500 mg qd x 3 d, thiamine 250 mg x 5 d, thiamine 100 mg po qd after.      Weakness of both lower extremities    -Exam has been concerning for AIDP.  Component of rigidity in BLE on exam, will plan follow up in clinic.  -MRI Brain and C/T/L spine with no signs concerning for infection despite 22 WBCs in Tube 2 CSF.  -Day 4 of 5 IV Ig 400 mg/kg/d.  Continue PT/OT for gait training, deconditioning, dispo recs.      Peripheral sensory  neuropathy    -Chronic diabetic neuropathy reported by pt, recent severe sensory loss in BLE above previous baseline.  -Hgb A1c 5.2%, vitamin B12 wnl. TSH low with normal FT4--appropriate for levothyroxine use.  -Vitamin B1 deficient, replace per above with 500 mg qd followed by 250 mg qd.  Discharge with thiamine 100 mg po qd.  -Peripheral neuropathy is improving per recent patient report, will continue to evaluate with continued IV Ig treatments and thiamine replacement.      Functional diarrhea    -Patient with chronic hx of diarrhea, less concerning for acute event triggering an AIDP  -Will plan for follow up in clinic.  Fluctuating BP on current admission, possibly dysautonomia.      Vitamin B1 deficiency neuropathy    -Per above, replace vitamin B1, currently on thiamine 250 mg po qd dosing  -Would like patient to continue thiamine 100 mg po qd on discharge  -Will follow up in Neurology clinic on discharge     VTE Risk Mitigation         Ordered     enoxaparin injection 40 mg  Daily     Route:  Subcutaneous        01/12/18 1615     Low Risk of VTE  Once      01/09/18 2057        Elva Diana MD  Neurology  [facility]

## 2018-01-17 NOTE — PLAN OF CARE
Spoke with pt in person  Informed by HEAVEN Velázquez after huddle that pt has 3 accepting SNF facilities. [facility] SNF denied due to pt not having snf benefits.   HEAVEN Velázquez requests email to admit to verify insurance info.  Currently, admit has [place] primary but the SNFs have Medicare as primary. SNFs to accept if Medicare is primary. Otherwise, a singe case agreement will be needed    Update 11:09 Spoke with Shellie in Admit who will change Medicare to primary insurer and Medicaid of [place] to secondary insurer. Pt has 3 accepting SNF facilities.     01/17/18 1009   Right Care Assessment   Can the patient answer the patient profile reliably? Yes, cognitively intact   How often would a person be available to care for the patient? Whenever needed   Describe the patient's ability to walk at the present time. Major restrictions/daily assistance from another person   How does the patient rate their overall health at the present time? Fair   Number of comorbid conditions (as recorded on the chart) Two   During the past month, has the patient often been bothered by feeling down, depressed or hopeless? No   During the past month, has the patient often been bothered by little interest or pleasure in doing things? No    Email to admit to verify which is primary and secondary insurers.  PMR consulted as well just in case.

## 2018-01-17 NOTE — ASSESSMENT & PLAN NOTE
1/17/18 Akinetic rigid-type, legs worse than arms, right leg most effected.  - Start sinemet 25/100 at 10am and 2pm daily

## 2018-01-18 VITALS
RESPIRATION RATE: 18 BRPM | WEIGHT: 248.25 LBS | TEMPERATURE: 98 F | BODY MASS INDEX: 34.75 KG/M2 | OXYGEN SATURATION: 95 % | SYSTOLIC BLOOD PRESSURE: 166 MMHG | DIASTOLIC BLOOD PRESSURE: 72 MMHG | HEIGHT: 71 IN | HEART RATE: 77 BPM

## 2018-01-18 PROBLEM — G61.0 AIDP (ACUTE INFLAMMATORY DEMYELINATING POLYNEUROPATHY): Status: ACTIVE | Noted: 2018-01-18

## 2018-01-18 PROBLEM — E51.11: Status: ACTIVE | Noted: 2018-01-18

## 2018-01-18 LAB
BASOPHILS # BLD AUTO: 0.02 K/UL
BASOPHILS NFR BLD: 0.7 %
DIFFERENTIAL METHOD: ABNORMAL
EOSINOPHIL # BLD AUTO: 0.2 K/UL
EOSINOPHIL NFR BLD: 6.5 %
ERYTHROCYTE [DISTWIDTH] IN BLOOD BY AUTOMATED COUNT: 12.4 %
HCT VFR BLD AUTO: 28 %
HGB BLD-MCNC: 9.1 G/DL
IMM GRANULOCYTES # BLD AUTO: 0.01 K/UL
IMM GRANULOCYTES NFR BLD AUTO: 0.4 %
LYMPHOCYTES # BLD AUTO: 0.9 K/UL
LYMPHOCYTES NFR BLD: 30.6 %
MCH RBC QN AUTO: 32.6 PG
MCHC RBC AUTO-ENTMCNC: 32.5 G/DL
MCV RBC AUTO: 100 FL
MONOCYTES # BLD AUTO: 0.5 K/UL
MONOCYTES NFR BLD: 16.5 %
NEUTROPHILS # BLD AUTO: 1.3 K/UL
NEUTROPHILS NFR BLD: 45.3 %
NRBC BLD-RTO: 0 /100 WBC
PLATELET # BLD AUTO: 186 K/UL
PMV BLD AUTO: 9.3 FL
RBC # BLD AUTO: 2.79 M/UL
WBC # BLD AUTO: 2.78 K/UL

## 2018-01-18 PROCEDURE — 20600001 HC STEP DOWN PRIVATE ROOM

## 2018-01-18 PROCEDURE — 80048 BASIC METABOLIC PNL TOTAL CA: CPT

## 2018-01-18 PROCEDURE — 97530 THERAPEUTIC ACTIVITIES: CPT

## 2018-01-18 PROCEDURE — 97535 SELF CARE MNGMENT TRAINING: CPT

## 2018-01-18 PROCEDURE — 25000003 PHARM REV CODE 250: Performed by: HOSPITALIST

## 2018-01-18 PROCEDURE — 99232 SBSQ HOSP IP/OBS MODERATE 35: CPT | Mod: ,,, | Performed by: NURSE PRACTITIONER

## 2018-01-18 PROCEDURE — 85025 COMPLETE CBC W/AUTO DIFF WBC: CPT

## 2018-01-18 PROCEDURE — 63600175 PHARM REV CODE 636 W HCPCS: Performed by: HOSPITALIST

## 2018-01-18 PROCEDURE — 99238 HOSP IP/OBS DSCHRG MGMT 30/<: CPT | Mod: ,,, | Performed by: HOSPITALIST

## 2018-01-18 PROCEDURE — 97112 NEUROMUSCULAR REEDUCATION: CPT

## 2018-01-18 PROCEDURE — 63600175 PHARM REV CODE 636 W HCPCS: Mod: JG | Performed by: STUDENT IN AN ORGANIZED HEALTH CARE EDUCATION/TRAINING PROGRAM

## 2018-01-18 PROCEDURE — 36415 COLL VENOUS BLD VENIPUNCTURE: CPT

## 2018-01-18 PROCEDURE — 99233 SBSQ HOSP IP/OBS HIGH 50: CPT | Mod: ,,, | Performed by: PSYCHIATRY & NEUROLOGY

## 2018-01-18 RX ORDER — CARBIDOPA AND LEVODOPA 25; 100 MG/1; MG/1
0.5 TABLET ORAL 2 TIMES DAILY
Qty: 60 TABLET | Refills: 3 | Status: SHIPPED | OUTPATIENT
Start: 2018-01-18 | End: 2018-09-05 | Stop reason: SDUPTHER

## 2018-01-18 RX ORDER — CARBIDOPA AND LEVODOPA 25; 100 MG/1; MG/1
1 TABLET ORAL DAILY
Status: DISCONTINUED | OUTPATIENT
Start: 2018-01-18 | End: 2018-01-19 | Stop reason: HOSPADM

## 2018-01-18 RX ORDER — CARBIDOPA AND LEVODOPA 25; 100 MG/1; MG/1
1 TABLET ORAL 2 TIMES DAILY
Qty: 60 TABLET | Refills: 3 | Status: SHIPPED | OUTPATIENT
Start: 2018-01-18 | End: 2018-01-18

## 2018-01-18 RX ORDER — AMLODIPINE BESYLATE 5 MG/1
5 TABLET ORAL DAILY
Status: DISCONTINUED | OUTPATIENT
Start: 2018-01-18 | End: 2018-01-19 | Stop reason: HOSPADM

## 2018-01-18 RX ORDER — HYDRALAZINE HYDROCHLORIDE 25 MG/1
25 TABLET, FILM COATED ORAL EVERY 8 HOURS PRN
Status: DISCONTINUED | OUTPATIENT
Start: 2018-01-18 | End: 2018-01-19 | Stop reason: HOSPADM

## 2018-01-18 RX ORDER — ONDANSETRON 4 MG/1
4 TABLET, FILM COATED ORAL EVERY 6 HOURS PRN
Status: DISCONTINUED | OUTPATIENT
Start: 2018-01-18 | End: 2018-01-19 | Stop reason: HOSPADM

## 2018-01-18 RX ORDER — AMLODIPINE BESYLATE 5 MG/1
5 TABLET ORAL DAILY
Qty: 30 TABLET | Refills: 3 | Status: ON HOLD | OUTPATIENT
Start: 2018-01-19 | End: 2021-08-22 | Stop reason: HOSPADM

## 2018-01-18 RX ORDER — CARBIDOPA AND LEVODOPA 25; 100 MG/1; MG/1
1 TABLET, EXTENDED RELEASE ORAL DAILY
Status: DISCONTINUED | OUTPATIENT
Start: 2018-01-18 | End: 2018-01-18

## 2018-01-18 RX ORDER — THIAMINE HCL 250 MG
250 TABLET ORAL DAILY
Qty: 30 TABLET | Refills: 2 | Status: SHIPPED | OUTPATIENT
Start: 2018-01-18

## 2018-01-18 RX ADMIN — ENOXAPARIN SODIUM 40 MG: 100 INJECTION SUBCUTANEOUS at 05:01

## 2018-01-18 RX ADMIN — HYDROCODONE BITARTRATE AND ACETAMINOPHEN 1 TABLET: 7.5; 325 TABLET ORAL at 01:01

## 2018-01-18 RX ADMIN — HUMAN IMMUNOGLOBULIN G 45 G: 20 LIQUID INTRAVENOUS at 02:01

## 2018-01-18 RX ADMIN — AMLODIPINE BESYLATE 5 MG: 5 TABLET ORAL at 08:01

## 2018-01-18 RX ADMIN — DIPHENOXYLATE HYDROCHLORIDE AND ATROPINE SULFATE 1 TABLET: 2.5; .025 TABLET ORAL at 03:01

## 2018-01-18 RX ADMIN — ONDANSETRON 4 MG: 4 TABLET, FILM COATED ORAL at 10:01

## 2018-01-18 RX ADMIN — LISINOPRIL 40 MG: 20 TABLET ORAL at 08:01

## 2018-01-18 RX ADMIN — CARBIDOPA AND LEVODOPA 1 TABLET: 25; 100 TABLET ORAL at 08:01

## 2018-01-18 RX ADMIN — LEVOTHYROXINE SODIUM 50 MCG: 50 TABLET ORAL at 06:01

## 2018-01-18 NOTE — PROGRESS NOTES
Wound care follow up:  Wounds improving.  Will continue with current orders in place. Pt planning for discharge.  [id number]

## 2018-01-18 NOTE — PT/OT/SLP PROGRESS
Physical Therapy Treatment    Patient Name:  Melissa Anand   MRN:  [id number]    Recommendations:     Discharge Recommendations:  nursing facility, skilled   Discharge Equipment Recommendations: walker, rolling, hospital bed, wheelchair   Barriers to discharge: Inaccessible home and Decreased caregiver support    Assessment:     Melissa Anand is a 72 y.o. female admitted with a medical diagnosis of Apraxia.  She presents with the following impairments/functional limitations:  weakness, impaired functional mobilty, gait instability, impaired endurance, impaired balance, impaired self care skills, decreased lower extremity function, decreased safety awareness, impaired cognition, decreased upper extremity function.  Pt demonstrates improved functional mobility.  Pt demonstrates min-mod movement of BLE which has improved greatly since initial evaluation.  Pt performed rolling and supine to sit c min A while sit to supine c mod A.  Pt able to maintain static standing x4min c no AD and min A.  Pt took 4 L lateral steps no AD and min A - required assistance c weight shift to improve step.  Pt would benefit from continued skilled acute PT 3x/wk while admitted to improve functional mobility.  Recommendation for d/c would be SNF once medically cleared but pt and family does not want to go to SNF.     Rehab Prognosis:  fair; patient would benefit from acute skilled PT services to address these deficits and reach maximum level of function.      Recent Surgery: * No surgery found *      Plan:     During this hospitalization, patient to be seen 3 x/week to address the above listed problems via gait training, therapeutic activities, therapeutic exercises, neuromuscular re-education  · Plan of Care Expires:  02/10/18   Plan of Care Reviewed with: patient    Subjective     Communicated with nursing and OT prior to session.  Patient found supine upon PT entry to room, agreeable to treatment.      Chief Complaint: decreased functional  "mobility  Patient comments/goals: "i'm not going to no nursing home"  Pain/Comfort:  · Pain Rating 1: 0/10    Patients cultural, spiritual, Hinduism conflicts given the current situation: none stated    Objective:     Patient found with: Herson     General Precautions: Standard, fall   Orthopedic Precautions:N/A   Braces: N/A     Functional Mobility:  · Bed Mobility:     · Rolling Left:  minimum assistance  · Scooting: minimum assistance  · Supine to Sit: minimum assistance  · Sit to Supine: moderate assistance  · Transfers:     · Sit to Stand:  1x min A; 1x mod A with no AD  · Gait: 4 L lateral steps to HOB no AD, min A - assisted c weight shift to improve step  · Balance: good static/dynamic sitting (S); fair static standing (min A); poor dynamic standing (mod A)      AM-PAC 6 CLICK MOBILITY  Turning over in bed (including adjusting bedclothes, sheets and blankets)?: 3  Sitting down on and standing up from a chair with arms (e.g., wheelchair, bedside commode, etc.): 2  Moving from lying on back to sitting on the side of the bed?: 3  Moving to and from a bed to a chair (including a wheelchair)?: 2  Need to walk in hospital room?: 2  Climbing 3-5 steps with a railing?: 1  Total Score: 13       Therapeutic Activities and Exercises:   Educated pt on BLE exercises to perform while in bed and sitting EOB (hip flex, LAQ, Ap)  Sitting EOB functional reach BUE - S  Static standing x4min min A  Weight shifting in standing min A  Sit<>stand no AD; 1x min A, 1x mod A  4 L lateral steps no Ad, min A - required assistance c weight shift     Patient left HOB elevated with all lines intact, call button in reach and RN notified..    GOALS:    Physical Therapy Goals        Problem: Physical Therapy Goal    Goal Priority Disciplines Outcome Goal Variances Interventions   Physical Therapy Goal     PT/OT, PT Ongoing (interventions implemented as appropriate)     Description:  Goals to be met by: 1/24/2018     Patient will increase " functional independence with mobility by performin. Supine to sit with Maximum Assistance - met  2. Rolling to Left with Maximum Assistance. - met  3. Sit to stand transfer with Maximum Assistance  4. Sitting at edge of bed x5 minutes with Moderate Assistance - met  5. Bed to chair transfer with Maximum Assistance using Rolling Walker  6. Stand for 3 minutes with Moderate Assistance using Rolling Walker                         Time Tracking:     PT Received On: 18  PT Start Time: 1029     PT Stop Time: 1056  PT Total Time (min): 27 min     Billable Minutes: Therapeutic Activity 15 min and Neuromuscular Re-education 12 min    Treatment Type: Treatment  PT/PTA: PT           Mike Calvin, PT  2018

## 2018-01-18 NOTE — PLAN OF CARE
3:34 PM  SW arranged transportation with Medicaid for pt. Medicaid will arrive at 6:00PM.   Number to [facility] is [phone]. Reference number is [id number].    Informed RN, Sonya.     Melania Crawley, MICHAEL   [facility]  Ext [phone]

## 2018-01-18 NOTE — PLAN OF CARE
1:17 PM  SW received home health orders. Pt did not have a preference. Faxed orders to [facility]. Will f/u as needed.    Melania Crawley LMSW   [facility]  Ext [phone]

## 2018-01-18 NOTE — DISCHARGE SUMMARY
"[facility] Hospital Medicine  Discharge Summary      Patient Name: Melissa Anand  MRN: [id number]  Admission Date: 1/9/2018  Hospital Length of Stay: 9 days  Discharge Date and Time:  01/18/2018 12:58 PM  Attending Physician: Rod Hernandez MD   Discharging Provider: Rod Hernandez MD  Primary Care Provider: Dana Castellon MD  Hospital Medicine Team: [facility] HOSP MED A Rod Hernandez MD    HPI:   73 yo F with PMHx pre-DM, HTN, and chronic low back pain who presents with progression of BLE weakness.  Patient's daughter at bedside assists with history.  Patient has been walking with a cane at baseline for past 2 years and has had worsening of BLE weakness for past 2-3 months with inability to get out of bed over the past weekend.  She denies recent infections--no SOB/cough, no n/v/c/d, no sick contacts, no URI Sx. Pt w/ acute worsening this am, she could not move her Les and needed assistance to raise them off the bed. Called EMS when daughter had difficulty assisting.      Has some diabetic peripheral neuropathy at baseline, but states that she hadn't had falls with her neuropathy prior to last two months.  Denies bowel/bladder incontinence (has chronic problems w/ constipation and Bms often loose when able to go, will go days w/o urinating and has gone 3x times since ED presentation), no saddle anesthesia, has sensory level to knee b/l. Patient has been getting injections in her back for chronic low back pain every 2 wks, last was few wks ago.  Daughter states that she had gotten some steroid injections but they did try an "epidural medication" a couple weeks ago.  She has been getting injections approximately every two weeks for past 3 months.  Patient states she still has low back pain with some radiation into the BLE but is unable to characterize the pain. States injections not working, but no sig pain now.     * No surgery found *      Hospital Course:   1/10: no change in lack of sensation or " inability to move Les. Pt w/ worsening of chronic L heel pain and having severe pain not controlled by home percocet and worse w/ palpation. Having frequent diarrhea, which she states is typical in appearance, non-bloody. Pt take OTC meds for this at home and would like med now.      1/11: less loose Bms, lomotil working. Sensation inconsistent and pt feeling pinches intermittently w/ loss of sensation above and below area. No return of motor function. Daughter states that no movement in feet for 4wks.      1/12: pt and daughter noticed flicker of movement w/ R foot. Inc sensation on right side as well. Bms dec in freq. Deneis neck stiff, photophobia, f/c, rash     1/13: better in better spirits and feeling better overall. No diarrhea. Inc movement in R foot. Dec pain in L foot.      1/14: improved movement, daughter states pt able to swing R leg over side of bed. Pt does not recall doing this. Feeling sleepy w/ starting IVIG. BP dropped during initiation but improved after. No BM    1/15: strength impressively improved and pt able to move to side of bed for PT session. Pt helped getting pt up and she was able to stand on her own, able to side step as well.     1/16: pt continue to report improvement in LE. Pt with asymptomatic hypotension and MADHAVI noted on lab work today. Start her on IV fluid replacement.     1/17: patient report continue improvement in lower extremities. Hypotension and MADHAVI resolved with IVF yesterday. 4/5 IVIG today, neuro is ok with discharge home tomorrow after last dose of IVIG. Discussed with patient, she wants to go home rather go to SNF with her daughter. CM/SW are contact daughter at this time.     1/18: Continue improvement, pt sat up at chair overnight per nursing report. Day 5/5 of IVIG, neurology is concern for possible parkinson dx and started her on a trial of Carbadopa-levadopa. Stable to be discharge per their stand point. Pt declines SNF and rehab. Will D/C with home with home  health, will need follow up with neuro in clinic.            Consults:   Consults         Status Ordering Provider     Inpatient consult to neurology  Once     Provider:  (Not yet assigned)    Completed SHAHEEN HOLLOWAY     Inpatient consult to Physical Medicine Rehab  Once     Provider:  (Not yet assigned)    Completed PATRICIA PICHARDO     Inpatient consult to [facility]  Once     Provider:  (Not yet assigned)    Acknowledged NANCY BALL          * Apraxia    Exam is most concerning for Presumed AIDP, also based on elevated protein in CSF, improvement in sxs with IV Ig. Patient with improvement in strength on exam, but disparity in ability to get up and take a few steps. MRI head, cervical and thoracic spine w/o e/o acute findings.   - At this time, per neuro staff, currently on her exam consistent with parkinson's disease, now somewhat suspect of the diagnosis of AIDP.  The possibilities are that she has AIDP in addition to PD, in which case, her weakness is fully resolved without return of reflexes or that she never had AIDP, but rather severe apraxia/ inability to move due to PD.  - IVIG for 5d day 1 1/14, IgA 254.  - PT/OT for ROM, deconditioning: SNF w/ RW but pt at this time request to be discharge home  - Start sinemet 25/100 at 10am and 2pm daily, plan to D/C home with current regiment  - will need close follow up with neurology in clinic, Apt 1/25 set up with neurology                 Acute renal injury due to hypovolemia    - Cr increased to 1.7, pt with hypotension noted recently also contrasted Imaging  - Suspect hypovolemia as she is fluid responsive  - aggressive IV hydration 1/16  - avoid nephrotoxin  - resolved  - CTM          Peripheral sensory neuropathy    -Neuro concerned for chronic diabetic neuropathy with more severe acute sensory loss in BLE. But pt never w/ elevated A1c in our system above 5.8  -Chronic diabetic neuropathy reported by pt, recent severe sensory loss in BLE above  previous baseline.  -Hgb A1c 5.2, vitamin B1 deficient, vitamin B12 nl. TSH low with normal FT4--appropriate for levothyroxine use.  -thiamine 500 mg qd x 3 d, thiamine 250 mg x 5 d (first does Sun 1/15), thiamine 100 mg po qd after.  -Peripheral neuropathy is improving per recent patient report, will continue to evaluate with continued IV Ig treatments.          Pressure injury of skin, stage 2    - Present on admission  - wound care orders per wound care team  - Nursing to cleanse coccyx with cleansing wipes and apply Critic-Aid Clear moisture barrier (purple top) BID and prn cleaning. Cleanse right and left heel with cleansing wipes and apply Mepilex border to both heels to protect from skin breakdown. Change dressing every 5 days.             PD (Parkinson's disease)    1/17/18 Akinetic rigid-type, legs worse than arms, right leg most effected.  - Start sinemet 25/100 at 10am and 2pm daily            Chronic pain disorder    Gabapentin 300mg tid, elavil 100 QHS, percocet 7.5 Q6h prn (inc to 10 1/11 d/t inc pain, Sx better and may tolerate dec again) - home meds listed in system, pt and daughter not entirely clear on dosing.         Areflexia    Per above, concern for AIDP.  Starting IV Ig, will continue to monitor for improvement.  -Possible component of chronic peripheral neuropathy though Hgb A1c 5.2%, well-controlled in the past.  B12 wnl.  -B1 25--deficient.  Replacing with thiamine 500 mg qd x 3 d, thiamine 250 mg x 5 d, thiamine 100 mg po qd after.          Functional diarrhea    Pt states this is chronic problem and no worse than usual. Non-bloody and no other concerning changes. Takes OTC meds at home. Wishes to take lomitil and not imodium; ordered.  - Improved w/ lomotil          Benign essential hypertension    Lability BP, likely from GBS. meds stopped on 1/15  BP elevated 1/17, restart Lisinopril 40 and restart amlodipine 5mg               Final Active Diagnoses:    Diagnosis Date Noted POA     PRINCIPAL PROBLEM:  Apraxia [R48.2] 01/16/2018 Yes    Acute renal injury due to hypovolemia [N17.9, E86.1] 01/16/2018 Clinically Undetermined    Peripheral sensory neuropathy [G62.9] 01/09/2018 Yes    Pressure injury of skin, stage 2 [L89.92] 01/12/2018 Yes    PD (Parkinson's disease) [G20] 01/17/2018 Yes    Vitamin B1 deficiency [E51.9] 01/16/2018 Yes    Chronic pain disorder [G89.4] 01/15/2018 Yes    Suspected deep tissue injury [Z04.9] 01/12/2018 Not Applicable    Areflexia [R29.2] 01/09/2018 Yes    Functional diarrhea [K59.1]  Yes    Benign essential hypertension [I10] 07/23/2014 Yes      Problems Resolved During this Admission:    Diagnosis Date Noted Date Resolved POA    Weakness of both lower extremities [R29.898] 01/09/2018 01/17/2018 Yes       Discharged Condition: stable    Disposition: Home or Self Care    Follow Up:  Follow-up Information     Go to Alexis Longoria MD.    Specialty:  Neurology  Why:  1/25/2018 3:00 PM with Neurology, Office visit, Post Hospital Follow Up  Contact information:  [address] [place] [address] [id number] [phone]                 Patient Instructions:     Diet diabetic         Significant Diagnostic Studies: Labs:   BMP:   Recent Labs  Lab 01/17/18  0525   GLU 89      K 4.5   *   CO2 18*   BUN 21   CREATININE 0.9   CALCIUM 8.1*    and CMP   Recent Labs  Lab 01/17/18  0525      K 4.5   *   CO2 18*   GLU 89   BUN 21   CREATININE 0.9   CALCIUM 8.1*   ANIONGAP 5*   ESTGFRAFRICA >60.0   EGFRNONAA >60.0       Pending Diagnostic Studies:     Procedure Component Value Units Date/Time    Basic metabolic panel [[id number]] Collected:  01/18/18 0516    Order Status:  Sent Lab Status:  In process Updated:  01/18/18 0605    Specimen:  Blood from Blood     Freeze and Hold, [facility] [[id number]] Collected:  01/11/18 1457    Order Status:  Sent Lab Status:  No result     Specimen:  CSF (Spinal Fluid) from Cerebrospinal Fluid          Medications:  Reconciled  Home Medications:   Current Discharge Medication List      START taking these medications    Details   amLODIPine (NORVASC) 5 MG tablet Take 1 tablet (5 mg total) by mouth once daily. Contact PCP for refills  Qty: 30 tablet, Refills: 3      carbidopa-levodopa  mg (SINEMET)  mg per tablet Take 1 tablet by mouth 2 (two) times daily. Take 10am and 2pm daily. Contact Neurologist for refill  Qty: 60 tablet, Refills: 3      thiamine 250 MG tablet Take 1 tablet (250 mg total) by mouth once daily.  Qty: 30 tablet, Refills: 2         CONTINUE these medications which have NOT CHANGED    Details   !! gabapentin (NEURONTIN) 300 mg tablet Take 300 mg by mouth every morning.       lisinopril (PRINIVIL,ZESTRIL) 40 MG tablet Take 1 tablet (40 mg total) by mouth once daily.  Qty: 30 tablet, Refills: 0      amitriptyline (ELAVIL) 100 MG tablet Take 100 mg by mouth every evening.      !! gabapentin (NEURONTIN) 600 MG tablet Take 600 mg by mouth every evening.      hydrocodone-acetaminophen 7.5-325mg (NORCO) 7.5-325 mg per tablet Take 1 tablet by mouth every 8 (eight) hours as needed for Pain.  Qty: 15 tablet, Refills: 0      lactobacillus acidophilus & bulgar (LACTINEX) 100 million cell packet Take 1 packet (1 each total) by mouth 2 (two) times daily.  Qty: 30 packet, Refills: 1      levothyroxine (SYNTHROID) 50 MCG tablet Take 1 tablet (50 mcg total) by mouth before breakfast.  Qty: 30 tablet, Refills: 11      quetiapine (SEROQUEL) 300 MG Tab Take 400 mg by mouth every evening.       tramadol (ULTRAM) 50 mg tablet Take 50 mg by mouth 2 (two) times daily.       !! - Potential duplicate medications found. Please discuss with provider.      STOP taking these medications       oxyCODONE-acetaminophen (PERCOCET)  mg per tablet Comments:   Reason for Stopping:               Indwelling Lines/Drains at time of discharge:   Lines/Drains/Airways     Drain                 Rectal Tube 07/23/14 fecal management system 1275 days           Pressure Ulcer                 Pressure Ulcer 01/12/18 Left heel suspected deep tissue injury 6 days         Pressure Ulcer 01/12/18 coccyx Stage II 6 days                Time spent on the discharge of patient: 25 minutes  Patient was seen and examined on the date of discharge and determined to be suitable for discharge.         Rod Hernandez MD  Department of Hospital Medicine  [facility]

## 2018-01-18 NOTE — PROGRESS NOTES
[facility]  Neurology  Progress Note    Patient Name: Melissa Anand  MRN: [id number]  Admission Date: 1/9/2018  Hospital Length of Stay: 9 days  Code Status: Prior   Attending Provider: Rod Hernandez MD  Primary Care Physician: Dana Castellon MD   Principal Problem:Apraxia    Subjective:     Interval History:   Patient had an event of getting out of bed without alerting RN and sitting up on couch.  When found by RN, patient had no recollection of event.  She notes some nausea after sinemet dose this am, but is less rigid on exam.  Discussed decreasing to half pill sinemet twice daily.  Seems to have improvement in effort on motor exam.  Discussed plan for small dose sinemet on discharge with follow up in Neurology clinic.    Current Neurological Medications: Amitriptyline 100 mg po qHS, hydrocodone-APAP  mg po q6h prn pain    Current Facility-Administered Medications   Medication Dose Route Frequency Provider Last Rate Last Dose    acetaminophen tablet 650 mg  650 mg Oral Q6H PRN Rod Hernandez MD   650 mg at 01/17/18 1735    amitriptyline tablet 100 mg  100 mg Oral QHS Titi Perez MD   100 mg at 01/17/18 2100    amLODIPine tablet 5 mg  5 mg Oral Daily Rod Hernandez MD   5 mg at 01/18/18 0836    carbidopa-levodopa  mg per tablet 1 tablet  1 tablet Oral Daily Elva Diana MD   Stopped at 01/18/18 1400    carbidopa-levodopa  mg per tablet 1 tablet  1 tablet Oral Daily Rod Hernandez MD   1 tablet at 01/18/18 0836    diphenoxylate-atropine 2.5-0.025 mg per tablet 1 tablet  1 tablet Oral QID PRN Titi Perez MD   1 tablet at 01/18/18 1502    enoxaparin injection 40 mg  40 mg Subcutaneous Daily Titi Perez MD   40 mg at 01/17/18 1657    hydrALAZINE tablet 25 mg  25 mg Oral Q8H PRN Rod Hernandez MD        hydrocodone-acetaminophen 7.5-325mg per tablet 1 tablet  1 tablet Oral Q6H PRN Titi Perez MD   1 tablet at 01/18/18 1309    levothyroxine  tablet 50 mcg  50 mcg Oral Before breakfast Titi Perez MD   50 mcg at 01/18/18 0600    lisinopril tablet 40 mg  40 mg Oral Daily Rod Hernandez MD   40 mg at 01/18/18 0836    ondansetron tablet 4 mg  4 mg Oral Q6H PRN Rod Hernandez MD   4 mg at 01/18/18 1007    sodium chloride 0.9% flush 3 mL  3 mL Intravenous PRN Titi Perez MD        thiamine tablet 250 mg  250 mg Oral Daily Titi Perez MD   250 mg at 01/17/18 1133     Review of Systems   Constitutional: Negative for chills and fever.   Eyes: Negative for photophobia and visual disturbance.   Respiratory: Negative for cough and shortness of breath.    Gastrointestinal: Negative for constipation, diarrhea, nausea and vomiting.   Musculoskeletal: Positive for back pain and gait problem.   Skin: Negative for rash and wound.   Neurological: Positive for weakness and numbness. Negative for headaches.   Hematological: Negative for adenopathy. Does not bruise/bleed easily.   Psychiatric/Behavioral: Positive for decreased concentration. Negative for agitation and confusion.     Objective:     Vital Signs (Most Recent):  Temp: 98.3 °F (36.8 °C) (01/18/18 1421)  Pulse: 84 (01/18/18 1439)  Resp: 18 (01/18/18 1421)  BP: (!) 169/72 (01/18/18 1535)  SpO2: 95 % (01/18/18 1421) Vital Signs (24h Range):  Temp:  [97.8 °F (36.6 °C)-99.5 °F (37.5 °C)] 98.3 °F (36.8 °C)  Pulse:  [77-89] 84  Resp:  [14-20] 18  SpO2:  [93 %-97 %] 95 %  BP: (127-187)/(58-84) 169/72     Weight: 112.6 kg (248 lb 3.8 oz)  Body mass index is 34.62 kg/m².    Physical Exam  General:  Well-developed, well-nourished, nad  HEENT:  NCAT, PERRLA, EOMI, oropharyngeal membranes non-erythematous/without exudate  Neck:  Supple, normal ROM without nuchal rigidity  Resp:  Symmetric expansion, no increased wob  CVS:  No LE edema, peripheral pulses 2+ (radial, dorsalis pedis)  GI:  Abd soft, non-distended, non-tender to palpation  Neurologic Exam:  Mental Status:  AAOx3.  Speech, thought content  appropriate.  Recent, remote recall 3/3.  Cranial Nerves:  VFs intact on moving fingers in all quadrants bilaterally.  PERRLA, EOMI.  Facial movement, sensation intact and symmetric.  Palate raises symmetrically, tongue protrudes midline.  Trapezius 4/5 bilaterally.  Motor:  Normal bulk and tone, no apparent atrophy or fasciculations.  BUE shoulder abduction, biceps/triceps, wrist flexion/extension,  strength 4-/5.  BLE hip flexors 2/5, knee flexion/extension 1/5, plantarflexion/dorsiflexion 1/5.    Sensory:  Patient has some sensation to light touch in BLE with some inattention to RLE.  Light touch at BUE intact without inattention.  Vibratory sensation diminished to mid-shin bilaterally.  Vibratory sensation intact at BUE digits.  Reflexes:  Areflexic patellar, Achilles bilaterally. Biceps, brachioradialis 2+ and symmetric. No ankle clonus. Equivocal toe bilaterally.  Coordination:  FNF, KAMRON intact with no dysmetria/ataxia/dysdiadochokinesia.  HTS deferred 2/2 weakness.  No resting tremor.  Gait:  Deferred 2/2 fall precautions     Significant Labs:     Recent Labs  Lab 18  0516   WBC 2.78*   RBC 2.79*   HGB 9.1*   HCT 28.0*      *   MCH 32.6*   MCHC 32.5     No results for input(s): GLUCOSE, CALCIUM, PROT, NA, K, CO2, CL, BUN, CREATININE, ALKPHOS, ALT, AST, BILITOT in the last 24 hours.    Invalid input(s):  ALBUMIN  CSF:   Tube 1--WBCs 5, RBCs 3000, 40% Seg Neutrophils, 40% Lymphocytes, 20% Monos/Macros  Tube 2--WBCs 22, RBCs 285, 3% Seg Neutrophils, 92% Lymphocytes, 5% Monos/Macros.  Protein 56 (corrected for RBCs = 56), Glucose 55.    Significant Imagin18 MRI L spine w/ w/o contrast:  Postoperative changes from L3-L4 laminectomy.  Multilevel degenerative changes of the lumbar spine most significant at L3-L4 with moderate to severe bilateral neuroforaminal narrowing.  Additional findings discussed level by level above.     18 CT head w/o contrast:  No evidence of acute  hemorrhage or major vascular distribution infarct.  Mild chronic ischemic change as above.  Prominence ossification lateral to the left anterior clinoid process and cavernous sinus suggesting the possibility of a calcified meningioma. This is without significant mass effect on the brain parenchyma.    01/12/18 MRI brain w/ w/o contrast:  1.  No acute intracranial abnormality identified on today's examination.  Specifically, no evidence of acute infarction.  2.  Multiple foci of T2/FLAIR hyperintensity in the supratentorial and periventricular white matter as well as mick, suggestive of chronic microvascular ischemic change. Remote right cerebellar infarct.    01/12/18 MRI C/T spine w/ w/o contrast:  1. Mild degenerative change of the cervical spine.  No abnormal cervical spinal cord signal or enhancement.  2. Mild degenerative change of thoracic spine most pronounced at the T10-T11 level where there is a posterior disc bulge resulting in mild/moderate spinal canal stenosis.  There is a questionable focus of T2 cord signal hyperintensity without associated enhancement at this level possibly reflecting component of myelomalacia.  Additional questionable focus of T2 cord signal intensity at the T4-T5 level as detailed above.    3.  Enlargement heterogeneity of the right thyroid lobe.      Assessment and Plan:     * Apraxia    -Initial presumed diagnosis of AIDP based on elevated protein in CSF, improvement in sxs with IV Ig  -Patient with improvement in strength on exam, but disparity in ability to get up and take a few steps  -Trial carbidopa-levodopa  mg po at 10 am, 2 pm--will cut dose in half 2/2 side effect of nausea  -Will follow up in with me in clinic.  Day 5/5 of IV Ig, continue PT/OT daily, likely discharge home today        Areflexia    -Per above, concern for AIDP.  Day 5/5 IV Ig, will continue to monitor for improvement.  -Possible component of chronic peripheral neuropathy though Hgb A1c 5.2%,  well-controlled in the past.  B12 wnl.  -B1 25--deficient.  Recommend replacing with thiamine 500 mg qd x 3 d, thiamine 250 mg x 5 d, thiamine 100 mg po qd after.      Peripheral sensory neuropathy    -Chronic diabetic neuropathy reported by pt, recent severe sensory loss in BLE above previous baseline.  -Hgb A1c 5.2%, vitamin B12 wnl. TSH low with normal FT4--appropriate for levothyroxine use.  -Vitamin B1 deficient, replace per above with 500 mg qd followed by 250 mg qd.  Discharge with thiamine 100 mg po qd.  -Peripheral neuropathy is improving per recent patient report, will continue to evaluate with continued IV Ig treatments and thiamine replacement.      Functional diarrhea    -Patient with chronic hx of diarrhea, less concerning for acute event triggering an AIDP  -Will plan for follow up in clinic.  Fluctuating BP on current admission, possibly dysautonomia.      Vitamin B1 deficiency    -Per above, replace vitamin B1, currently on thiamine 250 mg po qd dosing  -Would like patient to continue thiamine 100 mg po qd on discharge  -Will follow up in Neurology clinic on discharge     VTE Risk Mitigation         Ordered     enoxaparin injection 40 mg  Daily     Route:  Subcutaneous        01/12/18 1615     Low Risk of VTE  Once      01/09/18 2057        Elva Diana MD  Neurology  [facility]

## 2018-01-18 NOTE — PLAN OF CARE
Problem: Occupational Therapy Goal  Goal: Occupational Therapy Goal  Goals to be met by: 1/24/2018    Patient will increase functional independence with ADLs by performing:    Supine to sit & sit to supine with minimal assistance.  Sit to stand with minimal assistance.  UE Dressing while seated with set up.  LE Dressing with Maximum Assistance and AD as needed.  Toileting from bedside commode with Moderate Assistance for hygiene and clothing management.   Toilet transfer to bedside commode with Moderate Assistance.   Continue OT POC    Comments: Roc Cruz OTR/L  1/18/2018

## 2018-01-18 NOTE — SUBJECTIVE & OBJECTIVE
Subjective:     Interval History:   Patient had an event of getting out of bed without alerting RN and sitting up on couch.  When found by RN, patient had no recollection of event.  She notes some nausea after sinemet dose this am, but is less rigid on exam.  Discussed decreasing to half pill sinemet twice daily.  Seems to have improvement in effort on motor exam.  Discussed plan for small dose sinemet on discharge with follow up in Neurology clinic.    Current Neurological Medications: Amitriptyline 100 mg po qHS, hydrocodone-APAP  mg po q6h prn pain    Current Facility-Administered Medications   Medication Dose Route Frequency Provider Last Rate Last Dose    acetaminophen tablet 650 mg  650 mg Oral Q6H PRN Rod Hernandez MD   650 mg at 01/17/18 1735    amitriptyline tablet 100 mg  100 mg Oral QHS Titi Perez MD   100 mg at 01/17/18 2100    amLODIPine tablet 5 mg  5 mg Oral Daily Rod Hernandez MD   5 mg at 01/18/18 0836    carbidopa-levodopa  mg per tablet 1 tablet  1 tablet Oral Daily Elva Diana MD   Stopped at 01/18/18 1400    carbidopa-levodopa  mg per tablet 1 tablet  1 tablet Oral Daily Rod Hernandez MD   1 tablet at 01/18/18 0836    diphenoxylate-atropine 2.5-0.025 mg per tablet 1 tablet  1 tablet Oral QID PRN Titi Perez MD   1 tablet at 01/18/18 1502    enoxaparin injection 40 mg  40 mg Subcutaneous Daily Titi Perez MD   40 mg at 01/17/18 1657    hydrALAZINE tablet 25 mg  25 mg Oral Q8H PRN Rod Hernandez MD        hydrocodone-acetaminophen 7.5-325mg per tablet 1 tablet  1 tablet Oral Q6H PRN Titi Perez MD   1 tablet at 01/18/18 1309    levothyroxine tablet 50 mcg  50 mcg Oral Before breakfast Titi Perez MD   50 mcg at 01/18/18 0600    lisinopril tablet 40 mg  40 mg Oral Daily Rod Hernandez MD   40 mg at 01/18/18 0836    ondansetron tablet 4 mg  4 mg Oral Q6H PRN Rod Hernandez MD   4 mg at 01/18/18 1007    sodium chloride 0.9%  flush 3 mL  3 mL Intravenous PRN Titi Perez MD        thiamine tablet 250 mg  250 mg Oral Daily Titi Perez MD   250 mg at 01/17/18 1133     Review of Systems   Constitutional: Negative for chills and fever.   Eyes: Negative for photophobia and visual disturbance.   Respiratory: Negative for cough and shortness of breath.    Gastrointestinal: Negative for constipation, diarrhea, nausea and vomiting.   Musculoskeletal: Positive for back pain and gait problem.   Skin: Negative for rash and wound.   Neurological: Positive for weakness and numbness. Negative for headaches.   Hematological: Negative for adenopathy. Does not bruise/bleed easily.   Psychiatric/Behavioral: Positive for decreased concentration. Negative for agitation and confusion.     Objective:     Vital Signs (Most Recent):  Temp: 98.3 °F (36.8 °C) (01/18/18 1421)  Pulse: 84 (01/18/18 1439)  Resp: 18 (01/18/18 1421)  BP: (!) 169/72 (01/18/18 1535)  SpO2: 95 % (01/18/18 1421) Vital Signs (24h Range):  Temp:  [97.8 °F (36.6 °C)-99.5 °F (37.5 °C)] 98.3 °F (36.8 °C)  Pulse:  [77-89] 84  Resp:  [14-20] 18  SpO2:  [93 %-97 %] 95 %  BP: (127-187)/(58-84) 169/72     Weight: 112.6 kg (248 lb 3.8 oz)  Body mass index is 34.62 kg/m².    Physical Exam  General:  Well-developed, well-nourished, nad  HEENT:  NCAT, PERRLA, EOMI, oropharyngeal membranes non-erythematous/without exudate  Neck:  Supple, normal ROM without nuchal rigidity  Resp:  Symmetric expansion, no increased wob  CVS:  No LE edema, peripheral pulses 2+ (radial, dorsalis pedis)  GI:  Abd soft, non-distended, non-tender to palpation  Neurologic Exam:  Mental Status:  AAOx3.  Speech, thought content appropriate.  Recent, remote recall 3/3.  Cranial Nerves:  VFs intact on moving fingers in all quadrants bilaterally.  PERRLA, EOMI.  Facial movement, sensation intact and symmetric.  Palate raises symmetrically, tongue protrudes midline.  Trapezius 4/5 bilaterally.  Motor:  Normal bulk and tone, no  apparent atrophy or fasciculations.  BUE shoulder abduction, biceps/triceps, wrist flexion/extension,  strength 4-/5.  BLE hip flexors 2/5, knee flexion/extension 1/5, plantarflexion/dorsiflexion 1/5.    Sensory:  Patient has some sensation to light touch in BLE with some inattention to RLE.  Light touch at BUE intact without inattention.  Vibratory sensation diminished to mid-shin bilaterally.  Vibratory sensation intact at BUE digits.  Reflexes:  Areflexic patellar, Achilles bilaterally. Biceps, brachioradialis 2+ and symmetric. No ankle clonus. Equivocal toe bilaterally.  Coordination:  FNF, KAMRON intact with no dysmetria/ataxia/dysdiadochokinesia.  HTS deferred 2/2 weakness.  No resting tremor.  Gait:  Deferred 2/2 fall precautions     Significant Labs:     Recent Labs  Lab 18  0516   WBC 2.78*   RBC 2.79*   HGB 9.1*   HCT 28.0*      *   MCH 32.6*   MCHC 32.5     No results for input(s): GLUCOSE, CALCIUM, PROT, NA, K, CO2, CL, BUN, CREATININE, ALKPHOS, ALT, AST, BILITOT in the last 24 hours.    Invalid input(s):  ALBUMIN  CSF:   Tube 1--WBCs 5, RBCs 3000, 40% Seg Neutrophils, 40% Lymphocytes, 20% Monos/Macros  Tube 2--WBCs 22, RBCs 285, 3% Seg Neutrophils, 92% Lymphocytes, 5% Monos/Macros.  Protein 56 (corrected for RBCs = 56), Glucose 55.    Significant Imagin18 MRI L spine w/ w/o contrast:  Postoperative changes from L3-L4 laminectomy.  Multilevel degenerative changes of the lumbar spine most significant at L3-L4 with moderate to severe bilateral neuroforaminal narrowing.  Additional findings discussed level by level above.     18 CT head w/o contrast:  No evidence of acute hemorrhage or major vascular distribution infarct.  Mild chronic ischemic change as above.  Prominence ossification lateral to the left anterior clinoid process and cavernous sinus suggesting the possibility of a calcified meningioma. This is without significant mass effect on the brain  parenchyma.    01/12/18 MRI brain w/ w/o contrast:  1.  No acute intracranial abnormality identified on today's examination.  Specifically, no evidence of acute infarction.  2.  Multiple foci of T2/FLAIR hyperintensity in the supratentorial and periventricular white matter as well as mick, suggestive of chronic microvascular ischemic change. Remote right cerebellar infarct.    01/12/18 MRI C/T spine w/ w/o contrast:  1. Mild degenerative change of the cervical spine.  No abnormal cervical spinal cord signal or enhancement.  2. Mild degenerative change of thoracic spine most pronounced at the T10-T11 level where there is a posterior disc bulge resulting in mild/moderate spinal canal stenosis.  There is a questionable focus of T2 cord signal hyperintensity without associated enhancement at this level possibly reflecting component of myelomalacia.  Additional questionable focus of T2 cord signal intensity at the T4-T5 level as detailed above.    3.  Enlargement heterogeneity of the right thyroid lobe.

## 2018-01-18 NOTE — ASSESSMENT & PLAN NOTE
-Per above, concern for AIDP.  Day 5/5 IV Ig, will continue to monitor for improvement.  -Possible component of chronic peripheral neuropathy though Hgb A1c 5.2%, well-controlled in the past.  B12 wnl.  -B1 25--deficient.  Recommend replacing with thiamine 500 mg qd x 3 d, thiamine 250 mg x 5 d, thiamine 100 mg po qd after.

## 2018-01-18 NOTE — PROGRESS NOTES
"[facility] Hospital Medicine  Progress Note    Patient Name: Melissa Anand  MRN: [id number]  Patient Class: IP- Inpatient   Admission Date: 1/9/2018  Length of Stay: 9 days  Attending Physician: Rod Hernandez MD  Primary Care Provider: Dana Castellon MD    [facility] Medicine Team: [facility]OS[facility] MED A Rod Hernandez MD    Subjective:     Principal Problem:Apraxia    HPI:  71 yo F with PMHx pre-DM, HTN, and chronic low back pain who presents with progression of BLE weakness.  Patient's daughter at bedside assists with history.  Patient has been walking with a cane at baseline for past 2 years and has had worsening of BLE weakness for past 2-3 months with inability to get out of bed over the past weekend.  She denies recent infections--no SOB/cough, no n/v/c/d, no sick contacts, no URI Sx. Pt w/ acute worsening this am, she could not move her Les and needed assistance to raise them off the bed. Called EMS when daughter had difficulty assisting.      Has some diabetic peripheral neuropathy at baseline, but states that she hadn't had falls with her neuropathy prior to last two months.  Denies bowel/bladder incontinence (has chronic problems w/ constipation and Bms often loose when able to go, will go days w/o urinating and has gone 3x times since ED presentation), no saddle anesthesia, has sensory level to knee b/l. Patient has been getting injections in her back for chronic low back pain every 2 wks, last was few wks ago.  Daughter states that she had gotten some steroid injections but they did try an "epidural medication" a couple weeks ago.  She has been getting injections approximately every two weeks for past 3 months.  Patient states she still has low back pain with some radiation into the BLE but is unable to characterize the pain. States injections not working, but no sig pain now.     Hospital Course:  1/10: no change in lack of sensation or inability to move Les. Pt w/ worsening of chronic L heel " pain and having severe pain not controlled by home percocet and worse w/ palpation. Having frequent diarrhea, which she states is typical in appearance, non-bloody. Pt take OTC meds for this at home and would like med now.      1/11: less loose Bms, lomotil working. Sensation inconsistent and pt feeling pinches intermittently w/ loss of sensation above and below area. No return of motor function. Daughter states that no movement in feet for 4wks.      1/12: pt and daughter noticed flicker of movement w/ R foot. Inc sensation on right side as well. Bms dec in freq. Deneis neck stiff, photophobia, f/c, rash     1/13: better in better spirits and feeling better overall. No diarrhea. Inc movement in R foot. Dec pain in L foot.      1/14: improved movement, daughter states pt able to swing R leg over side of bed. Pt does not recall doing this. Feeling sleepy w/ starting IVIG. BP dropped during initiation but improved after. No BM    1/15: strength impressively improved and pt able to move to side of bed for PT session. Pt helped getting pt up and she was able to stand on her own, able to side step as well.     1/16: pt continue to report improvement in LE. Pt with asymptomatic hypotension and MADHAVI noted on lab work today. Start her on IV fluid replacement.     1/17: patient report continue improvement in lower extremities. Hypotension and MADHAVI resolved with IVF yesterday. 4/5 IVIG today, neuro is ok with discharge home tomorrow after last dose of IVIG. Discussed with patient, she wants to go home rather go to SNF with her daughter. CM/SW are contact daughter at this time.     1/18: Continue improvement, pt sat up at chair overnight per nursing report. Day 5/5 of IVIG, neurology is concern for possible parkinson dx and started her on a trial of Carbadopa-levadopa. Stable to be discharge per their stand point. Pt declines SNF and rehab. Will D/C with home with home health, will need follow up with neuro in clinic.            Interval History: Doing well today, pt has no complains. Today, feeling better      Review of Systems   Constitutional: Negative for chills and fever.   Eyes: Negative for photophobia and visual disturbance.   Respiratory: Negative for cough and shortness of breath.    Gastrointestinal: Negative for constipation, diarrhea, nausea and vomiting.   Musculoskeletal: Positive for back pain.   Skin: Negative for rash and wound.   Neurological: Positive for weakness, numbness and headaches.   Hematological: Negative for adenopathy. Does not bruise/bleed easily.     Objective:     Vital Signs (Most Recent):  Temp: 98.8 °F (37.1 °C) (01/18/18 0836)  Pulse: 80 (01/18/18 0836)  Resp: 18 (01/18/18 0836)  BP: (!) 187/81 (01/18/18 0836)  SpO2: (!) 94 % (01/18/18 0836) Vital Signs (24h Range):  Temp:  [97.8 °F (36.6 °C)-99.5 °F (37.5 °C)] 98.8 °F (37.1 °C)  Pulse:  [77-93] 80  Resp:  [14-20] 18  SpO2:  [93 %-100 %] 94 %  BP: (137-203)/(64-85) 187/81     Weight: 112.6 kg (248 lb 3.8 oz)  Body mass index is 34.62 kg/m².    Intake/Output Summary (Last 24 hours) at 01/18/18 1249  Last data filed at 01/17/18 1659   Gross per 24 hour   Intake              690 ml   Output              150 ml   Net              540 ml      Physical Exam    General:  Well-developed, well-nourished, nad  HEENT:  NCAT, PERRLA, EOMI, oropharyngeal membranes non-erythematous/without exudate  Neck:  Supple, normal ROM without nuchal rigidity  Resp:  Symmetric expansion, no increased wob  CVS:  No LE edema, peripheral pulses 2+ (radial, dorsalis pedis)  GI:  Abd soft, non-distended, non-tender to palpation. Yellow-brown, loose stool in bed and on hospital gown  Neurologic Exam:  Mental Status:  AAOx3.  Speech, thought content appropriate.   Cranial Nerves:  mostly intact. Weak trapezius, SCM strength bilaterally.  Motor:  Normal bulk and tone, no apparent atrophy or fasciculations.  BUE shoulder abduction, biceps/triceps, wrist flexion/extension,   "strength 4-/5.  BLE hip flexors 2/5, knee flexion/extension 1/5, plantarflexion/dorsiflexion 1/5, inc to 2/5 on R w/ more movement today. Contraction of anterior calf muscle.   Sensory:  Patient had no sensation to light touch or temperature in BLE up to knee (lower on R, upper on L), occasional "ouch" to pinch on RLE.No pain w/ palpation on L heel, no skin breaks or e/o infection  Reflexes:  Areflexic patellar, Achilles  Gait not assessed    MELD-Na score: 7 at 1/11/2018  5:33 AM  MELD score: 7 at 1/11/2018  5:33 AM  Calculated from:  Serum Creatinine: 1.0 mg/dL at 1/11/2018  5:33 AM  Serum Sodium: 139 mmol/L (Rounded to 137) at 1/11/2018  5:33 AM  Total Bilirubin: 0.6 mg/dL (Rounded to 1) at 1/9/2018 12:58 PM  INR(ratio): 1.1 at 1/9/2018 12:58 PM  Age: 72 years    Significant Labs:  CBC:    Recent Labs  Lab 01/17/18  0525 01/18/18  0516   WBC 3.45* 2.78*   HGB 9.3* 9.1*   HCT 29.0* 28.0*    186     CMP:    Recent Labs  Lab 01/17/18  0525      K 4.5   *   CO2 18*   GLU 89   BUN 21   CREATININE 0.9   CALCIUM 8.1*   ANIONGAP 5*   EGFRNONAA >60.0     PTINR:  No results for input(s): INR in the last 48 hours.       Assessment/Plan:      * Apraxia    Exam is most concerning for Presumed AIDP, also based on elevated protein in CSF, improvement in sxs with IV Ig. Patient with improvement in strength on exam, but disparity in ability to get up and take a few steps. MRI head, cervical and thoracic spine w/o e/o acute findings.   - At this time, per neuro staff, currently on her exam consistent with parkinson's disease, now somewhat suspect of the diagnosis of AIDP.  The possibilities are that she has AIDP in addition to PD, in which case, her weakness is fully resolved without return of reflexes or that she never had AIDP, but rather severe apraxia/ inability to move due to PD.  - IVIG for 5d day 1 1/14, IgA 254.  - PT/OT for ROM, deconditioning: SNF w/ RW but pt at this time request to be discharge " home  - Start sinemet 25/100 at 10am and 2pm daily, plan to D/C home with current regiment  - will need close follow up with neurology in clinic, Apt 1/25 set up with neurology                 Acute renal injury due to hypovolemia    - Cr increased to 1.7, pt with hypotension noted recently also contrasted Imaging  - Suspect hypovolemia as she is fluid responsive  - aggressive IV hydration 1/16  - avoid nephrotoxin  - resolved  - CTM          Peripheral sensory neuropathy    -Neuro concerned for chronic diabetic neuropathy with more severe acute sensory loss in BLE. But pt never w/ elevated A1c in our system above 5.8  -Chronic diabetic neuropathy reported by pt, recent severe sensory loss in BLE above previous baseline.  -Hgb A1c 5.2, vitamin B1 deficient, vitamin B12 nl. TSH low with normal FT4--appropriate for levothyroxine use.  -thiamine 500 mg qd x 3 d, thiamine 250 mg x 5 d (first does Sun 1/15), thiamine 100 mg po qd after.  -Peripheral neuropathy is improving per recent patient report, will continue to evaluate with continued IV Ig treatments.          Pressure injury of skin, stage 2    - Present on admission  - wound care orders per wound care team  - Nursing to cleanse coccyx with cleansing wipes and apply Critic-Aid Clear moisture barrier (purple top) BID and prn cleaning. Cleanse right and left heel with cleansing wipes and apply Mepilex border to both heels to protect from skin breakdown. Change dressing every 5 days.             PD (Parkinson's disease)    1/17/18 Akinetic rigid-type, legs worse than arms, right leg most effected.  - Start sinemet 25/100 at 10am and 2pm daily            Chronic pain disorder    Gabapentin 300mg tid, elavil 100 QHS, percocet 7.5 Q6h prn (inc to 10 1/11 d/t inc pain, Sx better and may tolerate dec again) - home meds listed in system, pt and daughter not entirely clear on dosing.         Suspected deep tissue injury    - see above          Areflexia    Per above,  concern for AIDP.  Starting IV Ig, will continue to monitor for improvement.  -Possible component of chronic peripheral neuropathy though Hgb A1c 5.2%, well-controlled in the past.  B12 wnl.  -B1 25--deficient.  Replacing with thiamine 500 mg qd x 3 d, thiamine 250 mg x 5 d, thiamine 100 mg po qd after.          Functional diarrhea    Pt states this is chronic problem and no worse than usual. Non-bloody and no other concerning changes. Takes OTC meds at home. Wishes to take lomitil and not imodium; ordered.  - Improved w/ lomotil          Benign essential hypertension    Lability BP, likely from GBS. meds stopped on 1/15  BP elevated 1/17, restart Lisinopril 40 and restart amlodipine 5mg               VTE Risk Mitigation         Ordered     enoxaparin injection 40 mg  Daily     Route:  Subcutaneous        01/12/18 1615     Low Risk of VTE  Once      01/09/18 2057        Dispo: D/C home after IVIG and home health set up      Rod Hernandez MD  Department of Hospital Medicine   [facility]

## 2018-01-18 NOTE — ASSESSMENT & PLAN NOTE
-Chronic diabetic neuropathy reported by pt, recent severe sensory loss in BLE above previous baseline.  -Hgb A1c 5.2%, vitamin B12 wnl. TSH low with normal FT4--appropriate for levothyroxine use.  -Vitamin B1 deficient, replace per above with 500 mg qd followed by 250 mg qd.  Discharge with thiamine 100 mg po qd.  -Peripheral neuropathy is improving per recent patient report, will continue to evaluate with continued IV Ig treatments and thiamine replacement.

## 2018-01-18 NOTE — ASSESSMENT & PLAN NOTE
-severe sensory loss reported by patient  -Neurology following  -Vitamin B1 def and now on Thiamine 250 mg PO, currently improving  -receiving IVIG day 5/5 on 1/18

## 2018-01-18 NOTE — PLAN OF CARE
Per DR. Hernandez in [facility], pt wants hh , not SNF.  Pt declines SNF per MD  hh orders written. HEAVEN Andino in [facility] and will set up.  [facility] services are current offering 130 hrs of PCA care weekly.  Per pt, she has ride. But if not, HEAVEN Andino will set up Medicaid tx.       01/18/18 1000   Final Note   Assessment Type Final Discharge Note   Discharge Disposition Home-Health  (per Dr Hernandez, pt wants hh , not SNF)   Hospital Follow Up  Appt(s) scheduled? Yes   Discharge plans and expectations educations in teach back method with documentation complete? Yes   Right Care Referral Info   Post Acute Recommendation Home-care   Dr. Hernandez requests that CM make neuro f/u with DR. De Guzman in one to two weeks.    Per  Dr. Gege Restrepo doesn't see this type of pt but he's able to make another appt  Future Appointments  Date Time Provider Department Center   1/25/2018 3:00 PM Alexis Longoria MD [facility] NEURO [facility]     13:14  UPDATE   Spoke alea Rebollar, pt's daughter, who will provide her w ride home.  If not, pt has Medicaid tx.

## 2018-01-18 NOTE — PLAN OF CARE
Problem: Physical Therapy Goal  Goal: Physical Therapy Goal  Goals to be met by: 2018     Patient will increase functional independence with mobility by performin. Supine to sit with Maximum Assistance - met  2. Rolling to Left with Maximum Assistance. - met  3. Sit to stand transfer with Maximum Assistance  4. Sitting at edge of bed x5 minutes with Moderate Assistance - met  5. Bed to chair transfer with Maximum Assistance using Rolling Walker  6. Stand for 3 minutes with Moderate Assistance using Rolling Walker        Outcome: Ongoing (interventions implemented as appropriate)  Pt progressing well towards goals    Mike Calvin DPT  2018

## 2018-01-18 NOTE — PT/OT/SLP PROGRESS
Occupational Therapy   Treatment    Name: Melissa Anand  MRN: [id number]  Admitting Diagnosis:  Apraxia       Recommendations:     Discharge Recommendations: nursing facility, skilled  Discharge Equipment Recommendations:  walker, rolling, wheelchair, hospital bed  Barriers to discharge:  None    Subjective     Communicated with: RN prior to session.  Pain/Comfort:  · Pain Rating 1: 0/10  · Pain Rating Post-Intervention 1: 0/10    Objective:     Patient found with: peripheral IV, PureWick    General Precautions: Standard, fall   Orthopedic Precautions:N/A   Braces: N/A     Occupational Performance:    Bed Mobility:    · Patient completed Rolling/Turning to Right with minimum assistance  · Patient completed Scooting/Bridging with minimum assistance  · Patient completed Supine to Sit with moderate assistance     Functional Mobility/Transfers:  · Patient completed Sit <> Stand Transfer with minimum assistance and moderate assistance  with  no assistive device and 1st trial min a 2nd trial mod s     Activities of Daily Living: (All ADLs performed sitting EOB).  · Grooming: stand by assistance oral and facial hygiene   · UB Dressing: minimum assistance donnded/doffed gown in front and back    Patient left HOB elevated with all lines intact and call button in reach    AMPA 6 Click:  [facility] Total Score: 17    Treatment & Education:  Pt completed static stand for 4 min at min a for balance.  Pt ambulated 4 steps to L at min a.  Therapist educated PT on BUE HEP.   Therapist demo' d BUE HEP to pt and pt demo' d back via teach back method.   Education:    Assessment:     Melissa Anand is a 72 y.o. female with a medical diagnosis of Apraxia.  She presents with impairments listed below. Pt displayed decreased endurance w/ oob activities. Pt presents w/ limited ability to perform ADLs indep. Pt would benefit from skilled OT services to improve independence and overall occupational functioning.      Performance deficits affecting  function are weakness, impaired endurance, impaired self care skills, impaired functional mobilty, gait instability, decreased lower extremity function, decreased safety awareness.      Rehab Prognosis:  good; patient would benefit from acute skilled OT services to address these deficits and reach maximum level of function.       Plan:     Patient to be seen 3 x/week to address the above listed problems via self-care/home management, therapeutic activities, therapeutic exercises  · Plan of Care Expires: 02/10/18  · Plan of Care Reviewed with: patient    This Plan of care has been discussed with the patient who was involved in its development and understands and is in agreement with the identified goals and treatment plan    GOALS:    Occupational Therapy Goals        Problem: Occupational Therapy Goal    Goal Priority Disciplines Outcome Interventions   Occupational Therapy Goal     OT, PT/OT Ongoing (interventions implemented as appropriate)    Description:  Goals to be met by: 1/24/2018    Patient will increase functional independence with ADLs by performing:    Supine to sit & sit to supine with minimal assistance.  Sit to stand with minimal assistance.  UE Dressing while seated with set up.  LE Dressing with Maximum Assistance and AD as needed.  Toileting from bedside commode with Moderate Assistance for hygiene and clothing management.   Toilet transfer to bedside commode with Moderate Assistance.                    Time Tracking:     OT Date of Treatment: 01/18/18  OT Start Time: 1029  OT Stop Time: 1055  OT Total Time (min): 26 min    Billable Minutes:Self Care/Home Management 13 minutes  Therapeutic Activity 10 minutes    Roc Cruz, OT  1/18/2018

## 2018-01-18 NOTE — SUBJECTIVE & OBJECTIVE
(01/18/2018): Patient is seen for follow-up rehab evaluation and recommendations.  No acute events over night.  Participating with therapy.  Barriers for discharge/rehab admission: patient declined IPR.     HPI, Past Medical, Surgical, Family, and Social History remains the same as documented in the initial encounter.      Scheduled Medications:    amitriptyline  100 mg Oral QHS    amLODIPine  5 mg Oral Daily    carbidopa-levodopa  mg  1 tablet Oral Daily    carbidopa-levodopa  mg  1 tablet Oral Daily    enoxaparin  40 mg Subcutaneous Daily    Immune Globulin G (IGG)-PRO-IGA 10 % injection (Privigen)  400 mg/kg/day Intravenous Q24H    levothyroxine  50 mcg Oral Before breakfast    lisinopril  40 mg Oral Daily    thiamine  250 mg Oral Daily       Diagnostic Results:   Labs: Reviewed  ECG: Reviewed  MRI: Reviewed    PRN Medications: acetaminophen, diphenoxylate-atropine 2.5-0.025 mg, hydrocodone-acetaminophen 7.5-325mg, ondansetron, sodium chloride 0.9%    Review of Systems  Objective:     Vital Signs (Most Recent):  Temp: 98.8 °F (37.1 °C) (01/18/18 0836)  Pulse: 80 (01/18/18 0836)  Resp: 18 (01/18/18 0836)  BP: (!) 187/81 (01/18/18 0836)  SpO2: (!) 94 % (01/18/18 0836)    Vital Signs (24h Range):  Temp:  [97.8 °F (36.6 °C)-99.5 °F (37.5 °C)] 98.8 °F (37.1 °C)  Pulse:  [77-93] 80  Resp:  [14-20] 18  SpO2:  [93 %-100 %] 94 %  BP: (137-203)/(64-85) 187/81     Physical Exam   Constitutional: She appears well-developed and well-nourished.   HENT:   Head: Normocephalic and atraumatic.   Eyes: EOM are normal. Pupils are equal, round, and reactive to light.   Neck: Normal range of motion.   Cardiovascular: Normal rate and regular rhythm.    Pulmonary/Chest: Effort normal. No respiratory distress.   Abdominal: Soft. There is no tenderness.   Rectal tube in place   Musculoskeletal: Normal range of motion. She exhibits no deformity.   Neurological:   -  Mental Status:  AAOx3.  Follows commands.  Answers  incorrect age and year.  -  Coordination: severe apraxia of right leg and moderate of left. Mild apraxia (hand waving, open/close) of both hands, but worse on her left.  -  Motor:  RUE: 5/5.  LUE: 5/5.  RLE: 5/5.  LLE: 5/5.   -  Tone: rigidity   -  Sensation: decreased to BLE.          Skin: Skin is warm and dry.   Psychiatric: She has a normal mood and affect. Cognition and memory are impaired.   Vitals reviewed.    NEUROLOGICAL EXAMINATION:     CRANIAL NERVES     CN III, IV, VI   Pupils are equal, round, and reactive to light.  Extraocular motions are normal.

## 2018-01-18 NOTE — SUBJECTIVE & OBJECTIVE
Interval History: Doing well today, pt has no complains. Today, feeling better      Review of Systems   Constitutional: Negative for chills and fever.   Eyes: Negative for photophobia and visual disturbance.   Respiratory: Negative for cough and shortness of breath.    Gastrointestinal: Negative for constipation, diarrhea, nausea and vomiting.   Musculoskeletal: Positive for back pain.   Skin: Negative for rash and wound.   Neurological: Positive for weakness, numbness and headaches.   Hematological: Negative for adenopathy. Does not bruise/bleed easily.     Objective:     Vital Signs (Most Recent):  Temp: 98.8 °F (37.1 °C) (01/18/18 0836)  Pulse: 80 (01/18/18 0836)  Resp: 18 (01/18/18 0836)  BP: (!) 187/81 (01/18/18 0836)  SpO2: (!) 94 % (01/18/18 0836) Vital Signs (24h Range):  Temp:  [97.8 °F (36.6 °C)-99.5 °F (37.5 °C)] 98.8 °F (37.1 °C)  Pulse:  [77-93] 80  Resp:  [14-20] 18  SpO2:  [93 %-100 %] 94 %  BP: (137-203)/(64-85) 187/81     Weight: 112.6 kg (248 lb 3.8 oz)  Body mass index is 34.62 kg/m².    Intake/Output Summary (Last 24 hours) at 01/18/18 1249  Last data filed at 01/17/18 1659   Gross per 24 hour   Intake              690 ml   Output              150 ml   Net              540 ml      Physical Exam    General:  Well-developed, well-nourished, nad  HEENT:  NCAT, PERRLA, EOMI, oropharyngeal membranes non-erythematous/without exudate  Neck:  Supple, normal ROM without nuchal rigidity  Resp:  Symmetric expansion, no increased wob  CVS:  No LE edema, peripheral pulses 2+ (radial, dorsalis pedis)  GI:  Abd soft, non-distended, non-tender to palpation. Yellow-brown, loose stool in bed and on hospital gown  Neurologic Exam:  Mental Status:  AAOx3.  Speech, thought content appropriate.   Cranial Nerves:  mostly intact. Weak trapezius, SCM strength bilaterally.  Motor:  Normal bulk and tone, no apparent atrophy or fasciculations.  BUE shoulder abduction, biceps/triceps, wrist flexion/extension,  strength  "4-/5.  BLE hip flexors 2/5, knee flexion/extension 1/5, plantarflexion/dorsiflexion 1/5, inc to 2/5 on R w/ more movement today. Contraction of anterior calf muscle.   Sensory:  Patient had no sensation to light touch or temperature in BLE up to knee (lower on R, upper on L), occasional "ouch" to pinch on RLE.No pain w/ palpation on L heel, no skin breaks or e/o infection  Reflexes:  Areflexic patellar, Achilles  Gait not assessed    MELD-Na score: 7 at 1/11/2018  5:33 AM  MELD score: 7 at 1/11/2018  5:33 AM  Calculated from:  Serum Creatinine: 1.0 mg/dL at 1/11/2018  5:33 AM  Serum Sodium: 139 mmol/L (Rounded to 137) at 1/11/2018  5:33 AM  Total Bilirubin: 0.6 mg/dL (Rounded to 1) at 1/9/2018 12:58 PM  INR(ratio): 1.1 at 1/9/2018 12:58 PM  Age: 72 years    Significant Labs:  CBC:    Recent Labs  Lab 01/17/18  0525 01/18/18  0516   WBC 3.45* 2.78*   HGB 9.3* 9.1*   HCT 29.0* 28.0*    186     CMP:    Recent Labs  Lab 01/17/18  0525      K 4.5   *   CO2 18*   GLU 89   BUN 21   CREATININE 0.9   CALCIUM 8.1*   ANIONGAP 5*   EGFRNONAA >60.0     PTINR:  No results for input(s): INR in the last 48 hours.     "

## 2018-01-18 NOTE — PROGRESS NOTES
[facility]  Physical Medicine & Rehab  Progress Note    Patient Name: Melissa Anand  MRN: [id number]  Admission Date: 1/9/2018  Length of Stay: 9 days  Attending Physician: Rod Hernandez MD    Subjective:     Principal Problem:Apraxia    Hospital Course:   1/10/18: Evaluated by therapy.  Bed mobility totalA. LBD: totalA.   1/15/18: Participated with therapy.  Bed mobility Max.  Sit to stand totalA & RW.  Ambulated 4 lateral steps MaxA & RW.  Grooming: Luis Felipe.  Toileting totalA.   1/17/18: Participated with therapy.  Bed mobility Min-totalA.  Sit to stand and transfers ModA.  UBD Luis Felipe and LBD totalA.       (01/18/2018): Patient is seen for follow-up rehab evaluation and recommendations.  No acute events over night.  Participating with therapy.  Barriers for discharge/rehab admission: patient declined IPR.     HPI, Past Medical, Surgical, Family, and Social History remains the same as documented in the initial encounter.      Scheduled Medications:    amitriptyline  100 mg Oral QHS    amLODIPine  5 mg Oral Daily    carbidopa-levodopa  mg  1 tablet Oral Daily    carbidopa-levodopa  mg  1 tablet Oral Daily    enoxaparin  40 mg Subcutaneous Daily    Immune Globulin G (IGG)-PRO-IGA 10 % injection (Privigen)  400 mg/kg/day Intravenous Q24H    levothyroxine  50 mcg Oral Before breakfast    lisinopril  40 mg Oral Daily    thiamine  250 mg Oral Daily       Diagnostic Results:   Labs: Reviewed  ECG: Reviewed  MRI: Reviewed    PRN Medications: acetaminophen, diphenoxylate-atropine 2.5-0.025 mg, hydrocodone-acetaminophen 7.5-325mg, ondansetron, sodium chloride 0.9%    Review of Systems  Objective:     Vital Signs (Most Recent):  Temp: 98.8 °F (37.1 °C) (01/18/18 0836)  Pulse: 80 (01/18/18 0836)  Resp: 18 (01/18/18 0836)  BP: (!) 187/81 (01/18/18 0836)  SpO2: (!) 94 % (01/18/18 0836)    Vital Signs (24h Range):  Temp:  [97.8 °F (36.6 °C)-99.5 °F (37.5 °C)] 98.8 °F (37.1 °C)  Pulse:  [77-93]  80  Resp:  [14-20] 18  SpO2:  [93 %-100 %] 94 %  BP: (137-203)/(64-85) 187/81     Physical Exam   Constitutional: She appears well-developed and well-nourished.   HENT:   Head: Normocephalic and atraumatic.   Eyes: EOM are normal. Pupils are equal, round, and reactive to light.   Neck: Normal range of motion.   Cardiovascular: Normal rate and regular rhythm.    Pulmonary/Chest: Effort normal. No respiratory distress.   Abdominal: Soft. There is no tenderness.   Rectal tube in place   Musculoskeletal: Normal range of motion. She exhibits no deformity.   Neurological:   -  Mental Status:  AAOx3.  Follows commands.  Answers incorrect age and year.  -  Coordination: severe apraxia of right leg and moderate of left. Mild apraxia (hand waving, open/close) of both hands, but worse on her left.  -  Motor:  RUE: 5/5.  LUE: 5/5.  RLE: 5/5.  LLE: 5/5.   -  Tone: rigidity   -  Sensation: decreased to BLE.   Skin: Skin is warm and dry.   Psychiatric: She has a normal mood and affect. Cognition and memory are impaired.   Vitals reviewed.       Assessment/Plan:      * Apraxia    -Neurology following  -presumed dx based on elevated protein in CSF  -receiving IVIG and symptoms have improved  -on Sinemet 25/100  -f/u movement disorder clinic     Recommendations  -  Encourage mobility, OOB in chair at least 3 hours per day, and early ambulation as appropriate  -  PT/OT evaluate and treat  -  Pain management  -  Monitor for and prevent skin breakdown and pressure ulcers  · Early mobility, repositioning/weight shifting every 20-30 minutes when sitting, turn patient every 2 hours, proper mattress/overlay and chair cushioning, pressure relief/heel protector boots  -  DVT prophylaxis:  Lovenox SQ  -  Reviewed discharge options (IP rehab, SNF, HH therapy, and OP therapy)          PD (Parkinson's disease)    -per Neurology, exam findings consistent with PD  -on Sinemet 25/100        Acute renal injury due to hypovolemia    -BUN 21 and Cr.  0.9 on 1/17  -improving         Chronic pain disorder    - Gabapentin 300 mg TID, Elavil 100 mg, Percocet 7.5 mg per HM         Pressure injury of skin, stage 2    -present on admission  -wound care consulted         Areflexia    -concern for AIDP  -Neurology following  -day 5/5 IVIG and receiving Thiamine        Peripheral sensory neuropathy    -severe sensory loss reported by patient  -Neurology following  -Vitamin B1 def and now on Thiamine 250 mg PO, currently improving  -receiving IVIG day 5/5 on 1/18        Functional diarrhea    -h/o chronic diarrhea   -rectal tube in place        Patient has declined IPR and would like to return home with daughter receiving HH therapy.  Will sign off.  Please call with questions/concerns or re-consult if situation changes.      Nupur Lyman NP  Department of Physical Medicine & Rehab   [facility]

## 2018-01-18 NOTE — PLAN OF CARE
Problem: Patient Care Overview  Goal: Plan of Care Review  Outcome: Ongoing (interventions implemented as appropriate)  Pt completed day 4/5 of IVIG today. Pt started on carbidopa/levidopa.  On bed rest and a turn schedule. Pt was initially AOx3, but later was found out of bed on the sofa, and had replaced her gown backwards. When asked, pt stated that she didn't remember getting up out of bed. Bed alarm placed. Reinforced the importance of calling the nursing staff for assistance.     Plan of care reviewed with pt. Educated pt on any medications, and procedures that the patient would be receiving. Discussed vital sign and I&O routine. Allowed time for pt to ask any questions, and told the patient to call for any assistance. Bed low & locked, call light and personal belongings within reach.  Progressing towards discharge.

## 2018-01-18 NOTE — ASSESSMENT & PLAN NOTE
-Presumed diagnosis based on elevated protein in CSF, improvement in sxs with IV Ig  -Patient with improvement in strength on exam, but disparity in ability to get up and take a few steps  -Trial carbidopa-levodopa  mg po at 10 am, 2 pm--will cut dose in half 2/2 side effect of nausea  -Will follow up in with me in clinic  -Day 5/5 of IV Ig, continue PT/OT daily

## 2018-01-18 NOTE — ASSESSMENT & PLAN NOTE
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 meds stopped on 1/15  BP elevated 1/17, restart Lisinopril 40 and restart amlodipine 5mg

## 2018-01-18 NOTE — PROGRESS NOTES
IVIG started; beginning vitals as follows.       01/18/18 1405   Vital Signs   Temp 98.3 °F (36.8 °C)   Temp src Oral   Pulse 85   Heart Rate Source Monitor   Resp 18   SpO2 95 %   Pulse Oximetry Type Intermittent   O2 Device (Oxygen Therapy) room air   BP (!) 162/72   BP Location Right arm   BP Method Automatic   Patient Position Lying

## 2018-01-18 NOTE — PROGRESS NOTES
Rate increase per guidelines at 67.6 ml/hour.  WCTM       01/18/18 1439   Vital Signs   Pulse 84   BP (!) 151/69   MAP (mmHg) 99   BP Location Right arm   BP Method Automatic   Patient Position Lying

## 2018-01-19 ENCOUNTER — PATIENT OUTREACH (OUTPATIENT)
Dept: ADMINISTRATIVE | Facility: CLINIC | Age: 73
End: 2018-01-19

## 2018-01-19 DIAGNOSIS — R41.0 CONFUSION: Primary | ICD-10-CM

## 2018-01-19 LAB
ANION GAP SERPL CALC-SCNC: 5 MMOL/L
BUN SERPL-MCNC: 14 MG/DL
CALCIUM SERPL-MCNC: 8.3 MG/DL
CHLORIDE SERPL-SCNC: 108 MMOL/L
CO2 SERPL-SCNC: 22 MMOL/L
CREAT SERPL-MCNC: 0.9 MG/DL
EST. GFR  (AFRICAN AMERICAN): >60 ML/MIN/1.73 M^2
EST. GFR  (NON AFRICAN AMERICAN): >60 ML/MIN/1.73 M^2
GLUCOSE SERPL-MCNC: 99 MG/DL
POTASSIUM SERPL-SCNC: 3.7 MMOL/L
SODIUM SERPL-SCNC: 135 MMOL/L

## 2018-01-19 NOTE — PLAN OF CARE
Problem: Patient Care Overview  Goal: Plan of Care Review  Outcome: Ongoing (interventions implemented as appropriate)  Pt AAOx4; pure wick in place to collect urine. IVIG infused as ordered.PO zofran and pain meds given once during shift. Pt to go home this evening, refused transport and her daughter will pick her up. Pt remained free from falls during shift.  Bed lowest position and locked.  Call light in reach.  Pt has no further needs at this time; will continue to monitor.

## 2018-01-19 NOTE — NURSING
Roadtest  O: on room air  A: up with standby assistance. Pt uses cane at home. Also has a walker at home, per daughter.  D: IV to right forearm removed and dressing applied.  T:pt tolerating oral intake  E: incontinent of urine and stool at times. Adult Diaper applied.   S: pt requires assistance with ADLs of transferring, dressing, bathing and ambulating. Pt currently living with daughter, who is her caretaker.  T: pt given discharge paperwork. Explained to pt and daughter the new medications, which medications to stop taking, and follow-up appointments.

## 2018-01-19 NOTE — PROGRESS NOTES
[phone]  [phone]  C3 nurse attempted to contact patient. No answer. The following message was left for the patient to return the call:  Good morning I am a nurse calling on behalf of [facility] from the [facility].  This is a Transitional Care Call for Dana Castellon MD. When you have a moment please contact us at [phone] or [phone] Monday through Friday, between the hours of 8 am to 4 pm. We look forward to speaking with you. On behalf of [facility] have a nice day.    The patient does not have a scheduled HOSFU appointment within 7-14 days post hospital discharge date 01/18/18. Message sent to Physician staff to assist with HOSFU appointment scheduling.

## 2018-01-19 NOTE — NURSING
[facility] refusing transport home that was previously set up; pt stating that she just got off the phone with her daughter and her daughter will be picking her up.

## 2018-01-19 NOTE — PT/OT/SLP DISCHARGE
Physical Therapy Discharge Summary    Name: Melissa Anand  MRN: [id number]   Principal Problem: PD (Parkinson's disease)     Patient Discharged from acute Physical Therapy on 2018.  Please refer to prior PT noted date on 2018 for functional status.     Assessment:     Patient was discharged unexpectedly.  Information required to complete an accurate discharge summary is unknown.  Refer to therapy initial evaluation and last progress note for initial and most recent functional status and goal achievement.  Recommendations made may be found in medical record.    Objective:     GOALS:    Physical Therapy Goals        Problem: Physical Therapy Goal    Goal Priority Disciplines Outcome Goal Variances Interventions   Physical Therapy Goal     PT/OT, PT Ongoing (interventions implemented as appropriate)     Description:  Goals to be met by: 2018     Patient will increase functional independence with mobility by performin. Supine to sit with Maximum Assistance - met  2. Rolling to Left with Maximum Assistance. - met  3. Sit to stand transfer with Maximum Assistance  4. Sitting at edge of bed x5 minutes with Moderate Assistance - met  5. Bed to chair transfer with Maximum Assistance using Rolling Walker  6. Stand for 3 minutes with Moderate Assistance using Rolling Walker                         Reasons for Discontinuation of Therapy Services  Transfer to alternate level of care.      Plan:     Patient Discharged to: Home no PT services needed.    Mike Calvin, PT  2018

## 2018-01-22 ENCOUNTER — OUTPATIENT CASE MANAGEMENT (OUTPATIENT)
Dept: ADMINISTRATIVE | Facility: OTHER | Age: 73
End: 2018-01-22

## 2018-01-22 NOTE — PROGRESS NOTES
Thank you for the referral.  Patient has been assigned to BECKY Carrasco for low risk screening for Outpatient Case Management.     Reason for referral: Confusion    Please contact [facility] at ext. [phone] with any questions.    Thank you,    Chantale Lucio

## 2018-01-22 NOTE — PATIENT INSTRUCTIONS
Preventing Falls in the Home  An adult or child can fall for many reasons. If you are an older adult, you may fall because your reaction time slows down. Your muscles and joints may get stiff, weak, or less flexible because of illness, medicines, or a physical condition. These things can also make a child more likely to fall or be injured in a fall.  Other health problems that make falls more likely include:  · Arthritis  · Dizziness or lightheadedness when you get out of bed (orthostatic hypotension)  · History of a stroke  · Dizziness  · Anemia  · Certain medicines taken for mental illness  · Problems with balance or gait  · History of falls with or without an injury  · Changes in vision (vision impairment)  · Changes in thinking skills and memory (cognitive impairment)  Injuries from a fall can include broken bones, dislocated joints, and cuts. When these injuries are serious enough, they can make it impossible for you or a child who is injured in a fall to live on his or her own.  Prevention tips  To help prevent falls and fall-related injuries, follow the tips below.   Floors  Make floors safer by doing the following:   · Put nonskid pads under area rugs.  · Remove throw rugs.  · Replace worn floor coverings.  · Tack carpets firmly to each step on carpeted stairs. Put nonskid strips on the edges of uncarpeted stairs.  · Keep floors and stairs free of clutter and cords.  · Arrange furniture so there are clear pathways.  · Clean up any spills right away.  · Wear shoes that fit.  Bathrooms    Make bathrooms safer by doing the following:   · Install grab bars in the tub or shower.  · Apply nonskid strips or put a nonskid rubber mat in the tub or shower.  · Sit on a bath chair to bathe.  · Use bathmats with nonskid backing.  Lighting and the environment  Improve lighting in your home by doing the following:   · Keep a flashlight in each room. Or put a lamp next to the bed within easy reach.  · Put nightlights in  the bedrooms, hallways, kitchen, and bathrooms.  · Make sure all stairways have good lighting.  · Take your time when going up and down stairs.  · Put handrails on both sides of stairs and in walkways for more support. To prevent injury to your wrist or arm, dont use handrails to pull yourself up.  · Install grab bars to pull yourself up.  · Move or rearrange items that you use often. This will make them easier to find or reach.  · Look at your home to find any safety hazards. Especially look at doorways, walkways, and the driveway. Remove or repair any safety problems that you find.  Date Last Reviewed: 8/1/2016  © 2178-1860 [employer]. [address] [place] [address], [place] [address]. All rights reserved. This information is not intended as a substitute for professional medical care. Always follow your healthcare professional's instructions.

## 2018-01-23 ENCOUNTER — OUTPATIENT CASE MANAGEMENT (OUTPATIENT)
Dept: ADMINISTRATIVE | Facility: OTHER | Age: 73
End: 2018-01-23

## 2018-01-23 NOTE — PROGRESS NOTES
This CSW received a referral on the above patient.   Reason for referral:Confusion  Name of the community resource that was provided:Advance Directives  Resource given to: Patient  via [employer] Mail and Telephone    Patient gave daughter Marilia Castro permission to complete assesment on her behalf. Patient resides with her daughter. Patient is able to complete ADL's. Patient ambulates with a walker. Daughter reports patient needs assistance with ambulating. Daughter reports HH will be out to the home to complete an assessment for services. Daughter reports patient receives transportation through [employer]. This organization is paid for by the [facility] through Medicaid. Daughter reports patient does not have an advance directive on file. CSW mailed Advance Directive information . Daughter reports there are no additional needs at this time.

## 2018-03-21 ENCOUNTER — LAB VISIT (OUTPATIENT)
Dept: LAB | Facility: HOSPITAL | Age: 73
End: 2018-03-21
Payer: MEDICARE

## 2018-03-21 ENCOUNTER — OFFICE VISIT (OUTPATIENT)
Dept: NEUROLOGY | Facility: CLINIC | Age: 73
End: 2018-03-21
Payer: MEDICARE

## 2018-03-21 VITALS — HEIGHT: 71 IN | BODY MASS INDEX: 32.19 KG/M2 | WEIGHT: 229.94 LBS

## 2018-03-21 DIAGNOSIS — E51.9 VITAMIN B1 DEFICIENCY: ICD-10-CM

## 2018-03-21 DIAGNOSIS — M62.569 MUSCLE WASTING AND ATROPHY, NOT ELSEWHERE CLASSIFIED, UNSPECIFIED LOWER LEG: ICD-10-CM

## 2018-03-21 DIAGNOSIS — R29.2 AREFLEXIA: ICD-10-CM

## 2018-03-21 DIAGNOSIS — M48.061 SPINAL STENOSIS OF LUMBAR REGION WITHOUT NEUROGENIC CLAUDICATION: ICD-10-CM

## 2018-03-21 DIAGNOSIS — E21.1 SECONDARY HYPERPARATHYROIDISM, NOT ELSEWHERE CLASSIFIED: ICD-10-CM

## 2018-03-21 DIAGNOSIS — R41.89 COGNITIVE CHANGES: ICD-10-CM

## 2018-03-21 DIAGNOSIS — M63.869: ICD-10-CM

## 2018-03-21 DIAGNOSIS — M62.89 PROXIMAL WEAKNESS OF EXTREMITY: ICD-10-CM

## 2018-03-21 DIAGNOSIS — G61.0 AIDP (ACUTE INFLAMMATORY DEMYELINATING POLYNEUROPATHY): ICD-10-CM

## 2018-03-21 DIAGNOSIS — E03.9 ACQUIRED HYPOTHYROIDISM: ICD-10-CM

## 2018-03-21 DIAGNOSIS — M62.89 PROXIMAL WEAKNESS OF EXTREMITY: Primary | ICD-10-CM

## 2018-03-21 DIAGNOSIS — G20.A1 PD (PARKINSON'S DISEASE): ICD-10-CM

## 2018-03-21 DIAGNOSIS — I10 BENIGN ESSENTIAL HYPERTENSION: ICD-10-CM

## 2018-03-21 DIAGNOSIS — G60.8 PERIPHERAL SENSORY NEUROPATHY: ICD-10-CM

## 2018-03-21 PROCEDURE — 99214 OFFICE O/P EST MOD 30 MIN: CPT | Mod: S$PBB,GC,, | Performed by: PSYCHIATRY & NEUROLOGY

## 2018-03-21 PROCEDURE — 99999 PR PBB SHADOW E&M-EST. PATIENT-LVL III: CPT | Mod: PBBFAC,GC,, | Performed by: STUDENT IN AN ORGANIZED HEALTH CARE EDUCATION/TRAINING PROGRAM

## 2018-03-21 PROCEDURE — 99213 OFFICE O/P EST LOW 20 MIN: CPT | Mod: PBBFAC | Performed by: STUDENT IN AN ORGANIZED HEALTH CARE EDUCATION/TRAINING PROGRAM

## 2018-03-22 ENCOUNTER — TELEPHONE (OUTPATIENT)
Dept: NEUROLOGY | Facility: CLINIC | Age: 73
End: 2018-03-22

## 2018-03-22 DIAGNOSIS — G20.A1 PD (PARKINSON'S DISEASE): Primary | ICD-10-CM

## 2018-03-22 PROBLEM — M62.89 PROXIMAL WEAKNESS OF EXTREMITY: Status: ACTIVE | Noted: 2018-03-22

## 2018-03-22 PROBLEM — E03.9 ACQUIRED HYPOTHYROIDISM: Status: ACTIVE | Noted: 2018-03-22

## 2018-03-22 PROBLEM — R41.89 COGNITIVE CHANGES: Status: ACTIVE | Noted: 2018-03-22

## 2018-03-22 NOTE — ASSESSMENT & PLAN NOTE
1/17/18 Akinetic rigid-type, legs worse than arms, right leg most effected.    - on sinemet 20/100 1 tab 10am/2pm  - improved symptoms - continue the same  - no concerns for psychotic SE

## 2018-03-22 NOTE — ASSESSMENT & PLAN NOTE
01/09/18 admission with BLE weakness as well as areflexia--bilateral patellar, Achilles.  Did not improve with IVIG.    - see section above

## 2018-03-22 NOTE — ASSESSMENT & PLAN NOTE
- 72 y.o. female with medical history of chronic low back pain / lumbar degenerative disc disease with recent admission and management on 1/2018 for acute on chronic onset LE weakness, presenting for establishment of Neurology follow-up.      - Unclear etiology / with primary DDx of AIDP for primarily motor involvement & diminished reflexes, with no sensory affect vs primary myopathy.   - Post 5 days of IVIG / PT sessions & Sinemet 25/100 1 tab (10AM/2PM) for parkinson's symptoms   - Improvement in strength B/L LE (proximal 2/5, distal 1/5) to (B/L LE adductors & abductors / knee flexors & extensors / plantar & dorsiflexors 4+/5 >>>> hip extensors 4+/5 >>> hip flexors 4-/5)     - Pertinent investigations:   -- MRI spine revealed degenerative changes / spinal stenosis & signal intensity at the T4-T5, however with no correlation to symptom presentation  -- CSF studies revealed elevated protein / wbc count with lymphocyte predominance. CSF IgG/Albumin ratio was 0.30  -- low TSH / normal free T4 on thyroid replacement post past thyroidectomy   -- Normal B12 / Low B1 on thiamine replacement   -- Elevated glucose trends with normal A1C levels / low calcium levels / normal potassium & sodium levels  -- Elevated CRP, with high normal CPK / ESR    DDx:   - Symmetrical proximal muscle weakness of unclear etiology at this moment. Inflammatory demyelinating polyneuropathy vs autoimmune systemic diseases vs endocrine (cushings - cushinoid facies / high glucose trends with normal A1C vs hypoparathyroidism - prior thyroidectomy , low calcium levels) >>> primary inflammatory myositis >>> genetic / congenital / spinal muscular or NM disorders    Plan:   - EMG & nerve conduction of B/L LE   - CBC / CMP - electrolytes and calcium   - muscle enzymes / inflammatory markers: for trending CK / aldolase / ESR / CRP   - other etiology serum work-up: TREE / RF / SPEP / UPEP / SSA / SSB   - Evaluate for endocrine etiology - ACTH / TSH / PTH /  Cortisol / Vit D  - PT eval and treat     - if investigations unrevealing shall consider MRI thighs with biopsy

## 2018-03-22 NOTE — TELEPHONE ENCOUNTER
----- Message from Annie Burnett sent at 3/22/2018 11:13 AM CDT -----  Patient has an order in [facility] for physical therapy, she is requesting home health for this service says can not do outpatient.    Thanks

## 2018-03-22 NOTE — PROGRESS NOTES
Patient Name: Melissa Anand  MRN: [id number]    CC: B/L LE weakness     HPI: Melissa Anand is a 72 y.o. female with medical history of chronic low back pain / lumbar degenerative disc disease with recent admission and management on 1/2018 for acute onset LE weakness, presenting for establishment of Neurology follow-up.      Had admission in 1/2017 for acute on chronic onset B/L LE weakness (proximal 2/5 >> distal 1/5). No concerns for bulbar / respiratory muscle involvement. MRI spine revealed degenerative changes / spinal stenosis & signal intensity at the T4-T5, however with no correlation to symptom presentation. CSF studies revealed elevated protein / wbc count with lymphocyte predominance. CSF IgG/Albumin ratio was 0.30. Pertinent evaluation revealed low TSH / B12 / high normal CPK / ESR & low thiamine. Etiology was unclear, however the primary DDx was AIDP for primarily motor involvement & diminished reflexes, with no sensory affect. Was managed with 5 days of IVIG, that had an positive response / along with PT sessions with improvement in strength prior to Dc. Other pertinent diagnosis during the admission was parkinson's for symptoms of right UE resting tremor / masked facies / apraxia / cog wheel rigidity and was discharged on Sinemet 25/100 1 tab (10AM/2PM).     Since discharge, patient and family reported of gradual improvement in strength. Currently near-baseline, using cane, off walker for ambulation. Able to change posture - lying to sitting / sitting to standing without assistance. Denied any sensory deficits / bulbar affect / respiratory depression / falls. Has been compliant with sinemet / reports improvement in symptoms, with no concerns for psychosis. Family reported of cognitive decline, ? Attention / concentration / short term memory, however denied any concerns for affect in reasoning / language / behavior.    ROS:   Review of Systems   Constitutional: Negative for weight loss.   HENT: Negative for  hearing loss.   Eyes: Negative for blurred vision and double vision.   Respiratory: Negative for shortness of breath and stridor.   Cardiovascular: Negative for chest pain and palpitations.   Gastrointestinal: Negative for nausea, vomiting and constipation.   Genitourinary: Negative for frequency. Negative for urgency.   Musculoskeletal: Negative for joint pain. Negative for myalgias and falls.   Skin: Negative for rash.   Neurological: Negative for dizziness and tremors. Positive for weakness and negative for seizures.   Endo/Heme/Allergies: Does not bruise/bleed easily.   Psychiatric/Behavioral: Postive for memory loss. Negative for depression and hallucinations. The patient is not nervous/anxious.      Past Medical History  Past Medical History:   Diagnosis Date    Anemia     Colitis     hospitalized July 2014    Diabetes mellitus     Encounter for blood transfusion     Goiter     Hypertension     Left hip pain 10/12/14    Syncope        Medications    Current Outpatient Prescriptions:     amitriptyline (ELAVIL) 100 MG tablet, Take 100 mg by mouth every evening., Disp: , Rfl:     amLODIPine (NORVASC) 5 MG tablet, Take 1 tablet (5 mg total) by mouth once daily. Contact PCP for refills, Disp: 30 tablet, Rfl: 3    carbidopa-levodopa  mg (SINEMET)  mg per tablet, Take 0.5 tablets by mouth 2 (two) times daily. Take 10am and 2pm daily. Contact Neurologist for refill, Disp: 60 tablet, Rfl: 3    gabapentin (NEURONTIN) 300 mg tablet, Take 300 mg by mouth every morning. , Disp: , Rfl:     gabapentin (NEURONTIN) 600 MG tablet, Take 600 mg by mouth every evening., Disp: , Rfl:     hydrocodone-acetaminophen 7.5-325mg (NORCO) 7.5-325 mg per tablet, Take 1 tablet by mouth every 8 (eight) hours as needed for Pain., Disp: 15 tablet, Rfl: 0    levothyroxine (SYNTHROID) 50 MCG tablet, Take 1 tablet (50 mcg total) by mouth before breakfast., Disp: 30 tablet, Rfl: 11    lisinopril (PRINIVIL,ZESTRIL) 40 MG  "tablet, Take 1 tablet (40 mg total) by mouth once daily., Disp: 30 tablet, Rfl: 0    quetiapine (SEROQUEL) 300 MG Tab, Take 400 mg by mouth every evening. , Disp: , Rfl:     thiamine 250 MG tablet, Take 1 tablet (250 mg total) by mouth once daily., Disp: 30 tablet, Rfl: 2    tramadol (ULTRAM) 50 mg tablet, Take 50 mg by mouth 2 (two) times daily., Disp: , Rfl:     Allergies  Review of patient's allergies indicates:  No Known Allergies    Social History  Social History     Social History    Marital status:      Spouse name: N/A    Number of children: N/A    Years of education: N/A     Occupational History    Not on file.     Social History Main Topics    Smoking status: Former Smoker     Years: 30.00     Types: Cigarettes     Quit date: 5/20/2014    Smokeless tobacco: Not on file    Alcohol use Yes      Comment: occassionally     Drug use: No    Sexual activity: Not Currently     Other Topics Concern    Not on file     Social History Narrative    No narrative on file       Family History  Family History   Problem Relation Age of Onset    Diabetes      Coronary artery disease      Cancer Son      testicular    Cancer Mother        Physical Exam  Ht 5' 11" (1.803 m)   Wt 104.3 kg (229 lb 15 oz)   LMP  (LMP Unknown)   BMI 32.07 kg/m²     General appearance: Well-developed, well-groomed.     Neurologic Exam: The patient is awake, alert and oriented. Language is fluent. Recent and remote memory are normal. Attention span and concentration are normal. Fund of knowledge is appropriate.     Cranial nerves: pupils are round and reactive to light and accommodation. Visual fields are full to confrontation. Ocular motility is full in all cardinal positions of gaze. Facial sensation is normal to pinprick and light touch. Facial activation is symmetric. Hearing is normal bilaterally. Palate elevates symmetrically and gag reflex is intact bilaterally. Shoulder elevation is symmetric and full strength " bilaterally. Tongue is midline and neck rotation strength is normal bilaterally. Neck range of motion is normal.     Motor examination: B/L LE adductors & abductors / knee flexors & extensors / plantar & dorsiflexors 4+/5 >>>> hip extensors 4+/5 >>> hip flexors 4-/5. No asymmetry in strength. Strength is 5/5 in the upper extremities bilaterally without pronator drift. All extremities demonstrates normal bulk and tone in all four limbs. There are no atrophy or fasciculations.     Sensory examination is normal to pinprick, proprioception and ?reduced vibration in the upper and lower extremities bilaterally. Romberg is negative.    Deep tendon reflexes are 0-1 at knees / adductors / ankles with mute toes bilaterally + and 2+ to 3+ in the upper extremities bilaterally.     Gait: Waddling gait / uses cane for ambulation     Coordination: Finger to nose testing is normal in both upper extremities.     General exam  Cardiovascular: regular rate and rhythm with no murmurs, rubs or gallops. There are no carotid or vertebral artery bruits. Pulses in both upper and lower extremities are symmetric. There is no peripheral edema.   Head and neck: no cervical lymphadenopathy      Lab and Test Results    WBC   Date Value Ref Range Status   01/18/2018 2.78 (L) 3.90 - 12.70 K/uL Final   01/17/2018 3.45 (L) 3.90 - 12.70 K/uL Final   01/16/2018 6.55 3.90 - 12.70 K/uL Final     Hemoglobin   Date Value Ref Range Status   01/18/2018 9.1 (L) 12.0 - 16.0 g/dL Final   01/17/2018 9.3 (L) 12.0 - 16.0 g/dL Final   01/16/2018 10.8 (L) 12.0 - 16.0 g/dL Final     Hematocrit   Date Value Ref Range Status   01/18/2018 28.0 (L) 37.0 - 48.5 % Final   01/17/2018 29.0 (L) 37.0 - 48.5 % Final   01/16/2018 32.9 (L) 37.0 - 48.5 % Final     Platelets   Date Value Ref Range Status   01/18/2018 186 150 - 350 K/uL Final   01/17/2018 186 150 - 350 K/uL Final   01/16/2018 190 150 - 350 K/uL Final     Glucose   Date Value Ref Range Status   01/18/2018 99 70 - 110  mg/dL Final   01/17/2018 89 70 - 110 mg/dL Final   01/16/2018 163 (H) 70 - 110 mg/dL Final   01/16/2018 163 (H) 70 - 110 mg/dL Final     Sodium   Date Value Ref Range Status   01/18/2018 135 (L) 136 - 145 mmol/L Final   01/17/2018 136 136 - 145 mmol/L Final   01/16/2018 137 136 - 145 mmol/L Final   01/16/2018 137 136 - 145 mmol/L Final     Potassium   Date Value Ref Range Status   01/18/2018 3.7 3.5 - 5.1 mmol/L Final   01/17/2018 4.5 3.5 - 5.1 mmol/L Final   01/16/2018 4.6 3.5 - 5.1 mmol/L Final   01/16/2018 4.6 3.5 - 5.1 mmol/L Final     Chloride   Date Value Ref Range Status   01/18/2018 108 95 - 110 mmol/L Final   01/17/2018 113 (H) 95 - 110 mmol/L Final   01/16/2018 108 95 - 110 mmol/L Final   01/16/2018 108 95 - 110 mmol/L Final     CO2   Date Value Ref Range Status   01/18/2018 22 (L) 23 - 29 mmol/L Final   01/17/2018 18 (L) 23 - 29 mmol/L Final   01/16/2018 22 (L) 23 - 29 mmol/L Final   01/16/2018 22 (L) 23 - 29 mmol/L Final     BUN, Bld   Date Value Ref Range Status   01/18/2018 14 8 - 23 mg/dL Final   01/17/2018 21 8 - 23 mg/dL Final   01/16/2018 30 (H) 8 - 23 mg/dL Final   01/16/2018 30 (H) 8 - 23 mg/dL Final     Creatinine   Date Value Ref Range Status   01/18/2018 0.9 0.5 - 1.4 mg/dL Final   01/17/2018 0.9 0.5 - 1.4 mg/dL Final   01/16/2018 1.9 (H) 0.5 - 1.4 mg/dL Final   01/16/2018 1.9 (H) 0.5 - 1.4 mg/dL Final     Calcium   Date Value Ref Range Status   01/18/2018 8.3 (L) 8.7 - 10.5 mg/dL Final   01/17/2018 8.1 (L) 8.7 - 10.5 mg/dL Final   01/16/2018 7.8 (L) 8.7 - 10.5 mg/dL Final   01/16/2018 7.8 (L) 8.7 - 10.5 mg/dL Final     Magnesium   Date Value Ref Range Status   01/09/2018 2.5 1.6 - 2.6 mg/dL Final   01/27/2015 1.8 1.6 - 2.6 mg/dL Final   01/26/2015 1.8 1.6 - 2.6 mg/dL Final     Phosphorus   Date Value Ref Range Status   01/09/2018 2.9 2.7 - 4.5 mg/dL Final   01/27/2015 3.9 2.7 - 4.5 mg/dL Final   01/26/2015 3.4 2.7 - 4.5 mg/dL Final     Alkaline Phosphatase   Date Value Ref Range Status    01/16/2018 55 55 - 135 U/L Final   01/09/2018 78 55 - 135 U/L Final   01/21/2015 76 55 - 135 U/L Final     ALT   Date Value Ref Range Status   01/16/2018 11 10 - 44 U/L Final   01/09/2018 10 10 - 44 U/L Final   01/21/2015 34 10 - 44 U/L Final     AST   Date Value Ref Range Status   01/16/2018 20 10 - 40 U/L Final   01/09/2018 26 10 - 40 U/L Final     Comment:     *Result may be interfered by visible hemolysis   01/21/2015 32 10 - 40 U/L Final       Proximal weakness of extremity  - 72 y.o. female with medical history of chronic low back pain / lumbar degenerative disc disease with recent admission and management on 1/2018 for acute on chronic onset LE weakness, presenting for establishment of Neurology follow-up.      - Unclear etiology / with primary DDx of AIDP for primarily motor involvement & diminished reflexes, with no sensory affect vs primary myopathy.   - Post 5 days of IVIG / PT sessions & Sinemet 25/100 1 tab (10AM/2PM) for parkinson's symptoms   - Improvement in strength B/L LE (proximal 2/5, distal 1/5) to (B/L LE adductors & abductors / knee flexors & extensors / plantar & dorsiflexors 4+/5 >>>> hip extensors 4+/5 >>> hip flexors 4-/5)     - Pertinent investigations:   -- MRI spine revealed degenerative changes / spinal stenosis & signal intensity at the T4-T5, however with no correlation to symptom presentation  -- CSF studies revealed elevated protein / wbc count with lymphocyte predominance. CSF IgG/Albumin ratio was 0.30  -- low TSH / normal free T4 on thyroid replacement post past thyroidectomy   -- Normal B12 / Low B1 on thiamine replacement   -- Elevated glucose trends with normal A1C levels / low calcium levels / normal potassium & sodium levels  -- Elevated CRP, with high normal CPK / ESR    DDx:   - Symmetrical proximal muscle weakness of unclear etiology at this moment. Inflammatory demyelinating polyneuropathy vs autoimmune systemic diseases vs endocrine (cushings - cushinoid facies /  high glucose trends with normal A1C vs hypoparathyroidism - prior thyroidectomy , low calcium levels) >>> primary inflammatory myositis >>> genetic / congenital / spinal muscular or NM disorders    Plan:   - EMG & nerve conduction of B/L LE   - CBC / CMP - electrolytes and calcium   - muscle enzymes / inflammatory markers: for trending CK / aldolase / ESR / CRP   - other etiology serum work-up: TREE / RF / SPEP / UPEP / SSA / SSB   - Evaluate for endocrine etiology - ACTH / TSH / PTH / Cortisol / Vit D  - PT eval and treat     - if investigations unrevealing shall consider MRI thighs with biopsy     Vitamin B1 deficiency  - Vitamin B1 25 on 01/09/18 hospital admission    - continue thiamine supplementation  - f/u levels     AIDP (acute inflammatory demyelinating polyneuropathy)  - primary differential for acute on chronic LE weakness in 1/2018    - see section above       Acquired hypothyroidism  - post thyroid surgery     - continue supplementation   - no toxic myopathy or hypothyroid myopathy concerns     Areflexia  01/09/18 admission with BLE weakness as well as areflexia--bilateral patellar, Achilles.  Did not improve with IVIG.    - see section above     Benign essential hypertension  - continue home meds     Type II or unspecified type diabetes mellitus without mention of complication, not stated as uncontrolled  - unclear diagnosis    - A1C 5.8 / Not on any medications   - PCP review     Lumbar stenosis  - chronic conerns / post surgery     - MRI reviewed - no correlation for acute on chronic B/L LE weakness   - ? Epidural steroids induced myopathy at the background of endocrine path    PD (Parkinson's disease)  1/17/18 Akinetic rigid-type, legs worse than arms, right leg most effected.    - on sinemet 20/100 1 tab 10am/2pm  - improved symptoms - continue the same  - no concerns for psychotic SE    Cognitive changes  - Family reported of cognitive decline, ? Attention / concentration / short term memory  - No  concerns for any concerns for affect in reasoning / language / behavior.  - Insight ++    - No active interventions / shall consider neuropsychology testing in future if needed     Peripheral sensory neuropathy  Presented with severe sensory loss of BLE 01/09/18.  Improvement in sensation s/p first few doses of IV Ig (also B1, B12).      - no active concerns / sensory deficit      Geovany Rick MD  Neurology Resident   [facility] [address] [place] [address]  Pager: [phone]

## 2018-03-22 NOTE — ASSESSMENT & PLAN NOTE
- Vitamin B1 25 on 01/09/18 hospital admission    - continue thiamine supplementation  - f/u levels

## 2018-03-22 NOTE — ASSESSMENT & PLAN NOTE
- post thyroid surgery     - continue supplementation   - no toxic myopathy or hypothyroid myopathy concerns

## 2018-03-22 NOTE — ASSESSMENT & PLAN NOTE
- chronic conerns / post surgery     - MRI reviewed - no correlation for acute on chronic B/L LE weakness   - ? Epidural steroids induced myopathy at the background of endocrine path

## 2018-03-22 NOTE — ASSESSMENT & PLAN NOTE
Presented with severe sensory loss of BLE 01/09/18.  Improvement in sensation s/p first few doses of IV Ig (also B1, B12).      - no active concerns / sensory deficit

## 2018-03-22 NOTE — ASSESSMENT & PLAN NOTE
- Family reported of cognitive decline, ? Attention / concentration / short term memory  - No concerns for any concerns for affect in reasoning / language / behavior.  - Insight ++    - No active interventions / shall consider neuropsychology testing in future if needed

## 2018-04-03 ENCOUNTER — TELEPHONE (OUTPATIENT)
Dept: NEUROLOGY | Facility: CLINIC | Age: 73
End: 2018-04-03

## 2018-04-03 NOTE — TELEPHONE ENCOUNTER
----- Message from Rosemarie Goldman sent at 4/3/2018 11:07 AM CDT -----  Contact: Diana (Daughter)  [phone]  Diana called to speak to someone regarding the patient's lab work.

## 2018-05-29 ENCOUNTER — TELEPHONE (OUTPATIENT)
Dept: NEUROLOGY | Facility: CLINIC | Age: 73
End: 2018-05-29

## 2018-05-29 NOTE — TELEPHONE ENCOUNTER
----- Message from Dory Gaitan sent at 5/25/2018 10:55 AM CDT -----  Contact: Self  Pt is calling because she missed her lab appt today and wants to be rescheduled; however, the pt called in beyond the appt time.  Secondly, the order were attached to the appt that was scheduled for today and there is nothing the  can attached to an appt; therefore, the pt requests Staff reinitiate orders for her labs.    She can be reached at [phone].    Thank you.

## 2018-06-04 ENCOUNTER — TELEPHONE (OUTPATIENT)
Dept: NEUROLOGY | Facility: CLINIC | Age: 73
End: 2018-06-04

## 2018-06-04 NOTE — TELEPHONE ENCOUNTER
Updated on labs / Inquired on symptoms. Stable with no worsening weakness / falls    - Plans for vitamin D deficiency active management  - Ramelteon for sleep   - Repeat CRP / CK / ESR in 2-3 months.   - Follow-up with EMG/NCS

## 2018-06-07 RX ORDER — RAMELTEON 8 MG/1
8 TABLET ORAL NIGHTLY
Qty: 30 TABLET | Refills: 1 | Status: SHIPPED | OUTPATIENT
Start: 2018-06-07 | End: 2018-08-27 | Stop reason: SDUPTHER

## 2018-06-07 RX ORDER — ERGOCALCIFEROL 1.25 MG/1
50000 CAPSULE ORAL
Qty: 4 CAPSULE | Refills: 1 | Status: SHIPPED | OUTPATIENT
Start: 2018-06-07 | End: 2018-07-09 | Stop reason: SDUPTHER

## 2018-06-13 ENCOUNTER — TELEPHONE (OUTPATIENT)
Dept: NEUROLOGY | Facility: CLINIC | Age: 73
End: 2018-06-13

## 2018-06-13 NOTE — TELEPHONE ENCOUNTER
----- Message from Anahi Gill sent at 6/13/2018  9:57 AM CDT -----  Contact: Pt   Pt was calling to speak to Dr Hernandez would like a call back at [phone].    Thank you

## 2018-07-02 ENCOUNTER — TELEPHONE (OUTPATIENT)
Dept: NEUROLOGY | Facility: CLINIC | Age: 73
End: 2018-07-02

## 2018-07-02 NOTE — TELEPHONE ENCOUNTER
----- Message from William Mckay sent at 7/2/2018  9:30 AM CDT -----  Contact: Patient @ [phone]  Patient is requesting a return call about clarity on the phone call she received 6-13th, ( the call was late she was half sleep )  pls call

## 2018-07-03 ENCOUNTER — TELEPHONE (OUTPATIENT)
Dept: PSYCHIATRY | Facility: HOSPITAL | Age: 73
End: 2018-07-03

## 2018-07-09 DIAGNOSIS — E55.9 VITAMIN D DEFICIENCY: Primary | ICD-10-CM

## 2018-07-09 RX ORDER — ERGOCALCIFEROL 1.25 MG/1
50000 CAPSULE ORAL
Qty: 4 CAPSULE | Refills: 1 | Status: SHIPPED | OUTPATIENT
Start: 2018-07-09 | End: 2018-07-10 | Stop reason: SDUPTHER

## 2018-07-10 RX ORDER — ERGOCALCIFEROL 1.25 MG/1
50000 CAPSULE ORAL
Qty: 4 CAPSULE | Refills: 1 | Status: SHIPPED | OUTPATIENT
Start: 2018-07-10 | End: 2018-08-29

## 2018-08-20 ENCOUNTER — TELEPHONE (OUTPATIENT)
Dept: NEUROLOGY | Facility: CLINIC | Age: 73
End: 2018-08-20

## 2018-08-20 NOTE — TELEPHONE ENCOUNTER
Returned the patients call about an appointment she stated she had on 8/28. She was asked to return my call.

## 2018-08-21 ENCOUNTER — PROCEDURE VISIT (OUTPATIENT)
Dept: NEUROLOGY | Facility: CLINIC | Age: 73
End: 2018-08-21
Payer: MEDICARE

## 2018-08-21 DIAGNOSIS — M62.89 PROXIMAL WEAKNESS OF EXTREMITY: ICD-10-CM

## 2018-08-21 PROCEDURE — 95885 MUSC TST DONE W/NERV TST LIM: CPT | Mod: 26,S$PBB,, | Performed by: PSYCHIATRY & NEUROLOGY

## 2018-08-21 PROCEDURE — 95910 NRV CNDJ TEST 7-8 STUDIES: CPT | Mod: 26,S$PBB,, | Performed by: PSYCHIATRY & NEUROLOGY

## 2018-08-21 PROCEDURE — 95885 MUSC TST DONE W/NERV TST LIM: CPT | Mod: PBBFAC | Performed by: PSYCHIATRY & NEUROLOGY

## 2018-08-21 PROCEDURE — 95910 NRV CNDJ TEST 7-8 STUDIES: CPT | Mod: PBBFAC | Performed by: PSYCHIATRY & NEUROLOGY

## 2018-08-23 ENCOUNTER — TELEPHONE (OUTPATIENT)
Dept: NEUROLOGY | Facility: CLINIC | Age: 73
End: 2018-08-23

## 2018-08-23 NOTE — TELEPHONE ENCOUNTER
----- Message from Lauro Wolfe sent at 8/20/2018 11:46 AM CDT -----  Needs Advice    Reason for call: Pt is requesting appt letter for 09/05/18 be mailed to address on file    Communication Preference: [phone]  Additional Information:

## 2018-08-29 RX ORDER — RAMELTEON 8 MG/1
8 TABLET ORAL NIGHTLY
Qty: 30 TABLET | Refills: 1 | Status: SHIPPED | OUTPATIENT
Start: 2018-08-29 | End: 2018-10-05 | Stop reason: SDUPTHER

## 2018-09-05 ENCOUNTER — OFFICE VISIT (OUTPATIENT)
Dept: NEUROLOGY | Facility: CLINIC | Age: 73
End: 2018-09-05
Payer: MEDICARE

## 2018-09-05 VITALS — SYSTOLIC BLOOD PRESSURE: 159 MMHG | HEART RATE: 67 BPM | DIASTOLIC BLOOD PRESSURE: 75 MMHG

## 2018-09-05 DIAGNOSIS — R29.2 AREFLEXIA: ICD-10-CM

## 2018-09-05 DIAGNOSIS — G61.0 AIDP (ACUTE INFLAMMATORY DEMYELINATING POLYNEUROPATHY): Primary | ICD-10-CM

## 2018-09-05 DIAGNOSIS — E51.9 VITAMIN B1 DEFICIENCY: ICD-10-CM

## 2018-09-05 DIAGNOSIS — M62.89 PROXIMAL WEAKNESS OF EXTREMITY: ICD-10-CM

## 2018-09-05 DIAGNOSIS — E03.9 ACQUIRED HYPOTHYROIDISM: ICD-10-CM

## 2018-09-05 DIAGNOSIS — R41.89 COGNITIVE CHANGES: ICD-10-CM

## 2018-09-05 DIAGNOSIS — G20.A1 PD (PARKINSON'S DISEASE): ICD-10-CM

## 2018-09-05 DIAGNOSIS — R48.2 APRAXIA: ICD-10-CM

## 2018-09-05 DIAGNOSIS — M48.061 SPINAL STENOSIS OF LUMBAR REGION WITHOUT NEUROGENIC CLAUDICATION: ICD-10-CM

## 2018-09-05 DIAGNOSIS — I63.9 CEREBELLAR INFARCT: ICD-10-CM

## 2018-09-05 DIAGNOSIS — E51.11 VITAMIN B1 DEFICIENCY NEUROPATHY: ICD-10-CM

## 2018-09-05 PROCEDURE — 99999 PR PBB SHADOW E&M-EST. PATIENT-LVL III: CPT | Mod: PBBFAC,,, | Performed by: STUDENT IN AN ORGANIZED HEALTH CARE EDUCATION/TRAINING PROGRAM

## 2018-09-05 PROCEDURE — 99213 OFFICE O/P EST LOW 20 MIN: CPT | Mod: PBBFAC | Performed by: STUDENT IN AN ORGANIZED HEALTH CARE EDUCATION/TRAINING PROGRAM

## 2018-09-05 PROCEDURE — 99214 OFFICE O/P EST MOD 30 MIN: CPT | Mod: S$PBB,,, | Performed by: STUDENT IN AN ORGANIZED HEALTH CARE EDUCATION/TRAINING PROGRAM

## 2018-09-05 RX ORDER — CARBIDOPA AND LEVODOPA 25; 100 MG/1; MG/1
0.5 TABLET ORAL 3 TIMES DAILY
Qty: 90 TABLET | Refills: 3 | Status: SHIPPED | OUTPATIENT
Start: 2018-09-05 | End: 2019-05-31 | Stop reason: SDUPTHER

## 2018-09-06 RX ORDER — CALCIUM CARB/VITAMIN D3/VIT K1 500-500-40
800 TABLET,CHEWABLE ORAL DAILY
Refills: 0 | COMMUNITY
Start: 2018-09-06 | End: 2018-10-09 | Stop reason: SDUPTHER

## 2018-09-09 NOTE — PROGRESS NOTES
Patient Name: Melissa Anand  MRN: [id number]    CC: F/u AIDP    HPI: Melissa Anand is a 73 y.o. female with medical history of chronic low back pain / lumbar degenerative disc disease / s/p admission and management on 1/2018 for acute on chronic onset LE weakness with concerns of AIDP, presenting for follow-up    Interval history:   - Nerve conduction studies of the right upper and bilateral lower extremities revealed absence of motor and sensory responses in the lower extremities. The right ulnar motor response was absent when stimulating proximally. Concentric needle examination of selected right lower extremity muscles demonstrated enlarged MUPs in the right TA muscle. Positive electrophysiologic evidence for a chronic, length-dependent, sensory-motor peripheral neuropathy.   - Unclear etiology / with primary DDx of AIDP for primarily motor involvement & diminished reflexes in LE, with no sensory affect vs primary myopathy.   - Stable strength post improvement after acute management: B/L LE adductors & abductors / knee flexors & extensors / plantar & dorsiflexors 4+/5 >>>> hip extensors 4+/5 >>> hip flexors 4-/5. No asymmetry in strength. Strength is 5/5 in the upper extremities bilaterally without pronator drift / 4/5 in intrinsic muscles of the hand.   - On vitamin B1 / D supplementation with positive trend in levels / With negative cortisol, acth abnormalities and autoimmune studies / F/u Ca wnl / elevated PTH    - On Parkinson's management with sinmet 25/100 10 AM / 2 PM, however reports of worsening left UE tremor towards the end of the say   - No active PT management, post initial intervention     ROS:   Review of Systems   Constitutional: Negative for weight loss.   HENT: Negative for hearing loss.   Eyes: Negative for blurred vision and double vision.   Respiratory: Negative for shortness of breath and stridor.   Cardiovascular: Negative for chest pain and palpitations.   Gastrointestinal: Negative for nausea,  vomiting and constipation.   Genitourinary: Negative for frequency. Negative for urgency.   Musculoskeletal: Negative for joint pain. Negative for myalgias and falls.   Skin: Negative for rash.   Neurological: Negative for dizziness and tremors. Positive for weakness and negative for seizures.   Endo/Heme/Allergies: Does not bruise/bleed easily.   Psychiatric/Behavioral: Postive for memory loss. Negative for depression and hallucinations. The patient is not nervous/anxious.      Past Medical History  Past Medical History:   Diagnosis Date    Anemia     Colitis     hospitalized July 2014    Diabetes mellitus     Encounter for blood transfusion     Goiter     Hypertension     Left hip pain 10/12/14    Syncope        Medications    Current Outpatient Medications:     amitriptyline (ELAVIL) 100 MG tablet, Take 100 mg by mouth every evening., Disp: , Rfl:     amLODIPine (NORVASC) 5 MG tablet, Take 1 tablet (5 mg total) by mouth once daily. Contact PCP for refills, Disp: 30 tablet, Rfl: 3    carbidopa-levodopa  mg (SINEMET)  mg per tablet, Take 0.5 tablets by mouth 3 (three) times daily. Take 10am and 2pm daily. Contact Neurologist for refill, Disp: 90 tablet, Rfl: 3    gabapentin (NEURONTIN) 300 mg tablet, Take 300 mg by mouth every morning. , Disp: , Rfl:     gabapentin (NEURONTIN) 600 MG tablet, Take 600 mg by mouth every evening., Disp: , Rfl:     hydrocodone-acetaminophen 7.5-325mg (NORCO) 7.5-325 mg per tablet, Take 1 tablet by mouth every 8 (eight) hours as needed for Pain., Disp: 15 tablet, Rfl: 0    lisinopril (PRINIVIL,ZESTRIL) 40 MG tablet, Take 1 tablet (40 mg total) by mouth once daily., Disp: 30 tablet, Rfl: 0    quetiapine (SEROQUEL) 300 MG Tab, Take 400 mg by mouth every evening. , Disp: , Rfl:     ramelteon (ROZEREM) 8 mg tablet, Take 1 tablet (8 mg total) by mouth every evening., Disp: 30 tablet, Rfl: 1    thiamine 250 MG tablet, Take 1 tablet (250 mg total) by mouth once  daily., Disp: 30 tablet, Rfl: 2    tramadol (ULTRAM) 50 mg tablet, Take 50 mg by mouth 2 (two) times daily., Disp: , Rfl:     cholecalciferol, vitamin D3, 400 unit Cap, Take 2 capsules (800 Units total) by mouth once daily., Disp: , Rfl: 0    levothyroxine (SYNTHROID) 50 MCG tablet, Take 1 tablet (50 mcg total) by mouth before breakfast., Disp: 30 tablet, Rfl: 11    Allergies  Review of patient's allergies indicates:  No Known Allergies    Social History  Social History     Socioeconomic History    Marital status:      Spouse name: Not on file    Number of children: Not on file    Years of education: Not on file    Highest education level: Not on file   Social Needs    Financial resource strain: Not on file    Food insecurity - worry: Not on file    Food insecurity - inability: Not on file    Transportation needs - medical: Not on file    Transportation needs - non-medical: Not on file   Occupational History    Not on file   Tobacco Use    Smoking status: Former Smoker     Years: 30.00     Types: Cigarettes     Last attempt to quit: 2014     Years since quittin.3   Substance and Sexual Activity    Alcohol use: Yes     Comment: occassionally     Drug use: No    Sexual activity: Not Currently   Other Topics Concern    Not on file   Social History Narrative    Not on file       Family History  Family History   Problem Relation Age of Onset    Diabetes Unknown     Coronary artery disease Unknown     Cancer Son         testicular    Cancer Mother        Physical Exam  BP (!) 159/75   Pulse 67   LMP  (LMP Unknown)     General appearance: Well-developed, well-groomed.      Neurologic Exam: The patient is awake, alert and oriented. Language is fluent. Recent and remote memory are normal. Attention span and concentration are normal. Fund of knowledge is appropriate.      Cranial nerves: pupils are round and reactive to light and accommodation. Visual fields are full to confrontation.  Ocular motility is full in all cardinal positions of gaze. Facial sensation is normal to pinprick and light touch. Facial activation is symmetric. Hearing is normal bilaterally. Palate elevates symmetrically and gag reflex is intact bilaterally. Shoulder elevation is symmetric and full strength bilaterally. Tongue is midline and neck rotation strength is normal bilaterally. Neck range of motion is normal.      Motor examination: B/L LE adductors & abductors / knee flexors & extensors / plantar & dorsiflexors 4+/5 >>>> hip extensors 4+/5 >>> hip flexors 4-/5. No asymmetry in strength. Strength is 5/5 in the upper extremities bilaterally without pronator drift / 4/5 intrinsic muscle of the hand. All extremities demonstrates normal bulk and tone in all four limbs. There are no atrophy or fasciculations.      Sensory examination is normal to pinprick, proprioception and ?reduced vibration in the upper and lower extremities bilaterally. Romberg is negative.     Deep tendon reflexes are 0-1 at knees / adductors / ankles with mute toes bilaterally + and 2+ to 3+ in the upper extremities bilaterally.      Gait: Waddling gait / uses cane for ambulation      Coordination: Finger to nose testing is normal in both upper extremities.      General exam  Cardiovascular: regular rate and rhythm with no murmurs, rubs or gallops. There are no carotid or vertebral artery bruits. Pulses in both upper and lower extremities are symmetric. There is no peripheral edema.   Head and neck: no cervical lymphadenopathy      Lab and Test Results    WBC   Date Value Ref Range Status   09/05/2018 4.76 3.90 - 12.70 K/uL Final   05/30/2018 3.71 (L) 3.90 - 12.70 K/uL Final   01/18/2018 2.78 (L) 3.90 - 12.70 K/uL Final     Hemoglobin   Date Value Ref Range Status   09/05/2018 10.1 (L) 12.0 - 16.0 g/dL Final   05/30/2018 10.2 (L) 12.0 - 16.0 g/dL Final   01/18/2018 9.1 (L) 12.0 - 16.0 g/dL Final     Hematocrit   Date Value Ref Range Status   09/05/2018  31.6 (L) 37.0 - 48.5 % Final   05/30/2018 31.9 (L) 37.0 - 48.5 % Final   01/18/2018 28.0 (L) 37.0 - 48.5 % Final     Platelets   Date Value Ref Range Status   09/05/2018 87 (L) 150 - 350 K/uL Final   05/30/2018 164 150 - 350 K/uL Final   01/18/2018 186 150 - 350 K/uL Final     Glucose   Date Value Ref Range Status   09/05/2018 116 (H) 70 - 110 mg/dL Final   05/30/2018 86 70 - 110 mg/dL Final   01/18/2018 99 70 - 110 mg/dL Final     Sodium   Date Value Ref Range Status   09/05/2018 139 136 - 145 mmol/L Final   05/30/2018 138 136 - 145 mmol/L Final   01/18/2018 135 (L) 136 - 145 mmol/L Final     Potassium   Date Value Ref Range Status   09/05/2018 3.9 3.5 - 5.1 mmol/L Final   05/30/2018 3.9 3.5 - 5.1 mmol/L Final   01/18/2018 3.7 3.5 - 5.1 mmol/L Final     Chloride   Date Value Ref Range Status   09/05/2018 105 95 - 110 mmol/L Final   05/30/2018 108 95 - 110 mmol/L Final   01/18/2018 108 95 - 110 mmol/L Final     CO2   Date Value Ref Range Status   09/05/2018 27 23 - 29 mmol/L Final   05/30/2018 23 23 - 29 mmol/L Final   01/18/2018 22 (L) 23 - 29 mmol/L Final     BUN, Bld   Date Value Ref Range Status   09/05/2018 18 8 - 23 mg/dL Final   05/30/2018 24 (H) 8 - 23 mg/dL Final   01/18/2018 14 8 - 23 mg/dL Final     Creatinine   Date Value Ref Range Status   09/05/2018 1.0 0.5 - 1.4 mg/dL Final   05/30/2018 1.0 0.5 - 1.4 mg/dL Final   01/18/2018 0.9 0.5 - 1.4 mg/dL Final     Calcium   Date Value Ref Range Status   09/05/2018 9.1 8.7 - 10.5 mg/dL Final   05/30/2018 8.9 8.7 - 10.5 mg/dL Final   01/18/2018 8.3 (L) 8.7 - 10.5 mg/dL Final     Magnesium   Date Value Ref Range Status   01/09/2018 2.5 1.6 - 2.6 mg/dL Final   01/27/2015 1.8 1.6 - 2.6 mg/dL Final   01/26/2015 1.8 1.6 - 2.6 mg/dL Final     Phosphorus   Date Value Ref Range Status   01/09/2018 2.9 2.7 - 4.5 mg/dL Final   01/27/2015 3.9 2.7 - 4.5 mg/dL Final   01/26/2015 3.4 2.7 - 4.5 mg/dL Final     Alkaline Phosphatase   Date Value Ref Range Status   09/05/2018 72  55 - 135 U/L Final   05/30/2018 73 55 - 135 U/L Final   01/16/2018 55 55 - 135 U/L Final     ALT   Date Value Ref Range Status   09/05/2018 11 10 - 44 U/L Final   05/30/2018 15 10 - 44 U/L Final   01/16/2018 11 10 - 44 U/L Final     AST   Date Value Ref Range Status   09/05/2018 17 10 - 40 U/L Final   05/30/2018 17 10 - 40 U/L Final   01/16/2018 20 10 - 40 U/L Final     Assessment and Plan    -  73 y.o. female with medical history of chronic low back pain / lumbar degenerative disc disease / s/p admission and management ([facility]) on 1/2018 for acute on chronic onset LE weakness with concerns of AIDP, presenting for follow-up  - Nerve conduction studies of the right upper and bilateral lower extremities revealed absence of motor and sensory responses in the lower extremities. The right ulnar motor response was absent when stimulating proximally. Concentric needle examination of selected right lower extremity muscles demonstrated enlarged MUPs in the right TA muscle. Positive electrophysiologic evidence for a chronic, length-dependent, sensory-motor peripheral neuropathy.     - Unclear etiology / with primary DDx of AIDP for primarily motor involvement & diminished reflexes in LE, with no sensory affect vs primary myopathy.   - Stable strength post improvement after acute management: B/L LE adductors & abductors / knee flexors & extensors / plantar & dorsiflexors 4+/5 >>>> hip extensors 4+/5 >>> hip flexors 4-/5. No asymmetry in strength. Strength is 5/5 in the upper extremities bilaterally without pronator drift / 4/5 in intrinsic muscles of the hand.   - On vitamin B1 / D supplementation with positive trend in levels / With negative cortisol, acth abnormalities and autoimmune studies / F/u Ca wnl / elevated PTH    - On Parkinson's management with sinmet 25/100 10 AM / 2 PM, however reports of worsening left UE tremor towards the end of the say   - No active PT management, post initial intervention     Plan:  - F/u CBC  / CMP / vit B1 / vit D levels   - Continue B1 / Vit D supplementation   - PCP follow-up of elevated PTH / low vit D levels and further management   - Increase sinemet dosing 25/100 to 10 AM / 2PM / 6 PM dosing   - Amb referral to PT eval and managment  - No further active intervention and further investigations warranted from neurology standpoint at the moment  - Fall precautions / Follow-up in 6 months     Geovany Rick MD  Neurology Resident   [facility] [address] [place] [address]  Pager: [phone]

## 2018-09-09 NOTE — PATIENT INSTRUCTIONS
F/u physical therapy   F/u Vitamin B1 / D levels / Continue supplementations   Take sinemet at 10 AM / 2PM / 6PM

## 2018-10-08 RX ORDER — RAMELTEON 8 MG/1
8 TABLET ORAL NIGHTLY
Qty: 30 TABLET | Refills: 1 | Status: SHIPPED | OUTPATIENT
Start: 2018-10-08 | End: 2019-02-26 | Stop reason: SDUPTHER

## 2018-10-16 RX ORDER — CALCIUM CARB/VITAMIN D3/VIT K1 500-500-40
800 TABLET,CHEWABLE ORAL DAILY
Refills: 0 | COMMUNITY
Start: 2018-10-16

## 2018-11-18 NOTE — PROGRESS NOTES
I have personally seen and examined the patient today along with Dr. Rick  , and agree with assessment and recommendations.    Lizzie Vizcaino MD

## 2018-11-21 ENCOUNTER — NURSE TRIAGE (OUTPATIENT)
Dept: ADMINISTRATIVE | Facility: CLINIC | Age: 73
End: 2018-11-21

## 2018-11-21 NOTE — TELEPHONE ENCOUNTER
"Carbidopa     Reason for Disposition   Health Information question, no triage required and triager able to answer question    Additional Information   Negative: Back pain  (and neurologic deficit)     Years ago    Answer Assessment - Initial Assessment Questions  1. SYMPTOM: "What is the main symptom you are concerned about?" (e.g., weakness, numbness)      Increase periods where more shakey then usual  2. ONSET: "When did this start?" (minutes, hours, days; while sleeping)      today  3. LAST NORMAL: "When was the last time you were normal (no symptoms)?"      This am  4. PATTERN "Does this come and go, or has it been constant since it started?"  "Is it present now?"      This after noon it just started  5. CARDIAC SYMPTOMS: "Have you had any of the following symptoms: chest pain, difficulty breathing, palpitations?"      no  6. NEUROLOGIC SYMPTOMS: "Have you had any of the following symptoms: headache, dizziness, vision loss, double vision, changes in speech, unsteady on your feet?"      I walk with a cane  7. OTHER SYMPTOMS: "Do you have any other symptoms?"      A lil shaky on the inside  8. PREGNANCY: "Is there any chance you are pregnant?" "When was your last menstrual period?"      hyst    Answer Assessment - Initial Assessment Questions  1. REASON FOR CALL or QUESTION: "What is your reason for calling today?" or "How can I best help you?" or "What question do you have that I can help answer?"      Nervousness in my stomach.    Protocols used:  INFORMATION ONLY CALL-A-,  NEUROLOGIC DEFICIT-A-      "

## 2018-11-22 NOTE — TELEPHONE ENCOUNTER
Patient states she is really just shaking in her stomach like a nervous stomach. I will take my Elavil and hopefully I will feel better.

## 2018-12-19 ENCOUNTER — TELEPHONE (OUTPATIENT)
Dept: NEUROLOGY | Facility: CLINIC | Age: 73
End: 2018-12-19

## 2018-12-19 NOTE — TELEPHONE ENCOUNTER
Patient states medication carbidopa-levodopa was increased and is not working. Please advise the patient on what she needs to do.

## 2018-12-20 ENCOUNTER — NURSE TRIAGE (OUTPATIENT)
Dept: ADMINISTRATIVE | Facility: CLINIC | Age: 73
End: 2018-12-20

## 2018-12-21 ENCOUNTER — TELEPHONE (OUTPATIENT)
Dept: PEDIATRIC NEUROLOGY | Facility: CLINIC | Age: 73
End: 2018-12-21

## 2018-12-21 ENCOUNTER — TELEPHONE (OUTPATIENT)
Dept: NEUROLOGY | Facility: CLINIC | Age: 73
End: 2018-12-21

## 2018-12-21 DIAGNOSIS — G61.0 AIDP (ACUTE INFLAMMATORY DEMYELINATING POLYNEUROPATHY): ICD-10-CM

## 2018-12-21 DIAGNOSIS — M62.89 PROXIMAL WEAKNESS OF EXTREMITY: Primary | ICD-10-CM

## 2018-12-21 DIAGNOSIS — G20.A1 PD (PARKINSON'S DISEASE): ICD-10-CM

## 2018-12-21 NOTE — TELEPHONE ENCOUNTER
----- Message from Dora Hernandez sent at 12/20/2018  9:16 AM CST -----  Contact: pt  Pt would like to be called back regarding follow up appt need medication changed , afraid and don't know what to do. Pt states she has called many times no return call from Office    Pt can be reached at [phone]

## 2018-12-21 NOTE — TELEPHONE ENCOUNTER
Inquired on the symptoms - complaints of tremors towards the end of day. On further review patient takes sinemet on 10 AM / 7PM - not as recommended TID dosing.     - Start taking sinemet at 10 am / 2 pm / 6 pm   - Continue with vitamin supplementations   - Order PT assistance / patient reported no therapy assistance despite the orders

## 2018-12-21 NOTE — TELEPHONE ENCOUNTER
Patient is due back in clinic in March. Would you like me to schedule her an appointment to discuss her medication?

## 2018-12-21 NOTE — TELEPHONE ENCOUNTER
Clonazepam 0.5 mg take 1-2 at bedtime    Reason for Disposition   Caller has medication question, adult has minor symptoms, caller declines triage, and triager answers question    Protocols used: ST MEDICATION QUESTION CALL-A-AH    Melissa would like a call from Dr. Rick's office in AM please:  Melissa has shaky hands and is wondering if carbidopa-levodopa should be increased. She states she has been trying to reach someone from his office since last week. She describes worrying a lot about her condition and symptoms. She states her psychiatrist was worried about her not sleeping at night due to anxiety and prescribed her clonazepam 0.5 mg 1-2 tablets to take at bedtime. She has been taking those for the past few days and is doing better with sleep. She states her pcp believes she is having anxiety as well. Asked patient when she is supposed to f/u with neurology. She states not until march. Advised if she is worried about it, recommended she call and schedule an appt to be seen sooner (within the next two weeks).

## 2018-12-21 NOTE — TELEPHONE ENCOUNTER
Spoke to the patient. She had questions about physical therapy. PT gave her a call to discuss. I asked the patient to give them a couple of days to respond back.

## 2018-12-21 NOTE — TELEPHONE ENCOUNTER
----- Message from Rosemarie Goldman sent at 12/21/2018  3:33 PM CST -----  Contact: Pt. [phone]  Needs Advice    Reason for call: The patient would like to speak to someone regarding home therapy. Please contact the patient to discuss further.          Communication Preference:PHONE       Additional Information:

## 2019-01-09 ENCOUNTER — TELEPHONE (OUTPATIENT)
Dept: NEUROLOGY | Facility: CLINIC | Age: 74
End: 2019-01-09

## 2019-01-09 NOTE — TELEPHONE ENCOUNTER
----- Message from Bianka Magdalena sent at 1/8/2019  2:40 PM CST -----  Contact: self @ [phone]  Pt is calling for the status of her Home Health Therapy orders.  Pt says she has used [facility].  Pls fax orders to [phone].  Pls call pt.

## 2019-01-10 ENCOUNTER — TELEPHONE (OUTPATIENT)
Dept: NEUROLOGY | Facility: CLINIC | Age: 74
End: 2019-01-10

## 2019-01-10 NOTE — TELEPHONE ENCOUNTER
----- Message from Lauro Wolfe sent at 1/10/2019  9:45 AM CST -----  Needs Advice    Reason for call: Karina is calling to confirm if the doctor wants the pt to complete outpatient PT or home health PT        Communication Preference: Karina Rios w/ [facility] @ [phone]    Additional Information:

## 2019-01-21 ENCOUNTER — TELEPHONE (OUTPATIENT)
Dept: NEUROLOGY | Facility: HOSPITAL | Age: 74
End: 2019-01-21

## 2019-01-21 DIAGNOSIS — G61.0 AIDP (ACUTE INFLAMMATORY DEMYELINATING POLYNEUROPATHY): Primary | ICD-10-CM

## 2019-01-23 ENCOUNTER — TELEPHONE (OUTPATIENT)
Dept: NEUROLOGY | Facility: CLINIC | Age: 74
End: 2019-01-23

## 2019-01-24 NOTE — TELEPHONE ENCOUNTER
----- Message from William Mckay sent at 1/16/2019  2:31 PM CST -----  Contact: Patient @ [phone]  Patient requesting a return call from [facility] regarding the referral for PT, pls call

## 2019-02-14 ENCOUNTER — TELEPHONE (OUTPATIENT)
Dept: NEUROLOGY | Facility: CLINIC | Age: 74
End: 2019-02-14

## 2019-02-14 NOTE — TELEPHONE ENCOUNTER
----- Message from Salma Malik sent at 2/14/2019  3:45 PM CST -----  Contact: pt @ [phone]  Calling to get an order to continue her therapy, says Dr. Rick told her to call if she needed to have additional therapy. please call.

## 2019-02-15 ENCOUNTER — TELEPHONE (OUTPATIENT)
Dept: NEUROLOGY | Facility: CLINIC | Age: 74
End: 2019-02-15

## 2019-02-15 NOTE — TELEPHONE ENCOUNTER
----- Message from Rosemarie Goldman sent at 2/15/2019 11:56 AM CST -----  Contact: Pt.[phone]  Needs Advice    Reason for call The patient would like to speak to someone regarding physical therapy. Please contact the patient to discuss further.          Communication Preference:PHONE     Additional Information:

## 2019-02-18 ENCOUNTER — TELEPHONE (OUTPATIENT)
Dept: NEUROLOGY | Facility: CLINIC | Age: 74
End: 2019-02-18

## 2019-02-18 NOTE — TELEPHONE ENCOUNTER
----- Message from Lauro Wolfe sent at 2/18/2019  3:02 PM CST -----  Patient Returning Call from [facility]    Who Left Message for Patient: [facility]  Communication Preference: [phone]  Additional Information:

## 2019-02-19 ENCOUNTER — TELEPHONE (OUTPATIENT)
Dept: NEUROLOGY | Facility: CLINIC | Age: 74
End: 2019-02-19

## 2019-02-19 NOTE — TELEPHONE ENCOUNTER
----- Message from Bianka Nichols sent at 2/19/2019  2:10 PM CST -----  Contact: self @ [phone]  Pt is calling for the status of her request for orders to continue Home Health Therapy.  Pls call.

## 2019-02-19 NOTE — TELEPHONE ENCOUNTER
Patient advised on yesterday that her message would be sent to the provider. She was advised that the provider was out of town.

## 2019-02-25 ENCOUNTER — TELEPHONE (OUTPATIENT)
Dept: NEUROLOGY | Facility: HOSPITAL | Age: 74
End: 2019-02-25

## 2019-02-26 RX ORDER — RAMELTEON 8 MG/1
8 TABLET ORAL NIGHTLY
Qty: 30 TABLET | Refills: 1 | Status: SHIPPED | OUTPATIENT
Start: 2019-02-26 | End: 2019-10-21 | Stop reason: SDUPTHER

## 2019-02-27 ENCOUNTER — TELEPHONE (OUTPATIENT)
Dept: NEUROLOGY | Facility: CLINIC | Age: 74
End: 2019-02-27

## 2019-03-13 ENCOUNTER — TELEPHONE (OUTPATIENT)
Dept: NEUROLOGY | Facility: CLINIC | Age: 74
End: 2019-03-13

## 2019-03-13 NOTE — TELEPHONE ENCOUNTER
----- Message from Lauro Wolfe sent at 3/11/2019  8:35 AM CDT -----  Patient Requesting Sooner Appointment.     Reason for sooner appt.: resched appt tomorrow 03/12  When is the first available appointment? None available for the year  Communication Preference: [phone]  Additional Information:

## 2019-03-20 ENCOUNTER — TELEPHONE (OUTPATIENT)
Dept: NEUROLOGY | Facility: CLINIC | Age: 74
End: 2019-03-20

## 2019-03-20 NOTE — TELEPHONE ENCOUNTER
----- Message from Lauro Wolfe sent at 3/19/2019  8:00 AM CDT -----  Needs Advice    Reason for call: Pt is asking to speak w/ the doctor directly to reschedule appt today at 2 PM to a different date in the morning        Communication Preference: [phone]    Additional Information:

## 2019-03-21 ENCOUNTER — TELEPHONE (OUTPATIENT)
Dept: NEUROLOGY | Facility: CLINIC | Age: 74
End: 2019-03-21

## 2019-03-21 NOTE — TELEPHONE ENCOUNTER
----- Message from Svetlana Rosario sent at 3/21/2019  9:14 AM CDT -----  Contact: pt   Patient Requesting Sooner Appointment.     Reason for sooner appt.: pt is calling to schedule a fu appt pt missed her appt on 3/19/2019  When is the first available appointment? No appts are coming up to schedule   Communication Preference: can you please call pt at [phone]  Additional Information: none    CLARK

## 2019-03-25 ENCOUNTER — TELEPHONE (OUTPATIENT)
Dept: NEUROLOGY | Facility: CLINIC | Age: 74
End: 2019-03-25

## 2019-03-25 NOTE — TELEPHONE ENCOUNTER
----- Message from Bianka Nichols sent at 3/22/2019  3:32 PM CDT -----  Contact: self @ [phone]  Pt says she is waiting on a return call concerning a f/u appt with Dr Rick.  Pls call.

## 2019-03-25 NOTE — TELEPHONE ENCOUNTER
----- Message from Bianka Nichols sent at 3/25/2019 11:20 AM CDT -----  Contact: self @ [phone]  Pt would like to speak with Dr Rick about her medication.  Pls call to discuss.

## 2019-04-24 ENCOUNTER — OFFICE VISIT (OUTPATIENT)
Dept: NEUROLOGY | Facility: CLINIC | Age: 74
End: 2019-04-24
Payer: MEDICARE

## 2019-04-24 VITALS
HEIGHT: 71 IN | DIASTOLIC BLOOD PRESSURE: 65 MMHG | WEIGHT: 233.69 LBS | SYSTOLIC BLOOD PRESSURE: 135 MMHG | BODY MASS INDEX: 32.72 KG/M2 | HEART RATE: 78 BPM

## 2019-04-24 DIAGNOSIS — R51.9 CHRONIC NONINTRACTABLE HEADACHE, UNSPECIFIED HEADACHE TYPE: ICD-10-CM

## 2019-04-24 DIAGNOSIS — G89.29 CHRONIC NONINTRACTABLE HEADACHE, UNSPECIFIED HEADACHE TYPE: ICD-10-CM

## 2019-04-24 DIAGNOSIS — G61.0 AIDP (ACUTE INFLAMMATORY DEMYELINATING POLYNEUROPATHY): Primary | ICD-10-CM

## 2019-04-24 DIAGNOSIS — K52.9 CHRONIC DIARRHEA OF UNKNOWN ORIGIN: ICD-10-CM

## 2019-04-24 DIAGNOSIS — E51.11 VITAMIN B1 DEFICIENCY NEUROPATHY: ICD-10-CM

## 2019-04-24 PROCEDURE — 99214 PR OFFICE/OUTPT VISIT, EST, LEVL IV, 30-39 MIN: ICD-10-PCS | Mod: S$PBB,GC,, | Performed by: STUDENT IN AN ORGANIZED HEALTH CARE EDUCATION/TRAINING PROGRAM

## 2019-04-24 PROCEDURE — 99999 PR PBB SHADOW E&M-EST. PATIENT-LVL IV: ICD-10-PCS | Mod: PBBFAC,GC,, | Performed by: STUDENT IN AN ORGANIZED HEALTH CARE EDUCATION/TRAINING PROGRAM

## 2019-04-24 PROCEDURE — 99214 OFFICE O/P EST MOD 30 MIN: CPT | Mod: PBBFAC | Performed by: STUDENT IN AN ORGANIZED HEALTH CARE EDUCATION/TRAINING PROGRAM

## 2019-04-24 PROCEDURE — 99214 OFFICE O/P EST MOD 30 MIN: CPT | Mod: S$PBB,GC,, | Performed by: STUDENT IN AN ORGANIZED HEALTH CARE EDUCATION/TRAINING PROGRAM

## 2019-04-24 PROCEDURE — 99999 PR PBB SHADOW E&M-EST. PATIENT-LVL IV: CPT | Mod: PBBFAC,GC,, | Performed by: STUDENT IN AN ORGANIZED HEALTH CARE EDUCATION/TRAINING PROGRAM

## 2019-04-24 RX ORDER — RIBOFLAVIN (VITAMIN B2) 100 MG
400 TABLET ORAL DAILY
Refills: 0 | COMMUNITY
Start: 2019-04-24 | End: 2020-04-14 | Stop reason: SDUPTHER

## 2019-04-25 ENCOUNTER — TELEPHONE (OUTPATIENT)
Dept: NEUROLOGY | Facility: CLINIC | Age: 74
End: 2019-04-25

## 2019-04-25 ENCOUNTER — TELEPHONE (OUTPATIENT)
Dept: PEDIATRIC NEUROLOGY | Facility: CLINIC | Age: 74
End: 2019-04-25

## 2019-04-25 NOTE — TELEPHONE ENCOUNTER
Patient is asking for a prescription for headaches. She states that it was talked about in her visit.

## 2019-04-25 NOTE — TELEPHONE ENCOUNTER
----- Message from Salma Malik sent at 4/25/2019 10:56 AM CDT -----  Needs Advice    Reason for call:asking to have the orders for her home health changed to have home health come to her instead of her having to go out, say she has transportation issues and can't get there. Please call.        Communication Preference:pt @ [phone]    Additional Information:

## 2019-04-25 NOTE — TELEPHONE ENCOUNTER
----- Message from Lauro Wolfe sent at 4/25/2019  9:11 AM CDT -----  Needs Advice     Reason for call: Pt is asking to speak w/ [facility] to check status of headache medication and other medications the doctor was sending to the pharmacy yesterday         Communication Preference: [phone]     Additional Information:

## 2019-04-26 ENCOUNTER — TELEPHONE (OUTPATIENT)
Dept: NEUROLOGY | Facility: CLINIC | Age: 74
End: 2019-04-26

## 2019-04-26 NOTE — TELEPHONE ENCOUNTER
----- Message from Lauro Wolfe sent at 4/26/2019 11:16 AM CDT -----  Needs Advice    Reason for call: Pt is asking to speak w/ [facility] about getting PT order changed to home health        Communication Preference: [phone]    Additional Information:

## 2019-04-29 NOTE — PROGRESS NOTES
Patient Name: Melissa Anand  MRN: [id number]    CC: F/u AIDP    HPI: Melissa Anand is a 73 y.o. 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, parkisons disease  - Interval improvement in LE strength / gait stability / s/p home health PT and clearance   - On sinemet dosing 25/100 to 10 AM / 2PM / 6 PM - with control on disabling tremors / Negative concerns for medication non-compliance / SE related to meds  - Interval bifrontal headaches x 1.5 months / migrainous vs tension type with confounder of medication overuse    - On active B1 / Vit D supplementation     Interval history 9/5/2018:   - Nerve conduction studies of the right upper and bilateral lower extremities revealed absence of motor and sensory responses in the lower extremities. The right ulnar motor response was absent when stimulating proximally. Concentric needle examination of selected right lower extremity muscles demonstrated enlarged MUPs in the right TA muscle. Positive electrophysiologic evidence for a chronic, length-dependent, sensory-motor peripheral neuropathy.   - Unclear etiology / with primary DDx of AIDP for primarily motor involvement & diminished reflexes in LE, with no sensory affect vs primary myopathy.   - Stable strength post improvement after acute management: B/L LE adductors & abductors / knee flexors & extensors / plantar & dorsiflexors 4+/5 >>>> hip extensors 4+/5 >>> hip flexors 4-/5. No asymmetry in strength. Strength is 5/5 in the upper extremities bilaterally without pronator drift / 4/5 in intrinsic muscles of the hand.   - On vitamin B1 / D supplementation with positive trend in levels / With negative cortisol, acth abnormalities and autoimmune studies / F/u Ca  wnl / elevated PTH    - On Parkinson's management with sinmet 25/100 10 AM / 2 PM, however reports of worsening left UE tremor towards the end of the say   - No active PT management, post initial intervention     ROS:   Review of Systems   Constitutional: Negative for weight loss.   HENT: Negative for hearing loss.   Eyes: Negative for blurred vision and double vision.   Respiratory: Negative for shortness of breath and stridor.   Cardiovascular: Negative for chest pain and palpitations.   Gastrointestinal: Negative for nausea, vomiting and constipation.   Genitourinary: Negative for frequency. Negative for urgency.   Musculoskeletal: Negative for joint pain. Negative for myalgias and falls.   Skin: Negative for rash.   Neurological: Negative for dizziness and tremors. Positive for weakness and negative for seizures.   Endo/Heme/Allergies: Does not bruise/bleed easily.   Psychiatric/Behavioral: Postive for memory loss. Negative for depression and hallucinations. The patient is not nervous/anxious.      Past Medical History  Past Medical History:   Diagnosis Date    Anemia     Colitis     hospitalized July 2014    Diabetes mellitus     Encounter for blood transfusion     Goiter     Hypertension     Left hip pain 10/12/14    Syncope        Medications    Current Outpatient Medications:     amitriptyline (ELAVIL) 100 MG tablet, Take 100 mg by mouth every evening., Disp: , Rfl:     carbidopa-levodopa  mg (SINEMET)  mg per tablet, Take 0.5 tablets by mouth 3 (three) times daily. Take 10am and 2pm daily. Contact Neurologist for refill, Disp: 90 tablet, Rfl: 3    cholecalciferol, vitamin D3, 400 unit Cap, Take 2 capsules (800 Units total) by mouth once daily., Disp: , Rfl: 0    gabapentin (NEURONTIN) 300 mg tablet, Take 300 mg by mouth every morning. , Disp: , Rfl:     gabapentin (NEURONTIN) 600 MG tablet, Take 600 mg by mouth every evening., Disp: , Rfl:     hydrocodone-acetaminophen 7.5-325mg  (NORCO) 7.5-325 mg per tablet, Take 1 tablet by mouth every 8 (eight) hours as needed for Pain., Disp: 15 tablet, Rfl: 0    lisinopril (PRINIVIL,ZESTRIL) 40 MG tablet, Take 1 tablet (40 mg total) by mouth once daily., Disp: 30 tablet, Rfl: 0    quetiapine (SEROQUEL) 300 MG Tab, Take 400 mg by mouth every evening. , Disp: , Rfl:     ramelteon (ROZEREM) 8 mg tablet, Take 1 tablet (8 mg total) by mouth every evening., Disp: 30 tablet, Rfl: 1    thiamine 250 MG tablet, Take 1 tablet (250 mg total) by mouth once daily., Disp: 30 tablet, Rfl: 2    tramadol (ULTRAM) 50 mg tablet, Take 50 mg by mouth 2 (two) times daily., Disp: , Rfl:     amLODIPine (NORVASC) 5 MG tablet, Take 1 tablet (5 mg total) by mouth once daily. Contact PCP for refills, Disp: 30 tablet, Rfl: 3    levothyroxine (SYNTHROID) 50 MCG tablet, Take 1 tablet (50 mcg total) by mouth before breakfast., Disp: 30 tablet, Rfl: 11    riboflavin, vitamin B2, (VITAMIN B-2) 100 mg Tab tablet, Take 4 tablets (400 mg total) by mouth once daily., Disp: , Rfl: 0    Allergies  Review of patient's allergies indicates:  No Known Allergies    Social History  Social History     Socioeconomic History    Marital status:      Spouse name: Not on file    Number of children: Not on file    Years of education: Not on file    Highest education level: Not on file   Occupational History    Not on file   Social Needs    Financial resource strain: Not on file    Food insecurity:     Worry: Not on file     Inability: Not on file    Transportation needs:     Medical: Not on file     Non-medical: Not on file   Tobacco Use    Smoking status: Former Smoker     Years: 30.00     Types: Cigarettes     Last attempt to quit: 2014     Years since quittin.9   Substance and Sexual Activity    Alcohol use: Yes     Comment: occassionally     Drug use: No    Sexual activity: Not Currently   Lifestyle    Physical activity:     Days per week: Not on file     Minutes  "per session: Not on file    Stress: Not on file   Relationships    Social connections:     Talks on phone: Not on file     Gets together: Not on file     Attends Holiness service: Not on file     Active member of club or organization: Not on file     Attends meetings of clubs or organizations: Not on file     Relationship status: Not on file   Other Topics Concern    Not on file   Social History Narrative    Not on file       Family History  Family History   Problem Relation Age of Onset    Diabetes Unknown     Coronary artery disease Unknown     Cancer Son         testicular    Cancer Mother        Physical Exam  /65   Pulse 78   Ht 5' 11" (1.803 m)   Wt 106 kg (233 lb 11 oz)   LMP  (LMP Unknown)   BMI 32.59 kg/m²     General appearance: Well-developed, well-groomed.      Neurologic Exam: The patient is awake, alert and oriented. Language is fluent. Recent and remote memory are normal. Attention span and concentration are normal. Fund of knowledge is appropriate.      Cranial nerves: pupils are round and reactive to light and accommodation. Visual fields are full to confrontation. Ocular motility is full in all cardinal positions of gaze. Facial sensation is normal to pinprick and light touch. Facial activation is symmetric. Hearing is normal bilaterally. Palate elevates symmetrically and gag reflex is intact bilaterally. Shoulder elevation is symmetric and full strength bilaterally. Tongue is midline and neck rotation strength is normal bilaterally. Neck range of motion is normal.      Motor examination: B/L LE adductors & abductors / knee flexors & extensors / plantar & dorsiflexors 4+/5 hip extensors 4+/5 >>> hip flexors 4-/5 to 4+ (improvement). No asymmetry in strength. Strength is 5/5 in the upper extremities bilaterally without pronator drift / 4/5 intrinsic muscle of the hand. All extremities demonstrates normal bulk and tone in all four limbs. There are no atrophy or fasciculations. "      Sensory examination is normal to pinprick, proprioception and ?reduced vibration in the upper and lower extremities bilaterally. Romberg is negative.     Deep tendon reflexes are 1 at knees / adductors / ankles with mute toes bilaterally + and 2+ to 3+ in the upper extremities bilaterally.      Gait: Waddling gait / uses cane for ambulation      Coordination: Finger to nose testing is normal in both upper extremities.      General exam  Cardiovascular: regular rate and rhythm with no murmurs, rubs or gallops. There are no carotid or vertebral artery bruits. Pulses in both upper and lower extremities are symmetric. There is no peripheral edema.   Head and neck: no cervical lymphadenopathy      Lab and Test Results    WBC   Date Value Ref Range Status   09/05/2018 4.76 3.90 - 12.70 K/uL Final   05/30/2018 3.71 (L) 3.90 - 12.70 K/uL Final   01/18/2018 2.78 (L) 3.90 - 12.70 K/uL Final     Hemoglobin   Date Value Ref Range Status   09/05/2018 10.1 (L) 12.0 - 16.0 g/dL Final   05/30/2018 10.2 (L) 12.0 - 16.0 g/dL Final   01/18/2018 9.1 (L) 12.0 - 16.0 g/dL Final     Hematocrit   Date Value Ref Range Status   09/05/2018 31.6 (L) 37.0 - 48.5 % Final   05/30/2018 31.9 (L) 37.0 - 48.5 % Final   01/18/2018 28.0 (L) 37.0 - 48.5 % Final     Platelets   Date Value Ref Range Status   09/05/2018 87 (L) 150 - 350 K/uL Final   05/30/2018 164 150 - 350 K/uL Final   01/18/2018 186 150 - 350 K/uL Final     Glucose   Date Value Ref Range Status   09/05/2018 116 (H) 70 - 110 mg/dL Final   05/30/2018 86 70 - 110 mg/dL Final   01/18/2018 99 70 - 110 mg/dL Final     Sodium   Date Value Ref Range Status   09/05/2018 139 136 - 145 mmol/L Final   05/30/2018 138 136 - 145 mmol/L Final   01/18/2018 135 (L) 136 - 145 mmol/L Final     Potassium   Date Value Ref Range Status   09/05/2018 3.9 3.5 - 5.1 mmol/L Final   05/30/2018 3.9 3.5 - 5.1 mmol/L Final   01/18/2018 3.7 3.5 - 5.1 mmol/L Final     Chloride   Date Value Ref Range Status    09/05/2018 105 95 - 110 mmol/L Final   05/30/2018 108 95 - 110 mmol/L Final   01/18/2018 108 95 - 110 mmol/L Final     CO2   Date Value Ref Range Status   09/05/2018 27 23 - 29 mmol/L Final   05/30/2018 23 23 - 29 mmol/L Final   01/18/2018 22 (L) 23 - 29 mmol/L Final     BUN, Bld   Date Value Ref Range Status   09/05/2018 18 8 - 23 mg/dL Final   05/30/2018 24 (H) 8 - 23 mg/dL Final   01/18/2018 14 8 - 23 mg/dL Final     Creatinine   Date Value Ref Range Status   09/05/2018 1.0 0.5 - 1.4 mg/dL Final   05/30/2018 1.0 0.5 - 1.4 mg/dL Final   01/18/2018 0.9 0.5 - 1.4 mg/dL Final     Calcium   Date Value Ref Range Status   09/05/2018 9.1 8.7 - 10.5 mg/dL Final   05/30/2018 8.9 8.7 - 10.5 mg/dL Final   01/18/2018 8.3 (L) 8.7 - 10.5 mg/dL Final     Magnesium   Date Value Ref Range Status   01/09/2018 2.5 1.6 - 2.6 mg/dL Final   01/27/2015 1.8 1.6 - 2.6 mg/dL Final   01/26/2015 1.8 1.6 - 2.6 mg/dL Final     Phosphorus   Date Value Ref Range Status   01/09/2018 2.9 2.7 - 4.5 mg/dL Final   01/27/2015 3.9 2.7 - 4.5 mg/dL Final   01/26/2015 3.4 2.7 - 4.5 mg/dL Final     Alkaline Phosphatase   Date Value Ref Range Status   09/05/2018 72 55 - 135 U/L Final   05/30/2018 73 55 - 135 U/L Final   01/16/2018 55 55 - 135 U/L Final     ALT   Date Value Ref Range Status   09/05/2018 11 10 - 44 U/L Final   05/30/2018 15 10 - 44 U/L Final   01/16/2018 11 10 - 44 U/L Final     AST   Date Value Ref Range Status   09/05/2018 17 10 - 40 U/L Final   05/30/2018 17 10 - 40 U/L Final   01/16/2018 20 10 - 40 U/L Final     Assessment and Plan    -  73 y.o. female with medical history of chronic low back pain / lumbar degenerative disc disease / s/p admission and management ([facility]) on 1/2018 for acute on chronic onset LE weakness with concerns of AIDP, presenting for follow-up  - Nerve conduction studies of the right upper and bilateral lower extremities revealed absence of motor and sensory responses in the lower extremities. The right ulnar motor  response was absent when stimulating proximally. Concentric needle examination of selected right lower extremity muscles demonstrated enlarged MUPs in the right TA muscle. Positive electrophysiologic evidence for a chronic, length-dependent, sensory-motor peripheral neuropathy.   - Unclear etiology / with primary DDx of AIDP for primarily motor involvement & diminished reflexes in LE, with no sensory affect vs primary myopathy.     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 Interval improvement in LE strength / gait stability / s/p home health PT and clearance   - On sinemet dosing 25/100 to 10 AM / 2PM / 6 PM - with control on disabling tremors / Negative concerns for medication non-compliance / SE related to meds  - Interval bifrontal headaches x 1.5 months / migrainous vs tension type with confounder of medication overuse    - On active B1 / Vit D supplementation     Plan:  - Continue sinemet dosing 25/100 to 10 AM / 2PM / 6 PM  - Amb referral to PT eval and management  - Continue B1 / Vit D supplementation/ f/u with PCP for further recs   - Recs B2 for headaches / counseled on the DDx / NSAIDS for abortive therapy / Counseled on medication overuse headache component, maintanance of sleep hygiene, headache diary, dietary hygiene, control of stress   - No further investigations warranted from neurology standpoint at the moment  - Fall precautions / Follow-up in 6 months     Any interval immediate concerns, please call office or seek ER attention.     Geovany Rick MD  Neurology Resident   [facility]  [address] [place] [address]  Pager: [phone]

## 2019-05-01 ENCOUNTER — TELEPHONE (OUTPATIENT)
Dept: NEUROLOGY | Facility: CLINIC | Age: 74
End: 2019-05-01

## 2019-05-01 NOTE — TELEPHONE ENCOUNTER
----- Message from Salma Malik sent at 4/30/2019  9:11 AM CDT -----  Needs Advice     Reason for call: Pt is asking to speak w/ someone in Dr. Rick's office about getting PT order changed to home health        Communication Preference: [phone]     Additional Information

## 2019-05-07 ENCOUNTER — TELEPHONE (OUTPATIENT)
Dept: NEUROLOGY | Facility: CLINIC | Age: 74
End: 2019-05-07

## 2019-05-07 NOTE — TELEPHONE ENCOUNTER
----- Message from Mahamed Lopes sent at 5/7/2019  4:59 PM CDT -----  Contact: self  Pt called about after care and would like a callback.          Pt callback number [phone]

## 2019-05-21 ENCOUNTER — TELEPHONE (OUTPATIENT)
Dept: NEUROLOGY | Facility: HOSPITAL | Age: 74
End: 2019-05-21

## 2019-05-31 ENCOUNTER — TELEPHONE (OUTPATIENT)
Dept: NEUROLOGY | Facility: HOSPITAL | Age: 74
End: 2019-05-31

## 2019-05-31 RX ORDER — CARBIDOPA AND LEVODOPA 25; 100 MG/1; MG/1
0.5 TABLET ORAL 3 TIMES DAILY
Qty: 90 TABLET | Refills: 3 | Status: SHIPPED | OUTPATIENT
Start: 2019-05-31 | End: 2019-09-12 | Stop reason: SDUPTHER

## 2019-05-31 NOTE — TELEPHONE ENCOUNTER
----- Message from Lauro Wolfe sent at 5/31/2019 11:21 AM CDT -----  Rx Refill/Request     Is this a Refill or New Rx: Refill  Rx Name and Strength: carbidopa-levodopa  mg (SINEMET)  mg per tablet   Preferred Pharmacy with phone number: see below  Communication Preference: [phone]  Additional Information: Pt is asking to speak w/ Lima regarding the refill    [facility] [address] AT [address] [address]  Phone: [phone] Fax: [phone]

## 2019-06-10 ENCOUNTER — TELEPHONE (OUTPATIENT)
Dept: NEUROLOGY | Facility: CLINIC | Age: 74
End: 2019-06-10

## 2019-06-10 DIAGNOSIS — G61.0 AIDP (ACUTE INFLAMMATORY DEMYELINATING POLYNEUROPATHY): Primary | ICD-10-CM

## 2019-06-10 NOTE — TELEPHONE ENCOUNTER
----- Message from Salma Malik sent at 6/10/2019 10:58 AM CDT -----  Contact: pt @ [phone]  Calling to speak with Dr. Rick regarding her therapy. Please call.

## 2019-06-25 ENCOUNTER — TELEPHONE (OUTPATIENT)
Dept: NEUROLOGY | Facility: CLINIC | Age: 74
End: 2019-06-25

## 2019-06-25 NOTE — TELEPHONE ENCOUNTER
----- Message from Bianka Nichols sent at 6/25/2019  2:45 PM CDT -----  Contact: self @ [phone]  Pt says Dr Rick is suppose to order home health for her but she has not heard from or seen anyone yet.  Pls call.

## 2019-06-27 ENCOUNTER — TELEPHONE (OUTPATIENT)
Dept: NEUROLOGY | Facility: CLINIC | Age: 74
End: 2019-06-27

## 2019-06-27 NOTE — TELEPHONE ENCOUNTER
----- Message from Bianka Nichols sent at 6/27/2019  3:39 PM CDT -----  Contact: self @ [phone]  Pt is calling for the status of her request for orders to continue Home Health.  Pls call.

## 2019-07-01 ENCOUNTER — TELEPHONE (OUTPATIENT)
Dept: NEUROLOGY | Facility: CLINIC | Age: 74
End: 2019-07-01

## 2019-07-01 NOTE — TELEPHONE ENCOUNTER
----- Message from Lauro Wolfe sent at 7/1/2019 10:41 AM CDT -----  Needs Advice    Reason for call: Pt is calling to confirm if she'll need to see her PCP or if she can schedule therapy        Communication Preference: [phone]    Additional Information:

## 2019-07-02 ENCOUNTER — TELEPHONE (OUTPATIENT)
Dept: NEUROLOGY | Facility: CLINIC | Age: 74
End: 2019-07-02

## 2019-07-02 NOTE — TELEPHONE ENCOUNTER
----- Message from Dylan Lakhani sent at 7/1/2019  5:38 PM CDT -----  Contact: self  Att Lima chao is requesting a call back at [phone] concerning medication

## 2019-07-03 ENCOUNTER — TELEPHONE (OUTPATIENT)
Dept: NEUROLOGY | Facility: CLINIC | Age: 74
End: 2019-07-03

## 2019-07-03 NOTE — TELEPHONE ENCOUNTER
----- Message from Bianka Nichols sent at 7/3/2019  4:12 PM CDT -----  Contact: Karina Garsia [facility]    [phone]  Calling to request orders for Home Health Physical Therapy.  Pls call or fax to [phone].

## 2019-07-09 ENCOUNTER — TELEPHONE (OUTPATIENT)
Dept: NEUROLOGY | Facility: CLINIC | Age: 74
End: 2019-07-09

## 2019-07-09 NOTE — TELEPHONE ENCOUNTER
----- Message from Natasha Sanchez sent at 7/9/2019 11:23 AM CDT -----  Contact:   Pt    [phone]  Needs Advice    Reason for call:          Communication Preference:   Phone     Additional Information:   Pt calling to get orders submitted to start home health services, Pt  Has complaints of really bad back pain..  Call the pt back to discuss more

## 2019-07-10 ENCOUNTER — TELEPHONE (OUTPATIENT)
Dept: NEUROLOGY | Facility: CLINIC | Age: 74
End: 2019-07-10

## 2019-07-10 NOTE — TELEPHONE ENCOUNTER
----- Message from Lauro Wolfe sent at 7/10/2019  8:32 AM CDT -----  Needs Advice    Reason for call: Pt is asking to speak w/ the doctor directly today about starting home health services        Communication Preference: [phone]    Additional Information:

## 2019-07-17 ENCOUNTER — TELEPHONE (OUTPATIENT)
Dept: NEUROLOGY | Facility: CLINIC | Age: 74
End: 2019-07-17

## 2019-07-17 NOTE — TELEPHONE ENCOUNTER
----- Message from Bianka Nichols sent at 7/16/2019  3:45 PM CDT -----  Contact: self @ [phone]  Pt is calling to let Dr Rick know that she had a fall due to her leg being weak.  Pt says her back has gotten worse even before her fall.  Pt says her tremors are worse as well.  Pls call to discuss asap.

## 2019-07-19 ENCOUNTER — TELEPHONE (OUTPATIENT)
Dept: NEUROLOGY | Facility: CLINIC | Age: 74
End: 2019-07-19

## 2019-07-22 ENCOUNTER — TELEPHONE (OUTPATIENT)
Dept: NEUROLOGY | Facility: CLINIC | Age: 74
End: 2019-07-22

## 2019-07-22 NOTE — TELEPHONE ENCOUNTER
----- Message from Bianka Nichols sent at 7/22/2019  1:12 PM CDT -----  Contact: self @ [phone]  Pt says she has been leaving messages a week.  Pt is calling for the status of her home health physical therapy orders.  Pls call.

## 2019-08-13 ENCOUNTER — TELEPHONE (OUTPATIENT)
Dept: NEUROLOGY | Facility: CLINIC | Age: 74
End: 2019-08-13

## 2019-08-13 NOTE — TELEPHONE ENCOUNTER
----- Message from Bianka Nichols sent at 8/13/2019  8:17 AM CDT -----  Contact: self @ [phone]  Pt says she has the shakes real bad and she is not feeling well.  She says she has been trying to reach Dr Rick for a month now.  Pls call asap.

## 2019-08-26 ENCOUNTER — TELEPHONE (OUTPATIENT)
Dept: NEUROLOGY | Facility: CLINIC | Age: 74
End: 2019-08-26

## 2019-08-26 NOTE — TELEPHONE ENCOUNTER
----- Message from Rosemarie Goldman sent at 8/26/2019 12:32 PM CDT -----  Contact: Pt. [phone]  The patient would like to speak to someone regarding her upcoming appointment. She said she's been calling for months and never receive call backs. She don't want to wait that long to be seen. Please contact the patient to discuss further.

## 2019-08-27 ENCOUNTER — TELEPHONE (OUTPATIENT)
Dept: NEUROLOGY | Facility: CLINIC | Age: 74
End: 2019-08-27

## 2019-08-27 NOTE — TELEPHONE ENCOUNTER
----- Message from Lauro Wolfe sent at 8/27/2019 11:45 AM CDT -----  Contact: pt @ [phone]  Pt is asking to speak w/ the doctor regarding increasing medication. Pt states she is not sleeping. Pt is asking to speak w/ asap today.

## 2019-08-28 ENCOUNTER — TELEPHONE (OUTPATIENT)
Dept: NEUROLOGY | Facility: HOSPITAL | Age: 74
End: 2019-08-28

## 2019-08-29 ENCOUNTER — TELEPHONE (OUTPATIENT)
Dept: NEUROLOGY | Facility: CLINIC | Age: 74
End: 2019-08-29

## 2019-08-29 NOTE — TELEPHONE ENCOUNTER
----- Message from Bianka Nichols sent at 8/29/2019  2:16 PM CDT -----  Contact: self @ [phone]  Pt says she saw her PCP today and was advised to contact Dr Rick for 3 weeks now.  Pls call.

## 2019-09-02 ENCOUNTER — TELEPHONE (OUTPATIENT)
Dept: NEUROLOGY | Facility: HOSPITAL | Age: 74
End: 2019-09-02

## 2019-09-10 ENCOUNTER — LAB VISIT (OUTPATIENT)
Dept: LAB | Facility: HOSPITAL | Age: 74
End: 2019-09-10
Payer: MEDICARE

## 2019-09-10 ENCOUNTER — OFFICE VISIT (OUTPATIENT)
Dept: NEUROLOGY | Facility: CLINIC | Age: 74
End: 2019-09-10
Payer: MEDICARE

## 2019-09-10 VITALS
SYSTOLIC BLOOD PRESSURE: 107 MMHG | HEART RATE: 68 BPM | WEIGHT: 224.63 LBS | HEIGHT: 71 IN | DIASTOLIC BLOOD PRESSURE: 65 MMHG | BODY MASS INDEX: 31.45 KG/M2

## 2019-09-10 DIAGNOSIS — M89.9 DISORDER OF BONE: ICD-10-CM

## 2019-09-10 DIAGNOSIS — G20.A1 PD (PARKINSON'S DISEASE): Primary | ICD-10-CM

## 2019-09-10 DIAGNOSIS — G20.A1 PD (PARKINSON'S DISEASE): ICD-10-CM

## 2019-09-10 DIAGNOSIS — M62.89 PROXIMAL WEAKNESS OF EXTREMITY: ICD-10-CM

## 2019-09-10 DIAGNOSIS — G61.0 AIDP (ACUTE INFLAMMATORY DEMYELINATING POLYNEUROPATHY): ICD-10-CM

## 2019-09-10 LAB
25(OH)D3+25(OH)D2 SERPL-MCNC: 17 NG/ML (ref 30–96)
ALBUMIN SERPL BCP-MCNC: 3.7 G/DL (ref 3.5–5.2)
ALP SERPL-CCNC: 59 U/L (ref 55–135)
ALT SERPL W/O P-5'-P-CCNC: 7 U/L (ref 10–44)
ANION GAP SERPL CALC-SCNC: 7 MMOL/L (ref 8–16)
AST SERPL-CCNC: 12 U/L (ref 10–40)
BASOPHILS # BLD AUTO: 0.03 K/UL (ref 0–0.2)
BASOPHILS NFR BLD: 0.7 % (ref 0–1.9)
BILIRUB SERPL-MCNC: 0.7 MG/DL (ref 0.1–1)
BUN SERPL-MCNC: 18 MG/DL (ref 8–23)
CALCIUM SERPL-MCNC: 8.9 MG/DL (ref 8.7–10.5)
CHLORIDE SERPL-SCNC: 110 MMOL/L (ref 95–110)
CO2 SERPL-SCNC: 23 MMOL/L (ref 23–29)
CREAT SERPL-MCNC: 1 MG/DL (ref 0.5–1.4)
DIFFERENTIAL METHOD: ABNORMAL
EOSINOPHIL # BLD AUTO: 0.2 K/UL (ref 0–0.5)
EOSINOPHIL NFR BLD: 4 % (ref 0–8)
ERYTHROCYTE [DISTWIDTH] IN BLOOD BY AUTOMATED COUNT: 13.8 % (ref 11.5–14.5)
EST. GFR  (AFRICAN AMERICAN): >60 ML/MIN/1.73 M^2
EST. GFR  (NON AFRICAN AMERICAN): 56 ML/MIN/1.73 M^2
GLUCOSE SERPL-MCNC: 96 MG/DL (ref 70–110)
HCT VFR BLD AUTO: 31.8 % (ref 37–48.5)
HGB BLD-MCNC: 9.6 G/DL (ref 12–16)
IMM GRANULOCYTES # BLD AUTO: 0.02 K/UL (ref 0–0.04)
IMM GRANULOCYTES NFR BLD AUTO: 0.5 % (ref 0–0.5)
LYMPHOCYTES # BLD AUTO: 1.4 K/UL (ref 1–4.8)
LYMPHOCYTES NFR BLD: 32.5 % (ref 18–48)
MCH RBC QN AUTO: 32.1 PG (ref 27–31)
MCHC RBC AUTO-ENTMCNC: 30.2 G/DL (ref 32–36)
MCV RBC AUTO: 106 FL (ref 82–98)
MONOCYTES # BLD AUTO: 0.5 K/UL (ref 0.3–1)
MONOCYTES NFR BLD: 11.6 % (ref 4–15)
NEUTROPHILS # BLD AUTO: 2.2 K/UL (ref 1.8–7.7)
NEUTROPHILS NFR BLD: 50.7 % (ref 38–73)
NRBC BLD-RTO: 0 /100 WBC
PLATELET # BLD AUTO: 178 K/UL (ref 150–350)
PMV BLD AUTO: 10.5 FL (ref 9.2–12.9)
POTASSIUM SERPL-SCNC: 3.8 MMOL/L (ref 3.5–5.1)
PROT SERPL-MCNC: 7.9 G/DL (ref 6–8.4)
RBC # BLD AUTO: 2.99 M/UL (ref 4–5.4)
SODIUM SERPL-SCNC: 140 MMOL/L (ref 136–145)
WBC # BLD AUTO: 4.24 K/UL (ref 3.9–12.7)

## 2019-09-10 PROCEDURE — 99213 OFFICE O/P EST LOW 20 MIN: CPT | Mod: PBBFAC | Performed by: STUDENT IN AN ORGANIZED HEALTH CARE EDUCATION/TRAINING PROGRAM

## 2019-09-10 PROCEDURE — 99999 PR PBB SHADOW E&M-EST. PATIENT-LVL III: ICD-10-PCS | Mod: PBBFAC,GC,, | Performed by: STUDENT IN AN ORGANIZED HEALTH CARE EDUCATION/TRAINING PROGRAM

## 2019-09-10 PROCEDURE — 99999 PR PBB SHADOW E&M-EST. PATIENT-LVL III: CPT | Mod: PBBFAC,GC,, | Performed by: STUDENT IN AN ORGANIZED HEALTH CARE EDUCATION/TRAINING PROGRAM

## 2019-09-10 PROCEDURE — 36415 COLL VENOUS BLD VENIPUNCTURE: CPT

## 2019-09-10 PROCEDURE — 84425 ASSAY OF VITAMIN B-1: CPT

## 2019-09-10 PROCEDURE — 85025 COMPLETE CBC W/AUTO DIFF WBC: CPT

## 2019-09-10 PROCEDURE — 82306 VITAMIN D 25 HYDROXY: CPT

## 2019-09-10 PROCEDURE — 80053 COMPREHEN METABOLIC PANEL: CPT

## 2019-09-10 PROCEDURE — 99214 OFFICE O/P EST MOD 30 MIN: CPT | Mod: S$PBB,GC,, | Performed by: STUDENT IN AN ORGANIZED HEALTH CARE EDUCATION/TRAINING PROGRAM

## 2019-09-10 PROCEDURE — 99214 PR OFFICE/OUTPT VISIT, EST, LEVL IV, 30-39 MIN: ICD-10-PCS | Mod: S$PBB,GC,, | Performed by: STUDENT IN AN ORGANIZED HEALTH CARE EDUCATION/TRAINING PROGRAM

## 2019-09-10 RX ORDER — GABAPENTIN 300 MG/1
900 CAPSULE ORAL 3 TIMES DAILY
Qty: 270 CAPSULE | Refills: 5 | Status: SHIPPED | OUTPATIENT
Start: 2019-09-10 | End: 2019-10-21 | Stop reason: SDUPTHER

## 2019-09-11 ENCOUNTER — TELEPHONE (OUTPATIENT)
Dept: NEUROLOGY | Facility: CLINIC | Age: 74
End: 2019-09-11

## 2019-09-11 NOTE — TELEPHONE ENCOUNTER
----- Message from Salma Mlaik sent at 9/11/2019  9:26 AM CDT -----  Contact: pt @ [phone]  Asking to speak with someone in Dr. Turner's office regarding a walker, says she needs a new walker. Please call.

## 2019-09-12 ENCOUNTER — TELEPHONE (OUTPATIENT)
Dept: NEUROLOGY | Facility: CLINIC | Age: 74
End: 2019-09-12

## 2019-09-12 RX ORDER — CARBIDOPA AND LEVODOPA 25; 100 MG/1; MG/1
0.5 TABLET ORAL 4 TIMES DAILY
Qty: 90 TABLET | Refills: 5 | Status: SHIPPED | OUTPATIENT
Start: 2019-09-12 | End: 2019-10-21 | Stop reason: SDUPTHER

## 2019-09-12 NOTE — TELEPHONE ENCOUNTER
----- Message from Natasha Sanchez sent at 9/12/2019 11:53 AM CDT -----  Contact:     Pt  [phone]  Pt  Called stated that she's not feeling any better since last visit in the clinic,  Pt stated that she has a very bad headache .. Pt wants to know what can she take..  Give the pt a call back to discuss

## 2019-09-12 NOTE — PROGRESS NOTES
Patient Name: Melissa Anand  MRN: [id number]    CC: F/u AIDP    HPI: Melissa Anand is a 74 y.o. 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, parkisons disease  - Interval improvement in LE strength / gait stability / s/p home health PT and clearance   - On sinemet dosing 25/100 to 10 AM / 2PM / 6 PM - with control on disabling tremors / Negative concerns for medication non-compliance / SE related to meds  - Interval bifrontal headaches x 1.5 months / migrainous vs tension type with confounder of medication overuse    - On active B1 / Vit D supplementation     Interval history 9/5/2018:   - Nerve conduction studies of the right upper and bilateral lower extremities revealed absence of motor and sensory responses in the lower extremities. The right ulnar motor response was absent when stimulating proximally. Concentric needle examination of selected right lower extremity muscles demonstrated enlarged MUPs in the right TA muscle. Positive electrophysiologic evidence for a chronic, length-dependent, sensory-motor peripheral neuropathy.   - Unclear etiology / with primary DDx of AIDP for primarily motor involvement & diminished  reflexes in LE, with no sensory affect vs primary myopathy.   - Stable strength post improvement after acute management: B/L LE adductors & abductors / knee flexors & extensors / plantar & dorsiflexors 4+/5 >>>> hip extensors 4+/5 >>> hip flexors 4-/5. No asymmetry in strength. Strength is 5/5 in the upper extremities bilaterally without pronator drift / 4/5 in intrinsic muscles of the hand.   - On vitamin B1 / D supplementation with positive trend in levels / With negative cortisol, acth abnormalities and autoimmune studies / F/u Ca wnl / elevated PTH    - On Parkinson's management with sinmet 25/100 10 AM / 2 PM, however reports of worsening left UE tremor towards the end of the say   - No active PT management, post initial intervention     ROS:   Review of Systems   Constitutional: Negative for weight loss.   HENT: Negative for hearing loss.   Eyes: Negative for blurred vision and double vision.   Respiratory: Negative for shortness of breath and stridor.   Cardiovascular: Negative for chest pain and palpitations.   Gastrointestinal: Negative for nausea, vomiting and constipation.   Genitourinary: Negative for frequency. Negative for urgency.   Musculoskeletal: Negative for joint pain. Back pain + Negative for myalgias and falls.   Skin: Negative for rash.   Neurological: Negative for dizziness and tremors. Positive for weakness and negative for seizures.   Endo/Heme/Allergies: Does not bruise/bleed easily.   Psychiatric/Behavioral: Postive for memory loss. Negative for depression and hallucinations. The patient is not nervous/anxious.      Past Medical History  Past Medical History:   Diagnosis Date    Anemia     Colitis     hospitalized July 2014    Diabetes mellitus     Encounter for blood transfusion     Goiter     Hypertension     Left hip pain 10/12/14    Syncope        Medications    Current Outpatient Medications:     amitriptyline (ELAVIL) 100 MG tablet, Take 100 mg by mouth every evening.,  Disp: , Rfl:     carbidopa-levodopa  mg (SINEMET)  mg per tablet, Take 0.5 tablets by mouth 3 (three) times daily. Take 10am and 2pm daily. Contact Neurologist for refill, Disp: 90 tablet, Rfl: 3    cholecalciferol, vitamin D3, 400 unit Cap, Take 2 capsules (800 Units total) by mouth once daily., Disp: , Rfl: 0    hydrocodone-acetaminophen 7.5-325mg (NORCO) 7.5-325 mg per tablet, Take 1 tablet by mouth every 8 (eight) hours as needed for Pain., Disp: 15 tablet, Rfl: 0    lisinopril (PRINIVIL,ZESTRIL) 40 MG tablet, Take 1 tablet (40 mg total) by mouth once daily., Disp: 30 tablet, Rfl: 0    quetiapine (SEROQUEL) 300 MG Tab, Take 400 mg by mouth every evening. , Disp: , Rfl:     ramelteon (ROZEREM) 8 mg tablet, Take 1 tablet (8 mg total) by mouth every evening., Disp: 30 tablet, Rfl: 1    riboflavin, vitamin B2, (VITAMIN B-2) 100 mg Tab tablet, Take 4 tablets (400 mg total) by mouth once daily., Disp: , Rfl: 0    thiamine 250 MG tablet, Take 1 tablet (250 mg total) by mouth once daily., Disp: 30 tablet, Rfl: 2    tramadol (ULTRAM) 50 mg tablet, Take 50 mg by mouth 2 (two) times daily., Disp: , Rfl:     amLODIPine (NORVASC) 5 MG tablet, Take 1 tablet (5 mg total) by mouth once daily. Contact PCP for refills, Disp: 30 tablet, Rfl: 3    gabapentin (NEURONTIN) 300 MG capsule, Take 3 capsules (900 mg total) by mouth 3 (three) times daily., Disp: 270 capsule, Rfl: 5    levothyroxine (SYNTHROID) 50 MCG tablet, Take 1 tablet (50 mcg total) by mouth before breakfast., Disp: 30 tablet, Rfl: 11    Allergies  Review of patient's allergies indicates:  No Known Allergies    Social History  Social History     Socioeconomic History    Marital status:      Spouse name: Not on file    Number of children: Not on file    Years of education: Not on file    Highest education level: Not on file   Occupational History    Not on file   Social Needs    Financial resource strain: Not on file    Food  "insecurity:     Worry: Not on file     Inability: Not on file    Transportation needs:     Medical: Not on file     Non-medical: Not on file   Tobacco Use    Smoking status: Former Smoker     Years: 30.00     Types: Cigarettes     Last attempt to quit: 2014     Years since quittin.3   Substance and Sexual Activity    Alcohol use: Yes     Comment: occassionally     Drug use: No    Sexual activity: Not Currently   Lifestyle    Physical activity:     Days per week: Not on file     Minutes per session: Not on file    Stress: Not on file   Relationships    Social connections:     Talks on phone: Not on file     Gets together: Not on file     Attends [employer]ical service: Not on file     Active member of club or organization: Not on file     Attends meetings of clubs or organizations: Not on file     Relationship status: Not on file   Other Topics Concern    Not on file   Social History Narrative    Not on file       Family History  Family History   Problem Relation Age of Onset    Diabetes Unknown     Coronary artery disease Unknown     Cancer Son         testicular    Cancer Mother        Physical Exam  /65   Pulse 68   Ht 5' 11" (1.803 m)   Wt 101.9 kg (224 lb 10.4 oz)   LMP  (LMP Unknown)   BMI 31.33 kg/m²     General appearance: Well-developed, well-groomed.      Neurologic Exam: The patient is awake, alert and oriented. Language is fluent. Recent and remote memory are normal. Attention span and concentration are normal. Fund of knowledge is appropriate.      Cranial nerves: pupils are round and reactive to light and accommodation. Visual fields are full to confrontation. Ocular motility is full in all cardinal positions of gaze. Facial sensation is normal to pinprick and light touch. Facial activation is symmetric. Hearing is normal bilaterally. Palate elevates symmetrically and gag reflex is intact bilaterally. Shoulder elevation is symmetric and full strength bilaterally. Tongue is " midline and neck rotation strength is normal bilaterally. Neck range of motion is normal.      Motor examination: B/L LE adductors & abductors / knee flexors & extensors / plantar & dorsiflexors 4+/5 hip extensors 4+/5 >>> hip flexors 4+/5 from 4+ (improvement). No asymmetry in strength. Strength is 5/5 in the upper extremities bilaterally without pronator drift / 4/5 intrinsic muscle of the hand. All extremities demonstrates normal bulk and tone in all four limbs. There are no atrophy or fasciculations.      Sensory examination is normal to pinprick, proprioception and ?reduced vibration in the upper and lower extremities bilaterally. Romberg is negative.     Deep tendon reflexes are 1 at knees / adductors / ankles with mute toes bilaterally + and 2+ to 3+ in the upper extremities bilaterally.      Gait: Waddling gait / uses cane for ambulation      Coordination: Finger to nose testing is normal in both upper extremities.      General exam  Cardiovascular: regular rate and rhythm with no murmurs, rubs or gallops. There are no carotid or vertebral artery bruits. Pulses in both upper and lower extremities are symmetric. There is no peripheral edema.   Head and neck: no cervical lymphadenopathy      Lab and Test Results    WBC   Date Value Ref Range Status   09/10/2019 4.24 3.90 - 12.70 K/uL Final   09/05/2018 4.76 3.90 - 12.70 K/uL Final   05/30/2018 3.71 (L) 3.90 - 12.70 K/uL Final     Hemoglobin   Date Value Ref Range Status   09/10/2019 9.6 (L) 12.0 - 16.0 g/dL Final   09/05/2018 10.1 (L) 12.0 - 16.0 g/dL Final   05/30/2018 10.2 (L) 12.0 - 16.0 g/dL Final     Hematocrit   Date Value Ref Range Status   09/10/2019 31.8 (L) 37.0 - 48.5 % Final   09/05/2018 31.6 (L) 37.0 - 48.5 % Final   05/30/2018 31.9 (L) 37.0 - 48.5 % Final     Platelets   Date Value Ref Range Status   09/10/2019 178 150 - 350 K/uL Final   09/05/2018 87 (L) 150 - 350 K/uL Final   05/30/2018 164 150 - 350 K/uL Final     Glucose   Date Value Ref  Range Status   09/10/2019 96 70 - 110 mg/dL Final   09/05/2018 116 (H) 70 - 110 mg/dL Final   05/30/2018 86 70 - 110 mg/dL Final     Sodium   Date Value Ref Range Status   09/10/2019 140 136 - 145 mmol/L Final   09/05/2018 139 136 - 145 mmol/L Final   05/30/2018 138 136 - 145 mmol/L Final     Potassium   Date Value Ref Range Status   09/10/2019 3.8 3.5 - 5.1 mmol/L Final   09/05/2018 3.9 3.5 - 5.1 mmol/L Final   05/30/2018 3.9 3.5 - 5.1 mmol/L Final     Chloride   Date Value Ref Range Status   09/10/2019 110 95 - 110 mmol/L Final   09/05/2018 105 95 - 110 mmol/L Final   05/30/2018 108 95 - 110 mmol/L Final     CO2   Date Value Ref Range Status   09/10/2019 23 23 - 29 mmol/L Final   09/05/2018 27 23 - 29 mmol/L Final   05/30/2018 23 23 - 29 mmol/L Final     BUN, Bld   Date Value Ref Range Status   09/10/2019 18 8 - 23 mg/dL Final   09/05/2018 18 8 - 23 mg/dL Final   05/30/2018 24 (H) 8 - 23 mg/dL Final     Creatinine   Date Value Ref Range Status   09/10/2019 1.0 0.5 - 1.4 mg/dL Final   09/05/2018 1.0 0.5 - 1.4 mg/dL Final   05/30/2018 1.0 0.5 - 1.4 mg/dL Final     Calcium   Date Value Ref Range Status   09/10/2019 8.9 8.7 - 10.5 mg/dL Final   09/05/2018 9.1 8.7 - 10.5 mg/dL Final   05/30/2018 8.9 8.7 - 10.5 mg/dL Final     Magnesium   Date Value Ref Range Status   01/09/2018 2.5 1.6 - 2.6 mg/dL Final   01/27/2015 1.8 1.6 - 2.6 mg/dL Final   01/26/2015 1.8 1.6 - 2.6 mg/dL Final     Phosphorus   Date Value Ref Range Status   01/09/2018 2.9 2.7 - 4.5 mg/dL Final   01/27/2015 3.9 2.7 - 4.5 mg/dL Final   01/26/2015 3.4 2.7 - 4.5 mg/dL Final     Alkaline Phosphatase   Date Value Ref Range Status   09/10/2019 59 55 - 135 U/L Final   09/05/2018 72 55 - 135 U/L Final   05/30/2018 73 55 - 135 U/L Final     ALT   Date Value Ref Range Status   09/10/2019 7 (L) 10 - 44 U/L Final   09/05/2018 11 10 - 44 U/L Final   05/30/2018 15 10 - 44 U/L Final     AST   Date Value Ref Range Status   09/10/2019 12 10 - 40 U/L Final   09/05/2018  17 10 - 40 U/L Final   05/30/2018 17 10 - 40 U/L Final     Assessment and Plan    -  73 y.o. female with medical history of chronic low back pain / lumbar degenerative disc disease / s/p admission and management ([facility]) on 1/2018 for acute on chronic onset LE weakness with concerns of AIDP, presenting for follow-up  - Nerve conduction studies of the right upper and bilateral lower extremities revealed absence of motor and sensory responses in the lower extremities. The right ulnar motor response was absent when stimulating proximally. Concentric needle examination of selected right lower extremity muscles demonstrated enlarged MUPs in the right TA muscle. Positive electrophysiologic evidence for a chronic, length-dependent, sensory-motor peripheral neuropathy.   - Unclear etiology / with primary DDx of AIDP for primarily motor involvement & diminished reflexes in LE, with no sensory affect vs primary myopathy.     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, parkisons disease  - Ambulates using cane / No falls / Reduced mobility - confounders > primary disease; with regards to back pain, poor pain control & B/L foot pain (outside podiatrist f/u) / On percocet & neurontin 300 mg TID   - On sinemet dosing 25/100 6 AM 10 AM / 2PM / 6 PM - with control on disabling tremors / Negative concerns for medication non-compliance / SE related to meds  - On active B1 / Vit D supplementation     Plan:  - Continue sinemet dosing 25/100 6AM / 10 AM / 2PM / 6 PM  - Control L/T DDD related pain: Increase neurontin to 900 mg TID gradually, as tolerated / Fu with primary back surgeon & podiatrist as recommended   - PT eval and management as recommende   - Continue B1 / Vit D supplementation/ f/u with PCP for further recs - Update labs cbc, cmp, b1, vit D  - Continue B2 for headaches / counseled on the DDx / NSAIDS for abortive therapy / Counseled on medication overuse headache component, maintanance of sleep  hygiene, headache diary, dietary hygiene, control of stress   - No further investigations warranted from neurology standpoint at the moment  - Fall precautions / Follow-up in 6 months     Any interval immediate concerns, please call office or seek ER attention.     Geovany Rick MD  Neurology Resident   [facility]  [address] [place] [address]  Pager: [phone]

## 2019-09-18 LAB — VIT B1 BLD-MCNC: 75 UG/L (ref 38–122)

## 2019-09-18 NOTE — PROGRESS NOTES
I have reviewed the history and physical, assessments, and plan, I concur with her/his documentation of Melissa Anand. Patient was seen and examined with the resident.    Shanle Starkey MD  General Neurology Staff  [facility]

## 2019-09-27 ENCOUNTER — TELEPHONE (OUTPATIENT)
Dept: NEUROLOGY | Facility: CLINIC | Age: 74
End: 2019-09-27

## 2019-10-01 ENCOUNTER — TELEPHONE (OUTPATIENT)
Dept: NEUROLOGY | Facility: CLINIC | Age: 74
End: 2019-10-01

## 2019-10-01 NOTE — TELEPHONE ENCOUNTER
----- Message from Lauro Wolfe sent at 10/1/2019  2:19 PM CDT -----  Contact: pt @ [phone]  Pt is calling to confirm status of home health care

## 2019-10-03 ENCOUNTER — TELEPHONE (OUTPATIENT)
Dept: NEUROLOGY | Facility: CLINIC | Age: 74
End: 2019-10-03

## 2019-10-03 NOTE — TELEPHONE ENCOUNTER
----- Message from William Mckay sent at 10/3/2019  3:33 PM CDT -----  Contact: Patient @ [phone]  Patient requesting a return call regarding extending the home therapy, shadi call

## 2019-10-10 ENCOUNTER — TELEPHONE (OUTPATIENT)
Dept: NEUROLOGY | Facility: CLINIC | Age: 74
End: 2019-10-10

## 2019-10-10 NOTE — TELEPHONE ENCOUNTER
----- Message from Aric Almanzar sent at 10/10/2019 10:17 AM CDT -----  Contact: patient   Please call pt at [phone]    Patient has medical questions (refused to give details)    Thank you  
Alert-The patient is alert, awake and responds to voice. The patient is oriented to time, place, and person. The triage nurse is able to obtain subjective information.

## 2019-10-21 NOTE — TELEPHONE ENCOUNTER
----- Message from Salma Malik sent at 10/21/2019  3:45 PM CDT -----  Rx Refill/Request     Is this a Refill or New Rx:  Refills    Rx Name and Strength:  carbidopa-levodopa  mg (SINEMET)  mg per tablet, gabapentin (NEURONTIN) 300 MG capsule and ramelteon (ROZEREM) 8 mg tablet    Preferred Pharmacy with phone number:     [id number] [address] [address]  Phone: [phone] Fax: [phone]    Communication Preference:pt @ [phone]  Additional Information: please call patient she says she needs to speak with the doctor.

## 2019-10-22 RX ORDER — RAMELTEON 8 MG/1
8 TABLET ORAL NIGHTLY
Qty: 30 TABLET | Refills: 1 | Status: SHIPPED | OUTPATIENT
Start: 2019-10-22 | End: 2021-09-21

## 2019-10-22 RX ORDER — GABAPENTIN 300 MG/1
900 CAPSULE ORAL 3 TIMES DAILY
Qty: 270 CAPSULE | Refills: 5 | Status: ON HOLD | OUTPATIENT
Start: 2019-10-22 | End: 2019-11-06 | Stop reason: SDUPTHER

## 2019-10-22 RX ORDER — CARBIDOPA AND LEVODOPA 25; 100 MG/1; MG/1
0.5 TABLET ORAL 4 TIMES DAILY
Qty: 90 TABLET | Refills: 5 | Status: ON HOLD | OUTPATIENT
Start: 2019-10-22 | End: 2019-11-06 | Stop reason: SDUPTHER

## 2019-10-30 ENCOUNTER — TELEPHONE (OUTPATIENT)
Dept: NEUROLOGY | Facility: CLINIC | Age: 74
End: 2019-10-30

## 2019-10-30 NOTE — TELEPHONE ENCOUNTER
----- Message from Lauro Wolfe sent at 10/24/2019  1:11 PM CDT -----  Contact: pt @ [phone]  Pt is asking to speak w/ the doctor, pt states she's been calling but hasn't gotten a response back

## 2019-11-01 ENCOUNTER — TELEPHONE (OUTPATIENT)
Dept: NEUROLOGY | Facility: CLINIC | Age: 74
End: 2019-11-01

## 2019-11-01 NOTE — TELEPHONE ENCOUNTER
----- Message from Sammie Bermeo sent at 11/1/2019  8:59 AM CDT -----  Contact: SHONNA MARTINEZ [[id number]]  Name of Who is Calling : SHONNA MARTINEZ [[id number]]    What is the request in detail :     Patient is requesting a call from staff she states she has called several times about her prescription and no one has called back she would also like to know if it is time for her follow up  .....Please contact to further discuss and advise.    Can the clinic reply by [facility] :  Phone call only    What Number to Call Back : [phone]

## 2019-11-06 ENCOUNTER — HOSPITAL ENCOUNTER (INPATIENT)
Facility: HOSPITAL | Age: 74
LOS: 2 days | Discharge: HOME HEALTH | DRG: 948 | End: 2019-11-08
Attending: EMERGENCY MEDICINE | Admitting: EMERGENCY MEDICINE
Payer: MEDICARE

## 2019-11-06 DIAGNOSIS — R29.898 BILATERAL LEG WEAKNESS: Primary | ICD-10-CM

## 2019-11-06 DIAGNOSIS — I10 ESSENTIAL HYPERTENSION: ICD-10-CM

## 2019-11-06 DIAGNOSIS — E51.11 VITAMIN B1 DEFICIENCY NEUROPATHY: ICD-10-CM

## 2019-11-06 DIAGNOSIS — G20.A1 PARKINSON'S DISEASE: ICD-10-CM

## 2019-11-06 DIAGNOSIS — E51.9 VITAMIN B1 DEFICIENCY: ICD-10-CM

## 2019-11-06 DIAGNOSIS — G61.0 AIDP (ACUTE INFLAMMATORY DEMYELINATING POLYNEUROPATHY): ICD-10-CM

## 2019-11-06 DIAGNOSIS — R53.83 FATIGUE: ICD-10-CM

## 2019-11-06 LAB
ALBUMIN SERPL BCP-MCNC: 3.7 G/DL (ref 3.5–5.2)
ALP SERPL-CCNC: 81 U/L (ref 55–135)
ALT SERPL W/O P-5'-P-CCNC: 5 U/L (ref 10–44)
ANION GAP SERPL CALC-SCNC: 10 MMOL/L (ref 8–16)
AST SERPL-CCNC: 15 U/L (ref 10–40)
BASOPHILS # BLD AUTO: 0.04 K/UL (ref 0–0.2)
BASOPHILS NFR BLD: 0.6 % (ref 0–1.9)
BILIRUB SERPL-MCNC: 0.8 MG/DL (ref 0.1–1)
BILIRUB UR QL STRIP: NEGATIVE
BUN SERPL-MCNC: 23 MG/DL (ref 8–23)
CALCIUM SERPL-MCNC: 9.1 MG/DL (ref 8.7–10.5)
CHLORIDE SERPL-SCNC: 106 MMOL/L (ref 95–110)
CK SERPL-CCNC: 56 U/L (ref 20–180)
CLARITY UR REFRACT.AUTO: ABNORMAL
CO2 SERPL-SCNC: 20 MMOL/L (ref 23–29)
COLOR UR AUTO: YELLOW
CREAT SERPL-MCNC: 1.1 MG/DL (ref 0.5–1.4)
CRP SERPL-MCNC: 13.7 MG/L (ref 0–8.2)
DIFFERENTIAL METHOD: ABNORMAL
EOSINOPHIL # BLD AUTO: 0.2 K/UL (ref 0–0.5)
EOSINOPHIL NFR BLD: 2.8 % (ref 0–8)
ERYTHROCYTE [DISTWIDTH] IN BLOOD BY AUTOMATED COUNT: 13.6 % (ref 11.5–14.5)
ERYTHROCYTE [SEDIMENTATION RATE] IN BLOOD BY WESTERGREN METHOD: 57 MM/HR (ref 0–36)
EST. GFR  (AFRICAN AMERICAN): 57.6 ML/MIN/1.73 M^2
EST. GFR  (NON AFRICAN AMERICAN): 49.9 ML/MIN/1.73 M^2
ESTIMATED AVG GLUCOSE: 105 MG/DL (ref 68–131)
FOLATE SERPL-MCNC: 8.5 NG/ML (ref 4–24)
GLUCOSE SERPL-MCNC: 118 MG/DL (ref 70–110)
GLUCOSE UR QL STRIP: NEGATIVE
HBA1C MFR BLD HPLC: 5.3 % (ref 4–5.6)
HCT VFR BLD AUTO: 33.1 % (ref 37–48.5)
HGB BLD-MCNC: 10.1 G/DL (ref 12–16)
HGB UR QL STRIP: NEGATIVE
IMM GRANULOCYTES # BLD AUTO: 0.03 K/UL (ref 0–0.04)
IMM GRANULOCYTES NFR BLD AUTO: 0.4 % (ref 0–0.5)
KETONES UR QL STRIP: NEGATIVE
LEUKOCYTE ESTERASE UR QL STRIP: NEGATIVE
LYMPHOCYTES # BLD AUTO: 1 K/UL (ref 1–4.8)
LYMPHOCYTES NFR BLD: 14.6 % (ref 18–48)
MAGNESIUM SERPL-MCNC: 2.1 MG/DL (ref 1.6–2.6)
MCH RBC QN AUTO: 32.4 PG (ref 27–31)
MCHC RBC AUTO-ENTMCNC: 30.5 G/DL (ref 32–36)
MCV RBC AUTO: 106 FL (ref 82–98)
MONOCYTES # BLD AUTO: 0.8 K/UL (ref 0.3–1)
MONOCYTES NFR BLD: 10.9 % (ref 4–15)
NEUTROPHILS # BLD AUTO: 4.9 K/UL (ref 1.8–7.7)
NEUTROPHILS NFR BLD: 70.7 % (ref 38–73)
NITRITE UR QL STRIP: NEGATIVE
NRBC BLD-RTO: 0 /100 WBC
PH UR STRIP: 5 [PH] (ref 5–8)
PLATELET # BLD AUTO: 195 K/UL (ref 150–350)
PMV BLD AUTO: 9.2 FL (ref 9.2–12.9)
POTASSIUM SERPL-SCNC: 4.9 MMOL/L (ref 3.5–5.1)
PROT SERPL-MCNC: 8.4 G/DL (ref 6–8.4)
PROT UR QL STRIP: NEGATIVE
RBC # BLD AUTO: 3.12 M/UL (ref 4–5.4)
SODIUM SERPL-SCNC: 136 MMOL/L (ref 136–145)
SP GR UR STRIP: 1.01 (ref 1–1.03)
T3 SERPL-MCNC: 48 NG/DL (ref 60–180)
T4 FREE SERPL-MCNC: 1.08 NG/DL (ref 0.71–1.51)
TSH SERPL DL<=0.005 MIU/L-ACNC: 0.84 UIU/ML (ref 0.4–4)
URN SPEC COLLECT METH UR: ABNORMAL
VIT B12 SERPL-MCNC: 886 PG/ML (ref 210–950)
WBC # BLD AUTO: 6.9 K/UL (ref 3.9–12.7)

## 2019-11-06 PROCEDURE — 63600175 PHARM REV CODE 636 W HCPCS: Performed by: PHYSICIAN ASSISTANT

## 2019-11-06 PROCEDURE — 27100107 HC POCKET PEAK FLOW METER

## 2019-11-06 PROCEDURE — 99285 EMERGENCY DEPT VISIT HI MDM: CPT | Mod: ,,, | Performed by: PHYSICIAN ASSISTANT

## 2019-11-06 PROCEDURE — 83036 HEMOGLOBIN GLYCOSYLATED A1C: CPT

## 2019-11-06 PROCEDURE — 80053 COMPREHEN METABOLIC PANEL: CPT

## 2019-11-06 PROCEDURE — 81003 URINALYSIS AUTO W/O SCOPE: CPT

## 2019-11-06 PROCEDURE — 84480 ASSAY TRIIODOTHYRONINE (T3): CPT

## 2019-11-06 PROCEDURE — 99900035 HC TECH TIME PER 15 MIN (STAT)

## 2019-11-06 PROCEDURE — 36415 COLL VENOUS BLD VENIPUNCTURE: CPT

## 2019-11-06 PROCEDURE — 82550 ASSAY OF CK (CPK): CPT

## 2019-11-06 PROCEDURE — 99222 PR INITIAL HOSPITAL CARE,LEVL II: ICD-10-PCS | Mod: ,,, | Performed by: INTERNAL MEDICINE

## 2019-11-06 PROCEDURE — 99285 EMERGENCY DEPT VISIT HI MDM: CPT | Mod: 25

## 2019-11-06 PROCEDURE — 25000003 PHARM REV CODE 250: Performed by: PHYSICIAN ASSISTANT

## 2019-11-06 PROCEDURE — 82746 ASSAY OF FOLIC ACID SERUM: CPT

## 2019-11-06 PROCEDURE — 25000003 PHARM REV CODE 250: Performed by: INTERNAL MEDICINE

## 2019-11-06 PROCEDURE — 99222 PR INITIAL HOSPITAL CARE,LEVL II: ICD-10-PCS | Mod: GC,,, | Performed by: PSYCHIATRY & NEUROLOGY

## 2019-11-06 PROCEDURE — 82607 VITAMIN B-12: CPT

## 2019-11-06 PROCEDURE — 99222 1ST HOSP IP/OBS MODERATE 55: CPT | Mod: ,,, | Performed by: INTERNAL MEDICINE

## 2019-11-06 PROCEDURE — 99222 1ST HOSP IP/OBS MODERATE 55: CPT | Mod: GC,,, | Performed by: PSYCHIATRY & NEUROLOGY

## 2019-11-06 PROCEDURE — 85025 COMPLETE CBC W/AUTO DIFF WBC: CPT

## 2019-11-06 PROCEDURE — 84443 ASSAY THYROID STIM HORMONE: CPT

## 2019-11-06 PROCEDURE — 83735 ASSAY OF MAGNESIUM: CPT

## 2019-11-06 PROCEDURE — 63600175 PHARM REV CODE 636 W HCPCS: Performed by: INTERNAL MEDICINE

## 2019-11-06 PROCEDURE — 84446 ASSAY OF VITAMIN E: CPT

## 2019-11-06 PROCEDURE — 93010 EKG 12-LEAD: ICD-10-PCS | Mod: ,,, | Performed by: INTERNAL MEDICINE

## 2019-11-06 PROCEDURE — 96360 HYDRATION IV INFUSION INIT: CPT

## 2019-11-06 PROCEDURE — 96361 HYDRATE IV INFUSION ADD-ON: CPT

## 2019-11-06 PROCEDURE — 85652 RBC SED RATE AUTOMATED: CPT

## 2019-11-06 PROCEDURE — 94010 BREATHING CAPACITY TEST: CPT

## 2019-11-06 PROCEDURE — 86140 C-REACTIVE PROTEIN: CPT

## 2019-11-06 PROCEDURE — 84439 ASSAY OF FREE THYROXINE: CPT

## 2019-11-06 PROCEDURE — 11000001 HC ACUTE MED/SURG PRIVATE ROOM

## 2019-11-06 PROCEDURE — 93005 ELECTROCARDIOGRAM TRACING: CPT

## 2019-11-06 PROCEDURE — 93010 ELECTROCARDIOGRAM REPORT: CPT | Mod: ,,, | Performed by: INTERNAL MEDICINE

## 2019-11-06 PROCEDURE — 99285 PR EMERGENCY DEPT VISIT,LEVEL V: ICD-10-PCS | Mod: ,,, | Performed by: PHYSICIAN ASSISTANT

## 2019-11-06 RX ORDER — POLYETHYLENE GLYCOL 3350 17 G/17G
17 POWDER, FOR SOLUTION ORAL DAILY
Status: DISCONTINUED | OUTPATIENT
Start: 2019-11-06 | End: 2019-11-08 | Stop reason: HOSPADM

## 2019-11-06 RX ORDER — ACETAMINOPHEN 500 MG
1000 TABLET ORAL
Status: COMPLETED | OUTPATIENT
Start: 2019-11-06 | End: 2019-11-06

## 2019-11-06 RX ORDER — ERGOCALCIFEROL 1.25 MG/1
CAPSULE ORAL
Refills: 1 | Status: ON HOLD | COMMUNITY
Start: 2019-09-30 | End: 2019-11-08 | Stop reason: SDUPTHER

## 2019-11-06 RX ORDER — LEVOTHYROXINE SODIUM 50 UG/1
50 TABLET ORAL
Status: DISCONTINUED | OUTPATIENT
Start: 2019-11-07 | End: 2019-11-08 | Stop reason: HOSPADM

## 2019-11-06 RX ORDER — ONDANSETRON 2 MG/ML
4 INJECTION INTRAMUSCULAR; INTRAVENOUS EVERY 8 HOURS PRN
Status: DISCONTINUED | OUTPATIENT
Start: 2019-11-06 | End: 2019-11-08 | Stop reason: HOSPADM

## 2019-11-06 RX ORDER — ACETAMINOPHEN 325 MG/1
650 TABLET ORAL EVERY 4 HOURS PRN
Status: DISCONTINUED | OUTPATIENT
Start: 2019-11-06 | End: 2019-11-08 | Stop reason: HOSPADM

## 2019-11-06 RX ORDER — INSULIN GLARGINE 100 [IU]/ML
INJECTION, SOLUTION SUBCUTANEOUS
COMMUNITY
Start: 2014-04-08 | End: 2019-11-06 | Stop reason: CLARIF

## 2019-11-06 RX ORDER — OXYCODONE AND ACETAMINOPHEN 7.5; 325 MG/1; MG/1
TABLET ORAL
Refills: 0 | Status: ON HOLD | COMMUNITY
Start: 2019-09-29 | End: 2019-11-08 | Stop reason: HOSPADM

## 2019-11-06 RX ORDER — ENOXAPARIN SODIUM 100 MG/ML
40 INJECTION SUBCUTANEOUS EVERY 24 HOURS
Status: DISCONTINUED | OUTPATIENT
Start: 2019-11-06 | End: 2019-11-08 | Stop reason: HOSPADM

## 2019-11-06 RX ORDER — ESCITALOPRAM OXALATE 20 MG/1
TABLET ORAL
Refills: 1 | COMMUNITY
Start: 2019-08-18 | End: 2021-08-19

## 2019-11-06 RX ORDER — PANTOPRAZOLE SODIUM 40 MG/1
TABLET, DELAYED RELEASE ORAL
COMMUNITY
Start: 2019-10-22 | End: 2021-08-09 | Stop reason: CLARIF

## 2019-11-06 RX ORDER — RIBOFLAVIN (VITAMIN B2) 100 MG
400 TABLET ORAL DAILY
Status: DISCONTINUED | OUTPATIENT
Start: 2019-11-07 | End: 2019-11-08 | Stop reason: HOSPADM

## 2019-11-06 RX ORDER — LIDOCAINE 50 MG/G
OINTMENT TOPICAL
Refills: 0 | COMMUNITY
Start: 2019-09-18 | End: 2021-09-21

## 2019-11-06 RX ORDER — CLONAZEPAM 0.5 MG/1
TABLET ORAL
Status: ON HOLD | COMMUNITY
Start: 2019-10-29 | End: 2019-11-08 | Stop reason: HOSPADM

## 2019-11-06 RX ORDER — SODIUM CHLORIDE 0.9 % (FLUSH) 0.9 %
10 SYRINGE (ML) INJECTION
Status: DISCONTINUED | OUTPATIENT
Start: 2019-11-06 | End: 2019-11-08 | Stop reason: HOSPADM

## 2019-11-06 RX ORDER — SODIUM CHLORIDE 0.9 % (FLUSH) 0.9 %
10 SYRINGE (ML) INJECTION
Status: CANCELLED | OUTPATIENT
Start: 2019-11-06

## 2019-11-06 RX ORDER — AMLODIPINE BESYLATE 5 MG/1
5 TABLET ORAL DAILY
Status: DISCONTINUED | OUTPATIENT
Start: 2019-11-06 | End: 2019-11-08 | Stop reason: HOSPADM

## 2019-11-06 RX ORDER — CARBIDOPA AND LEVODOPA 25; 100 MG/1; MG/1
1 TABLET, EXTENDED RELEASE ORAL 4 TIMES DAILY
Status: DISCONTINUED | OUTPATIENT
Start: 2019-11-06 | End: 2019-11-08 | Stop reason: HOSPADM

## 2019-11-06 RX ORDER — MUPIROCIN 20 MG/G
OINTMENT TOPICAL
COMMUNITY
Start: 2019-10-08 | End: 2021-09-21

## 2019-11-06 RX ADMIN — ACETAMINOPHEN 1000 MG: 500 TABLET ORAL at 10:11

## 2019-11-06 RX ADMIN — AMLODIPINE BESYLATE 5 MG: 5 TABLET ORAL at 05:11

## 2019-11-06 RX ADMIN — POLYETHYLENE GLYCOL 3350 17 G: 17 POWDER, FOR SOLUTION ORAL at 09:11

## 2019-11-06 RX ADMIN — CARBIDOPA AND LEVODOPA 1 TABLET: 25; 100 TABLET, EXTENDED RELEASE ORAL at 08:11

## 2019-11-06 RX ADMIN — SODIUM CHLORIDE 500 ML: 0.9 INJECTION, SOLUTION INTRAVENOUS at 10:11

## 2019-11-06 RX ADMIN — ENOXAPARIN SODIUM 40 MG: 100 INJECTION SUBCUTANEOUS at 05:11

## 2019-11-06 NOTE — TELEPHONE ENCOUNTER
----- Message from Salma Malik sent at 11/4/2019 12:07 PM CST -----  Rx Refill/Request     Is this a Refill or New Rx:  Refills    Rx Name and Strength:  carbidopa-levodopa  mg (SINEMET)  mg per tablet,  gabapentin (NEURONTIN) 300 MG capsule and ramelteon (ROZEREM) 8 mg tablet    Preferred Pharmacy with phone number:     [facility] #[id number] [address] [address]  Phone: [phone] Fax: [phone]    Communication Preference:pt @ [phone]    Additional Information: patient has called several times and asking what is the problem with getting her refills. Please call.

## 2019-11-06 NOTE — HPI
Melissa Feliciano is a 74 yr old female with medical history of AIDP (improved LE weakness 4+/5 / no recurrence) / Tremor dominant PD / DM II / HTN / HLD / 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     Last seen in clinic 9/19. Sub-acute / gradual worsening in LE weakness & gait ataxia to a level of preferring bedrest over the last 2 days. No specific triggers / no concerns for UE weakness; new onset sensory disturbances, ANS or cranial nerve deficits. Report of fall; back pain but negative concerns for acute worsening of ongoing complaints. No bowel and bladder incontinence. Significant confounder non-compliance with medications - specially PD meds resulting in worsening bradykinesia. Otherwise negative concerns for stroke; seizures etc.

## 2019-11-06 NOTE — HPI
Ms. Anand is a 74 y.o. F pt w PMHx of parkinsons disease, chronic back pain, AIDP, HTN and hypothyroidism who presents for progressive lower extremity weakness and fatigue.  Per pt and daughter she was at her baseline functional status 4 days ago when she went on outing to . At the end of the day she presented with lower extremity weakness, required a wheelchair and had to be carried into the car by her daughters. The next day she was back to her baseline, ambulating with the assistance of a cane and of a walker for longer distances. Yesterday pt once again presented with lower extremity weakness and somnolence. Daughter states she slept approximately 8 hours during the day. States her weakness did not improve so came to the ED. She denies any pain or trauma. Has not had had recent illnesses, fevers, chills, dysuria, nausea, vomiting, SOB, cough. Did have 1 episode of watery stool Monday, but none since. Denies sick contacts. Daughter is unsure of what meds she has been taking.  Pt lives with daughter and granddaughter is independent in most of her ADLs.     In the ED vitals were normal. CXR without acute process. CTH without acute infarct. 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 Neuro was consulted.

## 2019-11-06 NOTE — SUBJECTIVE & OBJECTIVE
Past Medical History:   Diagnosis Date    Anemia     Colitis     hospitalized July 2014    Diabetes mellitus     Encounter for blood transfusion     Goiter     Hypertension     Left hip pain 10/12/14    Syncope        Past Surgical History:   Procedure Laterality Date    BACK SURGERY      THYROIDECTOMY      TOE AMPUTATION Left        Review of patient's allergies indicates:  No Known Allergies    Current Neurological Medications:     No current facility-administered medications on file prior to encounter.      Current Outpatient Medications on File Prior to Encounter   Medication Sig    amLODIPine (NORVASC) 5 MG tablet Take 1 tablet (5 mg total) by mouth once daily. Contact PCP for refills    carbidopa-levodopa  mg (SINEMET)  mg per tablet Take 0.5 tablets by mouth 4 (four) times daily. Take 6AM / 10am / 2PM / 6PM    gabapentin (NEURONTIN) 300 MG capsule Take 3 capsules (900 mg total) by mouth 3 (three) times daily.    lisinopril (PRINIVIL,ZESTRIL) 40 MG tablet Take 1 tablet (40 mg total) by mouth once daily.    [DISCONTINUED] insulin glargine 100 units/mL (3mL) SubQ pen 25U nightly for the first 3 days.  If accuchecks are above 150, resume home dose of 50U at bedtime.    amitriptyline (ELAVIL) 100 MG tablet Take 100 mg by mouth every evening.    cholecalciferol, vitamin D3, 400 unit Cap Take 2 capsules (800 Units total) by mouth once daily.    clonazePAM (KLONOPIN) 0.5 MG tablet     ergocalciferol (ERGOCALCIFEROL) 50,000 unit Cap TK 1 C PO Q 7 DAYS    escitalopram oxalate (LEXAPRO) 20 MG tablet TK 1 T PO QD    hydrocodone-acetaminophen 7.5-325mg (NORCO) 7.5-325 mg per tablet Take 1 tablet by mouth every 8 (eight) hours as needed for Pain.    levothyroxine (SYNTHROID) 50 MCG tablet Take 1 tablet (50 mcg total) by mouth before breakfast.    lidocaine (XYLOCAINE) 5 % Oint ointment JANA AA D PRN P    mupirocin (BACTROBAN) 2 % ointment     oxyCODONE-acetaminophen (PERCOCET) 7.5-325  mg per tablet TK 1 T PO TID PRN P    pantoprazole (PROTONIX) 40 MG tablet     quetiapine (SEROQUEL) 300 MG Tab Take 400 mg by mouth every evening.     ramelteon (ROZEREM) 8 mg tablet Take 1 tablet (8 mg total) by mouth every evening.    riboflavin, vitamin B2, (VITAMIN B-2) 100 mg Tab tablet Take 4 tablets (400 mg total) by mouth once daily.    thiamine 250 MG tablet Take 1 tablet (250 mg total) by mouth once daily.    tramadol (ULTRAM) 50 mg tablet Take 50 mg by mouth 2 (two) times daily.     Family History     Problem Relation (Age of Onset)    Cancer Son, Mother    Coronary artery disease     Diabetes         Tobacco Use    Smoking status: Former Smoker     Years: 30.00     Types: Cigarettes     Last attempt to quit: 2014     Years since quittin.4    Smokeless tobacco: Never Used   Substance and Sexual Activity    Alcohol use: Yes     Comment: occassionally     Drug use: No    Sexual activity: Not Currently     Review of Systems   Unable to perform ROS: Acuity of condition   Constitutional: Negative for fever and unexpected weight change.   HENT: Negative for trouble swallowing and voice change.    Respiratory: Negative for cough and chest tightness.    Cardiovascular: Negative for chest pain.   Gastrointestinal: Negative for abdominal pain.   Genitourinary: Negative for dysuria.   Musculoskeletal: Positive for arthralgias and back pain.   Neurological: Positive for tremors, weakness and numbness. Negative for dizziness, seizures, syncope, facial asymmetry, speech difficulty, light-headedness and headaches.   Psychiatric/Behavioral: Negative for agitation, behavioral problems and confusion.     Objective:     Vital Signs (Most Recent):  Temp: 98.2 °F (36.8 °C) (19 0913)  Pulse: 85 (19 1143)  Resp: 17 (19 1143)  BP: (!) 153/67 (19 1133)  SpO2: 100 % (19 1143) Vital Signs (24h Range):  Temp:  [98.2 °F (36.8 °C)] 98.2 °F (36.8 °C)  Pulse:  [84-88] 85  Resp:  [17-35]  17  SpO2:  [95 %-100 %] 100 %  BP: (126-167)/(67-72) 153/67        There is no height or weight on file to calculate BMI.    Physical Exam   Constitutional: She is oriented to person, place, and time. No distress.   HENT:   Head: Normocephalic and atraumatic.   Eyes: Pupils are equal, round, and reactive to light. EOM are normal.   Neck: Neck supple.   Cardiovascular:   No murmur heard.  Pulmonary/Chest: No respiratory distress.   Abdominal: Soft.   Neurological: She is oriented to person, place, and time. She has a normal Finger-Nose-Finger Test.   Reflex Scores:       Bicep reflexes are 2+ on the right side and 2+ on the left side.       Brachioradialis reflexes are 2+ on the right side and 2+ on the left side.       Patellar reflexes are 1+ on the right side and 1+ on the left side.       Achilles reflexes are 1+ on the right side and 1+ on the left side.  Psychiatric: Her speech is normal.       NEUROLOGICAL EXAMINATION:     MENTAL STATUS   Oriented to person, place, and time.   Speech: speech is normal   Level of consciousness: alert    CRANIAL NERVES     CN II   Visual fields full to confrontation.     CN III, IV, VI   Pupils are equal, round, and reactive to light.  Extraocular motions are normal.   Nystagmus: none   Ophthalmoparesis: none    CN V   Facial sensation intact.     CN VII   Facial expression full, symmetric.     CN IX, X   CN IX normal.     CN XI   CN XI normal.     CN XII   CN XII normal.     MOTOR EXAM   Muscle bulk: normal  Overall muscle tone: normal    Strength   Right neck flexion: 5/5  Left neck flexion: 5/5  Right neck extension: 5/5  Left neck extension: 5/5       UE B/L 4+5 asymmetry / cervical DDD    LE 4/5 symmetrical hip flexors & hip extensors; knee flexors and extensors; dorsiflexors and extensors      REFLEXES     Reflexes   Right brachioradialis: 2+  Left brachioradialis: 2+  Right biceps: 2+  Left biceps: 2+  Right patellar: 1+  Left patellar: 1+  Right achilles: 1+  Left  achilles: 1+    SENSORY EXAM        Distal extremity affect in vibration and touch      GAIT AND COORDINATION      Coordination   Finger to nose coordination: normal    Tremor   Resting tremor: absent  Intention tremor: absent  Action tremor: absent      Significant Labs:   Hemoglobin A1c: No results for input(s): HGBA1C in the last 720 hours.  Blood Culture: No results for input(s): LABBLOO in the last 48 hours.  CBC:   Recent Labs   Lab 11/06/19  1006   WBC 6.90   HGB 10.1*   HCT 33.1*        CMP:   Recent Labs   Lab 11/06/19  1006   *      K 4.9      CO2 20*   BUN 23   CREATININE 1.1   CALCIUM 9.1   MG 2.1   PROT 8.4   ALBUMIN 3.7   BILITOT 0.8   ALKPHOS 81   AST 15   ALT 5*   ANIONGAP 10   EGFRNONAA 49.9*     CSF Culture: No results for input(s): CSFCULTURE in the last 48 hours.  CSF Studies: No results for input(s): ALIQUT, APPEARCSF, COLORCSF, CSFWBC, CSFRBC, GLUCCSF, LDHCSF, PROTEINCSF, VDRLCSF in the last 48 hours.  Inflammatory Markers: No results for input(s): SEDRATE, CRP, PROCAL in the last 48 hours.  Urine Studies:   Recent Labs   Lab 11/06/19  1006   COLORU Yellow   APPEARANCEUA Hazy*   PHUR 5.0   SPECGRAV 1.015   PROTEINUA Negative   GLUCUA Negative   KETONESU Negative   BILIRUBINUA Negative   OCCULTUA Negative   NITRITE Negative   LEUKOCYTESUR Negative     All pertinent lab results from the past 24 hours have been reviewed.    Significant Imaging: I have reviewed and interpreted all pertinent imaging results/findings within the past 24 hours.

## 2019-11-06 NOTE — ED NOTES
LOC: The patient is awake, alert, and oriented to place, time, situation. Affect is appropriate.  Speech is appropriate and clear.     APPEARANCE: Patient resting comfortably in no acute distress.  Patient is clean and well groomed.    SKIN: The skin is warm and dry; color consistent with ethnicity.  Patient has normal skin turgor and moist mucus membranes.  Skin intact; no breakdown or bruising noted.     MUSCULOSKELETAL: Patient moving upper and lower extremities with difficulty.  Reporting increased weakness.  Ambulates at home from bed to bathroom which is across the anand    RESPIRATORY: Airway is open and patent. Respirations spontaneous, and non-labored.  Patient has a normal effort and rate.  No accessory muscle use noted. Denies cough.     CARDIAC:  Normal rhythm and rate noted.  No peripheral edema noted. No complaints of chest pain.      ABDOMEN:  Obese.Soft and non tender to palpation.  No distention noted.     NEUROLOGIC: Eyes open spontaneously.  Behavior appropriate to situation.  Follows commands; facial expression symmetrical.  decreased sensation in all extremities.

## 2019-11-06 NOTE — CONSULTS
[facility]  Neurology  Consult Note    Patient Name: Melissa Anand  MRN: [id number]  Admission Date: 11/6/2019  Hospital Length of Stay: 0 days  Code Status: Prior   Attending Provider: Breana Wood*   Consulting Provider: Geovany Rick MD  Primary Care Physician: Dana Catsellon MD  Principal Problem:Bilateral leg weakness    Inpatient consult to Neurology  Consult performed by: Geovany Rick MD  Consult ordered by: Cally Stone PA-C         Subjective:     Chief Complaint:  B/L LE weakness      HPI:   Melissa Feliciano is a 74 yr old female with medical history of AIDP (improved LE weakness 4+/5 / no recurrence) / Tremor dominant PD / DM II / HTN / HLD / 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     Last seen in clinic 9/19. Sub-acute / gradual worsening in LE weakness & gait ataxia to a level of preferring bedrest over the last 2 days. No specific triggers / no concerns for UE weakness; new onset sensory disturbances, ANS or cranial nerve deficits. Report of fall; back pain but negative concerns for acute worsening of ongoing complaints. No bowel and bladder incontinence. Significant confounder non-compliance with medications - specially PD meds resulting in worsening bradykinesia. Otherwise negative concerns for stroke; seizures etc.      Past Medical History:   Diagnosis Date    Anemia     Colitis     hospitalized July 2014    Diabetes mellitus     Encounter for blood transfusion     Goiter     Hypertension     Left hip pain 10/12/14    Syncope        Past Surgical History:   Procedure Laterality Date    BACK SURGERY      THYROIDECTOMY      TOE AMPUTATION Left        Review of patient's allergies indicates:  No Known Allergies    Current Neurological Medications:     No current facility-administered  medications on file prior to encounter.      Current Outpatient Medications on File Prior to Encounter   Medication Sig    amLODIPine (NORVASC) 5 MG tablet Take 1 tablet (5 mg total) by mouth once daily. Contact PCP for refills    carbidopa-levodopa  mg (SINEMET)  mg per tablet Take 0.5 tablets by mouth 4 (four) times daily. Take 6AM / 10am / 2PM / 6PM    gabapentin (NEURONTIN) 300 MG capsule Take 3 capsules (900 mg total) by mouth 3 (three) times daily.    lisinopril (PRINIVIL,ZESTRIL) 40 MG tablet Take 1 tablet (40 mg total) by mouth once daily.    [DISCONTINUED] insulin glargine 100 units/mL (3mL) SubQ pen 25U nightly for the first 3 days.  If accuchecks are above 150, resume home dose of 50U at bedtime.    amitriptyline (ELAVIL) 100 MG tablet Take 100 mg by mouth every evening.    cholecalciferol, vitamin D3, 400 unit Cap Take 2 capsules (800 Units total) by mouth once daily.    clonazePAM (KLONOPIN) 0.5 MG tablet     ergocalciferol (ERGOCALCIFEROL) 50,000 unit Cap TK 1 C PO Q 7 DAYS    escitalopram oxalate (LEXAPRO) 20 MG tablet TK 1 T PO QD    hydrocodone-acetaminophen 7.5-325mg (NORCO) 7.5-325 mg per tablet Take 1 tablet by mouth every 8 (eight) hours as needed for Pain.    levothyroxine (SYNTHROID) 50 MCG tablet Take 1 tablet (50 mcg total) by mouth before breakfast.    lidocaine (XYLOCAINE) 5 % Oint ointment JANA AA D PRN P    mupirocin (BACTROBAN) 2 % ointment     oxyCODONE-acetaminophen (PERCOCET) 7.5-325 mg per tablet TK 1 T PO TID PRN P    pantoprazole (PROTONIX) 40 MG tablet     quetiapine (SEROQUEL) 300 MG Tab Take 400 mg by mouth every evening.     ramelteon (ROZEREM) 8 mg tablet Take 1 tablet (8 mg total) by mouth every evening.    riboflavin, vitamin B2, (VITAMIN B-2) 100 mg Tab tablet Take 4 tablets (400 mg total) by mouth once daily.    thiamine 250 MG tablet Take 1 tablet (250 mg total) by mouth once daily.    tramadol (ULTRAM) 50 mg tablet Take 50 mg by mouth 2  (two) times daily.     Family History     Problem Relation (Age of Onset)    Cancer Son, Mother    Coronary artery disease     Diabetes         Tobacco Use    Smoking status: Former Smoker     Years: 30.00     Types: Cigarettes     Last attempt to quit: 2014     Years since quittin.4    Smokeless tobacco: Never Used   Substance and Sexual Activity    Alcohol use: Yes     Comment: occassionally     Drug use: No    Sexual activity: Not Currently     Review of Systems   Unable to perform ROS: Acuity of condition   Constitutional: Negative for fever and unexpected weight change.   HENT: Negative for trouble swallowing and voice change.    Respiratory: Negative for cough and chest tightness.    Cardiovascular: Negative for chest pain.   Gastrointestinal: Negative for abdominal pain.   Genitourinary: Negative for dysuria.   Musculoskeletal: Positive for arthralgias and back pain.   Neurological: Positive for tremors, weakness and numbness. Negative for dizziness, seizures, syncope, facial asymmetry, speech difficulty, light-headedness and headaches.   Psychiatric/Behavioral: Negative for agitation, behavioral problems and confusion.     Objective:     Vital Signs (Most Recent):  Temp: 98.2 °F (36.8 °C) (19 0913)  Pulse: 85 (19 1143)  Resp: 17 (19 1143)  BP: (!) 153/67 (19 1133)  SpO2: 100 % (19 1143) Vital Signs (24h Range):  Temp:  [98.2 °F (36.8 °C)] 98.2 °F (36.8 °C)  Pulse:  [84-88] 85  Resp:  [17-35] 17  SpO2:  [95 %-100 %] 100 %  BP: (126-167)/(67-72) 153/67        There is no height or weight on file to calculate BMI.    Physical Exam   Constitutional: She is oriented to person, place, and time. No distress.   HENT:   Head: Normocephalic and atraumatic.   Eyes: Pupils are equal, round, and reactive to light. EOM are normal.   Neck: Neck supple.   Cardiovascular:   No murmur heard.  Pulmonary/Chest: No respiratory distress.   Abdominal: Soft.   Neurological: She is  oriented to person, place, and time. She has a normal Finger-Nose-Finger Test.   Reflex Scores:       Bicep reflexes are 2+ on the right side and 2+ on the left side.       Brachioradialis reflexes are 2+ on the right side and 2+ on the left side.       Patellar reflexes are 1+ on the right side and 1+ on the left side.       Achilles reflexes are 1+ on the right side and 1+ on the left side.  Psychiatric: Her speech is normal.       NEUROLOGICAL EXAMINATION:     MENTAL STATUS   Oriented to person, place, and time.   Speech: speech is normal   Level of consciousness: alert    CRANIAL NERVES     CN II   Visual fields full to confrontation.     CN III, IV, VI   Pupils are equal, round, and reactive to light.  Extraocular motions are normal.   Nystagmus: none   Ophthalmoparesis: none    CN V   Facial sensation intact.     CN VII   Facial expression full, symmetric.     CN IX, X   CN IX normal.     CN XI   CN XI normal.     CN XII   CN XII normal.     MOTOR EXAM   Muscle bulk: normal  Overall muscle tone: normal    Strength   Right neck flexion: 5/5  Left neck flexion: 5/5  Right neck extension: 5/5  Left neck extension: 5/5       UE B/L 4+5 asymmetry / cervical DDD    LE 4/5 symmetrical hip flexors & hip extensors; knee flexors and extensors; dorsiflexors and extensors      REFLEXES     Reflexes   Right brachioradialis: 2+  Left brachioradialis: 2+  Right biceps: 2+  Left biceps: 2+  Right patellar: 1+  Left patellar: 1+  Right achilles: 1+  Left achilles: 1+    SENSORY EXAM        Distal extremity affect in vibration and touch      GAIT AND COORDINATION      Coordination   Finger to nose coordination: normal    Tremor   Resting tremor: absent  Intention tremor: absent  Action tremor: absent      Significant Labs:   Hemoglobin A1c: No results for input(s): HGBA1C in the last 720 hours.  Blood Culture: No results for input(s): LABBLOO in the last 48 hours.  CBC:   Recent Labs   Lab 11/06/19  1006   WBC 6.90   HGB 10.1*    HCT 33.1*        CMP:   Recent Labs   Lab 11/06/19  1006   *      K 4.9      CO2 20*   BUN 23   CREATININE 1.1   CALCIUM 9.1   MG 2.1   PROT 8.4   ALBUMIN 3.7   BILITOT 0.8   ALKPHOS 81   AST 15   ALT 5*   ANIONGAP 10   EGFRNONAA 49.9*     CSF Culture: No results for input(s): CSFCULTURE in the last 48 hours.  CSF Studies: No results for input(s): ALIQUT, APPEARCSF, COLORCSF, CSFWBC, CSFRBC, GLUCCSF, LDHCSF, PROTEINCSF, VDRLCSF in the last 48 hours.  Inflammatory Markers: No results for input(s): SEDRATE, CRP, PROCAL in the last 48 hours.  Urine Studies:   Recent Labs   Lab 11/06/19  1006   COLORU Yellow   APPEARANCEUA Hazy*   PHUR 5.0   SPECGRAV 1.015   PROTEINUA Negative   GLUCUA Negative   KETONESU Negative   BILIRUBINUA Negative   OCCULTUA Negative   NITRITE Negative   LEUKOCYTESUR Negative     All pertinent lab results from the past 24 hours have been reviewed.    Significant Imaging: I have reviewed and interpreted all pertinent imaging results/findings within the past 24 hours.    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     DDx: Multifactorial as noted from chronic medical problems / with significant confounder from non-compliance with medications - specially PD meds resulting in worsening bradykinesia. Less concerns for acute onset sensory motor polyneuropathy or spinal cord injury.     Plans:  Parkinsons management   Repeat neuropathy labs and replenish/ management as required - B1,B6,B12,Vit E, Folate, TSH, t3, t4, A1C    PT/OT evaluation - management for gait training   Rehab dispo / fall precautions      AIDP (acute inflammatory  demyelinating polyneuropathy)  1/2018 - S/P ivig   Less concerns for AIDP for current presentation     Lumbar stenosis  - no active concerns  - PT / home meds - neurontin     Acquired hypothyroidism  - continue home meds  - concerns for medication non-compliance   - check TSH, T3, T4    PD (Parkinson's disease)  - bradykinesia related medication non-compliance    - significant confounder for debility   - home regimen     Sinemet 25/100 ::: 6 AM 10 AM / 2PM / 6 PM     Vitamin B1 deficiency  - continue B1 supplementation     Depression  - continue elavil     Vitamin B1 deficiency neuropathy  - check B1 / continue supplementations     Essential hypertension  - target < 130/80 mmHg        VTE Risk Mitigation (From admission, onward)         Ordered     enoxaparin injection 40 mg  Daily      11/06/19 1537     IP VTE HIGH RISK PATIENT  Once      11/06/19 1337     Place sequential compression device  Until discontinued      11/06/19 1337                Thank you for your consult. I will follow-up with patient. Please contact us if you have any additional questions.    Geovany Rick MD  Neurology  [facility]

## 2019-11-06 NOTE — ED PROVIDER NOTES
Encounter Date: 2019       History     Chief Complaint   Patient presents with    General Malaise     loss of appetite x 3 days     74-year-old female with multiple comorbidities including Parkinson's disease, DM 2, hypothyroidism, AID PE, history of cerebellar infarct presents for malaise and decreased appetite for 3 days.  The patient reports that she has been feeling generally weak and unwell.  At baseline, she is able to walk with a cane or walker and her family helps with food preparation and some ADLs.  For the past few days she has been unable to get up by herself to go to the restroom which is new.  She had some brief left arm weakness yesterday which has since resolved.  She has not eaten a full meal in several days which is atypical for her.  She reports associated decreased urine output, lightheadedness and headache. She denies fever/chills, visual changes,  numbness, chest pain, shortness of breath, palpitations, abdominal pain, nausea/vomiting, bowel or bladder symptoms.        Review of patient's allergies indicates:  No Known Allergies  Past Medical History:   Diagnosis Date    Anemia     Colitis     hospitalized 2014    Diabetes mellitus     Encounter for blood transfusion     Goiter     Hypertension     Left hip pain 10/12/14    Syncope      Past Surgical History:   Procedure Laterality Date    BACK SURGERY      THYROIDECTOMY      TOE AMPUTATION Left      Family History   Problem Relation Age of Onset    Diabetes Unknown     Coronary artery disease Unknown     Cancer Son         testicular    Cancer Mother      Social History     Tobacco Use    Smoking status: Former Smoker     Years: 30.00     Types: Cigarettes     Last attempt to quit: 2014     Years since quittin.4   Substance Use Topics    Alcohol use: Yes     Comment: occassionally     Drug use: No     Review of Systems   Constitutional: Positive for appetite change and fatigue. Negative for chills,  diaphoresis, fever and unexpected weight change.   Eyes: Negative for photophobia and visual disturbance.   Respiratory: Positive for shortness of breath. Negative for cough.    Cardiovascular: Negative for chest pain and palpitations.   Gastrointestinal: Negative for abdominal pain, blood in stool, constipation, diarrhea, nausea and vomiting.   Endocrine: Positive for cold intolerance. Negative for heat intolerance, polydipsia, polyphagia and polyuria.   Genitourinary: Positive for decreased urine volume. Negative for difficulty urinating, dysuria, flank pain, hematuria and urgency.   Musculoskeletal: Negative for arthralgias, back pain and myalgias.   Skin: Negative for pallor and wound.   Neurological: Positive for dizziness, weakness, light-headedness and headaches. Negative for tremors, seizures, syncope, facial asymmetry, speech difficulty and numbness.       Physical Exam     Initial Vitals [11/06/19 0913]   BP Pulse Resp Temp SpO2   126/72 88 20 98.2 °F (36.8 °C) 95 %      MAP       --         Physical Exam    Nursing note and vitals reviewed.  Constitutional: She appears well-developed and well-nourished. She is not diaphoretic. No distress.   Daughter at bedside   HENT:   Head: Normocephalic and atraumatic.   Mouth/Throat: Mucous membranes are dry.   Eyes: EOM are normal. Pupils are equal, round, and reactive to light.   Neck: Normal range of motion. Neck supple.   Cardiovascular: Normal rate, regular rhythm, normal heart sounds and intact distal pulses. Exam reveals no gallop and no friction rub.    No murmur heard.  Pulmonary/Chest: Breath sounds normal. No respiratory distress. She has no wheezes. She has no rhonchi. She has no rales. She exhibits no tenderness.   Abdominal: Soft. Bowel sounds are normal. There is generalized tenderness.   Musculoskeletal: Normal range of motion.   Neurological: She is alert and oriented to person, place, and time. No cranial nerve deficit or sensory deficit. GCS score  is 15. GCS eye subscore is 4. GCS verbal subscore is 5. GCS motor subscore is 6.   5/5 strength bilateral arms, 3/5 strength bilateral legs   Skin: Skin is warm and dry.   Psychiatric: She has a normal mood and affect.         ED Course   Procedures  Labs Reviewed   CBC W/ AUTO DIFFERENTIAL - Abnormal; Notable for the following components:       Result Value    RBC 3.12 (*)     Hemoglobin 10.1 (*)     Hematocrit 33.1 (*)     Mean Corpuscular Volume 106 (*)     Mean Corpuscular Hemoglobin 32.4 (*)     Mean Corpuscular Hemoglobin Conc 30.5 (*)     Lymph% 14.6 (*)     All other components within normal limits   COMPREHENSIVE METABOLIC PANEL - Abnormal; Notable for the following components:    CO2 20 (*)     Glucose 118 (*)     ALT 5 (*)     eGFR if  57.6 (*)     eGFR if non  49.9 (*)     All other components within normal limits   URINALYSIS, REFLEX TO URINE CULTURE - Abnormal; Notable for the following components:    Appearance, UA Hazy (*)     All other components within normal limits    Narrative:     Preferred Collection Type->Urine, Clean Catch   MAGNESIUM   TSH          Imaging Results    None          Medical Decision Making:   History:   I obtained history from: EMS provider.       <> Summary of History: Per EMS, the patient's vitals were normal upon their arrival.  CBG EN route was 170  Old Medical Records: I decided to obtain old medical records.  Old Records Summarized: records from previous admission(s) and records from clinic visits.       <> Summary of Records: No recent ED visits.  She was admitted in January of 2018 for 9 days for acute bilateral leg weakness, noted to have a polyneuropathy which improved with IVIG.  She was most recently seen by her neurologist on 09/10/2019 for follow-up of this issue.  At that time she was noted to have 4/5 strength in her bilateral legs  Initial Assessment:   74-year-old female presenting for malaise and decreased appetite.  Her  vitals are normal, she appears clinically dry and is generally weak.  Differential Diagnosis:   Dehydration  Exacerbation of AIDP  Electrolyte derangement  Anemia  Hypothyroidism  Infection  Low suspicion for intracranial process  Independently Interpreted Test(s):   I have ordered and independently interpreted X-rays - see summary below.       <> Summary of X-Ray Reading(s): No consolidation or pulmonary edema  I have ordered and independently interpreted EKG Reading(s) - see prior notes  Clinical Tests:   Lab Tests: Ordered and Reviewed  Radiological Study: Ordered and Reviewed  Medical Tests: Ordered and Reviewed  ED Management:  74-year-old female presenting for generalized malaise and decreased appetite.  Will check labs, check orthostatic vitals give fluid bolus, tylenol for headache, consult Neurology and reassess.    Patient reports no improvement of symptoms after fluids.  Lab workup is unrevealing.  Head CT without any acute abnormalities.  Discussed this patient with Neurology who recommend obtaining a NIF to evaluate pulmonary strength.    NIF= -40.  Given the patient's new weakness will admit to Hospital Medicine per Neurology recommendations for workup and PT evaluation.  Patient and family members comfortable with admission.  I discussed this patient with my supervising physician.    Cally Stone PA-C          Other:   I have discussed this case with another health care provider.       <> Summary of the Discussion: I discussed this patient with Neurology who recommend obtaining NIF and admitting the patient for worsening weakness.                   ED Course as of Nov 06 1425 Wed Nov 06, 2019   1244 EKG interpretation byED attending physician:  Normal sinus rhythm, rate 87, no ST changes, no ischemia, normal intervals.    Stable from prior 01/2018.    [SS]      ED Course User Index  [SS] Campos Reddy MD                Clinical Impression:       ICD-10-CM ICD-9-CM   1. Bilateral leg  weakness R29.898 729.89   2. Fatigue R53.83 780.79   3. Parkinson's disease G20 332.0   4. AIDP (acute inflammatory demyelinating polyneuropathy) G61.0 357.0         Disposition:   Disposition: Admitted  Condition: Jack Stone PA-C  11/06/19 1432

## 2019-11-06 NOTE — H&P
[facility] Hospital Medicine  History & Physical    Patient Name: Melissa Anand  MRN: [id number]  Admission Date: 11/6/2019  Attending Physician: Breana Wood*   Primary Care Provider: Dana Castellon MD    [facility] Medicine Team: [facility] MED R Breana Wood MD     Patient information was obtained from medical record, patient and daughter.     Subjective:     Principal Problem:Bilateral leg weakness    Chief Complaint:   Chief Complaint   Patient presents with    General Malaise     loss of appetite x 3 days        HPI: Ms. Anand is a 74 y.o. F pt w PMHx of parkinsons disease, chronic back pain, AIDP, HTN and hypothyroidism who presents for progressive lower extremity weakness and fatigue.  Per pt and daughter she was at her baseline functional status 4 days ago when she went on outing to . At the end of the day she presented with lower extremity weakness, required a wheelchair and had to be carried into the car by her daughters. The next day she was back to her baseline, ambulating with the assistance of a cane and of a walker for longer distances. Yesterday pt once again presented with lower extremity weakness and somnolence. Daughter states she slept approximately 8 hours during the day. States her weakness did not improve so came to the ED. She denies any pain or trauma. Has not had had recent illnesses, fevers, chills, dysuria, nausea, vomiting, SOB, cough. Did have 1 episode of watery stool Monday, but none since. Denies sick contacts. Daughter is unsure of what meds she has been taking.  Pt lives with daughter and granddaughter is independent in most of her ADLs.     In the ED vitals were normal. CXR without acute process. CTH without acute infarct. 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 Neuro was consulted.           Past Medical History:   Diagnosis Date    Anemia     Colitis     hospitalized July 2014    Diabetes mellitus     Encounter for blood transfusion      Goiter     Hypertension     Left hip pain 10/12/14    Syncope        Past Surgical History:   Procedure Laterality Date    BACK SURGERY      THYROIDECTOMY      TOE AMPUTATION Left        Review of patient's allergies indicates:  No Known Allergies    No current facility-administered medications on file prior to encounter.      Current Outpatient Medications on File Prior to Encounter   Medication Sig    amLODIPine (NORVASC) 5 MG tablet Take 1 tablet (5 mg total) by mouth once daily. Contact PCP for refills    carbidopa-levodopa  mg (SINEMET)  mg per tablet Take 0.5 tablets by mouth 4 (four) times daily. Take 6AM / 10am / 2PM / 6PM    gabapentin (NEURONTIN) 300 MG capsule Take 3 capsules (900 mg total) by mouth 3 (three) times daily.    lisinopril (PRINIVIL,ZESTRIL) 40 MG tablet Take 1 tablet (40 mg total) by mouth once daily.    [DISCONTINUED] insulin glargine 100 units/mL (3mL) SubQ pen 25U nightly for the first 3 days.  If accuchecks are above 150, resume home dose of 50U at bedtime.    amitriptyline (ELAVIL) 100 MG tablet Take 100 mg by mouth every evening.    cholecalciferol, vitamin D3, 400 unit Cap Take 2 capsules (800 Units total) by mouth once daily.    clonazePAM (KLONOPIN) 0.5 MG tablet     ergocalciferol (ERGOCALCIFEROL) 50,000 unit Cap TK 1 C PO Q 7 DAYS    escitalopram oxalate (LEXAPRO) 20 MG tablet TK 1 T PO QD    hydrocodone-acetaminophen 7.5-325mg (NORCO) 7.5-325 mg per tablet Take 1 tablet by mouth every 8 (eight) hours as needed for Pain.    levothyroxine (SYNTHROID) 50 MCG tablet Take 1 tablet (50 mcg total) by mouth before breakfast.    lidocaine (XYLOCAINE) 5 % Oint ointment JANA AA D PRN P    mupirocin (BACTROBAN) 2 % ointment     oxyCODONE-acetaminophen (PERCOCET) 7.5-325 mg per tablet TK 1 T PO TID PRN P    pantoprazole (PROTONIX) 40 MG tablet     quetiapine (SEROQUEL) 300 MG Tab Take 400 mg by mouth every evening.     ramelteon (ROZEREM) 8 mg tablet  Take 1 tablet (8 mg total) by mouth every evening.    riboflavin, vitamin B2, (VITAMIN B-2) 100 mg Tab tablet Take 4 tablets (400 mg total) by mouth once daily.    thiamine 250 MG tablet Take 1 tablet (250 mg total) by mouth once daily.    tramadol (ULTRAM) 50 mg tablet Take 50 mg by mouth 2 (two) times daily.     Family History     Problem Relation (Age of Onset)    Cancer Son, Mother    Coronary artery disease     Diabetes         Tobacco Use    Smoking status: Former Smoker     Years: 30.00     Types: Cigarettes     Last attempt to quit: 2014     Years since quittin.4    Smokeless tobacco: Never Used   Substance and Sexual Activity    Alcohol use: Yes     Comment: occassionally     Drug use: No    Sexual activity: Not Currently     Review of Systems   Constitutional: Positive for fatigue. Negative for chills and fever.   HENT: Negative for congestion and trouble swallowing.    Eyes: Negative for photophobia and visual disturbance.   Respiratory: Negative for cough, choking, shortness of breath, wheezing and stridor.    Cardiovascular: Negative for chest pain, palpitations and leg swelling.   Gastrointestinal: Positive for diarrhea. Negative for abdominal pain, nausea and vomiting.   Genitourinary: Negative for difficulty urinating and dysuria.   Musculoskeletal: Positive for back pain and gait problem. Negative for arthralgias and myalgias.   Skin: Negative for color change and pallor.   Neurological: Positive for tremors, weakness and headaches. Negative for syncope.   Psychiatric/Behavioral: Negative for confusion and hallucinations.     Objective:     Vital Signs (Most Recent):  Temp: 98.2 °F (36.8 °C) (19 0913)  Pulse: 86 (19 1457)  Resp: 20 (19 1457)  BP: (!) 143/63 (19 1457)  SpO2: 100 % (19 1457) Vital Signs (24h Range):  Temp:  [98.2 °F (36.8 °C)] 98.2 °F (36.8 °C)  Pulse:  [84-88] 86  Resp:  [17-35] 20  SpO2:  [95 %-100 %] 100 %  BP: (126-167)/(63-72) 143/63         There is no height or weight on file to calculate BMI.    Physical Exam   Constitutional: She is oriented to person, place, and time. She appears well-developed and well-nourished. She appears lethargic. No distress.   HENT:   Head: Normocephalic and atraumatic.   Mouth/Throat: No oropharyngeal exudate.   Eyes: Pupils are equal, round, and reactive to light. Conjunctivae and EOM are normal.   Neck: Normal range of motion. Neck supple.   Cardiovascular: Normal rate, regular rhythm and normal heart sounds.   Pulmonary/Chest: Effort normal and breath sounds normal. No respiratory distress. She has no wheezes.   Abdominal: Soft. Bowel sounds are normal. She exhibits no distension. There is no tenderness.   Musculoskeletal: Normal range of motion. She exhibits no edema or tenderness.   Neurological: She is oriented to person, place, and time. She appears lethargic. No cranial nerve deficit or sensory deficit.   5/5 strength upper extremities, 3/5 strength lower extremities   Skin: Skin is warm and dry.   Psychiatric: She has a normal mood and affect. Her behavior is normal.         CRANIAL NERVES     CN III, IV, VI   Pupils are equal, round, and reactive to light.  Extraocular motions are normal.        Significant Labs: All pertinent labs within the past 24 hours have been reviewed.    Significant Imaging: I have reviewed and interpreted all pertinent imaging results/findings within the past 24 hours.    Assessment/Plan:         * Bilateral leg weakness  AIDP (acute inflammatory demyelinating polyneuropathy)    --hospital admission back in 1/9/18- 1/18/18  for presumed AIDP presenting with BLE weakness and associated pain that improved after IVIG x 5   --she was also diagnosed with parkinsons at the time and started on trial of carbidopa-levodopa  --presenting now with 4 day hx of progressive LE weakness and fatigue  --TSH wnl  --f/u CK, CRP, ESR  --neurology consulted; will f/u recs      PD (Parkinson's  disease)  --continue carbidopa-levodopa 25-100mg QID  --neurology consulted; f/u recs      Anemia  Anemia of chronic disease vs chronic inflammation  --monitor with daily CBC      Essential hypertension  --BP at goal  --resume home lisinopril 40mg po daily and amlodipine 5mg po daily when indicated    Depression  --continue home escitalopram      Acquired hypothyroidism  --continue home synthroid      Vitamin B1 deficiency neuropathy  Continue thiamine    VTE Risk Mitigation (From admission, onward)         Ordered     IP VTE HIGH RISK PATIENT  Once      11/06/19 1337     Place sequential compression device  Until discontinued      11/06/19 1337                   Breana Wood MD  Department of Hospital Medicine   [facility]

## 2019-11-06 NOTE — ASSESSMENT & PLAN NOTE
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     DDx: Multifactorial as noted from chronic medical problems / with significant confounder from non-compliance with medications - specially PD meds resulting in worsening bradykinesia. Less concerns for acute onset sensory motor polyneuropathy or spinal cord injury.     Plans:  Parkinsons management   Repeat neuropathy labs and replenish/ management as required - B1,B6,B12,Vit E, Folate, TSH, t3, t4, A1C    PT/OT evaluation - management for gait training   Rehab dispo / fall precautions

## 2019-11-06 NOTE — ASSESSMENT & PLAN NOTE
Bilateral Lower Extremity Weakness  --hospital admission back in 1/9/18- 1/18/18  for presumed AIDP presenting with BLE weakness and associated pain that improved after IVIG x 5   --she was also diagnosed with parkinsons at the time and started on trial of carbidopa-levodopa  --presenting now with 4 day hx of progressive LE weakness and fatigue  --TSH wnl  --f/u CK, CRP, ESR  --neurology consulted; will f/u recs

## 2019-11-06 NOTE — SUBJECTIVE & OBJECTIVE
Past Medical History:   Diagnosis Date    Anemia     Colitis     hospitalized July 2014    Diabetes mellitus     Encounter for blood transfusion     Goiter     Hypertension     Left hip pain 10/12/14    Syncope        Past Surgical History:   Procedure Laterality Date    BACK SURGERY      THYROIDECTOMY      TOE AMPUTATION Left        Review of patient's allergies indicates:  No Known Allergies    No current facility-administered medications on file prior to encounter.      Current Outpatient Medications on File Prior to Encounter   Medication Sig    amLODIPine (NORVASC) 5 MG tablet Take 1 tablet (5 mg total) by mouth once daily. Contact PCP for refills    carbidopa-levodopa  mg (SINEMET)  mg per tablet Take 0.5 tablets by mouth 4 (four) times daily. Take 6AM / 10am / 2PM / 6PM    gabapentin (NEURONTIN) 300 MG capsule Take 3 capsules (900 mg total) by mouth 3 (three) times daily.    lisinopril (PRINIVIL,ZESTRIL) 40 MG tablet Take 1 tablet (40 mg total) by mouth once daily.    [DISCONTINUED] insulin glargine 100 units/mL (3mL) SubQ pen 25U nightly for the first 3 days.  If accuchecks are above 150, resume home dose of 50U at bedtime.    amitriptyline (ELAVIL) 100 MG tablet Take 100 mg by mouth every evening.    cholecalciferol, vitamin D3, 400 unit Cap Take 2 capsules (800 Units total) by mouth once daily.    clonazePAM (KLONOPIN) 0.5 MG tablet     ergocalciferol (ERGOCALCIFEROL) 50,000 unit Cap TK 1 C PO Q 7 DAYS    escitalopram oxalate (LEXAPRO) 20 MG tablet TK 1 T PO QD    hydrocodone-acetaminophen 7.5-325mg (NORCO) 7.5-325 mg per tablet Take 1 tablet by mouth every 8 (eight) hours as needed for Pain.    levothyroxine (SYNTHROID) 50 MCG tablet Take 1 tablet (50 mcg total) by mouth before breakfast.    lidocaine (XYLOCAINE) 5 % Oint ointment JANA AA D PRN P    mupirocin (BACTROBAN) 2 % ointment     oxyCODONE-acetaminophen (PERCOCET) 7.5-325 mg per tablet TK 1 T PO TID PRN P     pantoprazole (PROTONIX) 40 MG tablet     quetiapine (SEROQUEL) 300 MG Tab Take 400 mg by mouth every evening.     ramelteon (ROZEREM) 8 mg tablet Take 1 tablet (8 mg total) by mouth every evening.    riboflavin, vitamin B2, (VITAMIN B-2) 100 mg Tab tablet Take 4 tablets (400 mg total) by mouth once daily.    thiamine 250 MG tablet Take 1 tablet (250 mg total) by mouth once daily.    tramadol (ULTRAM) 50 mg tablet Take 50 mg by mouth 2 (two) times daily.     Family History     Problem Relation (Age of Onset)    Cancer Son, Mother    Coronary artery disease     Diabetes         Tobacco Use    Smoking status: Former Smoker     Years: 30.00     Types: Cigarettes     Last attempt to quit: 2014     Years since quittin.4    Smokeless tobacco: Never Used   Substance and Sexual Activity    Alcohol use: Yes     Comment: occassionally     Drug use: No    Sexual activity: Not Currently     Review of Systems   Constitutional: Positive for fatigue. Negative for chills and fever.   HENT: Negative for congestion and trouble swallowing.    Eyes: Negative for photophobia and visual disturbance.   Respiratory: Negative for cough, choking, shortness of breath, wheezing and stridor.    Cardiovascular: Negative for chest pain, palpitations and leg swelling.   Gastrointestinal: Positive for diarrhea. Negative for abdominal pain, nausea and vomiting.   Genitourinary: Negative for difficulty urinating and dysuria.   Musculoskeletal: Positive for back pain and gait problem. Negative for arthralgias and myalgias.   Skin: Negative for color change and pallor.   Neurological: Positive for tremors, weakness and headaches. Negative for syncope.   Psychiatric/Behavioral: Negative for confusion and hallucinations.     Objective:     Vital Signs (Most Recent):  Temp: 98.2 °F (36.8 °C) (19)  Pulse: 86 (19 145)  Resp: 20 (19 145)  BP: (!) 143/63 (19 145)  SpO2: 100 % (19) Vital Signs (24h  Range):  Temp:  [98.2 °F (36.8 °C)] 98.2 °F (36.8 °C)  Pulse:  [84-88] 86  Resp:  [17-35] 20  SpO2:  [95 %-100 %] 100 %  BP: (126-167)/(63-72) 143/63        There is no height or weight on file to calculate BMI.    Physical Exam   Constitutional: She is oriented to person, place, and time. She appears well-developed and well-nourished. She appears lethargic. No distress.   HENT:   Head: Normocephalic and atraumatic.   Mouth/Throat: No oropharyngeal exudate.   Eyes: Pupils are equal, round, and reactive to light. Conjunctivae and EOM are normal.   Neck: Normal range of motion. Neck supple.   Cardiovascular: Normal rate, regular rhythm and normal heart sounds.   Pulmonary/Chest: Effort normal and breath sounds normal. No respiratory distress. She has no wheezes.   Abdominal: Soft. Bowel sounds are normal. She exhibits no distension. There is no tenderness.   Musculoskeletal: Normal range of motion. She exhibits no edema or tenderness.   Neurological: She is oriented to person, place, and time. She appears lethargic. No cranial nerve deficit or sensory deficit.   5/5 strength upper extremities, 3/5 strength lower extremities   Skin: Skin is warm and dry.   Psychiatric: She has a normal mood and affect. Her behavior is normal.         CRANIAL NERVES     CN III, IV, VI   Pupils are equal, round, and reactive to light.  Extraocular motions are normal.        Significant Labs: All pertinent labs within the past 24 hours have been reviewed.    Significant Imaging: I have reviewed and interpreted all pertinent imaging results/findings within the past 24 hours.

## 2019-11-06 NOTE — ASSESSMENT & PLAN NOTE
- bradykinesia related medication non-compliance    - significant confounder for debility   - home regimen     Sinemet 25/100 ::: 6 AM 10 AM / 2PM / 6 PM

## 2019-11-07 LAB
ALBUMIN SERPL BCP-MCNC: 3.2 G/DL (ref 3.5–5.2)
ALP SERPL-CCNC: 75 U/L (ref 55–135)
ALT SERPL W/O P-5'-P-CCNC: <5 U/L (ref 10–44)
ANION GAP SERPL CALC-SCNC: 6 MMOL/L (ref 8–16)
AST SERPL-CCNC: 9 U/L (ref 10–40)
BASOPHILS # BLD AUTO: 0.03 K/UL (ref 0–0.2)
BASOPHILS NFR BLD: 0.6 % (ref 0–1.9)
BILIRUB SERPL-MCNC: 0.5 MG/DL (ref 0.1–1)
BUN SERPL-MCNC: 25 MG/DL (ref 8–23)
CALCIUM SERPL-MCNC: 8.4 MG/DL (ref 8.7–10.5)
CHLORIDE SERPL-SCNC: 109 MMOL/L (ref 95–110)
CO2 SERPL-SCNC: 25 MMOL/L (ref 23–29)
CREAT SERPL-MCNC: 1.1 MG/DL (ref 0.5–1.4)
DIFFERENTIAL METHOD: ABNORMAL
EOSINOPHIL # BLD AUTO: 0.2 K/UL (ref 0–0.5)
EOSINOPHIL NFR BLD: 5.1 % (ref 0–8)
ERYTHROCYTE [DISTWIDTH] IN BLOOD BY AUTOMATED COUNT: 13.5 % (ref 11.5–14.5)
EST. GFR  (AFRICAN AMERICAN): 57.6 ML/MIN/1.73 M^2
EST. GFR  (NON AFRICAN AMERICAN): 49.9 ML/MIN/1.73 M^2
GLUCOSE SERPL-MCNC: 107 MG/DL (ref 70–110)
HCT VFR BLD AUTO: 29.6 % (ref 37–48.5)
HGB BLD-MCNC: 9.1 G/DL (ref 12–16)
IMM GRANULOCYTES # BLD AUTO: 0.02 K/UL (ref 0–0.04)
IMM GRANULOCYTES NFR BLD AUTO: 0.4 % (ref 0–0.5)
LYMPHOCYTES # BLD AUTO: 1.5 K/UL (ref 1–4.8)
LYMPHOCYTES NFR BLD: 32.1 % (ref 18–48)
MAGNESIUM SERPL-MCNC: 2.1 MG/DL (ref 1.6–2.6)
MCH RBC QN AUTO: 32.3 PG (ref 27–31)
MCHC RBC AUTO-ENTMCNC: 30.7 G/DL (ref 32–36)
MCV RBC AUTO: 105 FL (ref 82–98)
MONOCYTES # BLD AUTO: 0.6 K/UL (ref 0.3–1)
MONOCYTES NFR BLD: 11.8 % (ref 4–15)
NEUTROPHILS # BLD AUTO: 2.3 K/UL (ref 1.8–7.7)
NEUTROPHILS NFR BLD: 50 % (ref 38–73)
NRBC BLD-RTO: 0 /100 WBC
PHOSPHATE SERPL-MCNC: 3.1 MG/DL (ref 2.7–4.5)
PLATELET # BLD AUTO: 185 K/UL (ref 150–350)
PMV BLD AUTO: 9.5 FL (ref 9.2–12.9)
POTASSIUM SERPL-SCNC: 4.2 MMOL/L (ref 3.5–5.1)
PROT SERPL-MCNC: 7.3 G/DL (ref 6–8.4)
RBC # BLD AUTO: 2.82 M/UL (ref 4–5.4)
SODIUM SERPL-SCNC: 140 MMOL/L (ref 136–145)
WBC # BLD AUTO: 4.67 K/UL (ref 3.9–12.7)

## 2019-11-07 PROCEDURE — 36415 COLL VENOUS BLD VENIPUNCTURE: CPT

## 2019-11-07 PROCEDURE — 99232 SBSQ HOSP IP/OBS MODERATE 35: CPT | Mod: ,,, | Performed by: INTERNAL MEDICINE

## 2019-11-07 PROCEDURE — 84100 ASSAY OF PHOSPHORUS: CPT

## 2019-11-07 PROCEDURE — 97166 OT EVAL MOD COMPLEX 45 MIN: CPT

## 2019-11-07 PROCEDURE — 25000003 PHARM REV CODE 250: Performed by: INTERNAL MEDICINE

## 2019-11-07 PROCEDURE — 99233 SBSQ HOSP IP/OBS HIGH 50: CPT | Mod: GC,,, | Performed by: PSYCHIATRY & NEUROLOGY

## 2019-11-07 PROCEDURE — 85025 COMPLETE CBC W/AUTO DIFF WBC: CPT

## 2019-11-07 PROCEDURE — 97161 PT EVAL LOW COMPLEX 20 MIN: CPT

## 2019-11-07 PROCEDURE — 99232 PR SUBSEQUENT HOSPITAL CARE,LEVL II: ICD-10-PCS | Mod: ,,, | Performed by: INTERNAL MEDICINE

## 2019-11-07 PROCEDURE — 11000001 HC ACUTE MED/SURG PRIVATE ROOM

## 2019-11-07 PROCEDURE — 99233 PR SUBSEQUENT HOSPITAL CARE,LEVL III: ICD-10-PCS | Mod: GC,,, | Performed by: PSYCHIATRY & NEUROLOGY

## 2019-11-07 PROCEDURE — 63600175 PHARM REV CODE 636 W HCPCS: Performed by: INTERNAL MEDICINE

## 2019-11-07 PROCEDURE — 80053 COMPREHEN METABOLIC PANEL: CPT

## 2019-11-07 PROCEDURE — 84207 ASSAY OF VITAMIN B-6: CPT

## 2019-11-07 PROCEDURE — 83735 ASSAY OF MAGNESIUM: CPT

## 2019-11-07 PROCEDURE — 97530 THERAPEUTIC ACTIVITIES: CPT

## 2019-11-07 PROCEDURE — 84252 ASSAY OF VITAMIN B-2: CPT

## 2019-11-07 RX ORDER — LISINOPRIL 20 MG/1
20 TABLET ORAL DAILY
Status: DISCONTINUED | OUTPATIENT
Start: 2019-11-07 | End: 2019-11-08 | Stop reason: HOSPADM

## 2019-11-07 RX ORDER — CHOLECALCIFEROL (VITAMIN D3) 25 MCG
1000 TABLET ORAL DAILY
Status: DISCONTINUED | OUTPATIENT
Start: 2019-11-07 | End: 2019-11-08 | Stop reason: HOSPADM

## 2019-11-07 RX ADMIN — CARBIDOPA AND LEVODOPA 1 TABLET: 25; 100 TABLET, EXTENDED RELEASE ORAL at 06:11

## 2019-11-07 RX ADMIN — Medication 250 MG: at 11:11

## 2019-11-07 RX ADMIN — LEVOTHYROXINE SODIUM 50 MCG: 50 TABLET ORAL at 06:11

## 2019-11-07 RX ADMIN — ACETAMINOPHEN 650 MG: 325 TABLET ORAL at 10:11

## 2019-11-07 RX ADMIN — ACETAMINOPHEN 650 MG: 325 TABLET ORAL at 04:11

## 2019-11-07 RX ADMIN — POLYETHYLENE GLYCOL 3350 17 G: 17 POWDER, FOR SOLUTION ORAL at 11:11

## 2019-11-07 RX ADMIN — ACETAMINOPHEN 650 MG: 325 TABLET ORAL at 11:11

## 2019-11-07 RX ADMIN — AMLODIPINE BESYLATE 5 MG: 5 TABLET ORAL at 11:11

## 2019-11-07 RX ADMIN — Medication 400 MG: at 11:11

## 2019-11-07 RX ADMIN — ENOXAPARIN SODIUM 40 MG: 100 INJECTION SUBCUTANEOUS at 06:11

## 2019-11-07 NOTE — PT/OT/SLP EVAL
"Occupational Therapy   Evaluation    Name: Melissa Anand  MRN: [id number]  Admitting Diagnosis:  Bilateral leg weakness      Recommendations:     Discharge Recommendations: home health OT  Discharge Equipment Recommendations:  other (see comments) - TBD  Barriers to discharge:  Inaccessible home environment    Assessment:     Melissa Anand is a 74 y.o. female with a medical diagnosis of Bilateral leg weakness.  She presents with HOB elevated.  Pt required encouragement for participation during evaluation.  She tolerated session well as demonstrated by her ability to perform bed mobility and bed to chair transfer.  Based on her Parkinson's disease, sedentary lifestyle and prior level of function, home health OT services recommended at discharge for maximal pt gains in functional performance. Performance deficits affecting function: weakness, impaired endurance, impaired sensation, impaired self care skills, impaired functional mobilty, gait instability, impaired balance, impaired cognition, decreased upper extremity function, decreased lower extremity function, decreased safety awareness, pain, decreased ROM, impaired fine motor.      Rehab Prognosis: Fair; patient would benefit from acute skilled OT services to address these deficits and reach maximum level of function.       Plan:     Patient to be seen 3 x/week to address the above listed problems via self-care/home management, therapeutic activities, therapeutic exercises  · Plan of Care Expires: 12/07/19  · Plan of Care Reviewed with: patient    Subjective     "My pain is an 11/10."  Chief Complaint: lower abdominal pain  Patient/Family Comments/goals: to return home, PLOF    Occupational Profile:  Living Environment: Pt lives with her daughter in a [facility] with 0 JAMIE.  She has a tub/shower combo with no chair or grab bars and a standard toilet with no grab bars.  Pt reports 5 falls in the past year. Pt reports a caretaker comes by 3 days/week.  Previous level of function: " "Per pt report, IND with ADLs and that caretaker does not assist her.  Pt no longer drives.  She uses a straight cane and rolling walker for ambulation, using the rolling walker for longer distances.  Pt reports she sometimes uses a wheelchair, but she doesn't like to.     Roles and Routines: Mother, enjoys watching TV   Equipment Used at Home:  wheelchair, walker, rolling, cane, straight  Assistance upon Discharge: her daughter, caretaker 3 days/week   Pain/Comfort:  Pain Rating 1: 10/10  Location - Orientation 1: lower  Location 1: abdomen  Pain Addressed 1: Reposition, Distraction    Patients cultural, spiritual, Spiritism conflicts given the current situation: no    Objective:     Communicated with: nurse and PT prior to session.  Patient found HOB elevated with telemetry, PureWick, bed alarm upon OT entry to room.    General Precautions: Standard, fall   Orthopedic Precautions:N/A   Braces: N/A     Occupational Performance:    Bed Mobility:    · Patient completed Rolling/Turning to Left with  maximal assistance  · Patient completed Scooting/Bridging with moderate assistance  · Patient completed Supine to Sit with maximal assistance    Functional Mobility/Transfers:  · Patient completed Sit <> Stand Transfer with minimum assistance  with  hand-held assist   · Patient completed Bed <> Chair Transfer using Stand Pivot technique with minimum assistance with hand-held assist      Activities of Daily Living:  · Toileting: total assistance and of 2 persons to clean rear and perineal area while in bed    Cognitive/Visual Perceptual:  Cognitive/Psychosocial Skills:     -       Oriented to: Person, Place, Situation.  Pt stated correct month and date, however stated year was "1982."    -       Mood/Affect/Coping skills/emotional control: Agitated    Physical Exam:  Upper Extremity Range of Motion:     -       Right Upper Extremity: WFL except AROM shoulder flexion/abduction less than 90 degrees  -       Left Upper " Extremity: WFL except AROM shoulder flexion/abduction less than 90 degrees  Upper Extremity Strength:    -       Right Upper Extremity: WFL except 2+/5 shoulder flexion/abduction  -       Left Upper Extremity: WFL except 2+/5 shoulder flexion/abduction   Strength:    -       Right Upper Extremity: WFL  -       Left Upper Extremity: WFL  Fine Motor Coordination: Pt able to perform thumb opposition with RUE but not with LUE.     AMPAC 6 Click ADL:  AMPAC Total Score: 16    Treatment & Education:  Pt edu on role of OT, benefit of out of bed activity, plan of care, and safety when performing functional transfers and self care tasks.  - White board updated  - Self care tasks completed-- as noted above   Education:    Patient left up in chair with all lines intact and call button in reach    GOALS:   Multidisciplinary Problems     Occupational Therapy Goals        Problem: Occupational Therapy Goal    Goal Priority Disciplines Outcome Interventions   Occupational Therapy Goal     OT, PT/OT     Description:  Goals to be met by: 11/28/2019    Patient will increase functional independence with ADLs by performing:    Feeding with Modified [place].  UE Dressing with Set-up Assistance.  LE Dressing with Contact Guard Assistance.  Grooming while standing with Contact Guard Assistance.  Toileting from toilet with Minimal Assistance for hygiene and clothing management.   Bathing from  standing at sink with Minimal Assistance.  Supine to sit with Minimal Assistance.  Step transfer with Contact Guard Assistance  Toilet transfer to toilet with Contact Guard Assistance.                      History:     Past Medical History:   Diagnosis Date    Anemia     Colitis     hospitalized July 2014    Diabetes mellitus     Encounter for blood transfusion     Goiter     Hypertension     Left hip pain 10/12/14    Syncope          Past Surgical History:   Procedure Laterality Date    BACK SURGERY      THYROIDECTOMY      TOE  AMPUTATION Left        Time Tracking:     OT Date of Treatment: 11/07/19  OT Start Time: 0909  OT Stop Time: 0945  OT Total Time (min): 36 min    Billable Minutes:Evaluation 8  Therapeutic Activity 28    GUSTAVO Hays  11/7/2019

## 2019-11-07 NOTE — ASSESSMENT & PLAN NOTE
-- Bradykinesia related medication non-compliance  -- significant confounder for debility   -- Continue CD/LD 25/100 --> 6 AM / 10AM / 2PM / 6 PM

## 2019-11-07 NOTE — PLAN OF CARE
Problem: Occupational Therapy Goal  Goal: Occupational Therapy Goal  Description  Goals to be met by: 11/28/2019    Patient will increase functional independence with ADLs by performing:    Feeding with Modified [place].  UE Dressing with Set-up Assistance.  LE Dressing with Contact Guard Assistance.  Grooming while standing with Contact Guard Assistance.  Toileting from toilet with Minimal Assistance for hygiene and clothing management.   Bathing from  standing at sink with Minimal Assistance.  Supine to sit with Minimal Assistance.  Step transfer with Contact Guard Assistance  Toilet transfer to toilet with Contact Guard Assistance.     Outcome: Ongoing, Progressing

## 2019-11-07 NOTE — PLAN OF CARE
No acute events throughout shift. VS and assessment performed per orders. Pain medication given as ordered, moderate relief.  Pt free from injury or any falls.

## 2019-11-07 NOTE — PT/OT/SLP EVAL
"Physical Therapy Evaluation    Patient Name:  Melissa Anand   MRN:  [id number]    Recommendations:     Discharge Recommendations:  home health PT   Discharge Equipment Recommendations: none   Barriers to discharge: None    Assessment:     Melissa Anand is a 74 y.o. female admitted with a medical diagnosis of Bilateral leg weakness.  She presents with the following impairments/functional limitations:  weakness, impaired endurance, impaired functional mobilty, gait instability, impaired balance, impaired self care skills, decreased lower extremity function, decreased upper extremity function, pain, decreased safety awareness, impaired cognition, decreased coordination, impaired sensation.    Pt is safe to perform stand pivot transfers, bed<>chair or bed<>BSC, with Luis Felipe of 1 person.  As per pt, she has been bed bound for "months", only getting out of the bed for toileting, though this PT believes that the pt is toileting in the bed at this time.  Pt was found in bed with a soiled undergarment, and pt was unaware that she had a bowel movement.  Concerns regarding safety, as pt has a history of falls, approx 5xs in the past year.  PT recommends for social working to discuss long term options for this pt to facilitate with caregiver burden and to maintain proper care for the pt.  Pt presents better than what was described upon her admit to the hospital and she is able to get out of bed, therefore safe for return to the home.       Recommendation for pt to receive LSVT BIG therapy as part of her rehabilitation for PD.      Rehab Prognosis: Good; patient would benefit from acute skilled PT services to address these deficits and reach maximum level of function.    Recent Surgery: * No surgery found *      Plan:     During this hospitalization, patient to be seen 3 x/week to address the identified rehab impairments via gait training, therapeutic activities, therapeutic exercises, neuromuscular re-education and progress toward the " "following goals:    · Plan of Care Expires:  12/04/19    Subjective     Chief Complaint: Abdominal pain  Patient/Family Comments/goals: To stay in bed all day  Pain/Comfort:  · Pain Rating 1: 10/10 ("11")  · Location - Orientation 1: lower  · Location 1: abdomen  · Pain Addressed 1: Pre-medicate for activity, Nurse notified, Distraction, Cessation of Activity, Reposition    Patients cultural, spiritual, Congregation conflicts given the current situation: no    Living Environment:  Pt lives with daughter, [facility], 0 JAMIE.   Prior to admission, patient was bed bound for a prolonged period of time, use of cane or RW for toileting and transfers.  Caregiver present 3xs/wk during the day.  Equipment used at home: wheelchair, walker, rolling, cane, straight.  DME owned (not currently used): none.  Upon discharge, patient will have assistance from caregiver and daughter.    Objective:     Communicated with nursing prior to session.  Patient found supine with peripheral IV, PureWick, telemetry, bed alarm  upon PT entry to room.    General Precautions: Standard, fall   Orthopedic Precautions:N/A   Braces: N/A     Exams:  · Cognitive Exam:  Patient is oriented to Person, Place, Situation and eyes closed for 75% of evaluation, difficult to arouse  · Fine Motor Coordination:    · -       Intact  Right hand thumb/finger opposition skills  · -       Impaired  Left hand thumb/finger opposition skills    · Gross Motor Coordination:  WFL  · Postural Exam:  Patient presented with the following abnormalities:    · -       Rounded shoulders  · Sensation:    · -       Impaired  light/touch B lower legs and feet  · Skin Integrity/Edema:      · -       Edema: None noted B LEs  · RUE ROM: WFL except sh ROM  · RUE Strength: WFL  · LUE ROM: WFL except sh ROM  · LUE Strength: WFL  · RLE ROM: Deficits: throughout  · RLE Strength: Deficits: 3+/5  · LLE ROM: Deficits: throughout  · LLE Strength: Deficits: 2+/5    Functional Mobility:  · Bed Mobility:   "   · Rolling Left:  maximal assistance  · Rolling Right: maximal assistance  · Scooting: Mod A to scoot ant sitting EOB  · Supine to Sit: maximal assistance  · Transfers:     · Sit to Stand:  minimum assistance with no AD  · Bed to Chair: minimum assistance with  no AD  using  Stand Pivot     · Gait: Pt amb a few steps during stand pivot transfer, Luis Felipe, without AD    · Balance: Luis Felipe: dynamic standing balance without AD; I: dynamic sitting balance EOB      Therapeutic Activities and Exercises:   Whiteboard updated  Bowel management: Total A for hygiene performed in bed    AM-PAC 6 CLICK MOBILITY  Total Score:14     Patient left up in chair with all lines intact, call button in reach and PCT and nsg notified.    GOALS:   Multidisciplinary Problems     Physical Therapy Goals        Problem: Physical Therapy Goal    Goal Priority Disciplines Outcome Goal Variances Interventions   Physical Therapy Goal     PT, PT/OT Ongoing, Progressing     Description:  Goals to be met by: 19     Patient will increase functional independence with mobility by performin. Supine to sit with Moderate Assistance.  2. Sit to supine with Moderate Assistance.  3. Rolling to Left and Right with Moderate Assistance.  4. Sit to stand transfer with Contact Guard Assistance.  5. Bed to chair transfer with Contact Guard Assistance using Rolling Walker.  6. Gait  x 20 feet with Minimal Assistance using Rolling Walker.   7. Ascend/Descend 6 inch curb step with Minimal Assistance using Rolling Walker.  8. Lower extremity exercise program x 20 reps per handout, with assistance as needed.                      History:     Past Medical History:   Diagnosis Date    Anemia     Colitis     hospitalized 2014    Diabetes mellitus     Encounter for blood transfusion     Goiter     Hypertension     Left hip pain 10/12/14    Syncope        Past Surgical History:   Procedure Laterality Date    BACK SURGERY      THYROIDECTOMY      TOE  AMPUTATION Left        Time Tracking:     PT Received On: 11/07/19  PT Start Time: 0908     PT Stop Time: 0946  PT Total Time (min): 38 min     Billable Minutes: Evaluation 8 and Therapeutic Activity 27      Linda Vazquez, PT  11/07/2019

## 2019-11-07 NOTE — ASSESSMENT & PLAN NOTE
73 y/o female with history of AIDP (Jan 2018), cervical and thoracic DDD with myelomalacia changes and lumbar DDD with NF narrowing, tremor dominant PD, DMII, HTN, HLD, presenting with concerns of worsening weakness in her LE for the past 3 days. Patient reports that she has been having trouble in getting out of bed for the past 3 days and has also had a few falls. No head trauma.   Unclear compliance with her meds.  Denies any bowel or bladder incontinence.  EMG has shown evidence of chronic length dependent sensorimotor polyneuropathy    Asssessment and Recommendations:  -- Differential includes: multifactorial as noted from chronic medical problems / with significant confounder from non-compliance with medications - specially PD meds resulting in worsening bradykinesia. Less concern for ADIP/CIDP flare or acute spinal cord injury,.   -- Continue CD/LD  -- Continue VitD, B1, B2.  -- Follow levels of B1,B6,B12,Vit E, Folate, TSH, T3, T4, A1C  -- PT/OT and PMR evaluation  -- Fall precautions.

## 2019-11-07 NOTE — PROGRESS NOTES
[facility]  Neurology  Progress Note    Patient Name: Melissa Anand  MRN: [id number]  Admission Date: 11/6/2019  Hospital Length of Stay: 1 days  Code Status: Full Code   Attending Provider: Breana Wood*  Primary Care Physician: Dana Castellon MD   Principal Problem:Bilateral leg weakness      Subjective:     Interval History: No acute events overnight. Today weakness seemed unchanged from yesterday although she didn't appear to be giving adequate effort.  Later on rounds with staff, she was sitting in her chair and looked much better.   PT/OT awaited.     Current Neurological Medications: CD/LD, Riboflavin, Thiamine, Vit D.    Current Facility-Administered Medications   Medication Dose Route Frequency Provider Last Rate Last Dose    acetaminophen tablet 650 mg  650 mg Oral Q4H PRN Breana Wood MD   650 mg at 11/07/19 1113    amLODIPine tablet 5 mg  5 mg Oral Daily Breana Wood MD   5 mg at 11/07/19 1101    carbidopa-levodopa  mg TBSR 1 tablet  1 tablet Oral QID Breana Wood MD   1 tablet at 11/07/19 0608    enoxaparin injection 40 mg  40 mg Subcutaneous Daily Breana Wood MD   40 mg at 11/06/19 1726    levothyroxine tablet 50 mcg  50 mcg Oral Before breakfast Breana Wood MD   50 mcg at 11/07/19 0608    lisinopril tablet 20 mg  20 mg Oral Daily Breana Wood MD        ondansetron injection 4 mg  4 mg Intravenous Q8H PRN Breana Wood MD        polyethylene glycol packet 17 g  17 g Oral Daily Breana Wood MD   17 g at 11/07/19 1100    riboflavin 100mg oral tablet  400 mg Oral Daily Breana Wood MD   400 mg at 11/07/19 1106    sodium chloride 0.9% flush 10 mL  10 mL Intravenous PRN Breana Wood MD        thiamine tablet 250 mg  250 mg Oral Daily Breana Wood MD   250 mg at 11/07/19 1101    vitamin D 1000 units tablet 1,000 Units   1,000 Units Oral Daily Breana Wood MD           Review of Systems   Constitutional: Negative for chills and fever.   HENT: Negative for trouble swallowing.    Eyes: Negative for visual disturbance.   Respiratory: Negative for shortness of breath.    Gastrointestinal: Negative for diarrhea and vomiting.   Genitourinary: Negative for difficulty urinating.   Musculoskeletal: Negative for arthralgias.   Neurological: Negative for dizziness, seizures, facial asymmetry, speech difficulty, weakness, light-headedness and numbness.   Psychiatric/Behavioral: Negative for agitation and confusion.     Objective:     Vital Signs (Most Recent):  Temp: 98 °F (36.7 °C) (11/07/19 0859)  Pulse: 81 (11/07/19 1100)  Resp: 17 (11/07/19 1100)  BP: 116/67 (11/07/19 1100)  SpO2: 97 % (11/07/19 1100) Vital Signs (24h Range):  Temp:  [97.9 °F (36.6 °C)-98 °F (36.7 °C)] 98 °F (36.7 °C)  Pulse:  [71-86] 81  Resp:  [13-20] 17  SpO2:  [95 %-100 %] 97 %  BP: (111-157)/(54-76) 116/67     Weight: 107.3 kg (236 lb 8.9 oz)  Body mass index is 32.99 kg/m².    Physical Exam   Constitutional: She is oriented to person, place, and time. No distress.   Eyes: EOM are normal.   Cardiovascular: Normal rate.   Pulmonary/Chest: Effort normal.   Neurological: She is alert and oriented to person, place, and time.   Reflex Scores:       Bicep reflexes are 1+ on the right side and 1+ on the left side.       Brachioradialis reflexes are 1+ on the right side and 1+ on the left side.       Patellar reflexes are 0 on the right side and 0 on the left side.  Psychiatric: Her speech is normal.   Nursing note and vitals reviewed.      NEUROLOGICAL EXAMINATION:     MENTAL STATUS   Oriented to person, place, and time.   Oriented to person.   Oriented to place.   Oriented to time.   Speech: speech is normal   Level of consciousness: alert    CRANIAL NERVES     CN III, IV, VI   Extraocular motions are normal.   CN VI: no CN VI palsy  Nystagmus: none    Ophthalmoparesis: none    CN V   Facial sensation intact.     CN VII   Facial expression full, symmetric.     CN XII   CN XII normal.     MOTOR EXAM        Antigravity to all four extremities.     REFLEXES     Reflexes   Right brachioradialis: 1+  Left brachioradialis: 1+  Right biceps: 1+  Left biceps: 1+  Right patellar: 0  Left patellar: 0      Significant Labs:   Hemoglobin A1c:   Recent Labs   Lab 11/06/19  1835   HGBA1C 5.3     CBC:   Recent Labs   Lab 11/06/19  1006 11/07/19  0543   WBC 6.90 4.67   HGB 10.1* 9.1*   HCT 33.1* 29.6*    185     CMP:   Recent Labs   Lab 11/06/19  1006 11/07/19  0543   * 107    140   K 4.9 4.2    109   CO2 20* 25   BUN 23 25*   CREATININE 1.1 1.1   CALCIUM 9.1 8.4*   MG 2.1 2.1   PROT 8.4 7.3   ALBUMIN 3.7 3.2*   BILITOT 0.8 0.5   ALKPHOS 81 75   AST 15 9*   ALT 5* <5*   ANIONGAP 10 6*   EGFRNONAA 49.9* 49.9*     POCT Glucose: No results for input(s): POCTGLUCOSE in the last 24 hours.  Urine Culture: No results for input(s): LABURIN in the last 48 hours.  Urine Studies:   Recent Labs   Lab 11/06/19  1006   COLORU Yellow   APPEARANCEUA Hazy*   PHUR 5.0   SPECGRAV 1.015   PROTEINUA Negative   GLUCUA Negative   KETONESU Negative   BILIRUBINUA Negative   OCCULTUA Negative   NITRITE Negative   LEUKOCYTESUR Negative     All pertinent lab results from the past 24 hours have been reviewed.    Significant Imaging: I have reviewed all pertinent imaging results/findings within the past 24 hours.    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 Patient reports that she has been having trouble in getting out of bed for the past 3 days and has also had a few falls. No head trauma.   Unclear compliance with her  meds.  Denies any bowel or bladder incontinence.  EMG has shown evidence of chronic length dependent sensorimotor polyneuropathy    Asssessment and Recommendations:  -- Differential includes: multifactorial as noted from chronic medical problems / with significant confounder from non-compliance with medications - specially PD meds resulting in worsening bradykinesia. Less concern for ADIP/CIDP flare or acute spinal cord injury.  -- Today she seemed better, was sitting in her chair and was more alert.  -- Continue CD/LD  -- Continue VitD, B1, B2.  -- Follow levels of B1,B6,B12,Vit E, Folate, TSH, T3, T4, A1C  -- PT/OT and PMR evaluation  -- Fall precautions.  -- Follow up with Dr Rick    Depression  -- Continue elavil     Acquired hypothyroidism  - continue home meds  - concerns for medication non-compliance   - check TSH, T3, T4    Vitamin B1 deficiency neuropathy  -- Continue B1  -- Follow level     AIDP (acute inflammatory demyelinating polyneuropathy)  1/2018 - S/P ivig   Less concerns for AIDP for current presentation     Parkinson's disease  -- Bradykinesia related medication non-compliance  -- significant confounder for debility   -- Continue CD/LD 25/100 --> 6 AM / 10AM / 2PM / 6 PM     Vitamin B1 deficiency  -- Continue B1 supplementation     Lumbar stenosis  -- Long standing history  -- No concern for acute spinal cord injury    Essential hypertension  - target < 130/80 mmHg        VTE Risk Mitigation (From admission, onward)         Ordered     Place DARRICK hose  Until discontinued      11/06/19 1930     enoxaparin injection 40 mg  Daily      11/06/19 1537     IP VTE HIGH RISK PATIENT  Once      11/06/19 1337     Place sequential compression device  Until discontinued      11/06/19 1337              Neurology will sign off. Please call with any questions.  Neurology Spectra [id number]    Jose D Romo MD  Neurology  [facility]

## 2019-11-07 NOTE — PLAN OF CARE
11/07/19 1350   Discharge Assessment   Assessment Type Discharge Planning Assessment   Confirmed/corrected address and phone number on facesheet? Yes   Assessment information obtained from? Patient   Expected Length of Stay (days) 3   Communicated expected length of stay with patient/caregiver yes   Prior to hospitilization cognitive status: Alert/Oriented   Prior to hospitalization functional status: Independent;Assistive Equipment   Current cognitive status: Alert/Oriented   Current Functional Status: Independent;Assistive Equipment   Lives With child(autumn), adult;grandchild(autumn)   Able to Return to Prior Arrangements yes   Is patient able to care for self after discharge? Unable to determine at this time (comments)   Who are your caregiver(s) and their phone number(s)? [facility] [phone]   Patient's perception of discharge disposition home or selfcare   Readmission Within the Last 30 Days no previous admission in last 30 days   Patient currently being followed by outpatient case management? Unable to determine (comments)   Patient currently receives any other outside agency services? No   Equipment Currently Used at Home wheelchair;walker, rolling;cane, straight;commode   Do you have any problems affording any of your prescribed medications? No   Is the patient taking medications as prescribed? yes   Does the patient have transportation home? Yes   Transportation Anticipated family or friend will provide   Dialysis Name and Scheduled days n/a   Does the patient receive services at the [facility]? No   Discharge Plan A Home with family   Discharge Plan B Home with family;Home Health   DME Needed Upon Discharge  none   Patient/Family in Agreement with Plan yes

## 2019-11-07 NOTE — PROGRESS NOTES
Hospital Medicine  Progress Note      Patient Name: Melissa Anand  MRN: [id number]  Date of Admission: 11/6/2019     Principal Problem: Bilateral leg weakness     Subjective     HD stable overnight. More awake and interactive this morning. Denies pain, fevers, chills, headache, nausea or vomiting.       Review of Systems     Constitutional: Positive for fatigue. Negative for chills and fever.   HENT: Negative for congestion and trouble swallowing.    Eyes: Negative for photophobia and visual disturbance.   Respiratory: Negative for cough, choking, shortness of breath, wheezing and stridor.    Cardiovascular: Negative for chest pain, palpitations and leg swelling.   Gastrointestinal: Positive for diarrhea. Negative for abdominal pain, nausea and vomiting.   Genitourinary: Negative for difficulty urinating and dysuria.   Musculoskeletal: Positive for back pain and gait problem. Negative for arthralgias and myalgias.   Skin: Negative for color change and pallor.   Neurological: Positive for tremors, weakness and headaches. Negative for syncope.   Psychiatric/Behavioral: Negative for confusion and hallucinations.        Medications  Scheduled Meds:   amLODIPine  5 mg Oral Daily    carbidopa-levodopa  mg  1 tablet Oral QID    enoxaparin  40 mg Subcutaneous Daily    levothyroxine  50 mcg Oral Before breakfast    polyethylene glycol  17 g Oral Daily    riboflavin (vitamin B2)  400 mg Oral Daily    thiamine  250 mg Oral Daily    vitamin D  1,000 Units Oral Daily     Continuous Infusions:  PRN Meds:.acetaminophen, ondansetron, sodium chloride 0.9%    Objective    Physical Examination    Temp:  [97.9 °F (36.6 °C)-98 °F (36.7 °C)]   Pulse:  [71-86]   Resp:  [13-20]   BP: (111-157)/(54-76)   SpO2:  [95 %-100 %]     Constitutional: She is oriented to person, place, and time. She appears well-developed and well-nourished. She appears lethargic. No distress.   HENT:   Head: Normocephalic and atraumatic.   Mouth/Throat: No  oropharyngeal exudate.   Eyes: Pupils are equal, round, and reactive to light. Conjunctivae and EOM are normal.   Neck: Normal range of motion. Neck supple.   Cardiovascular: Normal rate, regular rhythm and normal heart sounds.   Pulmonary/Chest: Effort normal and breath sounds normal. No respiratory distress. She has no wheezes.   Abdominal: Soft. Bowel sounds are normal. She exhibits no distension. There is no tenderness.   Musculoskeletal: Normal range of motion. She exhibits no edema or tenderness.   Neurological: She is oriented to person, place, and time. She appears lethargic. No cranial nerve deficit or sensory deficit.   5/5 strength upper extremities, 3/5 strength lower extremities   Skin: Skin is warm and dry.   Psychiatric: She has a normal mood and affect. Her behavior is normal.     CBC  Recent Labs   Lab 11/06/19  1006 11/07/19  0543   WBC 6.90 4.67   HGB 10.1* 9.1*   HCT 33.1* 29.6*    185     CMP  Recent Labs   Lab 11/06/19  1006 11/07/19  0543    140   K 4.9 4.2    109   CO2 20* 25   BUN 23 25*   CREATININE 1.1 1.1   * 107   CALCIUM 9.1 8.4*   MG 2.1 2.1   PHOS  --  3.1   ALKPHOS 81 75   ALT 5* <5*   AST 15 9*   ALBUMIN 3.7 3.2*   PROT 8.4 7.3   BILITOT 0.8 0.5           Hospital Course:  Admitted to  on 11/6 for progressive lower extremity weakness and fatigue possibly due to AIDP. Evalated by neurology in ED. Lab work ordered per their recs. PT/OT consulted. By day 2 pt more awake, but still having lower ext weakness.     Assessment and Plan:       * Bilateral leg weakness  AIDP (acute inflammatory demyelinating polyneuropathy)     --hospital admission back in 1/9/18- 1/18/18  for presumed AIDP presenting with BLE weakness and associated pain that improved after IVIG x 5   --she was also diagnosed with parkinsons at the time and started on trial of carbidopa-levodopa  --presenting now with 4 day hx of progressive LE weakness and fatigue likely related to inflammatory  polyneuropathy and deconditioning  --per neuro minimal parkinsonian symptoms present  --TSH wnl  --CK normal  --CRP, ESR- elevated  --B1, B12, folate normal  --B6, Vit E pending  --Vit D LOW  --A1c 5.3; at goal  --neurology consulted; appreciate assistance  --continue thiamine, riboflavin, vit D supplements and parkinsons meds        PD (Parkinson's disease)  --continue carbidopa-levodopa 25-100mg QID  --neurology consulted; appreciate recs        Anemia  Anemia of chronic disease vs chronic inflammation  --monitor with daily CBC        Essential hypertension  --BP at goal  --resume home lisinopril 40mg po daily  --amlodipine 5mg po daily      Depression  --continue home escitalopram        Acquired hypothyroidism  TSH and Free T4 normal  T3 low, however, unclear how this would   --continue home synthroid        Vitamin B1 deficiency neuropathy  Continue thiamine    Diet: reg  VTE PPX: lovenox   Goals of care: FULL  Dispo: pending PT/OT and neuro recs    Breana Wood M.D.  Department of Hospital Medicine  [facility]  [phone] (pager)

## 2019-11-07 NOTE — SUBJECTIVE & OBJECTIVE
Subjective:     Interval History: No acute events overnight. Today weakness seemed unchanged from yesterday although she didn't appear to be giving adequate effort.  Later on rounds with staff, she was sitting in her chair and looked much better.   PT/OT awaited.     Current Neurological Medications: CD/LD, Riboflavin, Thiamine, Vit D.    Current Facility-Administered Medications   Medication Dose Route Frequency Provider Last Rate Last Dose    acetaminophen tablet 650 mg  650 mg Oral Q4H PRN Breana Wood MD   650 mg at 11/07/19 1113    amLODIPine tablet 5 mg  5 mg Oral Daily Breana Wood MD   5 mg at 11/07/19 1101    carbidopa-levodopa  mg TBSR 1 tablet  1 tablet Oral QID Breana Wood MD   1 tablet at 11/07/19 0608    enoxaparin injection 40 mg  40 mg Subcutaneous Daily Breana Wood MD   40 mg at 11/06/19 1726    levothyroxine tablet 50 mcg  50 mcg Oral Before breakfast Breana Wood MD   50 mcg at 11/07/19 0608    lisinopril tablet 20 mg  20 mg Oral Daily Breana Wood MD        ondansetron injection 4 mg  4 mg Intravenous Q8H PRN Breana Wood MD        polyethylene glycol packet 17 g  17 g Oral Daily Breana Wood MD   17 g at 11/07/19 1100    riboflavin 100mg oral tablet  400 mg Oral Daily Breana Wood MD   400 mg at 11/07/19 1106    sodium chloride 0.9% flush 10 mL  10 mL Intravenous PRN Breana Wood MD        thiamine tablet 250 mg  250 mg Oral Daily Breana Wood MD   250 mg at 11/07/19 1101    vitamin D 1000 units tablet 1,000 Units  1,000 Units Oral Daily Breana Wood MD           Review of Systems   Constitutional: Negative for chills and fever.   HENT: Negative for trouble swallowing.    Eyes: Negative for visual disturbance.   Respiratory: Negative for shortness of breath.    Gastrointestinal: Negative for diarrhea and vomiting.    Genitourinary: Negative for difficulty urinating.   Musculoskeletal: Negative for arthralgias.   Neurological: Negative for dizziness, seizures, facial asymmetry, speech difficulty, weakness, light-headedness and numbness.   Psychiatric/Behavioral: Negative for agitation and confusion.     Objective:     Vital Signs (Most Recent):  Temp: 98 °F (36.7 °C) (11/07/19 0859)  Pulse: 81 (11/07/19 1100)  Resp: 17 (11/07/19 1100)  BP: 116/67 (11/07/19 1100)  SpO2: 97 % (11/07/19 1100) Vital Signs (24h Range):  Temp:  [97.9 °F (36.6 °C)-98 °F (36.7 °C)] 98 °F (36.7 °C)  Pulse:  [71-86] 81  Resp:  [13-20] 17  SpO2:  [95 %-100 %] 97 %  BP: (111-157)/(54-76) 116/67     Weight: 107.3 kg (236 lb 8.9 oz)  Body mass index is 32.99 kg/m².    Physical Exam   Constitutional: She is oriented to person, place, and time. No distress.   Eyes: EOM are normal.   Cardiovascular: Normal rate.   Pulmonary/Chest: Effort normal.   Neurological: She is alert and oriented to person, place, and time.   Reflex Scores:       Bicep reflexes are 1+ on the right side and 1+ on the left side.       Brachioradialis reflexes are 1+ on the right side and 1+ on the left side.       Patellar reflexes are 0 on the right side and 0 on the left side.  Psychiatric: Her speech is normal.   Nursing note and vitals reviewed.      NEUROLOGICAL EXAMINATION:     MENTAL STATUS   Oriented to person, place, and time.   Oriented to person.   Oriented to place.   Oriented to time.   Speech: speech is normal   Level of consciousness: alert    CRANIAL NERVES     CN III, IV, VI   Extraocular motions are normal.   CN VI: no CN VI palsy  Nystagmus: none   Ophthalmoparesis: none    CN V   Facial sensation intact.     CN VII   Facial expression full, symmetric.     CN XII   CN XII normal.     MOTOR EXAM        Antigravity to all four extremities.     REFLEXES     Reflexes   Right brachioradialis: 1+  Left brachioradialis: 1+  Right biceps: 1+  Left biceps: 1+  Right patellar:  0  Left patellar: 0      Significant Labs:   Hemoglobin A1c:   Recent Labs   Lab 11/06/19  1835   HGBA1C 5.3     CBC:   Recent Labs   Lab 11/06/19  1006 11/07/19  0543   WBC 6.90 4.67   HGB 10.1* 9.1*   HCT 33.1* 29.6*    185     CMP:   Recent Labs   Lab 11/06/19  1006 11/07/19  0543   * 107    140   K 4.9 4.2    109   CO2 20* 25   BUN 23 25*   CREATININE 1.1 1.1   CALCIUM 9.1 8.4*   MG 2.1 2.1   PROT 8.4 7.3   ALBUMIN 3.7 3.2*   BILITOT 0.8 0.5   ALKPHOS 81 75   AST 15 9*   ALT 5* <5*   ANIONGAP 10 6*   EGFRNONAA 49.9* 49.9*     POCT Glucose: No results for input(s): POCTGLUCOSE in the last 24 hours.  Urine Culture: No results for input(s): LABURIN in the last 48 hours.  Urine Studies:   Recent Labs   Lab 11/06/19  1006   COLORU Yellow   APPEARANCEUA Hazy*   PHUR 5.0   SPECGRAV 1.015   PROTEINUA Negative   GLUCUA Negative   KETONESU Negative   BILIRUBINUA Negative   OCCULTUA Negative   NITRITE Negative   LEUKOCYTESUR Negative     All pertinent lab results from the past 24 hours have been reviewed.    Significant Imaging: I have reviewed all pertinent imaging results/findings within the past 24 hours.

## 2019-11-08 VITALS
SYSTOLIC BLOOD PRESSURE: 137 MMHG | OXYGEN SATURATION: 97 % | RESPIRATION RATE: 17 BRPM | HEIGHT: 71 IN | TEMPERATURE: 98 F | DIASTOLIC BLOOD PRESSURE: 62 MMHG | HEART RATE: 83 BPM | WEIGHT: 236.56 LBS | BODY MASS INDEX: 33.12 KG/M2

## 2019-11-08 LAB
ALBUMIN SERPL BCP-MCNC: 3.3 G/DL (ref 3.5–5.2)
ALP SERPL-CCNC: 74 U/L (ref 55–135)
ALT SERPL W/O P-5'-P-CCNC: <5 U/L (ref 10–44)
ANION GAP SERPL CALC-SCNC: 6 MMOL/L (ref 8–16)
AST SERPL-CCNC: 10 U/L (ref 10–40)
BASOPHILS # BLD AUTO: 0.02 K/UL (ref 0–0.2)
BASOPHILS NFR BLD: 0.5 % (ref 0–1.9)
BILIRUB SERPL-MCNC: 0.5 MG/DL (ref 0.1–1)
BUN SERPL-MCNC: 19 MG/DL (ref 8–23)
CALCIUM SERPL-MCNC: 8.7 MG/DL (ref 8.7–10.5)
CHLORIDE SERPL-SCNC: 105 MMOL/L (ref 95–110)
CO2 SERPL-SCNC: 27 MMOL/L (ref 23–29)
CREAT SERPL-MCNC: 0.9 MG/DL (ref 0.5–1.4)
DIFFERENTIAL METHOD: ABNORMAL
EOSINOPHIL # BLD AUTO: 0.2 K/UL (ref 0–0.5)
EOSINOPHIL NFR BLD: 5.1 % (ref 0–8)
ERYTHROCYTE [DISTWIDTH] IN BLOOD BY AUTOMATED COUNT: 13.2 % (ref 11.5–14.5)
EST. GFR  (AFRICAN AMERICAN): >60 ML/MIN/1.73 M^2
EST. GFR  (NON AFRICAN AMERICAN): >60 ML/MIN/1.73 M^2
GLUCOSE SERPL-MCNC: 123 MG/DL (ref 70–110)
HCT VFR BLD AUTO: 30.4 % (ref 37–48.5)
HGB BLD-MCNC: 9.4 G/DL (ref 12–16)
IMM GRANULOCYTES # BLD AUTO: 0.01 K/UL (ref 0–0.04)
IMM GRANULOCYTES NFR BLD AUTO: 0.2 % (ref 0–0.5)
LYMPHOCYTES # BLD AUTO: 1.6 K/UL (ref 1–4.8)
LYMPHOCYTES NFR BLD: 36.1 % (ref 18–48)
MAGNESIUM SERPL-MCNC: 2 MG/DL (ref 1.6–2.6)
MCH RBC QN AUTO: 32 PG (ref 27–31)
MCHC RBC AUTO-ENTMCNC: 30.9 G/DL (ref 32–36)
MCV RBC AUTO: 103 FL (ref 82–98)
MONOCYTES # BLD AUTO: 0.6 K/UL (ref 0.3–1)
MONOCYTES NFR BLD: 14.7 % (ref 4–15)
NEUTROPHILS # BLD AUTO: 1.9 K/UL (ref 1.8–7.7)
NEUTROPHILS NFR BLD: 43.4 % (ref 38–73)
NRBC BLD-RTO: 0 /100 WBC
PHOSPHATE SERPL-MCNC: 2.3 MG/DL (ref 2.7–4.5)
PLATELET # BLD AUTO: 199 K/UL (ref 150–350)
PMV BLD AUTO: 9.6 FL (ref 9.2–12.9)
POTASSIUM SERPL-SCNC: 4.1 MMOL/L (ref 3.5–5.1)
PROT SERPL-MCNC: 7.6 G/DL (ref 6–8.4)
RBC # BLD AUTO: 2.94 M/UL (ref 4–5.4)
SODIUM SERPL-SCNC: 138 MMOL/L (ref 136–145)
WBC # BLD AUTO: 4.29 K/UL (ref 3.9–12.7)

## 2019-11-08 PROCEDURE — 99239 HOSP IP/OBS DSCHRG MGMT >30: CPT | Mod: ,,, | Performed by: INTERNAL MEDICINE

## 2019-11-08 PROCEDURE — 99239 PR HOSPITAL DISCHARGE DAY,>30 MIN: ICD-10-PCS | Mod: ,,, | Performed by: INTERNAL MEDICINE

## 2019-11-08 PROCEDURE — 84100 ASSAY OF PHOSPHORUS: CPT

## 2019-11-08 PROCEDURE — 36415 COLL VENOUS BLD VENIPUNCTURE: CPT

## 2019-11-08 PROCEDURE — 83735 ASSAY OF MAGNESIUM: CPT

## 2019-11-08 PROCEDURE — 80053 COMPREHEN METABOLIC PANEL: CPT

## 2019-11-08 PROCEDURE — 25000003 PHARM REV CODE 250: Performed by: INTERNAL MEDICINE

## 2019-11-08 PROCEDURE — 85025 COMPLETE CBC W/AUTO DIFF WBC: CPT

## 2019-11-08 RX ORDER — ERGOCALCIFEROL 1.25 MG/1
50000 CAPSULE ORAL
Qty: 4 CAPSULE | Refills: 0 | Status: SHIPPED | OUTPATIENT
Start: 2019-11-08 | End: 2019-12-08

## 2019-11-08 RX ORDER — CARBIDOPA AND LEVODOPA 25; 100 MG/1; MG/1
0.5 TABLET ORAL 4 TIMES DAILY
Qty: 90 TABLET | Refills: 5 | Status: SHIPPED | OUTPATIENT
Start: 2019-11-08 | End: 2019-12-05 | Stop reason: SDUPTHER

## 2019-11-08 RX ORDER — SODIUM,POTASSIUM PHOSPHATES 280-250MG
1 POWDER IN PACKET (EA) ORAL
Status: DISCONTINUED | OUTPATIENT
Start: 2019-11-08 | End: 2019-11-08 | Stop reason: HOSPADM

## 2019-11-08 RX ORDER — GABAPENTIN 300 MG/1
900 CAPSULE ORAL 3 TIMES DAILY
Qty: 270 CAPSULE | Refills: 5 | Status: SHIPPED | OUTPATIENT
Start: 2019-11-08 | End: 2019-12-05 | Stop reason: SDUPTHER

## 2019-11-08 RX ADMIN — POLYETHYLENE GLYCOL 3350 17 G: 17 POWDER, FOR SOLUTION ORAL at 10:11

## 2019-11-08 RX ADMIN — Medication 250 MG: at 10:11

## 2019-11-08 RX ADMIN — POTASSIUM & SODIUM PHOSPHATES POWDER PACK 280-160-250 MG 1 PACKET: 280-160-250 PACK at 12:11

## 2019-11-08 RX ADMIN — CARBIDOPA AND LEVODOPA 1 TABLET: 25; 100 TABLET, EXTENDED RELEASE ORAL at 06:11

## 2019-11-08 RX ADMIN — LISINOPRIL 20 MG: 20 TABLET ORAL at 10:11

## 2019-11-08 RX ADMIN — MELATONIN 1000 UNITS: at 10:11

## 2019-11-08 RX ADMIN — LEVOTHYROXINE SODIUM 50 MCG: 50 TABLET ORAL at 06:11

## 2019-11-08 RX ADMIN — AMLODIPINE BESYLATE 5 MG: 5 TABLET ORAL at 10:11

## 2019-11-08 RX ADMIN — CARBIDOPA AND LEVODOPA 1 TABLET: 25; 100 TABLET, EXTENDED RELEASE ORAL at 10:11

## 2019-11-08 NOTE — PLAN OF CARE
Problem: Fall Injury Risk  Goal: Absence of Fall and Fall-Related Injury  Outcome: Ongoing, Progressing     Problem: Adult Inpatient Plan of Care  Goal: Plan of Care Review  Outcome: Ongoing, Progressing     Problem: Adult Inpatient Plan of Care  Goal: Absence of Hospital-Acquired Illness or Injury  Outcome: Ongoing, Progressing     Problem: Adult Inpatient Plan of Care  Goal: Patient-Specific Goal (Individualization)  Outcome: Ongoing, Progressing     Problem: Adult Inpatient Plan of Care  Goal: Readiness for Transition of Care  Outcome: Ongoing, Progressing     Problem: Adult Inpatient Plan of Care  Goal: Rounds/Family Conference  Outcome: Ongoing, Progressing

## 2019-11-08 NOTE — PT/OT/SLP PROGRESS
Physical Therapy      Patient Name:  Melissa Anand   MRN:  [id number]    Patient not seen today secondary to Other (Comment)(Pt to d/c to home this day with HH services. PT to follow up per POC as appropriate if pt fails to discharge. ).    Duke Mccain, PTA

## 2019-11-08 NOTE — PLAN OF CARE
11/08/19 1635   Final Note   Assessment Type Final Discharge Note   Anticipated Discharge Disposition Home-Health  (At Home Healthcare)   What phone number can be called within the next 1-3 days to see how you are doing after discharge? [phone]   Hospital Follow Up  Appt(s) scheduled? Yes   Discharge plans and expectations educations in teach back method with documentation complete? Yes     Dr Beauchamp... Neuro clinic to call patient with 1 week post hospital fu appointment.

## 2019-11-08 NOTE — PLAN OF CARE
VS and assessment performed per orders. Pt stayed up all night with episodes of attempting to get out of bed without assistance, avasys camera placed in room for fall prevention. Pain medication given as ordered, moderate relief.  Pt free from injury or any falls.

## 2019-11-08 NOTE — PLAN OF CARE
Discussed options for therapy post hospital dc with patient and she was adamant that she go home with therapy. ''I'm not going to any other facility, I'm going home''. Patient did not have a preference of  agencies. Stated her daughter can bring her home from hospital. Notified Dr. Case of patient discussion.

## 2019-11-08 NOTE — PLAN OF CARE
[facility]    HOME HEALTH ORDERS  FACE TO FACE ENCOUNTER    Patient Name: Melissa Anand  YOB: 1945    PCP: Dana Castellon MD   PCP Address: [address] / [place] [address]  PCP Phone Number: [phone]  PCP Fax: [phone]    Encounter Date: 11/08/2019    Admit to Home Health    Diagnoses:  Active Hospital Problems    Diagnosis  POA    *Bilateral leg weakness [R29.898]  Yes    Acquired hypothyroidism [E03.9]  Yes    AIDP (acute inflammatory demyelinating polyneuropathy) [G61.0]  Yes    Vitamin B1 deficiency neuropathy [E51.11]  Yes    Parkinson's disease [G20]  Unknown     1/17/18 Akinetic rigid-type, legs worse than arms, right leg most effected.      Vitamin B1 deficiency [E51.9]  Yes     Vitamin B1 25 on 01/09/18 hospital admission.      Anemia [D64.9]  Yes    Lumbar stenosis [M48.061]  Yes    Essential hypertension [I10]  Unknown    Depression [F32.9]  Yes      Resolved Hospital Problems   No resolved problems to display.       No future appointments.  Follow-up Information     Schedule an appointment as soon as possible for a visit with Dana Castellon MD.    Specialty:  General Practice  Contact information:  [address] [place] [address] [phone]             Go to neurology.                   I have seen and examined this patient face to face today. My clinical findings that support the need for the home health skilled services and home bound status are the following:  Weakness/numbness causing balance and gait disturbance due to Weakness/Debility and AIDP making it taxing to leave home.  Requiring assistive device to leave home due to unsteady gait caused by  Weakness/Debility.    Allergies:Review of patient's allergies indicates:  No Known Allergies    Diet: cardiac diet    Activities: activity as tolerated    Nursing:   SN to complete comprehensive assessment including routine vital signs. Instruct on disease process and s/s of  complications to report to MD. Review/verify medication list sent home with the patient at time of discharge  and instruct patient/caregiver as needed. Frequency may be adjusted depending on start of care date.    Notify MD if SBP > 160 or < 90; DBP > 90 or < 50; HR > 120 or < 50; Temp > 101; Other:         CONSULTS:    Physical Therapy to evaluate and treat. Evaluate for home safety and equipment needs; Establish/upgrade home exercise program. Perform / instruct on therapeutic exercises, gait training, transfer training, and Range of Motion.  Occupational Therapy to evaluate and treat. Evaluate home environment for safety and equipment needs. Perform/Instruct on transfers, ADL training, ROM, and therapeutic exercises.   to evaluate for community resources/long-range planning.  Aide to provide assistance with personal care, ADLs, and vital signs.    MISCELLANEOUS CARE:  N/A    WOUND CARE ORDERS  n/a    Medications: Review discharge medications with patient and family and provide education.      I certify that this patient is confined to her home and needs intermittent skilled nursing care, physical therapy and occupational therapy.

## 2019-11-08 NOTE — NURSING
Alert and verbal. Discharged home by personal transportation. Accompanied to lobby by family via w/c. Educated on discharge and summary provided. Voiced understanding. No concerns voiced.

## 2019-11-09 NOTE — DISCHARGE SUMMARY
[facility] Hospital Medicine  Discharge Summary      Patient Name: Melissa Anand  MRN: [id number]  Admission Date: 11/6/2019  Hospital Length of Stay: 2 days  Discharge Date and Time:  11/09/2019 3:12 PM  Attending Physician: No att. providers found   Discharging Provider: Breana Wood MD  Primary Care Provider: Dana Castellon MD  [facility] Medicine Team: [facility] R Breana Wood MD    HPI:   Ms. Anand is a 74 y.o. F pt w PMHx of parkinsons disease, chronic back pain, AIDP, HTN and hypothyroidism who presents for progressive lower extremity weakness and fatigue.  Per pt and daughter she was at her baseline functional status 4 days ago when she went on outing to . At the end of the day she presented with lower extremity weakness, required a wheelchair and had to be carried into the car by her daughters. The next day she was back to her baseline, ambulating with the assistance of a cane and of a walker for longer distances. Yesterday pt once again presented with lower extremity weakness and somnolence. Daughter states she slept approximately 8 hours during the day. States her weakness did not improve so came to the ED. She denies any pain or trauma. Has not had had recent illnesses, fevers, chills, dysuria, nausea, vomiting, SOB, cough. Did have 1 episode of watery stool Monday, but none since. Denies sick contacts. Daughter is unsure of what meds she has been taking.  Pt lives with daughter and granddaughter is independent in most of her ADLs.     In the ED vitals were normal. CXR without acute process. CTH without acute infarct. 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 Neuro was consulted.           * No surgery found *      Hospital Course:   Admitted to  on 11/6 for progressive lower extremity weakness and fatigue possibly due to AIDP. Evaluated by neurology in ED and lab work ordered per their recs. PT/OT consulted. By day 2 pt more awake, but still having lower ext  weakness.  Per Neurology, presentation is concerning for deconditioning and less concerning for flare of AIDP/CIDP.  Minimal parkinsonian symptom present on exam. They recommend continuing home levodopa-carbidopa dose and vitamin supplements (riboflavin, vit d, thiamine). Labs revealed :TSH wnl, CK normal , CRP and ESR elevated,B1/ B12/ folate normal, B6/ Vit E pending, Vit D LOW. A1c 5.3; at goal.  PT/OT recommended HH PT/OT and pt not interested in inpt rehab. Medically stable for discharge home with neuro follow-up.     Consults:   Consults (From admission, onward)        Status Ordering Provider     Inpatient consult to Neurology  Once     Provider:  (Not yet assigned)    Completed ROD ESPINOZA          No new Assessment & Plan notes have been filed under this hospital service since the last note was generated.  Service: Hospital Medicine    Final Active Diagnoses:    Diagnosis Date Noted POA    PRINCIPAL PROBLEM:  Bilateral leg weakness [R29.898] 11/06/2019 Yes    Acquired hypothyroidism [E03.9] 03/22/2018 Yes    AIDP (acute inflammatory demyelinating polyneuropathy) [G61.0] 01/18/2018 Yes    Vitamin B1 deficiency neuropathy [E51.11] 01/18/2018 Yes    Parkinson's disease [G20] 01/17/2018 Unknown    Vitamin B1 deficiency [E51.9] 01/16/2018 Yes    Anemia [D64.9] 01/21/2015 Yes    Lumbar stenosis [M48.061] 07/23/2014 Yes    Essential hypertension [I10] 07/23/2014 Unknown    Depression [F32.9] 04/02/2014 Yes      Problems Resolved During this Admission:       Discharged Condition: stable    Disposition: Home or Self Care    Follow Up:  Follow-up Information     Schedule an appointment as soon as possible for a visit with Dana Castellon MD.    Specialty:  General Practice  Contact information:  [address] [place] [address] [phone]             Go to neurology.               Patient Instructions:      Ambulatory consult to Neurology   Referral Priority: Routine Referral Type:  Consultation   Referral Reason: Specialty Services Required   Referred to Provider: GIO ANTONIO Requested Specialty: Neurology   Number of Visits Requested: 1     Notify your health care provider if you experience any of the following:  temperature >100.4     Notify your health care provider if you experience any of the following:  persistent nausea and vomiting or diarrhea     Notify your health care provider if you experience any of the following:  severe uncontrolled pain     Notify your health care provider if you experience any of the following:  increased confusion or weakness     Activity as tolerated       Significant Diagnostic Studies: Labs: All labs within the past 24 hours have been reviewed    Pending Diagnostic Studies:     Procedure Component Value Units Date/Time    Vitamin B2 [[id number]] Collected:  11/07/19 0543    Order Status:  Sent Lab Status:  In process Updated:  11/07/19 0642    Specimen:  Blood     Vitamin B6 [[id number]] Collected:  11/07/19 0543    Order Status:  Sent Lab Status:  In process Updated:  11/07/19 0642    Specimen:  Blood     Vitamin E [[id number]] Collected:  11/06/19 2205    Order Status:  Sent Lab Status:  In process Updated:  11/06/19 2211    Specimen:  Blood          Medications:  Reconciled Home Medications:      Medication List      CHANGE how you take these medications    ergocalciferol 50,000 unit Cap  Commonly known as:  ERGOCALCIFEROL  Take 1 capsule (50,000 Units total) by mouth every 7 days.  What changed:  See the new instructions.        CONTINUE taking these medications    amLODIPine 5 MG tablet  Commonly known as:  NORVASC  Take 1 tablet (5 mg total) by mouth once daily. Contact PCP for refills     carbidopa-levodopa  mg  mg per tablet  Commonly known as:  SINEMET  Take 0.5 tablets by mouth 4 (four) times daily. Take 6AM / 10am / 2PM / 6PM     cholecalciferol (vitamin D3) 400 unit Cap  Take 2 capsules (800 Units total) by mouth once daily.      escitalopram oxalate 20 MG tablet  Commonly known as:  LEXAPRO  TK 1 T PO QD     gabapentin 300 MG capsule  Commonly known as:  NEURONTIN  Take 3 capsules (900 mg total) by mouth 3 (three) times daily.     levothyroxine 50 MCG tablet  Commonly known as:  SYNTHROID  Take 1 tablet (50 mcg total) by mouth before breakfast.     lidocaine 5 % Oint ointment  Commonly known as:  XYLOCAINE  JANA AA D PRN P     lisinopril 40 MG tablet  Commonly known as:  PRINIVIL,ZESTRIL  Take 1 tablet (40 mg total) by mouth once daily.     mupirocin 2 % ointment  Commonly known as:  BACTROBAN     pantoprazole 40 MG tablet  Commonly known as:  PROTONIX     ramelteon 8 mg tablet  Commonly known as:  ROZEREM  Take 1 tablet (8 mg total) by mouth every evening.     riboflavin (vitamin B2) 100 mg Tab tablet  Commonly known as:  VITAMIN B-2  Take 4 tablets (400 mg total) by mouth once daily.     thiamine 250 MG tablet  Take 1 tablet (250 mg total) by mouth once daily.        STOP taking these medications    amitriptyline 100 MG tablet  Commonly known as:  ELAVIL     clonazePAM 0.5 MG tablet  Commonly known as:  KLONOPIN     HYDROcodone-acetaminophen 7.5-325 mg per tablet  Commonly known as:  NORCO     oxyCODONE-acetaminophen 7.5-325 mg per tablet  Commonly known as:  PERCOCET     QUEtiapine 300 MG Tab  Commonly known as:  SEROQUEL     traMADol 50 mg tablet  Commonly known as:  ULTRAM            Indwelling Lines/Drains at time of discharge:   Lines/Drains/Airways     Drain            Female External Urinary Catheter 11/06/19 2030 2 days                Time spent on the discharge of patient: 35 minutes  Patient was seen and examined on the date of discharge and determined to be suitable for discharge.         Breana Wood MD  Department of Hospital Medicine  [facility]

## 2019-11-09 NOTE — HOSPITAL COURSE
Admitted to  on 11/6 for progressive lower extremity weakness and fatigue possibly due to AIDP. Evaluated by neurology in ED and lab work ordered per their recs. PT/OT consulted. By day 2 pt more awake, but still having lower ext weakness. Per Neurology, presentation is concerning for deconditioning and less concerning for flare of AIDP/CIDP.  Minimal parkinsonian symptom present on exam. They recommend continuing home levodopa-carbidopa dose and vitamin supplements (riboflavin, vit d, thiamine). Labs revealed :TSH wnl, CK normal, CRP and ESR elevated,B1/ B12/ folate normal, B6/ Vit E pending, Vit D LOW. A1c 5.3; at goal.  PT/OT recommended  PT/OT and pt not interested in inpt rehab. Medically stable for discharge home with neuro follow-up.

## 2019-11-10 ENCOUNTER — HOSPITAL ENCOUNTER (INPATIENT)
Facility: HOSPITAL | Age: 74
LOS: 3 days | Discharge: HOME HEALTH | DRG: 315 | End: 2019-11-13
Attending: EMERGENCY MEDICINE | Admitting: INTERNAL MEDICINE
Payer: MEDICARE

## 2019-11-10 DIAGNOSIS — I95.9 HYPOTENSION: ICD-10-CM

## 2019-11-10 DIAGNOSIS — R55 SYNCOPE: ICD-10-CM

## 2019-11-10 DIAGNOSIS — E86.1 HYPOVOLEMIA: ICD-10-CM

## 2019-11-10 DIAGNOSIS — I95.9 HYPOTENSION, UNSPECIFIED HYPOTENSION TYPE: Primary | ICD-10-CM

## 2019-11-10 DIAGNOSIS — R19.7 DIARRHEA, UNSPECIFIED TYPE: ICD-10-CM

## 2019-11-10 LAB
ALBUMIN SERPL BCP-MCNC: 3.4 G/DL (ref 3.5–5.2)
ALP SERPL-CCNC: 69 U/L (ref 55–135)
ALT SERPL W/O P-5'-P-CCNC: 6 U/L (ref 10–44)
ANION GAP SERPL CALC-SCNC: 9 MMOL/L (ref 8–16)
AST SERPL-CCNC: 12 U/L (ref 10–40)
BACTERIA #/AREA URNS AUTO: ABNORMAL /HPF
BASOPHILS # BLD AUTO: 0.01 K/UL (ref 0–0.2)
BASOPHILS NFR BLD: 0.1 % (ref 0–1.9)
BILIRUB SERPL-MCNC: 0.7 MG/DL (ref 0.1–1)
BILIRUB UR QL STRIP: NEGATIVE
BUN SERPL-MCNC: 31 MG/DL (ref 8–23)
CALCIUM SERPL-MCNC: 8.6 MG/DL (ref 8.7–10.5)
CHLORIDE SERPL-SCNC: 107 MMOL/L (ref 95–110)
CLARITY UR REFRACT.AUTO: ABNORMAL
CO2 SERPL-SCNC: 21 MMOL/L (ref 23–29)
COLOR UR AUTO: ABNORMAL
CREAT SERPL-MCNC: 1.5 MG/DL (ref 0.5–1.4)
DIFFERENTIAL METHOD: ABNORMAL
EOSINOPHIL # BLD AUTO: 0.1 K/UL (ref 0–0.5)
EOSINOPHIL NFR BLD: 0.6 % (ref 0–8)
ERYTHROCYTE [DISTWIDTH] IN BLOOD BY AUTOMATED COUNT: 13.4 % (ref 11.5–14.5)
EST. GFR  (AFRICAN AMERICAN): 39.6 ML/MIN/1.73 M^2
EST. GFR  (NON AFRICAN AMERICAN): 34.3 ML/MIN/1.73 M^2
GLUCOSE SERPL-MCNC: 165 MG/DL (ref 70–110)
GLUCOSE UR QL STRIP: NEGATIVE
HCT VFR BLD AUTO: 39.4 % (ref 37–48.5)
HGB BLD-MCNC: 12.2 G/DL (ref 12–16)
HGB UR QL STRIP: ABNORMAL
HYALINE CASTS UR QL AUTO: 1 /LPF
IMM GRANULOCYTES # BLD AUTO: 0.03 K/UL (ref 0–0.04)
IMM GRANULOCYTES NFR BLD AUTO: 0.3 % (ref 0–0.5)
KETONES UR QL STRIP: ABNORMAL
LACTATE SERPL-SCNC: 2.4 MMOL/L (ref 0.5–2.2)
LEUKOCYTE ESTERASE UR QL STRIP: NEGATIVE
LYMPHOCYTES # BLD AUTO: 1.3 K/UL (ref 1–4.8)
LYMPHOCYTES NFR BLD: 13.9 % (ref 18–48)
MAGNESIUM SERPL-MCNC: 2 MG/DL (ref 1.6–2.6)
MCH RBC QN AUTO: 32.4 PG (ref 27–31)
MCHC RBC AUTO-ENTMCNC: 31 G/DL (ref 32–36)
MCV RBC AUTO: 105 FL (ref 82–98)
MICROSCOPIC COMMENT: ABNORMAL
MONOCYTES # BLD AUTO: 0.5 K/UL (ref 0.3–1)
MONOCYTES NFR BLD: 5.5 % (ref 4–15)
NEUTROPHILS # BLD AUTO: 7.4 K/UL (ref 1.8–7.7)
NEUTROPHILS NFR BLD: 79.6 % (ref 38–73)
NITRITE UR QL STRIP: NEGATIVE
NRBC BLD-RTO: 0 /100 WBC
PH UR STRIP: 5 [PH] (ref 5–8)
PHOSPHATE SERPL-MCNC: 4 MG/DL (ref 2.7–4.5)
PLATELET # BLD AUTO: 218 K/UL (ref 150–350)
PMV BLD AUTO: 9.9 FL (ref 9.2–12.9)
POTASSIUM SERPL-SCNC: 4.3 MMOL/L (ref 3.5–5.1)
PROT SERPL-MCNC: 7.4 G/DL (ref 6–8.4)
PROT UR QL STRIP: NEGATIVE
RBC # BLD AUTO: 3.76 M/UL (ref 4–5.4)
RBC #/AREA URNS AUTO: 1 /HPF (ref 0–4)
SODIUM SERPL-SCNC: 137 MMOL/L (ref 136–145)
SP GR UR STRIP: 1.01 (ref 1–1.03)
URN SPEC COLLECT METH UR: ABNORMAL
WBC # BLD AUTO: 9.32 K/UL (ref 3.9–12.7)
WBC #/AREA URNS AUTO: 0 /HPF (ref 0–5)

## 2019-11-10 PROCEDURE — 93010 EKG 12-LEAD: ICD-10-PCS | Mod: ,,, | Performed by: INTERNAL MEDICINE

## 2019-11-10 PROCEDURE — 99222 1ST HOSP IP/OBS MODERATE 55: CPT | Mod: AI,,, | Performed by: INTERNAL MEDICINE

## 2019-11-10 PROCEDURE — 11000001 HC ACUTE MED/SURG PRIVATE ROOM

## 2019-11-10 PROCEDURE — 25000003 PHARM REV CODE 250: Performed by: INTERNAL MEDICINE

## 2019-11-10 PROCEDURE — 93005 ELECTROCARDIOGRAM TRACING: CPT

## 2019-11-10 PROCEDURE — 99285 EMERGENCY DEPT VISIT HI MDM: CPT | Mod: 25

## 2019-11-10 PROCEDURE — 83605 ASSAY OF LACTIC ACID: CPT

## 2019-11-10 PROCEDURE — 87046 STOOL CULTR AEROBIC BACT EA: CPT | Mod: 59

## 2019-11-10 PROCEDURE — 99285 EMERGENCY DEPT VISIT HI MDM: CPT | Mod: ,,, | Performed by: PHYSICIAN ASSISTANT

## 2019-11-10 PROCEDURE — 63600175 PHARM REV CODE 636 W HCPCS: Performed by: INTERNAL MEDICINE

## 2019-11-10 PROCEDURE — 96365 THER/PROPH/DIAG IV INF INIT: CPT | Mod: 59

## 2019-11-10 PROCEDURE — 96361 HYDRATE IV INFUSION ADD-ON: CPT

## 2019-11-10 PROCEDURE — 80053 COMPREHEN METABOLIC PANEL: CPT

## 2019-11-10 PROCEDURE — 99222 PR INITIAL HOSPITAL CARE,LEVL II: ICD-10-PCS | Mod: AI,,, | Performed by: INTERNAL MEDICINE

## 2019-11-10 PROCEDURE — 87040 BLOOD CULTURE FOR BACTERIA: CPT

## 2019-11-10 PROCEDURE — 87045 FECES CULTURE AEROBIC BACT: CPT

## 2019-11-10 PROCEDURE — 83735 ASSAY OF MAGNESIUM: CPT

## 2019-11-10 PROCEDURE — 99285 PR EMERGENCY DEPT VISIT,LEVEL V: ICD-10-PCS | Mod: ,,, | Performed by: PHYSICIAN ASSISTANT

## 2019-11-10 PROCEDURE — 93010 ELECTROCARDIOGRAM REPORT: CPT | Mod: ,,, | Performed by: INTERNAL MEDICINE

## 2019-11-10 PROCEDURE — 84100 ASSAY OF PHOSPHORUS: CPT

## 2019-11-10 PROCEDURE — 96366 THER/PROPH/DIAG IV INF ADDON: CPT

## 2019-11-10 PROCEDURE — 85025 COMPLETE CBC W/AUTO DIFF WBC: CPT

## 2019-11-10 PROCEDURE — 63600175 PHARM REV CODE 636 W HCPCS: Performed by: PHYSICIAN ASSISTANT

## 2019-11-10 PROCEDURE — 87449 NOS EACH ORGANISM AG IA: CPT

## 2019-11-10 PROCEDURE — 87427 SHIGA-LIKE TOXIN AG IA: CPT

## 2019-11-10 PROCEDURE — 25000003 PHARM REV CODE 250: Performed by: PHYSICIAN ASSISTANT

## 2019-11-10 PROCEDURE — 81001 URINALYSIS AUTO W/SCOPE: CPT

## 2019-11-10 PROCEDURE — S0030 INJECTION, METRONIDAZOLE: HCPCS | Performed by: PHYSICIAN ASSISTANT

## 2019-11-10 PROCEDURE — 89055 LEUKOCYTE ASSESSMENT FECAL: CPT

## 2019-11-10 RX ORDER — PROCHLORPERAZINE EDISYLATE 5 MG/ML
5 INJECTION INTRAMUSCULAR; INTRAVENOUS EVERY 6 HOURS PRN
Status: DISCONTINUED | OUTPATIENT
Start: 2019-11-10 | End: 2019-11-13 | Stop reason: HOSPADM

## 2019-11-10 RX ORDER — CIPROFLOXACIN 2 MG/ML
400 INJECTION, SOLUTION INTRAVENOUS
Status: COMPLETED | OUTPATIENT
Start: 2019-11-10 | End: 2019-11-10

## 2019-11-10 RX ORDER — CIPROFLOXACIN 500 MG/1
500 TABLET ORAL EVERY 12 HOURS
Status: DISCONTINUED | OUTPATIENT
Start: 2019-11-10 | End: 2019-11-13 | Stop reason: HOSPADM

## 2019-11-10 RX ORDER — CARBIDOPA AND LEVODOPA 25; 100 MG/1; MG/1
1 TABLET, EXTENDED RELEASE ORAL 4 TIMES DAILY
Status: DISCONTINUED | OUTPATIENT
Start: 2019-11-10 | End: 2019-11-11 | Stop reason: DRUGHIGH

## 2019-11-10 RX ORDER — RIBOFLAVIN (VITAMIN B2) 100 MG
100 TABLET ORAL DAILY
Status: DISCONTINUED | OUTPATIENT
Start: 2019-11-11 | End: 2019-11-13 | Stop reason: HOSPADM

## 2019-11-10 RX ORDER — HEPARIN SODIUM 5000 [USP'U]/ML
5000 INJECTION, SOLUTION INTRAVENOUS; SUBCUTANEOUS EVERY 8 HOURS
Status: DISCONTINUED | OUTPATIENT
Start: 2019-11-10 | End: 2019-11-13 | Stop reason: HOSPADM

## 2019-11-10 RX ORDER — METRONIDAZOLE 500 MG/100ML
500 INJECTION, SOLUTION INTRAVENOUS
Status: COMPLETED | OUTPATIENT
Start: 2019-11-10 | End: 2019-11-10

## 2019-11-10 RX ORDER — METRONIDAZOLE 500 MG/1
500 TABLET ORAL EVERY 8 HOURS
Status: DISCONTINUED | OUTPATIENT
Start: 2019-11-10 | End: 2019-11-13 | Stop reason: HOSPADM

## 2019-11-10 RX ORDER — ACETAMINOPHEN 325 MG/1
650 TABLET ORAL EVERY 8 HOURS PRN
Status: DISCONTINUED | OUTPATIENT
Start: 2019-11-10 | End: 2019-11-12

## 2019-11-10 RX ORDER — GABAPENTIN 300 MG/1
300 CAPSULE ORAL 3 TIMES DAILY
Status: DISCONTINUED | OUTPATIENT
Start: 2019-11-10 | End: 2019-11-13 | Stop reason: HOSPADM

## 2019-11-10 RX ORDER — ONDANSETRON 2 MG/ML
4 INJECTION INTRAMUSCULAR; INTRAVENOUS EVERY 8 HOURS PRN
Status: DISCONTINUED | OUTPATIENT
Start: 2019-11-10 | End: 2019-11-13 | Stop reason: HOSPADM

## 2019-11-10 RX ORDER — TRAMADOL HYDROCHLORIDE 50 MG/1
50 TABLET ORAL EVERY 6 HOURS PRN
Status: DISCONTINUED | OUTPATIENT
Start: 2019-11-10 | End: 2019-11-10

## 2019-11-10 RX ORDER — LEVOTHYROXINE SODIUM 50 UG/1
50 TABLET ORAL
Status: DISCONTINUED | OUTPATIENT
Start: 2019-11-11 | End: 2019-11-13 | Stop reason: HOSPADM

## 2019-11-10 RX ORDER — CHOLECALCIFEROL (VITAMIN D3) 25 MCG
1000 TABLET ORAL DAILY
Status: DISCONTINUED | OUTPATIENT
Start: 2019-11-11 | End: 2019-11-13 | Stop reason: HOSPADM

## 2019-11-10 RX ORDER — ESCITALOPRAM OXALATE 20 MG/1
20 TABLET ORAL DAILY
Status: DISCONTINUED | OUTPATIENT
Start: 2019-11-11 | End: 2019-11-13 | Stop reason: HOSPADM

## 2019-11-10 RX ORDER — SODIUM CHLORIDE 0.9 % (FLUSH) 0.9 %
10 SYRINGE (ML) INJECTION
Status: DISCONTINUED | OUTPATIENT
Start: 2019-11-10 | End: 2019-11-13 | Stop reason: HOSPADM

## 2019-11-10 RX ORDER — ACETAMINOPHEN 325 MG/1
650 TABLET ORAL EVERY 4 HOURS PRN
Status: DISCONTINUED | OUTPATIENT
Start: 2019-11-10 | End: 2019-11-12

## 2019-11-10 RX ADMIN — CIPROFLOXACIN HYDROCHLORIDE 500 MG: 500 TABLET, FILM COATED ORAL at 10:11

## 2019-11-10 RX ADMIN — SODIUM CHLORIDE, SODIUM LACTATE, POTASSIUM CHLORIDE, AND CALCIUM CHLORIDE 500 ML: .6; .31; .03; .02 INJECTION, SOLUTION INTRAVENOUS at 04:11

## 2019-11-10 RX ADMIN — GABAPENTIN 300 MG: 300 CAPSULE ORAL at 10:11

## 2019-11-10 RX ADMIN — METRONIDAZOLE 500 MG: 500 INJECTION, SOLUTION INTRAVENOUS at 04:11

## 2019-11-10 RX ADMIN — SODIUM CHLORIDE, SODIUM LACTATE, POTASSIUM CHLORIDE, AND CALCIUM CHLORIDE 500 ML: .6; .31; .03; .02 INJECTION, SOLUTION INTRAVENOUS at 12:11

## 2019-11-10 RX ADMIN — SODIUM CHLORIDE, SODIUM LACTATE, POTASSIUM CHLORIDE, AND CALCIUM CHLORIDE 500 ML: .6; .31; .03; .02 INJECTION, SOLUTION INTRAVENOUS at 06:11

## 2019-11-10 RX ADMIN — METRONIDAZOLE 500 MG: 500 TABLET ORAL at 10:11

## 2019-11-10 RX ADMIN — CIPROFLOXACIN 400 MG: 2 INJECTION, SOLUTION INTRAVENOUS at 04:11

## 2019-11-10 RX ADMIN — CARBIDOPA AND LEVODOPA 1 TABLET: 25; 100 TABLET, EXTENDED RELEASE ORAL at 10:11

## 2019-11-10 RX ADMIN — HEPARIN SODIUM 5000 UNITS: 5000 INJECTION, SOLUTION INTRAVENOUS; SUBCUTANEOUS at 10:11

## 2019-11-10 NOTE — SUBJECTIVE & OBJECTIVE
Past Medical History:   Diagnosis Date    Anemia     Colitis     hospitalized July 2014    Diabetes mellitus     Encounter for blood transfusion     Goiter     Hypertension     Left hip pain 10/12/14    Syncope        Past Surgical History:   Procedure Laterality Date    BACK SURGERY      THYROIDECTOMY      TOE AMPUTATION Left        Review of patient's allergies indicates:  No Known Allergies    No current facility-administered medications on file prior to encounter.      Current Outpatient Medications on File Prior to Encounter   Medication Sig    amLODIPine (NORVASC) 5 MG tablet Take 1 tablet (5 mg total) by mouth once daily. Contact PCP for refills    carbidopa-levodopa  mg (SINEMET)  mg per tablet Take 0.5 tablets by mouth 4 (four) times daily. Take 6AM / 10am / 2PM / 6PM    cholecalciferol, vitamin D3, 400 unit Cap Take 2 capsules (800 Units total) by mouth once daily.    ergocalciferol (ERGOCALCIFEROL) 50,000 unit Cap Take 1 capsule (50,000 Units total) by mouth every 7 days.    escitalopram oxalate (LEXAPRO) 20 MG tablet TK 1 T PO QD    gabapentin (NEURONTIN) 300 MG capsule Take 3 capsules (900 mg total) by mouth 3 (three) times daily.    levothyroxine (SYNTHROID) 50 MCG tablet Take 1 tablet (50 mcg total) by mouth before breakfast.    lidocaine (XYLOCAINE) 5 % Oint ointment JANA AA D PRN P    lisinopril (PRINIVIL,ZESTRIL) 40 MG tablet Take 1 tablet (40 mg total) by mouth once daily.    mupirocin (BACTROBAN) 2 % ointment     pantoprazole (PROTONIX) 40 MG tablet     ramelteon (ROZEREM) 8 mg tablet Take 1 tablet (8 mg total) by mouth every evening.    riboflavin, vitamin B2, (VITAMIN B-2) 100 mg Tab tablet Take 4 tablets (400 mg total) by mouth once daily.    thiamine 250 MG tablet Take 1 tablet (250 mg total) by mouth once daily.     Family History     Problem Relation (Age of Onset)    Cancer Son, Mother    Coronary artery disease     Diabetes         Tobacco Use     Smoking status: Former Smoker     Years: 30.00     Types: Cigarettes     Last attempt to quit: 2014     Years since quittin.4    Smokeless tobacco: Never Used   Substance and Sexual Activity    Alcohol use: Yes     Comment: occassionally     Drug use: No    Sexual activity: Not Currently     Review of Systems   Constitutional: Positive for fatigue. Negative for chills and fever.   HENT: Negative for congestion and trouble swallowing.    Eyes: Negative for visual disturbance.   Respiratory: Negative for cough and shortness of breath.    Cardiovascular: Negative for chest pain, palpitations and leg swelling.   Gastrointestinal: Positive for diarrhea. Negative for abdominal pain, blood in stool, nausea and vomiting.   Genitourinary: Negative for dysuria and hematuria.   Musculoskeletal: Negative for arthralgias and back pain.   Skin: Negative for color change and pallor.   Neurological: Positive for weakness and light-headedness.   Psychiatric/Behavioral: Negative for confusion. The patient is not nervous/anxious.      Objective:     Vital Signs (Most Recent):  Temp: 98.2 °F (36.8 °C) (11/10/19 1122)  Pulse: 84 (11/10/19 1602)  Resp: 18 (11/10/19 1122)  BP: (!) 97/56 (11/10/19 1602)  SpO2: 100 % (11/10/19 1602) Vital Signs (24h Range):  Temp:  [98.2 °F (36.8 °C)] 98.2 °F (36.8 °C)  Pulse:  [82-92] 84  Resp:  [18] 18  SpO2:  [96 %-100 %] 100 %  BP: ()/(40-64) 97/56        There is no height or weight on file to calculate BMI.    Physical Exam   Constitutional: She is oriented to person, place, and time. She appears well-developed and well-nourished. No distress.   Ill appearing   HENT:   Head: Normocephalic and atraumatic.   Eyes: Pupils are equal, round, and reactive to light. EOM are normal.   Neck: Normal range of motion. Neck supple.   Cardiovascular: Normal rate, regular rhythm and normal heart sounds.   Pulmonary/Chest: Effort normal and breath sounds normal.   Abdominal: Soft. Bowel sounds are  normal. She exhibits no distension. There is no tenderness.   Musculoskeletal: Normal range of motion. She exhibits no edema.   Neurological: She is alert and oriented to person, place, and time.   3/5 lowe ext strength bilat   Skin: Skin is warm and dry.   Psychiatric: She has a normal mood and affect. Her behavior is normal.         CRANIAL NERVES     CN III, IV, VI   Pupils are equal, round, and reactive to light.  Extraocular motions are normal.        Significant Labs: All pertinent labs within the past 24 hours have been reviewed.    Significant Imaging: I have reviewed and interpreted all pertinent imaging results/findings within the past 24 hours.

## 2019-11-10 NOTE — H&P
[facility] Hospital Medicine  History & Physical    Patient Name: Melissa Anand  MRN: [id number]  Admission Date: 11/10/2019  Attending Physician: Chantale Aceves MD   Primary Care Provider: Dana Castellon MD    [facility] Medicine Team: Networked reference to record PCT  Breana Wood MD     Patient information was obtained from past medical records and ER records.     Subjective:     Principal Problem:Hypotension    Chief Complaint:   Chief Complaint   Patient presents with    Generalized Weakness     with episodes of diarrhea, 1 L NS        HPI: 74-year-old female with a history of DM type 2, HTN, Parkinson's disease, AIDP, hypothyroidism presents to the ED for evaluation of weakness and syncope.    [place] [facility] [place] after presenting with 4 day hx of progressive lower extremity weakness on 11/6. Evaluated by neurology who felt presentation more consistent with deconditioning rather than with AICDP or parkinsons. Given option of rehab placement, but opted to go home with HH. Today pt presented with diarrhea, which she did not have during past admission. Complains of lower abdominal pain that she describes as soreness.  Denies nausea, vomiting, fever, chest pain, shortness of breath, headache, lightheadedness or dizziness.  No sick contacts at home and no recent abx use.     In ED pt was hypotensive to 80/40, HR 92. BP normalized after 1 L IVFs. Given 1 dose cipro/flagyl. CT abdomen ordered. Labs significant for Cr 1.5 (up from 0.9), LA 2.39, normal WBC. Admitted to .    Past Medical History:   Diagnosis Date    Anemia     Colitis     hospitalized July 2014    Diabetes mellitus     Encounter for blood transfusion     Goiter     Hypertension     Left hip pain 10/12/14    Syncope        Past Surgical History:   Procedure Laterality Date    BACK SURGERY      THYROIDECTOMY      TOE AMPUTATION Left        Review of patient's allergies indicates:  No Known  Allergies    No current facility-administered medications on file prior to encounter.      Current Outpatient Medications on File Prior to Encounter   Medication Sig    amLODIPine (NORVASC) 5 MG tablet Take 1 tablet (5 mg total) by mouth once daily. Contact PCP for refills    carbidopa-levodopa  mg (SINEMET)  mg per tablet Take 0.5 tablets by mouth 4 (four) times daily. Take 6AM / 10am / 2PM / 6PM    cholecalciferol, vitamin D3, 400 unit Cap Take 2 capsules (800 Units total) by mouth once daily.    ergocalciferol (ERGOCALCIFEROL) 50,000 unit Cap Take 1 capsule (50,000 Units total) by mouth every 7 days.    escitalopram oxalate (LEXAPRO) 20 MG tablet TK 1 T PO QD    gabapentin (NEURONTIN) 300 MG capsule Take 3 capsules (900 mg total) by mouth 3 (three) times daily.    levothyroxine (SYNTHROID) 50 MCG tablet Take 1 tablet (50 mcg total) by mouth before breakfast.    lidocaine (XYLOCAINE) 5 % Oint ointment JANA AA D PRN P    lisinopril (PRINIVIL,ZESTRIL) 40 MG tablet Take 1 tablet (40 mg total) by mouth once daily.    mupirocin (BACTROBAN) 2 % ointment     pantoprazole (PROTONIX) 40 MG tablet     ramelteon (ROZEREM) 8 mg tablet Take 1 tablet (8 mg total) by mouth every evening.    riboflavin, vitamin B2, (VITAMIN B-2) 100 mg Tab tablet Take 4 tablets (400 mg total) by mouth once daily.    thiamine 250 MG tablet Take 1 tablet (250 mg total) by mouth once daily.     Family History     Problem Relation (Age of Onset)    Cancer Son, Mother    Coronary artery disease     Diabetes         Tobacco Use    Smoking status: Former Smoker     Years: 30.00     Types: Cigarettes     Last attempt to quit: 2014     Years since quittin.4    Smokeless tobacco: Never Used   Substance and Sexual Activity    Alcohol use: Yes     Comment: occassionally     Drug use: No    Sexual activity: Not Currently     Review of Systems   Constitutional: Positive for fatigue. Negative for chills and fever.   HENT:  Negative for congestion and trouble swallowing.    Eyes: Negative for visual disturbance.   Respiratory: Negative for cough and shortness of breath.    Cardiovascular: Negative for chest pain, palpitations and leg swelling.   Gastrointestinal: Positive for diarrhea. Negative for abdominal pain, blood in stool, nausea and vomiting.   Genitourinary: Negative for dysuria and hematuria.   Musculoskeletal: Negative for arthralgias and back pain.   Skin: Negative for color change and pallor.   Neurological: Positive for weakness and light-headedness.   Psychiatric/Behavioral: Negative for confusion. The patient is not nervous/anxious.      Objective:     Vital Signs (Most Recent):  Temp: 98.2 °F (36.8 °C) (11/10/19 1122)  Pulse: 84 (11/10/19 1602)  Resp: 18 (11/10/19 1122)  BP: (!) 97/56 (11/10/19 1602)  SpO2: 100 % (11/10/19 1602) Vital Signs (24h Range):  Temp:  [98.2 °F (36.8 °C)] 98.2 °F (36.8 °C)  Pulse:  [82-92] 84  Resp:  [18] 18  SpO2:  [96 %-100 %] 100 %  BP: ()/(40-64) 97/56        There is no height or weight on file to calculate BMI.    Physical Exam   Constitutional: She is oriented to person, place, and time. She appears well-developed and well-nourished. No distress.   Ill appearing   HENT:   Head: Normocephalic and atraumatic.   Eyes: Pupils are equal, round, and reactive to light. EOM are normal.   Neck: Normal range of motion. Neck supple.   Cardiovascular: Normal rate, regular rhythm and normal heart sounds.   Pulmonary/Chest: Effort normal and breath sounds normal.   Abdominal: Soft. Bowel sounds are normal. She exhibits no distension. Tenderness at RLQ, no rebound or guarding  Musculoskeletal: Normal range of motion. She exhibits no edema. (1st toe missing on lt foot)  Neurological: She is alert and oriented to person, place, and time.   4/5 lower ext strength bilat   Skin: Skin is warm and dry.   Psychiatric: She has a normal mood and affect. Her behavior is normal.         CRANIAL NERVES      CN III, IV, VI   Pupils are equal, round, and reactive to light.  Extraocular motions are normal.        Significant Labs: All pertinent labs within the past 24 hours have been reviewed.    Significant Imaging: I have reviewed and interpreted all pertinent imaging results/findings within the past 24 hours.    Assessment/Plan:     * Hypotension  Possibly from hypovolemia in setting of diarrhea vs infection vs dysautonomia  --s/p 1L IVFs; BP currently stable  --provide IVFs as needed with close monitoring of vol status  --started on cipro/flagyl for possible intraabdominal process;   --CT abd: moderate fluid filling without distention of the right colon and proximal transverse colon suggestive of possible gastroenteritis.  --continue cipro/flagyl  --f/u cdiff results  --hold all BP meds    Diarrhea  Gastroenteritis  --continue cipro  And flagyl  --F/u cdiff  --hold PPI  .  Depression  Continue escitalopram      Acquired hypothyroidism  --continue home synthroid      Vitamin B1 deficiency neuropathy  --continue thiamine  --levels pending      AIDP (acute inflammatory demyelinating polyneuropathy)  --no acute issues  --plan was to f/u outpt in clinic with neuro  --continue PT/OT      Parkinson's disease  --continue home sinemet      Peripheral sensory neuropathy  Continue home gabapentin      Essential hypertension  --hold bp meds in the setting of hypotension      Syncope  --telemetry  --IVFs; treat underlying infection  --consider TTE    MADHAVI  --suspect pre-renal etiology due to hypovolemia  --s/p 1 L IVFs  --monitor with daily cmp  --avoid nephrotoxic agents and renally dose meds    VTE Risk Mitigation (From admission, onward)         Ordered     heparin (porcine) injection 5,000 Units  Every 8 hours      11/10/19 1720     IP VTE HIGH RISK PATIENT  Once      11/10/19 1720                   Breana Wood MD  Department of Hospital Medicine   [facility]

## 2019-11-10 NOTE — ED TRIAGE NOTES
Pt came in by EMS reporting 1 syncopal episode and diarrhea. Pt family stated pt did not hit her head. Pt reported top be hypotensive upon EMS arrival. Pt is A/O per baseline.

## 2019-11-10 NOTE — HPI
74-year-old female with a history of DM type 2, HTN, Parkinson's disease, AIDP, hypothyroidism presents to the ED for evaluation of weakness and syncope.    Pt was discharged from [facility] yesterday after presenting with 4 day hx of progressive lower extremity weakness on 11/6. Evaluated by neurology who felt presentation more consistent with deconditioning rather than with AICDP or parkinsons. Given option of rehab placement, but opted to go home with HH. Today pt presented with diarrhea, which she did not have during past admission. Complains of lower abdominal pain that she describes as soreness.  Denies nausea, vomiting, fever, chest pain, shortness of breath, headache, lightheadedness or dizziness.  No sick contacts at home and no recent abx use.     In ED pt was hypotensive to 80/40, HR 92. BP normalized after 1 L IVFs. Given 1 dose cipro/flagyl. CT abdomen ordered. Labs significant for Cr 1.5 (up from 0.9), LA 2.39, normal WBC. Admitted to .

## 2019-11-10 NOTE — ED PROVIDER NOTES
Encounter Date: 11/10/2019       History     Chief Complaint   Patient presents with    Generalized Weakness     with episodes of diarrhea, 1 L NS     74-year-old female with a history of DM type 2, hypertension, Parkinson's disease presents to the ED for evaluation of weakness and syncope.  Patient's daughter reports that the patient has had multiple episodes of diarrhea that began today.  Patient complains of lower abdominal pain that she describes as soreness.  Denies nausea, vomiting, fever, chest pain, shortness of breath, headache, lightheadedness or dizziness.  No sick contacts at home.  Of note, patient was discharged from the hospital yesterday.  She was evaluated for lower extremity weakness.  Felt to be secondary to deconditioning.  No recent antibiotic use.        Review of patient's allergies indicates:  No Known Allergies  Past Medical History:   Diagnosis Date    Anemia     Colitis     hospitalized 2014    Diabetes mellitus     Encounter for blood transfusion     Goiter     Hypertension     Left hip pain 10/12/14    Syncope      Past Surgical History:   Procedure Laterality Date    BACK SURGERY      THYROIDECTOMY      TOE AMPUTATION Left      Family History   Problem Relation Age of Onset    Diabetes Unknown     Coronary artery disease Unknown     Cancer Son         testicular    Cancer Mother      Social History     Tobacco Use    Smoking status: Former Smoker     Years: 30.00     Types: Cigarettes     Last attempt to quit: 2014     Years since quittin.4    Smokeless tobacco: Never Used   Substance Use Topics    Alcohol use: Yes     Comment: occassionally     Drug use: No     Review of Systems   Constitutional: Negative for chills and fever.   HENT: Negative for sore throat.    Respiratory: Negative for shortness of breath.    Cardiovascular: Negative for chest pain.   Gastrointestinal: Positive for abdominal pain and diarrhea. Negative for nausea and vomiting.    Genitourinary: Negative for dysuria.   Musculoskeletal: Negative for back pain.   Skin: Negative for rash.   Neurological: Positive for weakness. Negative for light-headedness.   Hematological: Does not bruise/bleed easily.       Physical Exam     Initial Vitals [11/10/19 1122]   BP Pulse Resp Temp SpO2   (!) 80/40 92 18 98.2 °F (36.8 °C) 100 %      MAP       --         Physical Exam    Nursing note and vitals reviewed.  Constitutional: She appears well-developed and well-nourished. She is not diaphoretic.  Non-toxic appearance. She does not appear ill. No distress.   HENT:   Head: Normocephalic and atraumatic.   Mouth/Throat: Mucous membranes are normal.   Neck: Neck supple.   Cardiovascular: Normal rate and regular rhythm. Exam reveals no gallop and no friction rub.    No murmur heard.  Pulmonary/Chest: Effort normal and breath sounds normal. No accessory muscle usage. No tachypnea. No respiratory distress. She has no decreased breath sounds. She has no wheezes. She has no rhonchi. She has no rales.   Abdominal: Soft. She exhibits no distension and no mass. There is no tenderness. There is no guarding.   Neurological: She is alert.   Skin: No rash noted. There is pallor.   Psychiatric: She has a normal mood and affect. Her behavior is normal.         ED Course   Procedures  Labs Reviewed   CULTURE, STOOL   CLOSTRIDIUM DIFFICILE   CULTURE, BLOOD   CULTURE, BLOOD   ENTEROHEMORRHAGIC E.COLI   CBC W/ AUTO DIFFERENTIAL   COMPREHENSIVE METABOLIC PANEL   PHOSPHORUS   MAGNESIUM   WBC, STOOL   URINALYSIS, REFLEX TO URINE CULTURE   LACTIC ACID, PLASMA     EKG Readings: (Independently Interpreted)   Initial Reading: No STEMI. Rhythm: Normal Sinus Rhythm. Heart Rate: 90.   Low voltage       Imaging Results          CT Abdomen Pelvis  Without Contrast (In process)                X-Ray Chest 1 View (Final result)  Result time 11/10/19 14:56:39    Final result by Dionicio Cheema MD (11/10/19 14:56:39)                  Impression:      No detrimental change or radiographic acute intrathoracic process seen on this single view.      Electronically signed by: Dionicio Cheema MD  Date:    11/10/2019  Time:    14:56             Narrative:    EXAMINATION:  XR CHEST 1 VIEW    CLINICAL HISTORY:  Hypotension, unspecified    TECHNIQUE:  Single frontal view of the chest was performed.    COMPARISON:  Chest radiograph 11/06/2019    FINDINGS:  Monitoring leads overlie the chest.  No detrimental change.  Cardiomediastinal silhouette is midline and stable without evidence of failure.  The lungs are symmetrically well expanded without focal consolidation, pleural effusion or pneumothorax.  No acute osseous process seen.  PA and lateral views can be obtained.                                 Medical Decision Making:   History:   I obtained history from: someone other than patient.       <> Summary of History: Daughter - see HPI  Old Medical Records: I decided to obtain old medical records.  Differential Diagnosis:   My differential diagnosis includes but is not limited to:  Orthostatic hypotension, hypovolemia, MADHAVI, dehydration, C diff colitis, enteritis  Independently Interpreted Test(s):   I have ordered and independently interpreted EKG Reading(s) - see prior notes  Clinical Tests:   Lab Tests: Ordered  Radiological Study: Ordered  ED Management:  74-year-old female presents with diarrhea, weakness and a syncopal episode today.  She is hypotensive on arrival.  She had received about 800cc of 1L fluid bolus en route.  She is awake and conversant but appears weak.  Abdomen soft and nontender. Mucous membranes are moist.    Will give additional IV fluids, obtain blood work, UA and reassess. EKG without ischemic patterns.     Blood work was remarkable for an MADHAVI with a creatinine of 1.5 increased from 0.9 a few days ago.  Lactate is elevated at 2.39.  No leukocytosis.  Suspect findings today are secondary to hypovolemia. Given patient's persistent  hypotension (90s/50s) despite fluid resuscitation, will treat for intra-abdominal infection with Cipro and Flagyl.  We will obtain CT a/p to further evaluate.  Patient will be admitted to Hospital Medicine for further treatment and management.    I have reviewed the patient's records and discussed this case with my supervising physician.      Please be advised that this text was dictated with Tell[employer] software and may contain dictation errors.     Other:   I have discussed this case with another health care provider.       <> Summary of the Discussion: Hospital Medicine              Attending Attestation:     Physician Attestation Statement for NP/PA:   I discussed this assessment and plan of this patient with the NP/PA, but I did not personally examine the patient. The face to face encounter was performed by the NP/PA.                  ED Course as of Nov 10 1706   Sun Nov 10, 2019   1333 Lab down time:  Creatinine 1.5, baseline is 1  Bicarb 21      [EB]   1604 Lactate 2.3    [EH]      ED Course User Index  [EB] Chantale Aceves MD  [EH] Radha Mcallister PA-C                 Clinical Impression:       ICD-10-CM ICD-9-CM   1. Hypotension, unspecified hypotension type I95.9 458.9   2. Syncope R55 780.2   3. Diarrhea, unspecified type R19.7 787.91   4. Hypovolemia E86.1 276.52   5. Hypotension I95.9 458.9         Disposition:   Disposition: Admitted  Condition: Fair                     Radha Mcallister PA-C  11/10/19 1707       Chantale Aceves MD  11/12/19 [id number]

## 2019-11-10 NOTE — ASSESSMENT & PLAN NOTE
Possibly from hypovolemia in setting of diarrhea vs infection vs dysautonomia  --s/p 1L IVFs; BP currently stable  --provide IVFs as needed with close monitoring of vol status  --started on cipro/flagyl for possible intraabdominal process; continue for today  --f/u cdiff results  --f/u CT abdomen  --hold all BP meds

## 2019-11-11 ENCOUNTER — TELEPHONE (OUTPATIENT)
Dept: NEUROLOGY | Facility: CLINIC | Age: 74
End: 2019-11-11

## 2019-11-11 LAB
A-TOCOPHEROL VIT E SERPL-MCNC: 770 UG/DL (ref 500–1800)
ALBUMIN SERPL BCP-MCNC: 2.8 G/DL (ref 3.5–5.2)
ALP SERPL-CCNC: 60 U/L (ref 55–135)
ALT SERPL W/O P-5'-P-CCNC: <5 U/L (ref 10–44)
ANION GAP SERPL CALC-SCNC: 6 MMOL/L (ref 8–16)
ASCENDING AORTA: 3.06 CM
AST SERPL-CCNC: 10 U/L (ref 10–40)
AV INDEX (PROSTH): 1
AV MEAN GRADIENT: 2 MMHG
AV PEAK GRADIENT: 4 MMHG
AV VALVE AREA: 3.05 CM2
AV VELOCITY RATIO: 0.92
BASOPHILS # BLD AUTO: 0.01 K/UL (ref 0–0.2)
BASOPHILS NFR BLD: 0.2 % (ref 0–1.9)
BILIRUB SERPL-MCNC: 0.6 MG/DL (ref 0.1–1)
BUN SERPL-MCNC: 36 MG/DL (ref 8–23)
C DIFF GDH STL QL: NEGATIVE
C DIFF TOX A+B STL QL IA: NEGATIVE
CALCIUM SERPL-MCNC: 8.1 MG/DL (ref 8.7–10.5)
CHLORIDE SERPL-SCNC: 108 MMOL/L (ref 95–110)
CO2 SERPL-SCNC: 26 MMOL/L (ref 23–29)
CREAT SERPL-MCNC: 1.5 MG/DL (ref 0.5–1.4)
CV ECHO LV RWT: 0.42 CM
DIFFERENTIAL METHOD: ABNORMAL
DOP CALC AO PEAK VEL: 0.95 M/S
DOP CALC AO VTI: 20.95 CM
DOP CALC LVOT AREA: 3 CM2
DOP CALC LVOT DIAMETER: 1.97 CM
DOP CALC LVOT PEAK VEL: 0.87 M/S
DOP CALC LVOT STROKE VOLUME: 63.92 CM3
DOP CALCLVOT PEAK VEL VTI: 20.98 CM
E WAVE DECELERATION TIME: 314.91 MSEC
E/A RATIO: 0.59
E/E' RATIO: 7.7 M/S
ECHO LV POSTERIOR WALL: 0.83 CM (ref 0.6–1.1)
EOSINOPHIL # BLD AUTO: 0.2 K/UL (ref 0–0.5)
EOSINOPHIL NFR BLD: 3.4 % (ref 0–8)
ERYTHROCYTE [DISTWIDTH] IN BLOOD BY AUTOMATED COUNT: 13.4 % (ref 11.5–14.5)
EST. GFR  (AFRICAN AMERICAN): 39.6 ML/MIN/1.73 M^2
EST. GFR  (NON AFRICAN AMERICAN): 34.3 ML/MIN/1.73 M^2
FRACTIONAL SHORTENING: 26 % (ref 28–44)
GLUCOSE SERPL-MCNC: 93 MG/DL (ref 70–110)
HCT VFR BLD AUTO: 32.2 % (ref 37–48.5)
HGB BLD-MCNC: 10 G/DL (ref 12–16)
IMM GRANULOCYTES # BLD AUTO: 0.04 K/UL (ref 0–0.04)
IMM GRANULOCYTES NFR BLD AUTO: 0.6 % (ref 0–0.5)
INTERVENTRICULAR SEPTUM: 0.79 CM (ref 0.6–1.1)
LA MAJOR: 5.17 CM
LA MINOR: 4.61 CM
LA WIDTH: 3.84 CM
LEFT ATRIUM SIZE: 3.97 CM
LEFT ATRIUM VOLUME: 63.16 CM3
LEFT INTERNAL DIMENSION IN SYSTOLE: 2.88 CM (ref 2.1–4)
LEFT VENTRICLE DIASTOLIC VOLUME: 66.47 ML
LEFT VENTRICLE SYSTOLIC VOLUME: 31.76 ML
LEFT VENTRICULAR INTERNAL DIMENSION IN DIASTOLE: 3.91 CM (ref 3.5–6)
LEFT VENTRICULAR MASS: 91.57 G
LV LATERAL E/E' RATIO: 7 M/S
LV SEPTAL E/E' RATIO: 8.56 M/S
LYMPHOCYTES # BLD AUTO: 1.8 K/UL (ref 1–4.8)
LYMPHOCYTES NFR BLD: 28.1 % (ref 18–48)
MAGNESIUM SERPL-MCNC: 2 MG/DL (ref 1.6–2.6)
MCH RBC QN AUTO: 33.1 PG (ref 27–31)
MCHC RBC AUTO-ENTMCNC: 31.1 G/DL (ref 32–36)
MCV RBC AUTO: 107 FL (ref 82–98)
MONOCYTES # BLD AUTO: 0.7 K/UL (ref 0.3–1)
MONOCYTES NFR BLD: 11.3 % (ref 4–15)
MV PEAK A VEL: 1.3 M/S
MV PEAK E VEL: 0.77 M/S
NEUTROPHILS # BLD AUTO: 3.6 K/UL (ref 1.8–7.7)
NEUTROPHILS NFR BLD: 56.4 % (ref 38–73)
NRBC BLD-RTO: 0 /100 WBC
PHOSPHATE SERPL-MCNC: 3.7 MG/DL (ref 2.7–4.5)
PLATELET # BLD AUTO: 190 K/UL (ref 150–350)
PMV BLD AUTO: 9.8 FL (ref 9.2–12.9)
POTASSIUM SERPL-SCNC: 4.3 MMOL/L (ref 3.5–5.1)
PROT SERPL-MCNC: 6.5 G/DL (ref 6–8.4)
PYRIDOXAL SERPL-MCNC: 2 UG/L (ref 5–50)
RA MAJOR: 4.24 CM
RA PRESSURE: 3 MMHG
RA WIDTH: 3.02 CM
RBC # BLD AUTO: 3.02 M/UL (ref 4–5.4)
RIGHT VENTRICULAR END-DIASTOLIC DIMENSION: 2.78 CM
SINUS: 2.95 CM
SODIUM SERPL-SCNC: 140 MMOL/L (ref 136–145)
STJ: 2.82 CM
TDI LATERAL: 0.11 M/S
TDI SEPTAL: 0.09 M/S
TDI: 0.1 M/S
TRICUSPID ANNULAR PLANE SYSTOLIC EXCURSION: 1.91 CM
WBC # BLD AUTO: 6.38 K/UL (ref 3.9–12.7)
WBC #/AREA STL HPF: NORMAL /[HPF]

## 2019-11-11 PROCEDURE — 83735 ASSAY OF MAGNESIUM: CPT

## 2019-11-11 PROCEDURE — 85025 COMPLETE CBC W/AUTO DIFF WBC: CPT

## 2019-11-11 PROCEDURE — 36415 COLL VENOUS BLD VENIPUNCTURE: CPT

## 2019-11-11 PROCEDURE — 97161 PT EVAL LOW COMPLEX 20 MIN: CPT

## 2019-11-11 PROCEDURE — 11000001 HC ACUTE MED/SURG PRIVATE ROOM

## 2019-11-11 PROCEDURE — 80053 COMPREHEN METABOLIC PANEL: CPT

## 2019-11-11 PROCEDURE — 63600175 PHARM REV CODE 636 W HCPCS: Performed by: INTERNAL MEDICINE

## 2019-11-11 PROCEDURE — 99232 SBSQ HOSP IP/OBS MODERATE 35: CPT | Mod: ,,, | Performed by: INTERNAL MEDICINE

## 2019-11-11 PROCEDURE — 84100 ASSAY OF PHOSPHORUS: CPT

## 2019-11-11 PROCEDURE — 99232 PR SUBSEQUENT HOSPITAL CARE,LEVL II: ICD-10-PCS | Mod: ,,, | Performed by: INTERNAL MEDICINE

## 2019-11-11 PROCEDURE — 25000003 PHARM REV CODE 250: Performed by: INTERNAL MEDICINE

## 2019-11-11 PROCEDURE — 97166 OT EVAL MOD COMPLEX 45 MIN: CPT

## 2019-11-11 PROCEDURE — 97116 GAIT TRAINING THERAPY: CPT

## 2019-11-11 PROCEDURE — 97535 SELF CARE MNGMENT TRAINING: CPT

## 2019-11-11 RX ORDER — THIAMINE HCL 100 MG
200 TABLET ORAL DAILY
Status: DISCONTINUED | OUTPATIENT
Start: 2019-11-11 | End: 2019-11-13 | Stop reason: HOSPADM

## 2019-11-11 RX ORDER — METRONIDAZOLE 500 MG/1
500 TABLET ORAL EVERY 8 HOURS
Qty: 15 TABLET | Refills: 0 | Status: SHIPPED | OUTPATIENT
Start: 2019-11-11 | End: 2019-11-17

## 2019-11-11 RX ORDER — PYRIDOXINE HCL (VITAMIN B6) 25 MG
25 TABLET ORAL DAILY
Status: DISCONTINUED | OUTPATIENT
Start: 2019-11-12 | End: 2019-11-13 | Stop reason: HOSPADM

## 2019-11-11 RX ORDER — CIPROFLOXACIN 500 MG/1
500 TABLET ORAL EVERY 12 HOURS
Qty: 10 TABLET | Refills: 0 | Status: SHIPPED | OUTPATIENT
Start: 2019-11-11 | End: 2019-11-17

## 2019-11-11 RX ADMIN — MELATONIN 1000 UNITS: at 09:11

## 2019-11-11 RX ADMIN — CARBIDOPA AND LEVODOPA 1 SPLIT TABLET: 25; 100 TABLET ORAL at 10:11

## 2019-11-11 RX ADMIN — Medication 200 MG: at 10:11

## 2019-11-11 RX ADMIN — CARBIDOPA AND LEVODOPA 1 SPLIT TABLET: 25; 100 TABLET ORAL at 12:11

## 2019-11-11 RX ADMIN — CIPROFLOXACIN HYDROCHLORIDE 500 MG: 500 TABLET, FILM COATED ORAL at 09:11

## 2019-11-11 RX ADMIN — CARBIDOPA AND LEVODOPA 1 SPLIT TABLET: 25; 100 TABLET ORAL at 04:11

## 2019-11-11 RX ADMIN — LEVOTHYROXINE SODIUM 50 MCG: 50 TABLET ORAL at 06:11

## 2019-11-11 RX ADMIN — ACETAMINOPHEN 650 MG: 325 TABLET ORAL at 09:11

## 2019-11-11 RX ADMIN — METRONIDAZOLE 500 MG: 500 TABLET ORAL at 06:11

## 2019-11-11 RX ADMIN — CARBIDOPA AND LEVODOPA 1 SPLIT TABLET: 25; 100 TABLET ORAL at 09:11

## 2019-11-11 RX ADMIN — HEPARIN SODIUM 5000 UNITS: 5000 INJECTION, SOLUTION INTRAVENOUS; SUBCUTANEOUS at 09:11

## 2019-11-11 RX ADMIN — HEPARIN SODIUM 5000 UNITS: 5000 INJECTION, SOLUTION INTRAVENOUS; SUBCUTANEOUS at 06:11

## 2019-11-11 RX ADMIN — GABAPENTIN 300 MG: 300 CAPSULE ORAL at 02:11

## 2019-11-11 RX ADMIN — HEPARIN SODIUM 5000 UNITS: 5000 INJECTION, SOLUTION INTRAVENOUS; SUBCUTANEOUS at 01:11

## 2019-11-11 RX ADMIN — ESCITALOPRAM OXALATE 20 MG: 20 TABLET ORAL at 09:11

## 2019-11-11 RX ADMIN — GABAPENTIN 300 MG: 300 CAPSULE ORAL at 09:11

## 2019-11-11 RX ADMIN — Medication 100 MG: at 09:11

## 2019-11-11 RX ADMIN — METRONIDAZOLE 500 MG: 500 TABLET ORAL at 01:11

## 2019-11-11 RX ADMIN — METRONIDAZOLE 500 MG: 500 TABLET ORAL at 09:11

## 2019-11-11 NOTE — ED NOTES
Med Team R Dr Canchola notified pt BP in the 80's asymptomatic LR bolus running, NAD, no new orders, will continue to monitor.

## 2019-11-11 NOTE — PLAN OF CARE
Tracial complete. Pt tolerated session well. Initiate OT POC.  Discharge Recommendation: Home Health OT  DME rec: None    Problem: Occupational Therapy Goal  Goal: Occupational Therapy Goal  Description  Goals to be met by: 12/8/2019    Patient will increase functional independence with ADLs by performing:    UE Dressing with Set-up Assistance.  LE Dressing with Set-up Assistance.  Grooming while standing at sink with Modified Agenda and Assistive Devices as needed.  Toileting from toilet with Modified Agenda and Assistive Devices as needed for hygiene and clothing management.   Bathing from  standing at sink with Modified Agenda and Assistive Devices as needed.  Toilet transfer to toilet with Modified Agenda.     Outcome: Ongoing, Progressing

## 2019-11-11 NOTE — TELEPHONE ENCOUNTER
----- Message from Salma Malik sent at 11/8/2019  4:33 PM CST -----  Contact: pt @ [phone]  Calling to schedule a hospital follow up 1-2 weeks with Dr. Rick. Needs sooner than next available 1/13, Please call.

## 2019-11-11 NOTE — PLAN OF CARE
11/11/19 1339   Discharge Assessment   Assessment Type Discharge Planning Assessment   Confirmed/corrected address and phone number on facesheet? Yes   Assessment information obtained from? Patient   Expected Length of Stay (days) 3   Communicated expected length of stay with patient/caregiver yes   Prior to hospitilization cognitive status: Alert/Oriented   Prior to hospitalization functional status: Independent;Assistive Equipment   Current cognitive status: Alert/Oriented   Current Functional Status: Independent;Assistive Equipment   Lives With child(autumn), adult;grandchild(autumn)   Able to Return to Prior Arrangements yes   Who are your caregiver(s) and their phone number(s[phone]   Patient's perception of discharge disposition home health   Readmission Within the Last 30 Days current reason for admission unrelated to previous admission   Equipment Currently Used at Home wheelchair;walker, rolling;cane, straight;commode   Do you have any problems affording any of your prescribed medications? No   Is the patient taking medications as prescribed? yes   Does the patient have transportation home? Yes   Transportation Anticipated agency   Dialysis Name and Scheduled days n/a   Does the patient receive services at the [facility]? No   Discharge Plan A Home Health   Discharge Plan B Skilled Nursing Facility   DME Needed Upon Discharge  none   Patient/Family in Agreement with Plan yes

## 2019-11-11 NOTE — ED NOTES
LOC: The patient is awake, alert, oriented to self and place, confused to time. Affect is appropriate.  Speech is appropriate and clear.     APPEARANCE: Patient resting comfortably in no acute distress.  Patient is clean and well groomed.    SKIN: The skin is warm and dry; color consistent with ethnicity.  Patient has decreased skin turgor and moist mucus membranes.  Skin intact; no breakdown or bruising noted.     MUSCULOSKELETAL: Patient moving upper  without difficulty, bilateral lower extremity weakness and stiffness noted, pt is non-ambulatory.  Left foot great toe amputated. Generalized weakness.     RESPIRATORY: Airway is open and patent. Respirations spontaneous, even, easy, and non-labored.  Patient has a normal effort and rate.  No accessory muscle use noted. Denies cough.     CARDIAC:  Normal rhythm and rate noted.  No peripheral edema noted. No complaints of chest pain.      ABDOMEN: Soft and non tender to palpation.  No distention noted.     NEUROLOGIC: Eyes open spontaneously.  Behavior appropriate to situation.  Follows commands; facial expression symmetrical.  Purposeful motor response noted; normal sensation in all extremities.

## 2019-11-11 NOTE — PT/OT/SLP EVAL
Occupational Therapy   Evaluation    Name: Melissa Anand  MRN: [id number]  Admitting Diagnosis:  Hypotension      Recommendations:     Discharge Recommendations: home with home health, home health OT  Discharge Equipment Recommendations:  none  Barriers to discharge:  None    Assessment:     Melissa Anand is a 74 y.o. female with a medical diagnosis of Hypotension.  She presents with poor endurance, . Performance deficits affecting function: weakness, impaired endurance, impaired self care skills, impaired functional mobilty, impaired sensation, gait instability, impaired balance, decreased upper extremity function, decreased lower extremity function, impaired coordination, impaired fine motor, impaired cardiopulmonary response to activity.      Rehab Prognosis: Fair; patient would benefit from acute skilled OT services to address these deficits and reach maximum level of function.       Plan:     Patient to be seen 2 x/week to address the above listed problems via self-care/home management, therapeutic activities, therapeutic exercises, therapeutic groups  · Plan of Care Expires: 12/08/19  · Plan of Care Reviewed with: patient, daughter    Subjective     Chief Complaint: Poor energy Patient/Family Comments/goals: Maintain function during hospital stay (Parkinson's)    Occupational Profile:  Living Environment: Pt lives in a [facility] w/ daughter, granddaughter, and son-in-law.   Previous level of function: assist w/ ADLs and mod I w/ spc for household distances.   Roles and Routines: N/A  Equipment Owned:  cane, straight, 3-in-1 commode  Assistance upon Discharge: pt's family can assist    Pain/Comfort:  · Pain Rating 1: 0/10  · Pain Rating Post-Intervention 1: 0/10    Patients cultural, spiritual, Quaker conflicts given the current situation: no    Objective:     Communicated with: RN prior to session.  Patient found HOB elevated with telemetry upon OT entry to room.    General Precautions: Standard, fall   Orthopedic  Precautions:N/A   Braces: N/A     Occupational Performance:    Bed Mobility:    · Patient completed Rolling/Turning to Left with  supervision  · Patient completed Rolling/Turning to Right with supervision  · Patient completed Scooting/Bridging with supervision  · Patient completed Supine to Sit with stand by assistance  · Patient completed Sit to Supine with stand by assistance    Functional Mobility/Transfers:  · Patient completed Sit <> Stand Transfer with minimum assistance  with  rolling walker   · Patient completed Bed <> Chair Transfer using Step Transfer technique with minimum assistance with rolling walker  Functional Mobility:  Pt was able to walk short household distances w/ RW, and CGA.      Activities of Daily Living:  · Upper Body Dressing: contact guard assistance seated at EOB.  · Lower Body Dressing: moderate assistance seated at EOB.    Cognitive/Visual Perceptual:  Completely cognitively intact adult w/ no glasses worn or present during eval.       Physical Exam:  Balance:    -       Sit/Standing: Fair  Dominant hand:    -       RH  Upper Extremity Range of Motion:     -       Right Upper Extremity: WFL  -       Left Upper Extremity: WFL  Upper Extremity Strength:    -       Right Upper Extremity: WFL  -       Left Upper Extremity: WFL   Strength:    -       Right Upper Extremity: WFL  -       Left Upper Extremity: WFL  Fine Motor Coordination:    -       Intact  Gross motor coordination:   WFL    AMPAC 6 Click ADL:  AMPAC Total Score: 20    Treatment & Education:  -Pt edu on OT role/POC  -Importance of OOB activity with staff assistance  -Safety during functional t/f and mobility  - White board updated  - Multiple self care tasks/functional mobility completed-- assistance level noted above  - All questions/concerns answered within OT scope of practice    Education:    Patient left up in chair with all lines intact, call button in reach, RN notified and daughter present    GOALS:    Multidisciplinary Problems     Occupational Therapy Goals        Problem: Occupational Therapy Goal    Goal Priority Disciplines Outcome Interventions   Occupational Therapy Goal     OT, PT/OT Ongoing, Progressing    Description:  Goals to be met by: 12/8/2019    Patient will increase functional independence with ADLs by performing:    UE Dressing with Set-up Assistance.  LE Dressing with Set-up Assistance.  Grooming while standing at sink with Modified [place] and Assistive Devices as needed.  Toileting from toilet with Modified [place] and Assistive Devices as needed for hygiene and clothing management.   Bathing from  standing at sink with Modified [place] and Assistive Devices as needed.  Toilet transfer to toilet with Modified [place].                      History:     Past Medical History:   Diagnosis Date    Anemia     Colitis     hospitalized July 2014    Diabetes mellitus     Encounter for blood transfusion     Goiter     Hypertension     Left hip pain 10/12/14    Syncope        Past Surgical History:   Procedure Laterality Date    BACK SURGERY      THYROIDECTOMY      TOE AMPUTATION Left        Time Tracking:     OT Date of Treatment: 11/11/19  OT Start Time: 1344  OT Stop Time: 1409  OT Total Time (min): 25 min    Billable Minutes:Evaluation 10 mins  Self Care/Home Management 25 mins    Doyle Rothman OT  11/11/2019

## 2019-11-11 NOTE — NURSING
Patient arrived to the unit around 19:30 with daughter and placed into room 7070 A. Orientated to room, white board updated, call bell within reach and instructed on how to use, side rails up x 2, bed lowered to lowest position, isolation  set up for possible c-diff, VS taken and stable, medications given.

## 2019-11-11 NOTE — PROGRESS NOTES
Hospital Medicine  Progress Note      Patient Name: Melissa Anand  MRN: [id number]  Date of Admission: 11/10/2019     Principal Problem: Hypotension     Subjective     Afenrile overnight. BP has remained stable. Continues to have watery stools. Cdiff NEG, stool cx pending. BCx NGTD. On day 2 cipro/flagyl for intraabdominal source. mentation at baseline today. Complaining of intermittent abdominal cramping that improves after BM.      Review of Systems     Constitutional: Positive for fatigue. Negative for chills and fever.   HENT: Negative for congestion and trouble swallowing.    Eyes: Negative for visual disturbance.   Respiratory: Negative for cough and shortness of breath.    Cardiovascular: Negative for chest pain, palpitations and leg swelling.   Gastrointestinal: Positive for diarrhea. Negative for abdominal pain, blood in stool, nausea and vomiting.   Genitourinary: Negative for dysuria and hematuria.   Musculoskeletal: Negative for arthralgias and back pain.   Skin: Negative for color change and pallor.   Neurological: Positive for weakness and light-headedness.   Psychiatric/Behavioral: Negative for confusion. The patient is not nervous/anxious.      Medications  Scheduled Meds:   carbidopa-levodopa 12.5-50 mg  1 split tablet Oral QID    ciprofloxacin HCl  500 mg Oral Q12H    escitalopram oxalate  20 mg Oral Daily    gabapentin  300 mg Oral TID    heparin (porcine)  5,000 Units Subcutaneous Q8H    levothyroxine  50 mcg Oral Before breakfast    metroNIDAZOLE  500 mg Oral Q8H    riboflavin (vitamin B2)  100 mg Oral Daily    thiamine  200 mg Oral Daily    vitamin D  1,000 Units Oral Daily     Continuous Infusions:  PRN Meds:.acetaminophen, acetaminophen, ondansetron, prochlorperazine, sodium chloride 0.9%    Objective    Physical Examination    Temp:  [97.2 °F (36.2 °C)-98.5 °F (36.9 °C)]   Pulse:  [72-87]   Resp:  [18]   BP: ()/(48-64)   SpO2:  [93 %-100 %]     Constitutional: She is oriented to  "person, place, and time. She appears well-developed and well-nourished. No distress. obese  HENT:   Head: Normocephalic and atraumatic.   Eyes: Pupils are equal, round, and reactive to light. EOM are normal.   Neck: Normal range of motion. Neck supple.   Cardiovascular: Normal rate, regular rhythm and normal heart sounds.   Pulmonary/Chest: Effort normal and breath sounds normal.   Abdominal: Soft. Bowel sounds are normal. She exhibits no distension. Tenderness at RLQ, no rebound or guarding  Musculoskeletal: Normal range of motion. She exhibits no edema. (1st toe missing on lt foot)  Neurological: She is alert and oriented to person, place, and time.   4/5 lower ext strength bilat   Skin: Skin is warm and dry.   Psychiatric: She has a normal mood and affect. Her behavior is normal.     CBC  Recent Labs   Lab 11/08/19  0241 11/10/19  1212 11/11/19  0543   WBC 4.29 9.32 6.38   HGB 9.4* 12.2 10.0*   HCT 30.4* 39.4 32.2*    218 190     CMP  Recent Labs   Lab 11/08/19  0241 11/10/19  1212 11/11/19  0543    137 140   K 4.1 4.3 4.3    107 108   CO2 27 21* 26   BUN 19 31* 36*   CREATININE 0.9 1.5* 1.5*   * 165* 93   CALCIUM 8.7 8.6* 8.1*   MG 2.0 2.0 2.0   PHOS 2.3* 4.0 3.7   ALKPHOS 74 69 60   ALT <5* 6* <5*   AST 10 12 10   ALBUMIN 3.3* 3.4* 2.8*   PROT 7.6 7.4 6.5   BILITOT 0.5 0.7 0.6           Hospital Course:    Assessment and Plan:    Hypotension  Possibly from hypovolemia in setting of diarrhea vs infection vs dysautonomia  --s/p 1L IVFs; BP currently stable  --provide IVFs as needed with close monitoring of vol status  --started on cipro/flagyl for possible intraabdominal process; stable  --CT abd: moderate fluid filling without distention of the right colon and proximal transverse colon suggestive of possible gastroenteritis.  --continue cipro/flagyl  --if decompensates will consider consulting IR for possible drainage of "moderate free fluid of the pelvis and right upper quadrant of " "uncertain etiology." visualized on CT  --hold all BP meds     Diarrhea  Gastroenteritis  --continue cipro and flagyl 7-10 days  --cdiff neg  --f/u stool cx  --hold PPI  .  Depression  Continue escitalopram        Acquired hypothyroidism  --continue home synthroid        Vitamin B1 deficiency neuropathy  --continue thiamine  --levels pending        AIDP (acute inflammatory demyelinating polyneuropathy)  --no acute issues  --plan was to f/u outpt in clinic with neuro  --continue PT/OT        Parkinson's disease  --continue home sinemet        Peripheral sensory neuropathy  Continue home gabapentin        Essential hypertension  --hold bp meds in the setting of hypotension  --resume as needed        Syncope  --telemetry  --IVFs; treat underlying infection  --TTE pending  --PT/OT     MADHAVI  --baseline Cr 0.9  --suspect pre-renal etiology due to hypovolemia  --s/p 1.5 L IVFs  --Cr.1.5-->1.5 today  --avoid nephrotoxic agents and renally dose meds  --will administer additional 500cc today  --monitor with daily cmp    Diet: cardiac  VTE PPX: heparin     Goals of care: full    Dispo: likely back home with HH in 1-2 days  Breana Wood M.D.  Department of Hospital Medicine  [facility]  [phone] (pager)    "

## 2019-11-11 NOTE — PT/OT/SLP EVAL
Physical Therapy Evaluation    Patient Name:  Melissa Anand   MRN:  [id number]    Recommendations:     Discharge Recommendations:  home health PT, home with home health   Discharge Equipment Recommendations:     Barriers to discharge: None    Assessment:     Melissa Anand is a 74 y.o. female admitted with a medical diagnosis of Hypotension.  She presents with the following impairments/functional limitations:  impaired endurance, impaired functional mobilty, gait instability, impaired balance . Patient participated well. Transfers w/ RW and min assistance for stability, ambulates in room 40 feet w/ RW and CG/min assistance. .    Rehab Prognosis: Good; patient would benefit from acute skilled PT services to address these deficits and reach maximum level of function.    Recent Surgery: * No surgery found *      Plan:     During this hospitalization, patient to be seen 2 x/week to address the identified rehab impairments via gait training, therapeutic activities, therapeutic exercises and progress toward the following goals:    · Plan of Care Expires:  12/11/19    Subjective     Chief Complaint: none stated  Patient/Family Comments/goals: reports feels better  Pain/Comfort:  · Pain Rating 1: 0/10  · Location - Side 1: Bilateral  · Location - Orientation 1: generalized  · Location 1: back  · Pain Rating Post-Intervention 1: (not rated)    Patients cultural, spiritual, Adventist conflicts given the current situation:      Living Environment:  Patient lives w/ daughter in [facility] w/o JAMIE. Reports minimal ambulation w/ RW in house, w/c for longer distances.  Prior to admission, patients level of function was SBA w/ RW for limited household ambulation, w/c for community mobilityi.  Equipment used at home: walker, rolling, wheelchair, bedside commode, bath bench.  DME owned (not currently used): none.  Upon discharge, patient will have assistance from daughter, family.    Objective:     Communicated with nurse prior to session.   Patient found HOB elevated with telemetry  upon PT entry to room.    General Precautions: Standard, fall   Orthopedic Precautions:N/A   Braces: N/A     Exams:  · Cognitive Exam:  Patient is oriented to Person, Place, Time and Situation  · Gross Motor Coordination:  WFL  · Postural Exam:  Patient presented with the following abnormalities:    · -       Rounded shoulders  · RLE ROM: WFL  · RLE Strength: WFL  · LLE ROM: WFL  · LLE Strength: WFL    Functional Mobility:  · Bed Mobility:     · Supine to Sit: stand by assistance  · Sit to Supine: stand by assistance  · Transfers:     · Sit to Stand:  minimum assistance with rolling walker from bed, from chair-for stability, extra time  · Bed to Chair: minimum assistance with  rolling walker  using  Step Transfer  · Gait: ambulates in room w/ RW and CGA 40 feet, slow pace, small steps and mostly step-to gait. No LOB.       Therapeutic Activities and Exercises:   patient educated on PT POC, gait and transfer technique. Patient requested to return to bed for comfort, d/t low back pain. Patient educated on importance of OOB activities    AM-PAC 6 CLICK MOBILITY  Total Score:17     Patient left HOB elevated with all lines intact, call button in reach, nurse notified and daughter present.    GOALS:   Multidisciplinary Problems     Physical Therapy Goals        Problem: Physical Therapy Goal    Goal Priority Disciplines Outcome Goal Variances Interventions   Physical Therapy Goal     PT, PT/OT      Description:  Goals to be met by: 19     Patient will increase functional independence with mobility by performin. Sit to stand transfer with Stand-by Assistance  2. Bed to chair transfer with Contact Guard Assistance using Rolling Walker  3. Gait  x 75 feet with Contact Guard Assistance using Rolling Walker.   4. Lower extremity exercise program x15 reps  assistance as needed                      History:     Past Medical History:   Diagnosis Date    Anemia     Colitis      hospitalized July 2014    Diabetes mellitus     Encounter for blood transfusion     Goiter     Hypertension     Left hip pain 10/12/14    Syncope        Past Surgical History:   Procedure Laterality Date    BACK SURGERY      THYROIDECTOMY      TOE AMPUTATION Left        Time Tracking:     PT Received On: 11/11/19  PT Start Time: 1344     PT Stop Time: 1410  PT Total Time (min): 26 min     Billable Minutes: Evaluation 15 and Gait Training 8      Maame Bullock, PT  11/11/2019

## 2019-11-12 LAB
ALBUMIN SERPL BCP-MCNC: 3.1 G/DL (ref 3.5–5.2)
ALP SERPL-CCNC: 61 U/L (ref 55–135)
ALT SERPL W/O P-5'-P-CCNC: <5 U/L (ref 10–44)
ANION GAP SERPL CALC-SCNC: 4 MMOL/L (ref 8–16)
AST SERPL-CCNC: 13 U/L (ref 10–40)
BASOPHILS # BLD AUTO: 0.03 K/UL (ref 0–0.2)
BASOPHILS NFR BLD: 0.7 % (ref 0–1.9)
BILIRUB SERPL-MCNC: 0.5 MG/DL (ref 0.1–1)
BUN SERPL-MCNC: 26 MG/DL (ref 8–23)
CALCIUM SERPL-MCNC: 8.2 MG/DL (ref 8.7–10.5)
CHLORIDE SERPL-SCNC: 104 MMOL/L (ref 95–110)
CO2 SERPL-SCNC: 26 MMOL/L (ref 23–29)
CREAT SERPL-MCNC: 1.2 MG/DL (ref 0.5–1.4)
DIFFERENTIAL METHOD: ABNORMAL
E COLI SXT1 STL QL IA: NEGATIVE
E COLI SXT2 STL QL IA: NEGATIVE
EOSINOPHIL # BLD AUTO: 0.2 K/UL (ref 0–0.5)
EOSINOPHIL NFR BLD: 5 % (ref 0–8)
ERYTHROCYTE [DISTWIDTH] IN BLOOD BY AUTOMATED COUNT: 13.2 % (ref 11.5–14.5)
EST. GFR  (AFRICAN AMERICAN): 51.8 ML/MIN/1.73 M^2
EST. GFR  (NON AFRICAN AMERICAN): 44.9 ML/MIN/1.73 M^2
GLUCOSE SERPL-MCNC: 109 MG/DL (ref 70–110)
HCT VFR BLD AUTO: 28.6 % (ref 37–48.5)
HGB BLD-MCNC: 9.6 G/DL (ref 12–16)
IMM GRANULOCYTES # BLD AUTO: 0.04 K/UL (ref 0–0.04)
IMM GRANULOCYTES NFR BLD AUTO: 0.9 % (ref 0–0.5)
LYMPHOCYTES # BLD AUTO: 1.6 K/UL (ref 1–4.8)
LYMPHOCYTES NFR BLD: 36.3 % (ref 18–48)
MAGNESIUM SERPL-MCNC: 2.1 MG/DL (ref 1.6–2.6)
MCH RBC QN AUTO: 33.6 PG (ref 27–31)
MCHC RBC AUTO-ENTMCNC: 33.6 G/DL (ref 32–36)
MCV RBC AUTO: 100 FL (ref 82–98)
MONOCYTES # BLD AUTO: 0.6 K/UL (ref 0.3–1)
MONOCYTES NFR BLD: 14.2 % (ref 4–15)
NEUTROPHILS # BLD AUTO: 1.9 K/UL (ref 1.8–7.7)
NEUTROPHILS NFR BLD: 42.9 % (ref 38–73)
NRBC BLD-RTO: 0 /100 WBC
PHOSPHATE SERPL-MCNC: 2.9 MG/DL (ref 2.7–4.5)
PLATELET # BLD AUTO: 168 K/UL (ref 150–350)
PMV BLD AUTO: 10.2 FL (ref 9.2–12.9)
POTASSIUM SERPL-SCNC: 4.5 MMOL/L (ref 3.5–5.1)
PROT SERPL-MCNC: 6.9 G/DL (ref 6–8.4)
RBC # BLD AUTO: 2.86 M/UL (ref 4–5.4)
SODIUM SERPL-SCNC: 134 MMOL/L (ref 136–145)
VIT B2 SERPL-MCNC: 14 MCG/L (ref 1–19)
WBC # BLD AUTO: 4.44 K/UL (ref 3.9–12.7)

## 2019-11-12 PROCEDURE — 99232 SBSQ HOSP IP/OBS MODERATE 35: CPT | Mod: ,,, | Performed by: STUDENT IN AN ORGANIZED HEALTH CARE EDUCATION/TRAINING PROGRAM

## 2019-11-12 PROCEDURE — 80053 COMPREHEN METABOLIC PANEL: CPT

## 2019-11-12 PROCEDURE — 84100 ASSAY OF PHOSPHORUS: CPT

## 2019-11-12 PROCEDURE — 25000003 PHARM REV CODE 250: Performed by: INTERNAL MEDICINE

## 2019-11-12 PROCEDURE — 25000003 PHARM REV CODE 250: Performed by: STUDENT IN AN ORGANIZED HEALTH CARE EDUCATION/TRAINING PROGRAM

## 2019-11-12 PROCEDURE — 36415 COLL VENOUS BLD VENIPUNCTURE: CPT

## 2019-11-12 PROCEDURE — 99232 PR SUBSEQUENT HOSPITAL CARE,LEVL II: ICD-10-PCS | Mod: ,,, | Performed by: STUDENT IN AN ORGANIZED HEALTH CARE EDUCATION/TRAINING PROGRAM

## 2019-11-12 PROCEDURE — 85025 COMPLETE CBC W/AUTO DIFF WBC: CPT

## 2019-11-12 PROCEDURE — 63600175 PHARM REV CODE 636 W HCPCS: Performed by: INTERNAL MEDICINE

## 2019-11-12 PROCEDURE — 11000001 HC ACUTE MED/SURG PRIVATE ROOM

## 2019-11-12 PROCEDURE — 63600175 PHARM REV CODE 636 W HCPCS: Performed by: STUDENT IN AN ORGANIZED HEALTH CARE EDUCATION/TRAINING PROGRAM

## 2019-11-12 PROCEDURE — 83735 ASSAY OF MAGNESIUM: CPT

## 2019-11-12 RX ORDER — ACETAMINOPHEN 325 MG/1
650 TABLET ORAL EVERY 8 HOURS PRN
Status: DISCONTINUED | OUTPATIENT
Start: 2019-11-12 | End: 2019-11-13 | Stop reason: HOSPADM

## 2019-11-12 RX ORDER — AMLODIPINE BESYLATE 5 MG/1
5 TABLET ORAL DAILY
Status: DISCONTINUED | OUTPATIENT
Start: 2019-11-12 | End: 2019-11-13 | Stop reason: HOSPADM

## 2019-11-12 RX ORDER — DIPHENOXYLATE HYDROCHLORIDE AND ATROPINE SULFATE 2.5; .025 MG/1; MG/1
1 TABLET ORAL 4 TIMES DAILY PRN
Status: DISCONTINUED | OUTPATIENT
Start: 2019-11-12 | End: 2019-11-13 | Stop reason: HOSPADM

## 2019-11-12 RX ORDER — LOPERAMIDE HYDROCHLORIDE 2 MG/1
2 CAPSULE ORAL 3 TIMES DAILY
Status: DISCONTINUED | OUTPATIENT
Start: 2019-11-12 | End: 2019-11-13 | Stop reason: HOSPADM

## 2019-11-12 RX ADMIN — CARBIDOPA AND LEVODOPA 1 SPLIT TABLET: 25; 100 TABLET ORAL at 05:11

## 2019-11-12 RX ADMIN — CARBIDOPA AND LEVODOPA 1 SPLIT TABLET: 25; 100 TABLET ORAL at 01:11

## 2019-11-12 RX ADMIN — METRONIDAZOLE 500 MG: 500 TABLET ORAL at 09:11

## 2019-11-12 RX ADMIN — CARBIDOPA AND LEVODOPA 1 SPLIT TABLET: 25; 100 TABLET ORAL at 08:11

## 2019-11-12 RX ADMIN — HEPARIN SODIUM 5000 UNITS: 5000 INJECTION, SOLUTION INTRAVENOUS; SUBCUTANEOUS at 09:11

## 2019-11-12 RX ADMIN — CIPROFLOXACIN HYDROCHLORIDE 500 MG: 500 TABLET, FILM COATED ORAL at 09:11

## 2019-11-12 RX ADMIN — METRONIDAZOLE 500 MG: 500 TABLET ORAL at 01:11

## 2019-11-12 RX ADMIN — DIPHENOXYLATE HYDROCHLORIDE AND ATROPINE SULFATE 1 TABLET: 2.5; .025 TABLET ORAL at 09:11

## 2019-11-12 RX ADMIN — LEVOTHYROXINE SODIUM 50 MCG: 50 TABLET ORAL at 07:11

## 2019-11-12 RX ADMIN — Medication 25 MG: at 08:11

## 2019-11-12 RX ADMIN — GABAPENTIN 300 MG: 300 CAPSULE ORAL at 09:11

## 2019-11-12 RX ADMIN — MELATONIN 1000 UNITS: at 08:11

## 2019-11-12 RX ADMIN — ACETAMINOPHEN 650 MG: 325 TABLET ORAL at 01:11

## 2019-11-12 RX ADMIN — ACETAMINOPHEN 650 MG: 325 TABLET ORAL at 09:11

## 2019-11-12 RX ADMIN — ONDANSETRON 4 MG: 2 INJECTION INTRAMUSCULAR; INTRAVENOUS at 12:11

## 2019-11-12 RX ADMIN — LOPERAMIDE HYDROCHLORIDE 2 MG: 2 CAPSULE ORAL at 09:11

## 2019-11-12 RX ADMIN — Medication 200 MG: at 08:11

## 2019-11-12 RX ADMIN — CIPROFLOXACIN HYDROCHLORIDE 500 MG: 500 TABLET, FILM COATED ORAL at 08:11

## 2019-11-12 RX ADMIN — HEPARIN SODIUM 5000 UNITS: 5000 INJECTION, SOLUTION INTRAVENOUS; SUBCUTANEOUS at 01:11

## 2019-11-12 RX ADMIN — LOPERAMIDE HYDROCHLORIDE 2 MG: 2 CAPSULE ORAL at 03:11

## 2019-11-12 RX ADMIN — SODIUM CHLORIDE, SODIUM LACTATE, POTASSIUM CHLORIDE, AND CALCIUM CHLORIDE 500 ML: .6; .31; .03; .02 INJECTION, SOLUTION INTRAVENOUS at 03:11

## 2019-11-12 RX ADMIN — CARBIDOPA AND LEVODOPA 1 SPLIT TABLET: 25; 100 TABLET ORAL at 09:11

## 2019-11-12 RX ADMIN — DIPHENOXYLATE HYDROCHLORIDE AND ATROPINE SULFATE 1 TABLET: 2.5; .025 TABLET ORAL at 01:11

## 2019-11-12 RX ADMIN — AMLODIPINE BESYLATE 5 MG: 5 TABLET ORAL at 03:11

## 2019-11-12 RX ADMIN — Medication 100 MG: at 08:11

## 2019-11-12 RX ADMIN — ESCITALOPRAM OXALATE 20 MG: 20 TABLET ORAL at 08:11

## 2019-11-12 RX ADMIN — GABAPENTIN 300 MG: 300 CAPSULE ORAL at 03:11

## 2019-11-12 RX ADMIN — METRONIDAZOLE 500 MG: 500 TABLET ORAL at 07:11

## 2019-11-12 RX ADMIN — GABAPENTIN 300 MG: 300 CAPSULE ORAL at 08:11

## 2019-11-12 RX ADMIN — HEPARIN SODIUM 5000 UNITS: 5000 INJECTION, SOLUTION INTRAVENOUS; SUBCUTANEOUS at 07:11

## 2019-11-12 NOTE — PHYSICIAN QUERY
PT Name: Melissa Anand  MR #: [id number]  Physician Query Form - Renal Condition Clarification     CDS/: Nupur Buckley RN             Contact information: [phone]    This form is a permanent document in the medical record.     QueryDate: November 12, 2019    By submitting this query, we are merely seeking further clarification of documentation. Please utilize your independent clinical judgment when addressing the question(s) below.    The Medical record contains the following:   Indicator Supporting Clinical Findings Location in Medical Record    Kidney (Renal) Insufficiency     x Kidney (Renal) Failure / Injury MADHAVI  suspect pre-renal etiology due to hypovolemia   11/12 Hosp med PN    Nephrotoxic Agents     x BUN/Creatinine GFR Baseline Cr 0.9    Bun/cr= 31/ 1.5   GFR= 39.6  Bun/cr= 36/ 1.5   GFR= 39.6  Bun/cr= 26/ 1.2   GFR= 51.8  11/12 Hosp med PN    11/10 Lab  11/11 Lab  11/12 Lab     x Urine: Casts         Eosinophils Hyaline cast= 1 11/10 Urinalysis    Dehydration      Nausea/Vomiting      Dialysis/CRRT     x Treatment: LR 500cc Bolus x 3doses    Daily CMP  Input and output  Urinalysis    --s/p 1.5 L IVFs  --avoid nephrotoxic agents and renally dose meds  --monitor with daily cmp  --resolving   11/10 MAR    11/10 NSG orders        11/10 Hosp med PN       x Other:  Hypotension    11/10 Hosp med PN   Acute Kidney Injury / Acute Renal Failure has different defining criteria. A generally accepted guideline  is:   A greater than 100% (2X) rise in serum creatinine from baseline* occurring during the course of a single hospital stay.   *Baseline as determined by the providers judgment and consideration of previous lab values and other documentation, if available.    A diagnosis of Acute Kidney Injury/ Acute Renal Failure should incorporate abnormal labs and clinical findings that are clinically significant      References: 1. Harrison et al. Acute renal failure-definition, outcome measures, animal models,  "fluid therapy and information technology needs: the Second International Consensus Conference of the Acute Dialysis Quality Initiative (ADQI) Group. Crit Care 2004; 8:B204; 2. Shila et al. Acute Kidney Injury Network: report of an initiative to improve outcomes in acute kidney injury. Crit Care 2007; 11:R31; 3. Kidney Disease: Improving Global Outcomes (KDIGO). Acute Kidney Injury Work Group. KDIGO clinical practice guidelines for acute kidney injury. Kidney Int Suppl 2012; 2:1.    The clinical guidelines noted below is only a system guideline, it does not replace the providers clinical judgment.    Provider, please specify the diagnosis or diagnoses associated with above clinical findings.    Please further specify the meaning of the diagnosis "MADHAVI" to mean:      [   ] Other Acute Kidney Failure/Injury (please specify): ____________       [   ] Unspecified Acute Kidney Failure/Injury        [ X  ] Acute Renal Insufficiency  Consider if SCr rise is transient and normalizes quickly with no efforts at real resuscitation of vital signs and perfusion     [   ] Other (please specify): _________________________________     [   ]  Clinically Undetermined       Please document in your progress notes daily for the duration of treatment until resolved and include in your discharge summary.    "

## 2019-11-12 NOTE — PLAN OF CARE
11/12/19 1313   Final Note   Assessment Type Final Discharge Note   Anticipated Discharge Disposition Home-Health  ([facility])   What phone number can be called within the next 1-3 days to see how you are doing after discharge? [phone]   Hospital Follow Up  Appt(s) scheduled? Yes   Discharge plans and expectations educations in teach back method with documentation complete? Yes     Future Appointments   Date Time Provider Department Center   12/5/2019  9:30 AM Geovany Rick MD [facility]

## 2019-11-12 NOTE — PLAN OF CARE
Pt vitals stable.  Pt has had several incontinent episodes of bowel and bladder and was changed today.  Pt worked with PT/OT and is encouraged to sit at bedside or ambulate with assistance.  Otherwise, no major events during days shift.

## 2019-11-12 NOTE — PLAN OF CARE
CM faxed referral to [facility] via  for review. Patient was current with [facility] prior to inpatient stay. SW will continue to follow.        11/12/19 7360   Post-Acute Status   Post-Acute Authorization Home Health/Hospice   Home Health/Hospice Status Referrals Sent       Briana Irvin LMSW   - [facility]  Ext. [phone]

## 2019-11-12 NOTE — PLAN OF CARE
SW faxed referral to [facility] via  for review. SW will continue to follow.        11/12/19 1212   Post-Acute Status   Post-Acute Authorization Home Health/Hospice   Home Health/Hospice Status Referrals Sent     12:17 PM  Patient was accepted to [facility] upon d/c.    Briana Irvin LMSW   - [facility]  Ext. [phone]

## 2019-11-12 NOTE — PROGRESS NOTES
Hospital Medicine  Progress Note      Patient Name: Melissa Anand  MRN: [id number]  Date of Admission: 11/10/2019     Principal Problem: Hypotension     Subjective     Afenrile overnight. BP has remained stable. Continues to have watery stools. Cdiff NEG, stool cx NGTD. BCx NGTD. On day 3 cipro/flagyl for intraabdominal source. mentation at baseline today. Complaining of intermittent abdominal cramping. Patient refusing to go home today although initially reported feeling better in the AM. Started immodium and lomotil for diarrhea.       Review of Systems     Constitutional: Positive for fatigue. Negative for chills and fever.   HENT: Negative for congestion and trouble swallowing.    Eyes: Negative for visual disturbance.   Respiratory: Negative for cough and shortness of breath.    Cardiovascular: Negative for chest pain, palpitations and leg swelling.   Gastrointestinal: Positive for diarrhea and nausea. Negative for abdominal pain, blood in stool, and vomiting.   Genitourinary: Negative for dysuria and hematuria.   Musculoskeletal: Negative for arthralgias and back pain.   Skin: Negative for color change and pallor.   Neurological: Positive for weakness.   Psychiatric/Behavioral: Negative for confusion. The patient is not nervous/anxious.      Medications  Scheduled Meds:   carbidopa-levodopa 12.5-50 mg  1 split tablet Oral QID    ciprofloxacin HCl  500 mg Oral Q12H    escitalopram oxalate  20 mg Oral Daily    gabapentin  300 mg Oral TID    heparin (porcine)  5,000 Units Subcutaneous Q8H    levothyroxine  50 mcg Oral Before breakfast    loperamide  2 mg Oral TID    metroNIDAZOLE  500 mg Oral Q8H    pyridoxine (vitamin B6)  25 mg Oral Daily    riboflavin (vitamin B2)  100 mg Oral Daily    thiamine  200 mg Oral Daily    vitamin D  1,000 Units Oral Daily     Continuous Infusions:  PRN Meds:.acetaminophen, acetaminophen, diphenoxylate-atropine 2.5-0.025 mg, ondansetron, prochlorperazine, sodium chloride  0.9%    Objective    Physical Examination    Temp:  [97.3 °F (36.3 °C)-98.6 °F (37 °C)]   Pulse:  [73-79]   Resp:  [18-20]   BP: (108-191)/(53-75)   SpO2:  [91 %-99 %]     Constitutional: She is oriented to person, place, and time. She appears well-developed and well-nourished. No distress. obese  HENT:   Head: Normocephalic and atraumatic.   Eyes: Pupils are equal, round, and reactive to light. EOM are normal.   Neck: Normal range of motion. Neck supple.   Cardiovascular: Normal rate, regular rhythm and normal heart sounds.   Pulmonary/Chest: Effort normal and breath sounds normal.   Abdominal: Soft. Bowel sounds are normal. She exhibits no distension. Tenderness at RLQ, no rebound or guarding  Musculoskeletal: Normal range of motion. She exhibits no edema. (1st toe missing on lt foot)  Neurological: She is alert and oriented to person, place, and time.   3/5 lower ext strength B/L  Skin: Skin is warm and dry.   Psychiatric: She has a normal mood and affect. Her behavior is normal.     CBC  Recent Labs   Lab 11/10/19  1212 11/11/19  0543 11/12/19  0503   WBC 9.32 6.38 4.44   HGB 12.2 10.0* 9.6*   HCT 39.4 32.2* 28.6*    190 168     CMP  Recent Labs   Lab 11/10/19  1212 11/11/19  0543 11/12/19  0503    140 134*   K 4.3 4.3 4.5    108 104   CO2 21* 26 26   BUN 31* 36* 26*   CREATININE 1.5* 1.5* 1.2   * 93 109   CALCIUM 8.6* 8.1* 8.2*   MG 2.0 2.0 2.1   PHOS 4.0 3.7 2.9   ALKPHOS 69 60 61   ALT 6* <5* <5*   AST 12 10 13   ALBUMIN 3.4* 2.8* 3.1*   PROT 7.4 6.5 6.9   BILITOT 0.7 0.6 0.5           Hospital Course:    Assessment and Plan:    Hypotension  Possibly from hypovolemia in setting of diarrhea vs infection vs dysautonomia  --s/p 1L IVFs; BP currently stable  --provide IVFs as needed with close monitoring of vol status  --started on cipro/flagyl for possible intraabdominal process; stable  --CT abd: moderate fluid filling without distention of the right colon and proximal transverse colon  "suggestive of possible gastroenteritis.  --continue cipro/flagyl  --if decompensates will consider consulting IR for possible drainage of "moderate free fluid of the pelvis and right upper quadrant of uncertain etiology." visualized on CT  --resuming BP meds on 11/12 as SBP of 190     Diarrhea  Gastroenteritis  --continue cipro and flagyl 7-10 days  --cdiff neg  --stool culture neg  --hold PPI  --started immodium and lomotil 11/12  .  Depression  Continue escitalopram        Acquired hypothyroidism  --continue home synthroid        Vitamin B1 deficiency neuropathy  --continue thiamine        AIDP (acute inflammatory demyelinating polyneuropathy)  --no acute issues  --plan was to f/u outpt in clinic with neuro  --continue PT/OT        Parkinson's disease  --continue home sinemet        Peripheral sensory neuropathy  Continue home gabapentin        Essential hypertension  --hold bp meds in the setting of hypotension  --resume as needed        Syncope  --telemetry  --IVFs; treat underlying infection  --TTE pending  --PT/OT     MADHAVI  --baseline Cr 0.9  --suspect pre-renal etiology due to hypovolemia  --s/p 1.5 L IVFs  --avoid nephrotoxic agents and renally dose meds  --monitor with daily cmp  --resolving      Diet: cardiac  VTE PPX: heparin     Goals of care: full    Dispo: likely back home with  tomorrow    Nahid Le M.D.  Department of Hospital Medicine  [facility]  [phone] (pager)    "

## 2019-11-12 NOTE — NURSING
Pt and daughter didn't feel comfortable being discharged at this time b/c she had a large amount of diarrhea.  Dr. Le ordered antidiarrhea medication for pt to take and will check on pt an hour after taking the medication.  It will be coming from pharmacy.

## 2019-11-13 VITALS
OXYGEN SATURATION: 97 % | DIASTOLIC BLOOD PRESSURE: 60 MMHG | SYSTOLIC BLOOD PRESSURE: 124 MMHG | HEART RATE: 70 BPM | TEMPERATURE: 98 F | RESPIRATION RATE: 18 BRPM

## 2019-11-13 LAB
ALBUMIN SERPL BCP-MCNC: 3.4 G/DL (ref 3.5–5.2)
ALP SERPL-CCNC: 69 U/L (ref 55–135)
ALT SERPL W/O P-5'-P-CCNC: <5 U/L (ref 10–44)
ANION GAP SERPL CALC-SCNC: 6 MMOL/L (ref 8–16)
AST SERPL-CCNC: 14 U/L (ref 10–40)
BASOPHILS # BLD AUTO: 0.03 K/UL (ref 0–0.2)
BASOPHILS NFR BLD: 0.7 % (ref 0–1.9)
BILIRUB SERPL-MCNC: 0.5 MG/DL (ref 0.1–1)
BUN SERPL-MCNC: 19 MG/DL (ref 8–23)
CALCIUM SERPL-MCNC: 8.7 MG/DL (ref 8.7–10.5)
CHLORIDE SERPL-SCNC: 105 MMOL/L (ref 95–110)
CO2 SERPL-SCNC: 28 MMOL/L (ref 23–29)
CREAT SERPL-MCNC: 1 MG/DL (ref 0.5–1.4)
DIFFERENTIAL METHOD: ABNORMAL
EOSINOPHIL # BLD AUTO: 0.3 K/UL (ref 0–0.5)
EOSINOPHIL NFR BLD: 6.6 % (ref 0–8)
ERYTHROCYTE [DISTWIDTH] IN BLOOD BY AUTOMATED COUNT: 13.2 % (ref 11.5–14.5)
EST. GFR  (AFRICAN AMERICAN): >60 ML/MIN/1.73 M^2
EST. GFR  (NON AFRICAN AMERICAN): 56 ML/MIN/1.73 M^2
GLUCOSE SERPL-MCNC: 93 MG/DL (ref 70–110)
HCT VFR BLD AUTO: 33.2 % (ref 37–48.5)
HGB BLD-MCNC: 10.2 G/DL (ref 12–16)
IMM GRANULOCYTES # BLD AUTO: 0.02 K/UL (ref 0–0.04)
IMM GRANULOCYTES NFR BLD AUTO: 0.5 % (ref 0–0.5)
LYMPHOCYTES # BLD AUTO: 1.7 K/UL (ref 1–4.8)
LYMPHOCYTES NFR BLD: 38.9 % (ref 18–48)
MAGNESIUM SERPL-MCNC: 2.1 MG/DL (ref 1.6–2.6)
MCH RBC QN AUTO: 32.3 PG (ref 27–31)
MCHC RBC AUTO-ENTMCNC: 30.7 G/DL (ref 32–36)
MCV RBC AUTO: 105 FL (ref 82–98)
MONOCYTES # BLD AUTO: 0.6 K/UL (ref 0.3–1)
MONOCYTES NFR BLD: 12.4 % (ref 4–15)
NEUTROPHILS # BLD AUTO: 1.8 K/UL (ref 1.8–7.7)
NEUTROPHILS NFR BLD: 40.9 % (ref 38–73)
NRBC BLD-RTO: 0 /100 WBC
PHOSPHATE SERPL-MCNC: 3.1 MG/DL (ref 2.7–4.5)
PLATELET # BLD AUTO: 184 K/UL (ref 150–350)
PMV BLD AUTO: 9.7 FL (ref 9.2–12.9)
POTASSIUM SERPL-SCNC: 4.4 MMOL/L (ref 3.5–5.1)
PROT SERPL-MCNC: 7.7 G/DL (ref 6–8.4)
RBC # BLD AUTO: 3.16 M/UL (ref 4–5.4)
SODIUM SERPL-SCNC: 139 MMOL/L (ref 136–145)
WBC # BLD AUTO: 4.42 K/UL (ref 3.9–12.7)

## 2019-11-13 PROCEDURE — 36415 COLL VENOUS BLD VENIPUNCTURE: CPT

## 2019-11-13 PROCEDURE — 99239 HOSP IP/OBS DSCHRG MGMT >30: CPT | Mod: ,,, | Performed by: STUDENT IN AN ORGANIZED HEALTH CARE EDUCATION/TRAINING PROGRAM

## 2019-11-13 PROCEDURE — 63600175 PHARM REV CODE 636 W HCPCS: Performed by: INTERNAL MEDICINE

## 2019-11-13 PROCEDURE — 94761 N-INVAS EAR/PLS OXIMETRY MLT: CPT

## 2019-11-13 PROCEDURE — 25000003 PHARM REV CODE 250: Performed by: STUDENT IN AN ORGANIZED HEALTH CARE EDUCATION/TRAINING PROGRAM

## 2019-11-13 PROCEDURE — 99239 PR HOSPITAL DISCHARGE DAY,>30 MIN: ICD-10-PCS | Mod: ,,, | Performed by: STUDENT IN AN ORGANIZED HEALTH CARE EDUCATION/TRAINING PROGRAM

## 2019-11-13 PROCEDURE — 80053 COMPREHEN METABOLIC PANEL: CPT

## 2019-11-13 PROCEDURE — 83735 ASSAY OF MAGNESIUM: CPT

## 2019-11-13 PROCEDURE — 85025 COMPLETE CBC W/AUTO DIFF WBC: CPT

## 2019-11-13 PROCEDURE — 84100 ASSAY OF PHOSPHORUS: CPT

## 2019-11-13 PROCEDURE — 25000003 PHARM REV CODE 250: Performed by: INTERNAL MEDICINE

## 2019-11-13 RX ORDER — LOPERAMIDE HYDROCHLORIDE 2 MG/1
2 CAPSULE ORAL 3 TIMES DAILY PRN
Qty: 30 CAPSULE | Refills: 0 | Status: SHIPPED | OUTPATIENT
Start: 2019-11-13 | End: 2019-11-23

## 2019-11-13 RX ADMIN — LOPERAMIDE HYDROCHLORIDE 2 MG: 2 CAPSULE ORAL at 09:11

## 2019-11-13 RX ADMIN — Medication 25 MG: at 09:11

## 2019-11-13 RX ADMIN — LEVOTHYROXINE SODIUM 50 MCG: 50 TABLET ORAL at 05:11

## 2019-11-13 RX ADMIN — GABAPENTIN 300 MG: 300 CAPSULE ORAL at 09:11

## 2019-11-13 RX ADMIN — ESCITALOPRAM OXALATE 20 MG: 20 TABLET ORAL at 09:11

## 2019-11-13 RX ADMIN — METRONIDAZOLE 500 MG: 500 TABLET ORAL at 05:11

## 2019-11-13 RX ADMIN — HEPARIN SODIUM 5000 UNITS: 5000 INJECTION, SOLUTION INTRAVENOUS; SUBCUTANEOUS at 05:11

## 2019-11-13 RX ADMIN — MELATONIN 1000 UNITS: at 09:11

## 2019-11-13 RX ADMIN — AMLODIPINE BESYLATE 5 MG: 5 TABLET ORAL at 09:11

## 2019-11-13 RX ADMIN — Medication 200 MG: at 09:11

## 2019-11-13 RX ADMIN — CIPROFLOXACIN HYDROCHLORIDE 500 MG: 500 TABLET, FILM COATED ORAL at 09:11

## 2019-11-13 RX ADMIN — CARBIDOPA AND LEVODOPA 1 SPLIT TABLET: 25; 100 TABLET ORAL at 09:11

## 2019-11-13 NOTE — HOSPITAL COURSE
Patient was admitted with hypotension and this was thought to possibly be from hypovolemia in setting of diarrhea vs infection vs dysautonomia. She received a total of 2L of IVF during her hospitalization (1L in ED, 500cc boluses on day #2 and day#3) as she had a mild MADHAVI and diarrhea. CT of her abdomen showed moderate fluid filling without distention of the right colon and proximal transverse colon suggestive of possible gastroenteritis and she was started on ciprofloxacin and flagyl. Infectious workup (blood cultures, CDiff, stool Cx, U/A) negative for infection and home blood pressure medications were resumed - patient was thought to be stable for discharge on 11/12/19 however patient states she was still feeling weak and having diarrhea. Immodium and lomotil PRN added to medications and patient with improvement in symptoms and resolution of MADHAVI. She was discharged home with home health to follow up with her PCP and her neurologist.  .

## 2019-11-13 NOTE — DISCHARGE SUMMARY
[facility] Hospital Medicine  Discharge Summary      Patient Name: Melissa Anand  MRN: [id number]  Admission Date: 11/10/2019  Hospital Length of Stay: 3 days  Discharge Date and Time:  11/13/2019 3:10 PM  Attending Physician: Nahid Le MD   Discharging Provider: Nahid Le MD  Primary Care Provider: Dana Castellon MD  Hospital Medicine Team: [facility] HOSP MED R Nahid Le MD    HPI:   74-year-old female with a history of DM type 2, HTN, Parkinson's disease, AIDP, hypothyroidism presents to the ED for evaluation of weakness and syncope.    Pt was discharged from [facility] yesterday after presenting with 4 day hx of progressive lower extremity weakness on 11/6. Evaluated by neurology who felt presentation more consistent with deconditioning rather than with AICDP or parkinsons. Given option of rehab placement, but opted to go home with HH. Today pt presented with diarrhea, which she did not have during past admission. Complains of lower abdominal pain that she describes as soreness.  Denies nausea, vomiting, fever, chest pain, shortness of breath, headache, lightheadedness or dizziness.  No sick contacts at home and no recent abx use.     In ED pt was hypotensive to 80/40, HR 92. BP normalized after 1 L IVFs. Given 1 dose cipro/flagyl. CT abdomen ordered. Labs significant for Cr 1.5 (up from 0.9), LA 2.39, normal WBC. Admitted to .    * No surgery found *      Hospital Course:   Patient was admitted with hypotension and this was thought to possibly be from hypovolemia in setting of diarrhea vs infection vs dysautonomia. She received a total of 2L of IVF during her hospitalization (1L in ED, 500cc boluses on day #2 and day#3) as she had a mild MADHAVI and diarrhea. CT of her abdomen showed moderate fluid filling without distention of the right colon and proximal transverse colon suggestive of possible gastroenteritis and she was started on ciprofloxacin and flagyl. Infectious workup (blood  cultures, CDiff, stool Cx, U/A) negative for infection and home blood pressure medications were resumed - patient was thought to be stable for discharge on 11/12/19 however patient states she was still feeling weak and having diarrhea. Immodium and lomotil PRN added to medications and patient with improvement in symptoms and resolution of MADHAVI. She was discharged home with home health to follow up with her PCP and her neurologist.  .       Consults:   Consults (From admission, onward)        Status Ordering Provider     IP consult to case management  Once     Provider:  (Not yet assigned)    Acknowledged SAMIR CERVANTES          No new Assessment & Plan notes have been filed under this hospital service since the last note was generated.  Service: Hospital Medicine    Final Active Diagnoses:    Diagnosis Date Noted POA    PRINCIPAL PROBLEM:  Hypotension [I95.9] 01/21/2015 Yes    Acquired hypothyroidism [E03.9] 03/22/2018 Yes    Vitamin B1 deficiency neuropathy [E51.11] 01/18/2018 Yes    AIDP (acute inflammatory demyelinating polyneuropathy) [G61.0] 01/18/2018 Yes    Parkinson's disease [G20] 01/17/2018 Yes    Peripheral sensory neuropathy [G62.9] 01/09/2018 Yes    Essential hypertension [I10] 07/23/2014 Yes    Syncope [R55] 07/22/2014 Yes    Depression [F32.9] 04/02/2014 Yes      Problems Resolved During this Admission:       Discharged Condition: stable    Disposition: Home or Self Care    Follow Up:  Follow-up Information     Dana Castellon MD In 1 week.    Specialty:  General Practice  Why:  hospital follow up  Contact information:  [address] [place] [address] [phone]                 Patient Instructions:      Activity as tolerated     Activity as tolerated     Vitals:    11/13/19 0521 11/13/19 0819 11/13/19 1130 11/13/19 1140   BP: (!) 146/67 (!) 145/65  124/60   BP Location: Left arm Left arm     Patient Position: Lying Lying     Pulse: 74 72  70   Resp: 20 18  18   Temp:  97.5 °F (36.4 °C) 97.5 °F (36.4 °C)  98.2 °F (36.8 °C)   TempSrc: Oral Oral     SpO2: (!) 94% 97% 97% 97%     Physical Exam   Constitutional: She is oriented to person, place, and time. She appears well-developed and well-nourished. No distress. obese  HENT:   Head: Normocephalic and atraumatic.   Eyes: Pupils are equal, round, and reactive to light. EOM are normal.   Cardiovascular: Normal rate, regular rhythm and normal heart sounds.   Pulmonary/Chest: Effort normal and breath sounds normal.   Abdominal: Soft. Bowel sounds are normal. She exhibits no distension. Tenderness at RLQ, no rebound or guarding  Musculoskeletal: Normal range of motion. She exhibits no edema. (1st toe missing on lt foot)  Neurological: She is alert and oriented to person, place, and time.   3/5 lower ext strength B/L  Skin: Skin is warm and dry.   Psychiatric: She has a normal mood and affect. Her behavior is normal.     Significant Diagnostic Studies: Labs:   CMP   Recent Labs   Lab 11/12/19  0503 11/13/19  0640   * 139   K 4.5 4.4    105   CO2 26 28    93   BUN 26* 19   CREATININE 1.2 1.0   CALCIUM 8.2* 8.7   PROT 6.9 7.7   ALBUMIN 3.1* 3.4*   BILITOT 0.5 0.5   ALKPHOS 61 69   AST 13 14   ALT <5* <5*   ANIONGAP 4* 6*   ESTGFRAFRICA 51.8* >60.0   EGFRNONAA 44.9* 56.0*    and CBC   Recent Labs   Lab 11/12/19  0503 11/13/19  0640   WBC 4.44 4.42   HGB 9.6* 10.2*   HCT 28.6* 33.2*    184     Microbiology:   Blood Culture   Lab Results   Component Value Date    LABBLOO No Growth to date 11/10/2019    LABBLOO No Growth to date 11/10/2019    LABBLOO No Growth to date 11/10/2019    and Urine Culture    Lab Results   Component Value Date    LABURIN ESCHERICHIA COLI  >100,000 cfu/ml   01/09/2018       Pending Diagnostic Studies:     None         Medications:  Reconciled Home Medications:      Medication List      START taking these medications    ciprofloxacin HCl 500 MG tablet  Commonly known as:  CIPRO  Take 1 tablet  (500 mg total) by mouth every 12 (twelve) hours for 5 days     loperamide 2 mg capsule  Commonly known as:  IMODIUM  Take 1 capsule (2 mg total) by mouth 3 (three) times daily as needed for Diarrhea.     metroNIDAZOLE 500 MG tablet  Commonly known as:  FLAGYL  Take 1 tablet (500 mg total) by mouth every 8 (eight) hours for 5 days        CONTINUE taking these medications    amLODIPine 5 MG tablet  Commonly known as:  NORVASC  Take 1 tablet (5 mg total) by mouth once daily. Contact PCP for refills     carbidopa-levodopa  mg  mg per tablet  Commonly known as:  SINEMET  Take 0.5 tablets by mouth 4 (four) times daily. Take 6AM / 10am / 2PM / 6PM     cholecalciferol (vitamin D3) 400 unit Cap  Take 2 capsules (800 Units total) by mouth once daily.     ergocalciferol 50,000 unit Cap  Commonly known as:  ERGOCALCIFEROL  Take 1 capsule (50,000 Units total) by mouth every 7 days.     escitalopram oxalate 20 MG tablet  Commonly known as:  LEXAPRO  TK 1 T PO QD     gabapentin 300 MG capsule  Commonly known as:  NEURONTIN  Take 3 capsules (900 mg total) by mouth 3 (three) times daily.     levothyroxine 50 MCG tablet  Commonly known as:  SYNTHROID  Take 1 tablet (50 mcg total) by mouth before breakfast.     lidocaine 5 % Oint ointment  Commonly known as:  XYLOCAINE  JANA AA D PRN P     lisinopril 40 MG tablet  Commonly known as:  PRINIVIL,ZESTRIL  Take 1 tablet (40 mg total) by mouth once daily.     mupirocin 2 % ointment  Commonly known as:  BACTROBAN     pantoprazole 40 MG tablet  Commonly known as:  PROTONIX     ramelteon 8 mg tablet  Commonly known as:  ROZEREM  Take 1 tablet (8 mg total) by mouth every evening.     riboflavin (vitamin B2) 100 mg Tab tablet  Commonly known as:  VITAMIN B-2  Take 4 tablets (400 mg total) by mouth once daily.     thiamine 250 MG tablet  Take 1 tablet (250 mg total) by mouth once daily.            Indwelling Lines/Drains at time of discharge:   Lines/Drains/Airways     None                  Time spent on the discharge of patient: > 30 minutes  Patient was seen and examined on the date of discharge and determined to be suitable for discharge.         Nahid Le MD  Department of Hospital Medicine  [facility]

## 2019-11-13 NOTE — PHYSICIAN QUERY
"PT Name: Melissa Anand  MR #: [id number]     Physician Query Form - Etiology of Condition Clarification      CDS: Althea Harvey RN  Contact information: [email or web address]  This form is a permanent document in the medical record.     Query Date: November 13, 2019    By submitting this query, we are merely seeking further clarification of documentation.  Please utilize your independent clinical judgment when addressing the question(s) below.     The medical record contains the following:    Findings Supporting Clinical Information Location in Medical Record   Per Neurology, presentation is concerning for deconditioning and less concerning for flare of AIDP/CIDP.           Hospital Course:   Admitted to  on 11/6 for progressive lower extremity weakness and fatigue possibly due to AIDP. Evaluated by neurology in ED and lab work ordered per their recs. PT/OT consulted. By day 2 pt more awake, but still having lower ext weakness.    They recommend continuing home levodopa-carbidopa dose and vitamin supplements (riboflavin, vit d, thiamine). Labs revealed :TSH wnl, CK normal , CRP and ESR elevated,B1/ B12/ folate normal, B6/ Vit E pending, Vit D LOW. A1c 5.3; at goal.        EMG has shown evidence of chronic length dependent sensorimotor polyneuropathy  -- Differential includes: multifactorial as noted from chronic medical problems / with significant confounder from non-compliance with medications - specially PD meds resulting in worsening bradykinesia. Less concern for ADIP/CIDP flare or acute spinal cord injury.  Vitamin B1 deficiency neuropathy  Parkinson's disease  -- Bradykinesia related medication non-compliance  -- significant confounder for debility Dc summary 11/8        Dc summary 11/8                              Neurology PN 11/7     For accurate coding purposes,   Please document your best medical opinion regarding the etiology of "deconditioning" for which treatment was directed.     Provider use only       "  [  ] Deconditioning due to progression of parkinsons disease        [  ] Deconditioning due to progression of Vitamin B1 deficiency neuropathy        [x  ] Deconditioning due other reason, please specify:_______________________        [  ] Other clarification: ______________________________________       [  ] Clinically Undetermined     Please document in your progress notes daily for the duration of treatment, until resolved, and include in your discharge summary.

## 2019-11-13 NOTE — PLAN OF CARE
Pt was expected to get discharged today but prior to discharge, pt had a large amount of watery stool, diarrhea and then complained of weakness.  She stated she had such a lack of energy and fatigue and felt dizzy. Pt stated that she came into the hospital due to diarrhea and does not want to leave until it is resolved.  Several anti diarrheal medications ordered for pt and given.  Pt had two episodes of diarrhea today during day shift.  Pt also complained of headache, she was given 500 cc bolus of LR and tylenol.  Headache eventually resolved.  Pt also complained of nausea, no emesis, nausea also resolved and pt was able to eat some dinner. Pt vitals stable.

## 2019-11-14 ENCOUNTER — TELEPHONE (OUTPATIENT)
Dept: NEUROLOGY | Facility: CLINIC | Age: 74
End: 2019-11-14

## 2019-11-14 LAB — BACTERIA STL CULT: NORMAL

## 2019-11-14 NOTE — TELEPHONE ENCOUNTER
----- Message from Sammie Bermeo sent at 11/14/2019  9:13 AM CST -----  Contact: SHONNA MARTINEZ [[id number]]  Name of Who is Calling : SHONNA MARTINEZ [[id number]]    What is the request in detail :     Patient is requesting a call from staff she would like to discuss her upcoming appointment and hospital follow up  .....Please contact to further discuss and advise.    Can the clinic reply by [facility] : NO     What Number to Call Back : [phone]

## 2019-11-15 ENCOUNTER — PATIENT OUTREACH (OUTPATIENT)
Dept: ADMINISTRATIVE | Facility: CLINIC | Age: 74
End: 2019-11-15

## 2019-11-15 LAB
BACTERIA BLD CULT: NORMAL
BACTERIA BLD CULT: NORMAL

## 2019-11-15 NOTE — PATIENT INSTRUCTIONS
Discharge Instructions for Low Blood Pressure (Hypotension)   You have been diagnosed with low blood pressure (hypotension). When you have hypotension, your blood pressure is lower than normal. Low blood pressure can make you feel dizzy or faint. This condition is sometimes a side effect of taking certain medicines, including medicines for high blood pressure (hypertension). It can also result from medical conditions such as dehydration. Hypotension has many possible causes. Sometimes the cause is unknown, and you will need follow-up visits and tests.   Home care  These steps can help manage your condition:   Follow your healthcare providers instructions. Go to all your follow up appointments.   Rest in bed and ask for help with daily activities until you feel better. You may need to slowly increase the amount of time you spend sitting or doing light activity.   Dont drive while your blood pressure is not controlled.   Be careful when you get up from sitting or lying down.   Take your time. Sudden movements can cause dizziness or fainting.   When you first sit up after lying down, be sure to sit in bed for 30 seconds or so before getting up to walk.   Tell your healthcare provider about the medicines you are taking. Many kinds of medicines trigger low blood pressure.   Limit your alcohol intake to no more than 2 drinks a day for men and 1 drink a day for women. Alcohol can dehydrate you even further. It can also interfere with the effectiveness of medicines.   Prevent dehydration by drinking plenty of fluids, unless otherwise instructed by your healthcare provider.   Learn to take your own blood pressure. Keep a record of your results. Ask your healthcare provider which readings mean that you need medical attention.   Tell your family members to call an ambulance if you become unconscious. Ask them to learn CPR.   Follow-up care  Make a follow-up appointment, or as advised.   Call 911  Call 911 right away if you  have:   Chest pain  Shortness of breath  When to call your healthcare provider  Call your healthcare provider right away if you have any of the following:   Dizziness or fainting spells   Black, maroon, or tarry stools   Irregular heartbeat  Stiff neck  Severe upper back pain  Diarrhea or vomiting that doesnt go away   Inability to eat or drink  Burning sensation when you urinate   Urine with a strong, unpleasant odor   Fainting with exercise  © 5096-9691 [employer]. [address] [place] [address]. All rights reserved. This information is not intended as a substitute for professional medical care. Always follow your healthcare professional's instructions.

## 2019-11-15 NOTE — TELEPHONE ENCOUNTER
C3 nurse attempted to contact patient. The following occurred:   C3 nurse attempted to contact Melissa Anand's daughter for a TCC post hospital discharge follow up call. The patient is unable to conduct the call @ this time. The patient requested a callback around 1400.    The patient does not have a scheduled HOSFU appointment within 7-14 days post hospital discharge date 11/13/2019. PCP not within [facility].

## 2019-12-05 ENCOUNTER — OFFICE VISIT (OUTPATIENT)
Dept: NEUROLOGY | Facility: CLINIC | Age: 74
End: 2019-12-05
Payer: MEDICARE

## 2019-12-05 VITALS
SYSTOLIC BLOOD PRESSURE: 116 MMHG | DIASTOLIC BLOOD PRESSURE: 61 MMHG | WEIGHT: 220 LBS | HEIGHT: 71 IN | HEART RATE: 67 BPM | BODY MASS INDEX: 30.8 KG/M2

## 2019-12-05 DIAGNOSIS — G60.8 PERIPHERAL SENSORY NEUROPATHY: ICD-10-CM

## 2019-12-05 DIAGNOSIS — E51.9 VITAMIN B1 DEFICIENCY: ICD-10-CM

## 2019-12-05 DIAGNOSIS — G20.A1 PARKINSON'S DISEASE: ICD-10-CM

## 2019-12-05 DIAGNOSIS — M62.89 PROXIMAL WEAKNESS OF EXTREMITY: ICD-10-CM

## 2019-12-05 DIAGNOSIS — E03.9 ACQUIRED HYPOTHYROIDISM: ICD-10-CM

## 2019-12-05 DIAGNOSIS — G61.0 AIDP (ACUTE INFLAMMATORY DEMYELINATING POLYNEUROPATHY): Primary | ICD-10-CM

## 2019-12-05 PROCEDURE — 1126F PR PAIN SEVERITY QUANTIFIED, NO PAIN PRESENT: ICD-10-PCS | Mod: GC,,, | Performed by: STUDENT IN AN ORGANIZED HEALTH CARE EDUCATION/TRAINING PROGRAM

## 2019-12-05 PROCEDURE — 1126F AMNT PAIN NOTED NONE PRSNT: CPT | Mod: GC,,, | Performed by: STUDENT IN AN ORGANIZED HEALTH CARE EDUCATION/TRAINING PROGRAM

## 2019-12-05 PROCEDURE — 1159F MED LIST DOCD IN RCRD: CPT | Mod: GC,,, | Performed by: STUDENT IN AN ORGANIZED HEALTH CARE EDUCATION/TRAINING PROGRAM

## 2019-12-05 PROCEDURE — 99214 OFFICE O/P EST MOD 30 MIN: CPT | Mod: S$PBB,GC,, | Performed by: STUDENT IN AN ORGANIZED HEALTH CARE EDUCATION/TRAINING PROGRAM

## 2019-12-05 PROCEDURE — 99214 PR OFFICE/OUTPT VISIT, EST, LEVL IV, 30-39 MIN: ICD-10-PCS | Mod: S$PBB,GC,, | Performed by: STUDENT IN AN ORGANIZED HEALTH CARE EDUCATION/TRAINING PROGRAM

## 2019-12-05 PROCEDURE — 1159F PR MEDICATION LIST DOCUMENTED IN MEDICAL RECORD: ICD-10-PCS | Mod: GC,,, | Performed by: STUDENT IN AN ORGANIZED HEALTH CARE EDUCATION/TRAINING PROGRAM

## 2019-12-05 PROCEDURE — 99999 PR PBB SHADOW E&M-EST. PATIENT-LVL III: ICD-10-PCS | Mod: PBBFAC,GC,, | Performed by: STUDENT IN AN ORGANIZED HEALTH CARE EDUCATION/TRAINING PROGRAM

## 2019-12-05 PROCEDURE — 99999 PR PBB SHADOW E&M-EST. PATIENT-LVL III: CPT | Mod: PBBFAC,GC,, | Performed by: STUDENT IN AN ORGANIZED HEALTH CARE EDUCATION/TRAINING PROGRAM

## 2019-12-05 PROCEDURE — 99213 OFFICE O/P EST LOW 20 MIN: CPT | Mod: PBBFAC | Performed by: STUDENT IN AN ORGANIZED HEALTH CARE EDUCATION/TRAINING PROGRAM

## 2019-12-05 RX ORDER — GABAPENTIN 300 MG/1
900 CAPSULE ORAL 3 TIMES DAILY
Qty: 270 CAPSULE | Refills: 5 | Status: SHIPPED | OUTPATIENT
Start: 2019-12-05 | End: 2020-04-14 | Stop reason: SDUPTHER

## 2019-12-05 RX ORDER — CARBIDOPA AND LEVODOPA 25; 100 MG/1; MG/1
0.5 TABLET ORAL 4 TIMES DAILY
Qty: 90 TABLET | Refills: 5 | Status: SHIPPED | OUTPATIENT
Start: 2019-12-05 | End: 2020-04-14 | Stop reason: SDUPTHER

## 2019-12-05 NOTE — PROGRESS NOTES
Patient Name: Melissa Anand  MRN: [id number]    CC: F/u AIDP    HPI: Melissa Anand is a 74 y.o. female with medical history of chronic low back pain / lumbar degenerative disc disease / s/p admission and management on 1/2018 for acute on chronic onset LE weakness with concerns of AIDP, presenting for follow-up    Interval history 12/5/19:   - Interval in-patient admission 11/2019 / management for generalized weakness & debility - Negative acute onset demyelinating; axonal neuropathy concerns. DDx of dehydration related to gastroenteritis / unclear source infection / worsening parkinsonism from missed medications   - Currently on assistance from home-health therapy / improvement in overall health, with patient back to near baseline   - Compliance with medications + / with assistance from family with regards to the same.   - Active concerns are related to poor sleep at night / otherwise negative concerns for debility from weakness; pain; tremors.     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, parkisons disease  - Interval improvement in LE strength / gait stability / s/p home health PT and clearance   - On sinemet dosing 25/100 to 10 AM / 2PM / 6 PM - with control on disabling tremors / Negative concerns for medication non-compliance / SE related to meds  - Interval bifrontal headaches x 1.5 months / migrainous vs tension type with confounder  of medication overuse    - On active B1 / Vit D supplementation     Interval history 9/5/2018:   - Nerve conduction studies of the right upper and bilateral lower extremities revealed absence of motor and sensory responses in the lower extremities. The right ulnar motor response was absent when stimulating proximally. Concentric needle examination of selected right lower extremity muscles demonstrated enlarged MUPs in the right TA muscle. Positive electrophysiologic evidence for a chronic, length-dependent, sensory-motor peripheral neuropathy.   - Unclear etiology / with primary DDx of AIDP for primarily motor involvement & diminished reflexes in LE, with no sensory affect vs primary myopathy.   - Stable strength post improvement after acute management: B/L LE adductors & abductors / knee flexors & extensors / plantar & dorsiflexors 4+/5 >>>> hip extensors 4+/5 >>> hip flexors 4-/5. No asymmetry in strength. Strength is 5/5 in the upper extremities bilaterally without pronator drift / 4/5 in intrinsic muscles of the hand.   - On vitamin B1 / D supplementation with positive trend in levels / With negative cortisol, acth abnormalities and autoimmune studies / F/u Ca wnl / elevated PTH    - On Parkinson's management with sinmet 25/100 10 AM / 2 PM, however reports of worsening left UE tremor towards the end of the say   - No active PT management, post initial intervention     ROS:   Review of Systems   Constitutional: Negative for weight loss.   HENT: Negative for hearing loss.   Eyes: Negative for blurred vision and double vision.   Respiratory: Negative for shortness of breath and stridor.   Cardiovascular: Negative for chest pain and palpitations.   Gastrointestinal: Negative for nausea, vomiting and constipation.   Genitourinary: Negative for frequency. Negative for urgency.   Musculoskeletal: Negative for joint pain. Back pain + Negative for myalgias and falls.   Skin: Negative for rash.   Neurological: Negative  for dizziness and tremors. Positive for weakness and negative for seizures.   Endo/Heme/Allergies: Does not bruise/bleed easily.   Psychiatric/Behavioral: Postive for memory loss. Negative for depression and hallucinations. The patient is not nervous/anxious.      Past Medical History  Past Medical History:   Diagnosis Date    Anemia     Colitis     hospitalized July 2014    Diabetes mellitus     Encounter for blood transfusion     Goiter     Hypertension     Left hip pain 10/12/14    Syncope        Medications    Current Outpatient Medications:     carbidopa-levodopa  mg (SINEMET)  mg per tablet, Take 0.5 tablets by mouth 4 (four) times daily. Take 6AM / 10am / 2PM / 6PM, Disp: 90 tablet, Rfl: 5    cholecalciferol, vitamin D3, 400 unit Cap, Take 2 capsules (800 Units total) by mouth once daily., Disp: , Rfl: 0    ergocalciferol (ERGOCALCIFEROL) 50,000 unit Cap, Take 1 capsule (50,000 Units total) by mouth every 7 days., Disp: 4 capsule, Rfl: 0    escitalopram oxalate (LEXAPRO) 20 MG tablet, TK 1 T PO QD, Disp: , Rfl: 1    gabapentin (NEURONTIN) 300 MG capsule, Take 3 capsules (900 mg total) by mouth 3 (three) times daily., Disp: 270 capsule, Rfl: 5    lidocaine (XYLOCAINE) 5 % Oint ointment, JANA AA D PRN P, Disp: , Rfl: 0    lisinopril (PRINIVIL,ZESTRIL) 40 MG tablet, Take 1 tablet (40 mg total) by mouth once daily., Disp: 30 tablet, Rfl: 0    mupirocin (BACTROBAN) 2 % ointment, , Disp: , Rfl:     pantoprazole (PROTONIX) 40 MG tablet, , Disp: , Rfl:     ramelteon (ROZEREM) 8 mg tablet, Take 1 tablet (8 mg total) by mouth every evening., Disp: 30 tablet, Rfl: 1    riboflavin, vitamin B2, (VITAMIN B-2) 100 mg Tab tablet, Take 4 tablets (400 mg total) by mouth once daily., Disp: , Rfl: 0    thiamine 250 MG tablet, Take 1 tablet (250 mg total) by mouth once daily., Disp: 30 tablet, Rfl: 2    amLODIPine (NORVASC) 5 MG tablet, Take 1 tablet (5 mg total) by mouth once daily. Contact PCP  "for refills, Disp: 30 tablet, Rfl: 3    levothyroxine (SYNTHROID) 50 MCG tablet, Take 1 tablet (50 mcg total) by mouth before breakfast., Disp: 30 tablet, Rfl: 11    Allergies  Review of patient's allergies indicates:  No Known Allergies    Social History  Social History     Socioeconomic History    Marital status:      Spouse name: Not on file    Number of children: Not on file    Years of education: Not on file    Highest education level: Not on file   Occupational History    Not on file   Social Needs    Financial resource strain: Not on file    Food insecurity:     Worry: Not on file     Inability: Not on file    Transportation needs:     Medical: Not on file     Non-medical: Not on file   Tobacco Use    Smoking status: Former Smoker     Years: 30.00     Types: Cigarettes     Last attempt to quit: 2014     Years since quittin.5    Smokeless tobacco: Never Used   Substance and Sexual Activity    Alcohol use: Yes     Comment: occassionally     Drug use: No    Sexual activity: Not Currently   Lifestyle    Physical activity:     Days per week: Not on file     Minutes per session: Not on file    Stress: Not on file   Relationships    Social connections:     Talks on phone: Not on file     Gets together: Not on file     Attends [employer] service: Not on file     Active member of club or organization: Not on file     Attends meetings of clubs or organizations: Not on file     Relationship status: Not on file   Other Topics Concern    Not on file   Social History Narrative    Not on file       Family History  Family History   Problem Relation Age of Onset    Diabetes Unknown     Coronary artery disease Unknown     Cancer Son         testicular    Cancer Mother        Physical Exam  /61   Pulse 67   Ht 5' 11" (1.803 m)   Wt 99.8 kg (220 lb)   LMP  (LMP Unknown)   BMI 30.68 kg/m²     General appearance: Well-developed, well-groomed.      Neurologic Exam: The patient is " awake, alert and oriented. Language is fluent. Recent and remote memory are normal. Attention span and concentration are normal. Fund of knowledge is appropriate.      Cranial nerves: pupils are round and reactive to light and accommodation. Visual fields are full to confrontation. Ocular motility is full in all cardinal positions of gaze. Facial sensation is normal to pinprick and light touch. Facial activation is symmetric. Hearing is normal bilaterally. Palate elevates symmetrically and gag reflex is intact bilaterally. Shoulder elevation is symmetric and full strength bilaterally. Tongue is midline and neck rotation strength is normal bilaterally. Neck range of motion is normal.      Motor examination:   B/L LE adductors & abductors / knee flexors & extensors / plantar & dorsiflexors 4+/5 hip extensors 4+/5   B/L LE hip flexors 4+/5 from 4+ (improvement). No asymmetry in strength.   Strength is 5/5 in the upper extremities bilaterally without pronator drift / 4/5 intrinsic muscle of the hand & B/L biceps 4+/5. All extremities demonstrates normal bulk and tone in all four limbs. There are no atrophy or fasciculations.      Sensory examination is normal to pinprick, proprioception and ?reduced vibration in the upper and lower extremities bilaterally. Romberg is negative.     Deep tendon reflexes are 1 at knees / adductors / ankles with mute toes bilaterally and 2+ to 3+ in the upper extremities bilaterally.      Gait: Short stepping; Waddling gait / Currently prefers wheel chair, however walks wo assistance; intermittent use of cane      Coordination: Finger to nose testing is normal in both upper extremities.      General exam  Cardiovascular: regular rate and rhythm with no murmurs, rubs or gallops. There are no carotid or vertebral artery bruits. Pulses in both upper and lower extremities are symmetric. There is no peripheral edema.   Head and neck: no cervical lymphadenopathy      Lab and Test Results    WBC    Date Value Ref Range Status   11/13/2019 4.42 3.90 - 12.70 K/uL Final   11/12/2019 4.44 3.90 - 12.70 K/uL Final   11/11/2019 6.38 3.90 - 12.70 K/uL Final     Hemoglobin   Date Value Ref Range Status   11/13/2019 10.2 (L) 12.0 - 16.0 g/dL Final   11/12/2019 9.6 (L) 12.0 - 16.0 g/dL Final   11/11/2019 10.0 (L) 12.0 - 16.0 g/dL Final     Hematocrit   Date Value Ref Range Status   11/13/2019 33.2 (L) 37.0 - 48.5 % Final   11/12/2019 28.6 (L) 37.0 - 48.5 % Final   11/11/2019 32.2 (L) 37.0 - 48.5 % Final     Platelets   Date Value Ref Range Status   11/13/2019 184 150 - 350 K/uL Final   11/12/2019 168 150 - 350 K/uL Final   11/11/2019 190 150 - 350 K/uL Final     Glucose   Date Value Ref Range Status   11/13/2019 93 70 - 110 mg/dL Final   11/12/2019 109 70 - 110 mg/dL Final   11/11/2019 93 70 - 110 mg/dL Final     Sodium   Date Value Ref Range Status   11/13/2019 139 136 - 145 mmol/L Final   11/12/2019 134 (L) 136 - 145 mmol/L Final   11/11/2019 140 136 - 145 mmol/L Final     Potassium   Date Value Ref Range Status   11/13/2019 4.4 3.5 - 5.1 mmol/L Final   11/12/2019 4.5 3.5 - 5.1 mmol/L Final   11/11/2019 4.3 3.5 - 5.1 mmol/L Final     Chloride   Date Value Ref Range Status   11/13/2019 105 95 - 110 mmol/L Final   11/12/2019 104 95 - 110 mmol/L Final   11/11/2019 108 95 - 110 mmol/L Final     CO2   Date Value Ref Range Status   11/13/2019 28 23 - 29 mmol/L Final   11/12/2019 26 23 - 29 mmol/L Final   11/11/2019 26 23 - 29 mmol/L Final     BUN, Bld   Date Value Ref Range Status   11/13/2019 19 8 - 23 mg/dL Final   11/12/2019 26 (H) 8 - 23 mg/dL Final   11/11/2019 36 (H) 8 - 23 mg/dL Final     Creatinine   Date Value Ref Range Status   11/13/2019 1.0 0.5 - 1.4 mg/dL Final   11/12/2019 1.2 0.5 - 1.4 mg/dL Final   11/11/2019 1.5 (H) 0.5 - 1.4 mg/dL Final     Calcium   Date Value Ref Range Status   11/13/2019 8.7 8.7 - 10.5 mg/dL Final   11/12/2019 8.2 (L) 8.7 - 10.5 mg/dL Final   11/11/2019 8.1 (L) 8.7 - 10.5 mg/dL Final      Magnesium   Date Value Ref Range Status   11/13/2019 2.1 1.6 - 2.6 mg/dL Final   11/12/2019 2.1 1.6 - 2.6 mg/dL Final   11/11/2019 2.0 1.6 - 2.6 mg/dL Final     Phosphorus   Date Value Ref Range Status   11/13/2019 3.1 2.7 - 4.5 mg/dL Final   11/12/2019 2.9 2.7 - 4.5 mg/dL Final   11/11/2019 3.7 2.7 - 4.5 mg/dL Final     Alkaline Phosphatase   Date Value Ref Range Status   11/13/2019 69 55 - 135 U/L Final   11/12/2019 61 55 - 135 U/L Final   11/11/2019 60 55 - 135 U/L Final     ALT   Date Value Ref Range Status   11/13/2019 <5 (L) 10 - 44 U/L Final   11/12/2019 <5 (L) 10 - 44 U/L Final   11/11/2019 <5 (L) 10 - 44 U/L Final     AST   Date Value Ref Range Status   11/13/2019 14 10 - 40 U/L Final   11/12/2019 13 10 - 40 U/L Final   11/11/2019 10 10 - 40 U/L Final     Assessment and Plan    -  73 y.o. female with medical history of chronic low back pain / lumbar degenerative disc disease / s/p admission and management ([facility]) on 1/2018 for acute on chronic onset LE weakness with concerns of AIDP, presenting for follow-up. Nerve conduction studies revealed a chronic, length-dependent, sensory-motor peripheral neuropathy. Unclear etiology / with primary DDx of AIDP for primarily motor involvement & diminished reflexes in LE, with no sensory affect vs primary myopathy. Confounders of new onset parkinsonism and radiculopathy. On supportive management with symptom control with sinemet and B1 / B6 / Vit D supplementation     - Interval in-patient admission 11/2019 / management for generalized weakness & debility - Negative acute onset demyelinating; axonal neuropathy concerns. DDx of dehydration related to gastroenteritis / unclear source infection / worsening parkinsonism from missed medications. Improvement with supportive management and currently on assistance from home-health therapy. Compliance with medications + / with assistance from family with regards to the same.     Plan:  Discussed the DDx / management -  reviewed the medications   - Continue sinemet dosing 25/100  : 6AM / 10 AM / 2PM / 6 PM  - Control L/T DDD related pain:Continue 900 mg TID as tolerated / Fu with primary back surgeon as neede   - Continue Home health - PT eval and management as recommended   - Avoid narco / control use of klonopin / suggested ramelteon for sleep - f/u with psychiatry; podiatrist for the same  - Continue B1 / Vit D supplementation/ f/u with PCP for further recs - Update labs cbc, cmp, b1, vit D  - Continue B2 for headaches prevention / Counseled on medication overuse headache component, maintanance of sleep hygiene, headache diary, dietary hygiene, control of stress     - No further investigations warranted from neurology standpoint at the moment  - Fall precautions / Follow-up in 6 months     Any interval immediate concerns, please call office or seek ER attention.     Geovany Rick MD  Neurology Resident   [facility]  [address] [place] [address]  Pager: [phone]

## 2019-12-05 NOTE — PROGRESS NOTES
I have reviewed the history and physical, assessments, and plan, I concur with her/his documentation of Melissa Anand. Patient was seen and examined with the resident.    Shanel Starkey MD  General Neurology Staff  [facility]

## 2019-12-11 ENCOUNTER — TELEPHONE (OUTPATIENT)
Dept: NEUROLOGY | Facility: CLINIC | Age: 74
End: 2019-12-11

## 2019-12-11 NOTE — TELEPHONE ENCOUNTER
----- Message from Aric Almanzar sent at 12/11/2019 11:34 AM CST -----  Contact: pt   Please call pt at [phone]    The pharmacy needs clarification for gabapentin (NEURONTIN) 300 MG capsule    [id number] [id number] [place], [place] [address] AT [facility] [employer] [facility] [phone] (Phone)  [phone] (Fax)    Patient is out of meds    Thank you

## 2020-01-10 ENCOUNTER — TELEPHONE (OUTPATIENT)
Dept: NEUROLOGY | Facility: CLINIC | Age: 75
End: 2020-01-10

## 2020-01-10 NOTE — TELEPHONE ENCOUNTER
----- Message from Aric Almanzar sent at 1/10/2020  3:03 PM CST -----  Contact: pt   Please call pt at [phone]    Patient is returning staff call    Thank you

## 2020-01-10 NOTE — TELEPHONE ENCOUNTER
----- Message from Bianka Nichols sent at 1/10/2020 11:30 AM CST -----  Contact: self @ [phone]  Pt says her hands are shaking more.  Pt would like to discuss with dr Rick.  Pls call.

## 2020-01-14 RX ORDER — ERGOCALCIFEROL 1.25 MG/1
CAPSULE ORAL
Qty: 4 CAPSULE | Refills: 0 | OUTPATIENT
Start: 2020-01-14

## 2020-01-15 ENCOUNTER — TELEPHONE (OUTPATIENT)
Dept: NEUROLOGY | Facility: CLINIC | Age: 75
End: 2020-01-15

## 2020-01-15 NOTE — TELEPHONE ENCOUNTER
----- Message from Aric Almanzar sent at 1/13/2020  3:20 PM CST -----  Contact: pt   Please call pt at [phone]    Patient is having severe hand shakes    Patient has been calling since last week no staff response    Thank you

## 2020-02-07 ENCOUNTER — TELEPHONE (OUTPATIENT)
Dept: NEUROLOGY | Facility: CLINIC | Age: 75
End: 2020-02-07

## 2020-02-07 NOTE — TELEPHONE ENCOUNTER
----- Message from Salma Malik sent at 2/7/2020 12:12 PM CST -----  Contact: pt @ [phone]  Calling to speak with someone in Dr. Rick's office regarding medication Percocet. Please call.

## 2020-02-11 ENCOUNTER — TELEPHONE (OUTPATIENT)
Dept: NEUROLOGY | Facility: CLINIC | Age: 75
End: 2020-02-11

## 2020-02-11 NOTE — TELEPHONE ENCOUNTER
----- Message from Jerome Cheek sent at 2/11/2020  2:27 PM CST -----  Contact: self  Pt states that she need her prescriptions refill on all of her medications Pt also ask for an appointment due to pain in her stomach Pt ask for a call    Contact info  [phone]

## 2020-02-19 ENCOUNTER — TELEPHONE (OUTPATIENT)
Dept: NEUROLOGY | Facility: CLINIC | Age: 75
End: 2020-02-19

## 2020-02-19 NOTE — TELEPHONE ENCOUNTER
----- Message from Aric Almanzar sent at 2/19/2020  1:28 PM CST -----  Contact: pt   Please call pt at [phone]    Patient is having some medical problems (severe hand shakes)    Thank you

## 2020-02-26 ENCOUNTER — TELEPHONE (OUTPATIENT)
Dept: NEUROLOGY | Facility: CLINIC | Age: 75
End: 2020-02-26

## 2020-02-26 NOTE — TELEPHONE ENCOUNTER
----- Message from Maylin Foster sent at 2/19/2020  1:25 PM CST -----  Contact: Melissa   tel:    [phone]  Caller is asking if you can move her appt. Up to a sooner date.    Having problems.     Pls call today.

## 2020-02-27 ENCOUNTER — TELEPHONE (OUTPATIENT)
Dept: NEUROLOGY | Facility: HOSPITAL | Age: 75
End: 2020-02-27

## 2020-04-06 ENCOUNTER — TELEPHONE (OUTPATIENT)
Dept: NEUROLOGY | Facility: CLINIC | Age: 75
End: 2020-04-06

## 2020-04-13 ENCOUNTER — TELEPHONE (OUTPATIENT)
Dept: NEUROLOGY | Facility: CLINIC | Age: 75
End: 2020-04-13

## 2020-04-13 NOTE — TELEPHONE ENCOUNTER
----- Message from Aric Almanzar sent at 4/13/2020  8:22 AM CDT -----  Contact: pt   Please call pt at [phone]    Patient is still waiting to have her virtual visit with the provider this morning     Thank you

## 2020-04-13 NOTE — TELEPHONE ENCOUNTER
----- Message from William Mckay sent at 4/13/2020  8:57 AM CDT -----  Contact: Patient @ [phone]  Patient calling to get an update on today's Audio visit, pls advise

## 2020-04-13 NOTE — TELEPHONE ENCOUNTER
Left a message for the patient to let her know her appointment was rescheduled to 4/14 at 2:00. She was asked to call back to confirm message or to reschedule.

## 2020-04-13 NOTE — TELEPHONE ENCOUNTER
----- Message from Mamta Mohr sent at 4/13/2020 11:30 AM CDT -----  Contact: patient  Please call above patient at [phone] need to speak with the nurse concerning virtual visit waiting on a call back thanks.

## 2020-04-14 ENCOUNTER — OFFICE VISIT (OUTPATIENT)
Dept: NEUROLOGY | Facility: CLINIC | Age: 75
End: 2020-04-14
Payer: MEDICARE

## 2020-04-14 DIAGNOSIS — G20.A1 PARKINSON'S DISEASE: Primary | ICD-10-CM

## 2020-04-14 DIAGNOSIS — M48.061 SPINAL STENOSIS OF LUMBAR REGION, UNSPECIFIED WHETHER NEUROGENIC CLAUDICATION PRESENT: ICD-10-CM

## 2020-04-14 DIAGNOSIS — G61.0 AIDP (ACUTE INFLAMMATORY DEMYELINATING POLYNEUROPATHY): ICD-10-CM

## 2020-04-14 PROCEDURE — 1159F MED LIST DOCD IN RCRD: CPT | Mod: GC,,, | Performed by: PSYCHIATRY & NEUROLOGY

## 2020-04-14 PROCEDURE — 1101F PR PT FALLS ASSESS DOC 0-1 FALLS W/OUT INJ PAST YR: ICD-10-PCS | Mod: CPTII,GC,, | Performed by: PSYCHIATRY & NEUROLOGY

## 2020-04-14 PROCEDURE — 1101F PT FALLS ASSESS-DOCD LE1/YR: CPT | Mod: CPTII,GC,, | Performed by: PSYCHIATRY & NEUROLOGY

## 2020-04-14 PROCEDURE — 99443 PR PHYSICIAN TELEPHONE EVALUATION 21-30 MIN: CPT | Mod: 95,GC,, | Performed by: PSYCHIATRY & NEUROLOGY

## 2020-04-14 PROCEDURE — 1159F PR MEDICATION LIST DOCUMENTED IN MEDICAL RECORD: ICD-10-PCS | Mod: GC,,, | Performed by: PSYCHIATRY & NEUROLOGY

## 2020-04-14 PROCEDURE — 99443 PR PHYSICIAN TELEPHONE EVALUATION 21-30 MIN: ICD-10-PCS | Mod: 95,GC,, | Performed by: PSYCHIATRY & NEUROLOGY

## 2020-04-14 RX ORDER — CARBIDOPA AND LEVODOPA 25; 100 MG/1; MG/1
1 TABLET ORAL 4 TIMES DAILY
Qty: 90 TABLET | Refills: 5 | Status: SHIPPED | OUTPATIENT
Start: 2020-04-14 | End: 2021-02-23 | Stop reason: SDUPTHER

## 2020-04-14 RX ORDER — GABAPENTIN 300 MG/1
900 CAPSULE ORAL 3 TIMES DAILY
Qty: 270 CAPSULE | Refills: 5 | Status: SHIPPED | OUTPATIENT
Start: 2020-04-14 | End: 2021-09-21

## 2020-04-14 RX ORDER — RIBOFLAVIN (VITAMIN B2) 100 MG
400 TABLET ORAL DAILY
Refills: 0 | COMMUNITY
Start: 2020-04-14 | End: 2021-09-21

## 2020-04-14 NOTE — PROGRESS NOTES
Patient Name: Melissa Anand  MRN: [id number]    The patient location is: Home  The chief complaint leading to consultation is: F/u AIDP / Parkinsonism / Lumbar DDD   Visit type: audio only  Total time spent with patient: 25 mins   Each patient to whom he or she provides medical services by telemedicine is:  (1) informed of the relationship between the physician and patient and the respective role of any other health care provider with respect to management of the patient; and (2) notified that he or she may decline to receive medical services by telemedicine and may withdraw from such care at any time.    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 Ambulates using cane / No falls / Reduced mobility - confounders with regards to back pain / otherwise improvement in overall health, with patient back to near baseline since last in-patient admission. Compliance with medications + / with assistance from family with regards to the same.   - Active concerns are feeling of depression related to staying at home / poor sleep at night / otherwise negative concerns for debility from weakness; pain; tremors.   - No concerns for COVID-19 sings or symptoms     HPI: Melissa Anand is a 74 y.o. female with medical history of chronic low back pain / lumbar degenerative disc disease / s/p admission and management on 1/2018 for acute on chronic onset LE weakness with concerns of AIDP, presenting for follow-up    Interval history 12/5/19:   - Interval in-patient admission 11/2019 / management for generalized weakness & debility - Negative acute onset demyelinating; axonal neuropathy concerns. DDx of dehydration related to gastroenteritis / unclear source infection / worsening parkinsonism from missed medications   - Currently on assistance from home-health therapy / improvement in overall health, with patient back to near baseline   - Compliance  with medications + / with assistance from family with regards to the same.   - Active concerns are related to poor sleep at night / otherwise negative concerns for debility from weakness; pain; tremors.     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, parkisons disease  - Ambulates using cane / No falls / Reduced mobility - confounders > primary disease; with regards to back pain, poor pain control & B/L foot pain (outside podiatrist f/u) / On percocet & neurontin 300 mg TID   - On sinemet dosing 25/100 6 AM 10 AM / 2PM / 6 PM - with control on disabling tremors / Negative concerns for medication non-compliance / SE related to meds  - On active B1 / Vit D supplementation     Interval history 9/5/2018:   - Nerve conduction studies of the right upper and bilateral lower extremities revealed absence of motor and sensory responses in the lower extremities. The right ulnar motor response was absent when stimulating proximally. Concentric needle examination of selected right lower extremity muscles demonstrated enlarged MUPs in the right TA muscle. Positive electrophysiologic evidence for a chronic, length-dependent, sensory-motor peripheral neuropathy.   - Unclear etiology / with primary DDx of AIDP for primarily motor involvement & diminished reflexes in LE, with no sensory affect vs primary myopathy.   - Stable strength post improvement after acute management: B/L LE adductors & abductors / knee flexors & extensors / plantar & dorsiflexors 4+/5 >>>> hip extensors 4+/5 >>> hip flexors 4-/5. No asymmetry in strength. Strength is 5/5 in the upper extremities bilaterally without pronator drift / 4/5 in intrinsic muscles of the hand.   - On vitamin B1 / D supplementation with positive trend in levels / With negative cortisol, acth abnormalities and autoimmune studies / F/u Ca wnl / elevated PTH    - On Parkinson's management with sinmet 25/100 10 AM / 2 PM, however  reports of worsening left UE tremor towards the end of the say   - No active PT management, post initial intervention     ROS (Limited):   Review of Systems   Respiratory: Negative for shortness of breath and stridor.   Cardiovascular: Negative for chest pain and palpitations.   Gastrointestinal: Negative for nausea, vomiting and diarrhea.   Musculoskeletal: Negative for joint pain. Back pain + / Negative for myalgias and falls.   Neurological: Negative for dizziness / negative for seizures.   Psychiatric/Behavioral: Postive for depression and negative for hallucinations. The patient is not nervous/anxious.      Past Medical History  Past Medical History:   Diagnosis Date    Anemia     Colitis     hospitalized July 2014    Diabetes mellitus     Encounter for blood transfusion     Goiter     Hypertension     Left hip pain 10/12/14    Syncope        Medications    Current Outpatient Medications:     amLODIPine (NORVASC) 5 MG tablet, Take 1 tablet (5 mg total) by mouth once daily. Contact PCP for refills, Disp: 30 tablet, Rfl: 3    carbidopa-levodopa  mg (SINEMET)  mg per tablet, Take 0.5 tablets by mouth 4 (four) times daily. Take 6AM / 10am / 2PM / 6PM, Disp: 90 tablet, Rfl: 5    cholecalciferol, vitamin D3, 400 unit Cap, Take 2 capsules (800 Units total) by mouth once daily., Disp: , Rfl: 0    escitalopram oxalate (LEXAPRO) 20 MG tablet, TK 1 T PO QD, Disp: , Rfl: 1    gabapentin (NEURONTIN) 300 MG capsule, Take 3 capsules (900 mg total) by mouth 3 (three) times daily., Disp: 270 capsule, Rfl: 5    levothyroxine (SYNTHROID) 50 MCG tablet, Take 1 tablet (50 mcg total) by mouth before breakfast., Disp: 30 tablet, Rfl: 11    lidocaine (XYLOCAINE) 5 % Oint ointment, JANA AA D PRN P, Disp: , Rfl: 0    lisinopril (PRINIVIL,ZESTRIL) 40 MG tablet, Take 1 tablet (40 mg total) by mouth once daily., Disp: 30 tablet, Rfl: 0    mupirocin (BACTROBAN) 2 % ointment, , Disp: , Rfl:     pantoprazole  (PROTONIX) 40 MG tablet, , Disp: , Rfl:     ramelteon (ROZEREM) 8 mg tablet, Take 1 tablet (8 mg total) by mouth every evening., Disp: 30 tablet, Rfl: 1    riboflavin, vitamin B2, (VITAMIN B-2) 100 mg Tab tablet, Take 4 tablets (400 mg total) by mouth once daily., Disp: , Rfl: 0    thiamine 250 MG tablet, Take 1 tablet (250 mg total) by mouth once daily., Disp: 30 tablet, Rfl: 2    Allergies  Review of patient's allergies indicates:  No Known Allergies    Social History  Social History     Socioeconomic History    Marital status:      Spouse name: Not on file    Number of children: Not on file    Years of education: Not on file    Highest education level: Not on file   Occupational History    Not on file   Social Needs    Financial resource strain: Not on file    Food insecurity:     Worry: Not on file     Inability: Not on file    Transportation needs:     Medical: Not on file     Non-medical: Not on file   Tobacco Use    Smoking status: Former Smoker     Years: 30.00     Types: Cigarettes     Last attempt to quit: 2014     Years since quittin.9    Smokeless tobacco: Never Used   Substance and Sexual Activity    Alcohol use: Yes     Comment: occassionally     Drug use: No    Sexual activity: Not Currently   Lifestyle    Physical activity:     Days per week: Not on file     Minutes per session: Not on file    Stress: Not on file   Relationships    Social connections:     Talks on phone: Not on file     Gets together: Not on file     Attends Buddhism service: Not on file     Active member of club or organization: Not on file     Attends meetings of clubs or organizations: Not on file     Relationship status: Not on file   Other Topics Concern    Not on file   Social History Narrative    Not on file       Family History  Family History   Problem Relation Age of Onset    Diabetes Unknown     Coronary artery disease Unknown     Cancer Son         testicular    Cancer Mother         Physical Exam  LMP  (LMP Unknown)     Not applicable / Virtual audio visit     Lab and Test Results    WBC   Date Value Ref Range Status   11/13/2019 4.42 3.90 - 12.70 K/uL Final   11/12/2019 4.44 3.90 - 12.70 K/uL Final   11/11/2019 6.38 3.90 - 12.70 K/uL Final     Hemoglobin   Date Value Ref Range Status   11/13/2019 10.2 (L) 12.0 - 16.0 g/dL Final   11/12/2019 9.6 (L) 12.0 - 16.0 g/dL Final   11/11/2019 10.0 (L) 12.0 - 16.0 g/dL Final     Hematocrit   Date Value Ref Range Status   11/13/2019 33.2 (L) 37.0 - 48.5 % Final   11/12/2019 28.6 (L) 37.0 - 48.5 % Final   11/11/2019 32.2 (L) 37.0 - 48.5 % Final     Platelets   Date Value Ref Range Status   11/13/2019 184 150 - 350 K/uL Final   11/12/2019 168 150 - 350 K/uL Final   11/11/2019 190 150 - 350 K/uL Final     Glucose   Date Value Ref Range Status   11/13/2019 93 70 - 110 mg/dL Final   11/12/2019 109 70 - 110 mg/dL Final   11/11/2019 93 70 - 110 mg/dL Final     Sodium   Date Value Ref Range Status   11/13/2019 139 136 - 145 mmol/L Final   11/12/2019 134 (L) 136 - 145 mmol/L Final   11/11/2019 140 136 - 145 mmol/L Final     Potassium   Date Value Ref Range Status   11/13/2019 4.4 3.5 - 5.1 mmol/L Final   11/12/2019 4.5 3.5 - 5.1 mmol/L Final   11/11/2019 4.3 3.5 - 5.1 mmol/L Final     Chloride   Date Value Ref Range Status   11/13/2019 105 95 - 110 mmol/L Final   11/12/2019 104 95 - 110 mmol/L Final   11/11/2019 108 95 - 110 mmol/L Final     CO2   Date Value Ref Range Status   11/13/2019 28 23 - 29 mmol/L Final   11/12/2019 26 23 - 29 mmol/L Final   11/11/2019 26 23 - 29 mmol/L Final     BUN, Bld   Date Value Ref Range Status   11/13/2019 19 8 - 23 mg/dL Final   11/12/2019 26 (H) 8 - 23 mg/dL Final   11/11/2019 36 (H) 8 - 23 mg/dL Final     Creatinine   Date Value Ref Range Status   11/13/2019 1.0 0.5 - 1.4 mg/dL Final   11/12/2019 1.2 0.5 - 1.4 mg/dL Final   11/11/2019 1.5 (H) 0.5 - 1.4 mg/dL Final     Calcium   Date Value Ref Range Status   11/13/2019 8.7  8.7 - 10.5 mg/dL Final   11/12/2019 8.2 (L) 8.7 - 10.5 mg/dL Final   11/11/2019 8.1 (L) 8.7 - 10.5 mg/dL Final     Magnesium   Date Value Ref Range Status   11/13/2019 2.1 1.6 - 2.6 mg/dL Final   11/12/2019 2.1 1.6 - 2.6 mg/dL Final   11/11/2019 2.0 1.6 - 2.6 mg/dL Final     Phosphorus   Date Value Ref Range Status   11/13/2019 3.1 2.7 - 4.5 mg/dL Final   11/12/2019 2.9 2.7 - 4.5 mg/dL Final   11/11/2019 3.7 2.7 - 4.5 mg/dL Final     Alkaline Phosphatase   Date Value Ref Range Status   11/13/2019 69 55 - 135 U/L Final   11/12/2019 61 55 - 135 U/L Final   11/11/2019 60 55 - 135 U/L Final     ALT   Date Value Ref Range Status   11/13/2019 <5 (L) 10 - 44 U/L Final   11/12/2019 <5 (L) 10 - 44 U/L Final   11/11/2019 <5 (L) 10 - 44 U/L Final     AST   Date Value Ref Range Status   11/13/2019 14 10 - 40 U/L Final   11/12/2019 13 10 - 40 U/L Final   11/11/2019 10 10 - 40 U/L Final       F/u AIDP / Parkinsonism / Lumbar DDD  -  73 y.o. female with medical history of chronic low back pain / lumbar degenerative disc disease / acute on chronic onset LE weakness with concerns of AIDP (1/2018 ) / Parkinsonism on virtual audio follow-up for the same.     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 Ambulates using cane / No falls / Reduced mobility - confounders with regards to back pain / otherwise improvement in overall health, with patient back to near baseline since last in-patient admission. Compliance with medications + / with assistance from family with regards to the same.   - Active concerns are feeling of depression related to staying at home / poor sleep at night / otherwise negative concerns for debility from weakness; pain; tremors.     Plan:   Discussed the DDx / management - reviewed the medications     - Continue sinemet dosing 25/100  : 6AM / 10 AM / 2PM / 6 PM - Refill meds   - Control L/T DDD related pain: Continue 900 mg TID as tolerated - Refill Meds    - Fall  precautions / Resume PT follow-up when appropriate, post pandemic crisis   - Reviewed avoidance of narco / controlled use of klonopin / continuance of ramelteon for sleep - fu with PCP for the same  - Reviewed continuation of Lexapro for depression -  fu with PCP for the same  - Reviewed continuation of B1 / Vit D supplementation /  fu with PCP for the same  - Reviewed continuation B2 for headaches prevention / Counseled on medication overuse headache component, maintanance of sleep hygiene, headache diary, dietary hygiene, control of stress     - No further investigations warranted from neurology standpoint at the moment  - Follow-up in 6 months     Any interval immediate concerns, please call office or seek ER attention.     Geovany Rick MD  Neurology Resident   [facility]  [address] [place] [address]  Pager: [phone]

## 2020-04-16 NOTE — PROGRESS NOTES
I have reviewed the history and physical, assessments, and plan, I concur with her/his documentation of Melissa Anand. Patient was discussed with the resident.    Shanel Starkey MD  General Neurology Staff  [facility]

## 2020-06-17 ENCOUNTER — TELEPHONE (OUTPATIENT)
Dept: NEUROLOGY | Facility: CLINIC | Age: 75
End: 2020-06-17

## 2020-06-17 NOTE — TELEPHONE ENCOUNTER
----- Message from Sharda Howard sent at 6/16/2020  5:44 PM CDT -----  Regarding: Appointment  Type:  Needs Medical Advice    Who Called: Patient  Reason: schedule follow up  Would the patient rather a call back or a response via [facility]? callback  Best Call Back Number: [phone]  Additional Information: would like to schedule a audio visit if possible

## 2020-06-17 NOTE — TELEPHONE ENCOUNTER
Patient scheduled with Dr. Coley in August. Can you advise her of you switching departments? Thanks!

## 2020-06-23 ENCOUNTER — TELEPHONE (OUTPATIENT)
Dept: NEUROLOGY | Facility: CLINIC | Age: 75
End: 2020-06-23

## 2020-06-23 NOTE — TELEPHONE ENCOUNTER
----- Message from Mamta Mohr sent at 6/23/2020  2:41 PM CDT -----  Regarding: speak with nurse  Contact: patient  [phone]-please call above patient need to speak with the nurse concerning appointment waiting on a call back thanks.

## 2020-06-26 ENCOUNTER — TELEPHONE (OUTPATIENT)
Dept: NEUROLOGY | Facility: HOSPITAL | Age: 75
End: 2020-06-26

## 2020-07-15 ENCOUNTER — TELEPHONE (OUTPATIENT)
Dept: NEUROLOGY | Facility: CLINIC | Age: 75
End: 2020-07-15

## 2020-07-15 NOTE — TELEPHONE ENCOUNTER
"Spoke with Ms. Anand, she states her daughter informed her they will not come to the 8/10/2020 appt due to Covid-19 concerns. Ms. Anand advised she can do a virtual visit through the portal. Ms. Anand verbalized understanding stating "I will speak with my daughter and have her contact you".   "

## 2020-07-15 NOTE — TELEPHONE ENCOUNTER
----- Message from Daphne Ramirez sent at 7/15/2020 10:07 AM CDT -----  Contact: Pt @ [phone]  Pt wants to speak to someone on staff about her upcoming appt date.

## 2020-08-05 ENCOUNTER — TELEPHONE (OUTPATIENT)
Dept: NEUROLOGY | Facility: CLINIC | Age: 75
End: 2020-08-05

## 2020-08-05 NOTE — TELEPHONE ENCOUNTER
Lm informing Ms. Anand there has been changes to Dr. Coley schedule, Monday's are for virtual appts, to contact the clinic to discuss converting appt and going over instructions.

## 2020-08-06 ENCOUNTER — TELEPHONE (OUTPATIENT)
Dept: NEUROLOGY | Facility: CLINIC | Age: 75
End: 2020-08-06

## 2020-08-06 NOTE — TELEPHONE ENCOUNTER
"Spoke with Smooth, she was informed of the changes to Dr. Coley schedule, there are in person days and virtual days, Monday's are virtual days. Smooth verbalized understanding stating "I will need to reschedule for another day, I will not be in clinic". Appt rescheduled for Monday August 31st at 11 am. Appt letter mailed.   "

## 2020-08-07 ENCOUNTER — TELEPHONE (OUTPATIENT)
Dept: NEUROLOGY | Facility: CLINIC | Age: 75
End: 2020-08-07

## 2020-08-07 NOTE — TELEPHONE ENCOUNTER
Spoke with Ms. Cheng she was informed her appt was rescheduled to 8/31/2020 at 11 am due to daughter Smooth not being in town to assist with MsTd Cheng's virtual appt.

## 2020-08-31 ENCOUNTER — OFFICE VISIT (OUTPATIENT)
Dept: NEUROLOGY | Facility: CLINIC | Age: 75
End: 2020-08-31
Payer: MEDICARE

## 2020-08-31 DIAGNOSIS — G61.0 AIDP (ACUTE INFLAMMATORY DEMYELINATING POLYNEUROPATHY): Primary | ICD-10-CM

## 2020-08-31 PROCEDURE — 99499 UNLISTED E&M SERVICE: CPT | Mod: 95,,, | Performed by: PSYCHIATRY & NEUROLOGY

## 2020-08-31 PROCEDURE — 99499 NO LOS: ICD-10-PCS | Mod: 95,,, | Performed by: PSYCHIATRY & NEUROLOGY

## 2020-11-03 ENCOUNTER — TELEPHONE (OUTPATIENT)
Dept: NEUROLOGY | Facility: CLINIC | Age: 75
End: 2020-11-03

## 2020-11-03 NOTE — TELEPHONE ENCOUNTER
"Spoke with Smooth (pt dtr), she ws informed Ms. Anand does not have in network or out of network benefits with the provider, if she is to keep the appt, Ms. Anand will be held responsible for the bill. Smooth verbalized understanding stating "what will I have to do". She was advised to contact the insurance company for a list of providers in network with insurance, the appt will have to be canceled. Smooth verbalized understanding   "

## 2020-11-05 ENCOUNTER — TELEPHONE (OUTPATIENT)
Dept: NEUROLOGY | Facility: CLINIC | Age: 75
End: 2020-11-05

## 2020-11-05 NOTE — TELEPHONE ENCOUNTER
----- Message from Laura Bowens sent at 11/5/2020  8:12 AM CST -----  Contact: Patient  Patient Advice/Staff Message     Reason for call: Wants to speak with the office to find out why her appt was canceled.    Do you feel you need to be seen today:: No        Communication Preference: [phone]    Additional Information:

## 2020-11-05 NOTE — TELEPHONE ENCOUNTER
Returned pt phone call. Dominique (pt granddaughter) answered the phone. She was informed I am returning Ms. Anand phone call. My name was given. She informed I can speak with her, she is not in the chart, I asked for Micole, Dominique hung up the phone.

## 2020-11-05 NOTE — TELEPHONE ENCOUNTER
Lm informing Ms. Anand, her appt was canceled due to insurance not being in network, not having out of network benefits, she will be responsible for the visit. To contact the clinic if there is any other concerns

## 2020-11-05 NOTE — TELEPHONE ENCOUNTER
----- Message from William Mckay sent at 11/5/2020  1:07 PM CST -----  Contact: @[phone]  Patient has provided updated insurance information, pls call to r/s the appt

## 2020-11-06 ENCOUNTER — TELEPHONE (OUTPATIENT)
Dept: NEUROLOGY | Facility: CLINIC | Age: 75
End: 2020-11-06

## 2020-11-06 NOTE — TELEPHONE ENCOUNTER
----- Message from Aric Almanzar sent at 11/6/2020 10:58 AM CST -----  Contact: pt  Please call pt at [phone]    Patient has questions regarding her last visit and verify insurance information    Thank you

## 2020-11-10 ENCOUNTER — TELEPHONE (OUTPATIENT)
Dept: NEUROLOGY | Facility: CLINIC | Age: 75
End: 2020-11-10

## 2020-11-10 NOTE — TELEPHONE ENCOUNTER
----- Message from Salma Malik sent at 11/10/2020  4:23 PM CST -----  Regarding: appt  Contact: pt @ [phone]  Pt returning missed call from Dr. Coley's office,  (Rachel), please call. Patient's insurance has been corrected in Epic per the insurance dept.

## 2020-11-16 ENCOUNTER — NURSE TRIAGE (OUTPATIENT)
Dept: ADMINISTRATIVE | Facility: CLINIC | Age: 75
End: 2020-11-16

## 2020-11-17 NOTE — TELEPHONE ENCOUNTER
Spoke with pt: calling to make appointment. instructed pt on call nurses cannot make specialist appointments but I will send high alert message to office to call her to schedule. reviewed good contact #. verbalizes understanding.       Reason for Disposition   Requesting regular office appointment    Protocols used: INFORMATION ONLY CALL - NO TRIAGE-A-

## 2020-12-04 ENCOUNTER — TELEPHONE (OUTPATIENT)
Dept: NEUROLOGY | Facility: CLINIC | Age: 75
End: 2020-12-04
Payer: MEDICARE

## 2020-12-04 NOTE — TELEPHONE ENCOUNTER
----- Message from Salma Malik sent at 12/4/2020  9:35 AM CST -----  Regarding: pt advice  Contact: pt @ [phone]  Pt calling to speak with someone in Dr. Coley's office regarding getting a sooner appt, says she needs to see the doctor. Please call.

## 2020-12-09 ENCOUNTER — TELEPHONE (OUTPATIENT)
Dept: NEUROLOGY | Facility: CLINIC | Age: 75
End: 2020-12-09

## 2020-12-09 NOTE — TELEPHONE ENCOUNTER
----- Message from Salma Malik sent at 12/9/2020  1:08 PM CST -----  Regarding: pt advice  Contact: pt @ [phone]  Patient calling tos speak with someone in Dr. Coley' s office, says no on  is returning her calls and she says she either need a sooner appt or talk with the doctor. Please call.

## 2020-12-09 NOTE — TELEPHONE ENCOUNTER
Called and left a message for  regarding a sooner appt. I stated to give us a  Call back regarding this matter

## 2021-01-04 ENCOUNTER — TELEPHONE (OUTPATIENT)
Dept: NEUROLOGY | Facility: CLINIC | Age: 76
End: 2021-01-04

## 2021-01-11 ENCOUNTER — TELEPHONE (OUTPATIENT)
Dept: NEUROLOGY | Facility: CLINIC | Age: 76
End: 2021-01-11

## 2021-02-10 ENCOUNTER — TELEPHONE (OUTPATIENT)
Dept: NEUROLOGY | Facility: CLINIC | Age: 76
End: 2021-02-10

## 2021-02-10 NOTE — TELEPHONE ENCOUNTER
----- Message from Salma Malik sent at 2/10/2021  4:28 PM CST -----  Regarding: pt advice  Contact: pt @ [phone]  Pt asking to speak with someone regarding her appt, says her appts keep getting canceled, says she has not seen a doctor since Dr. Rick left. Patient is asking to be scheduled with a Neurologist to continue her care. Please call

## 2021-02-23 ENCOUNTER — TELEPHONE (OUTPATIENT)
Dept: NEUROLOGY | Facility: CLINIC | Age: 76
End: 2021-02-23

## 2021-02-23 RX ORDER — CARBIDOPA AND LEVODOPA 25; 100 MG/1; MG/1
1 TABLET ORAL 4 TIMES DAILY
Qty: 90 TABLET | Refills: 5 | Status: SHIPPED | OUTPATIENT
Start: 2021-02-23 | End: 2021-09-21

## 2021-03-02 ENCOUNTER — TELEPHONE (OUTPATIENT)
Dept: NEUROLOGY | Facility: CLINIC | Age: 76
End: 2021-03-02

## 2021-04-26 ENCOUNTER — PATIENT MESSAGE (OUTPATIENT)
Dept: RESEARCH | Facility: HOSPITAL | Age: 76
End: 2021-04-26

## 2021-05-10 ENCOUNTER — HOSPITAL ENCOUNTER (EMERGENCY)
Facility: HOSPITAL | Age: 76
Discharge: HOME | End: 2021-05-10
Attending: EMERGENCY MEDICINE
Payer: MEDICARE

## 2021-05-10 VITALS
WEIGHT: 206 LBS | DIASTOLIC BLOOD PRESSURE: 86 MMHG | RESPIRATION RATE: 16 BRPM | BODY MASS INDEX: 28.84 KG/M2 | SYSTOLIC BLOOD PRESSURE: 147 MMHG | HEIGHT: 71 IN | TEMPERATURE: 99 F | HEART RATE: 86 BPM | OXYGEN SATURATION: 99 %

## 2021-05-10 DIAGNOSIS — T14.8XXA BLISTER: Primary | ICD-10-CM

## 2021-05-10 DIAGNOSIS — R22.41 LOCALIZED SWELLING OF RIGHT FOOT: ICD-10-CM

## 2021-05-10 PROCEDURE — 10060 I&D ABSCESS SIMPLE/SINGLE: CPT

## 2021-05-10 PROCEDURE — 99284 PR EMERGENCY DEPT VISIT,LEVEL IV: ICD-10-PCS | Mod: 25,,, | Performed by: PHYSICIAN ASSISTANT

## 2021-05-10 PROCEDURE — 10140 I&D HMTMA SEROMA/FLUID COLLJ: CPT | Mod: ,,, | Performed by: PHYSICIAN ASSISTANT

## 2021-05-10 PROCEDURE — 99283 EMERGENCY DEPT VISIT LOW MDM: CPT | Mod: 25

## 2021-05-10 PROCEDURE — 10140 PR DRAINAGE OF HEMATOMA/FLUID: ICD-10-PCS | Mod: ,,, | Performed by: PHYSICIAN ASSISTANT

## 2021-05-10 PROCEDURE — 25000003 PHARM REV CODE 250: Performed by: PHYSICIAN ASSISTANT

## 2021-05-10 PROCEDURE — 99284 EMERGENCY DEPT VISIT MOD MDM: CPT | Mod: 25,,, | Performed by: PHYSICIAN ASSISTANT

## 2021-05-10 RX ORDER — LIDOCAINE HYDROCHLORIDE 10 MG/ML
10 INJECTION INFILTRATION; PERINEURAL
Status: COMPLETED | OUTPATIENT
Start: 2021-05-10 | End: 2021-05-10

## 2021-05-10 RX ORDER — CEPHALEXIN 500 MG/1
500 CAPSULE ORAL 4 TIMES DAILY
Qty: 20 CAPSULE | Refills: 0 | Status: SHIPPED | OUTPATIENT
Start: 2021-05-10 | End: 2021-05-15

## 2021-05-10 RX ORDER — BACITRACIN ZINC 500 [USP'U]/G
OINTMENT TOPICAL
Status: COMPLETED | OUTPATIENT
Start: 2021-05-10 | End: 2021-05-10

## 2021-05-10 RX ADMIN — BACITRACIN 1 EACH: 500 OINTMENT TOPICAL at 06:05

## 2021-05-10 RX ADMIN — LIDOCAINE HYDROCHLORIDE 10 ML: 10 INJECTION, SOLUTION INFILTRATION; PERINEURAL at 06:05

## 2021-05-31 ENCOUNTER — PATIENT MESSAGE (OUTPATIENT)
Dept: PSYCHIATRY | Facility: CLINIC | Age: 76
End: 2021-05-31

## 2021-06-04 ENCOUNTER — TELEPHONE (OUTPATIENT)
Dept: NEUROLOGY | Facility: CLINIC | Age: 76
End: 2021-06-04

## 2021-06-07 NOTE — ASSESSMENT & PLAN NOTE
Continue home gabapentin     Refill Approved    Rx renewed per Medication Renewal Policy. Medication was last renewed on 4/23/19, last OV 11/22/19.    Nella Mendoza, [facility] Triage/Med Refill 4/25/2020     Requested Prescriptions   Pending Prescriptions Disp Refills     fluticasone propionate (FLONASE) 50 mcg/actuation nasal spray [Pharmacy Med Name: FLUTICASONE 50MCG NASAL SP (120) RX] 48 g 3     Sig: SHAKE LIQUID AND USE 1 SPRAY IN EACH NOSTRIL TWICE DAILY       Nasal Steroid Refill Protocol Passed - 4/25/2020  5:11 AM        Passed - Patient has had office visit/physical in last 2 years     Last office visit with prescriber/PCP: 10/3/2018 OR same dept: 11/22/2019 Lan Murry MD OR same specialty: 11/22/2019 Lan Murry MD Last physical: Visit date not found Last MTM visit: Visit date not found    Next appt within 3 mo: Visit date not found  Next physical within 3 mo: Visit date not found  Prescriber OR PCP: Naima Murry MD  Last diagnosis associated with med order: 1. Dry mouth  - fluticasone propionate (FLONASE) 50 mcg/actuation nasal spray [Pharmacy Med Name: FLUTICASONE 50MCG NASAL SP (120) RX]; SHAKE LIQUID AND USE 1 SPRAY IN EACH NOSTRIL TWICE DAILY  Dispense: 48 g; Refill: 3     If protocol passes may refill for 12 months if within 3 months of last provider visit (or a total of 15 months).

## 2021-06-18 ENCOUNTER — HOSPITAL ENCOUNTER (EMERGENCY)
Facility: HOSPITAL | Age: 76
Discharge: HOME | End: 2021-06-18
Attending: EMERGENCY MEDICINE
Payer: MEDICARE

## 2021-06-18 ENCOUNTER — PATIENT OUTREACH (OUTPATIENT)
Dept: EMERGENCY MEDICINE | Facility: HOSPITAL | Age: 76
End: 2021-06-18

## 2021-06-18 ENCOUNTER — OFFICE VISIT (OUTPATIENT)
Dept: NEUROLOGY | Facility: CLINIC | Age: 76
End: 2021-06-18
Payer: MEDICARE

## 2021-06-18 VITALS
BODY MASS INDEX: 28 KG/M2 | RESPIRATION RATE: 16 BRPM | HEART RATE: 94 BPM | OXYGEN SATURATION: 99 % | SYSTOLIC BLOOD PRESSURE: 122 MMHG | DIASTOLIC BLOOD PRESSURE: 66 MMHG | TEMPERATURE: 98 F | HEIGHT: 71 IN | WEIGHT: 200 LBS

## 2021-06-18 VITALS
WEIGHT: 205.94 LBS | SYSTOLIC BLOOD PRESSURE: 167 MMHG | BODY MASS INDEX: 28.72 KG/M2 | DIASTOLIC BLOOD PRESSURE: 90 MMHG | HEART RATE: 80 BPM

## 2021-06-18 DIAGNOSIS — G20.A1 PARKINSON'S DISEASE: ICD-10-CM

## 2021-06-18 DIAGNOSIS — G61.0 AIDP (ACUTE INFLAMMATORY DEMYELINATING POLYNEUROPATHY): ICD-10-CM

## 2021-06-18 DIAGNOSIS — L97.509 FOOT ULCER: ICD-10-CM

## 2021-06-18 DIAGNOSIS — I96 DRY GANGRENE: Primary | ICD-10-CM

## 2021-06-18 LAB
ALBUMIN SERPL BCP-MCNC: 3.6 G/DL (ref 3.5–5.2)
ALP SERPL-CCNC: 64 U/L (ref 55–135)
ALT SERPL W/O P-5'-P-CCNC: 5 U/L (ref 10–44)
ANION GAP SERPL CALC-SCNC: 10 MMOL/L (ref 8–16)
AST SERPL-CCNC: 20 U/L (ref 10–40)
BASOPHILS # BLD AUTO: 0.03 K/UL (ref 0–0.2)
BASOPHILS NFR BLD: 0.7 % (ref 0–1.9)
BILIRUB SERPL-MCNC: 0.6 MG/DL (ref 0.1–1)
BUN SERPL-MCNC: 17 MG/DL (ref 8–23)
CALCIUM SERPL-MCNC: 9 MG/DL (ref 8.7–10.5)
CHLORIDE SERPL-SCNC: 104 MMOL/L (ref 95–110)
CO2 SERPL-SCNC: 23 MMOL/L (ref 23–29)
CREAT SERPL-MCNC: 0.9 MG/DL (ref 0.5–1.4)
CRP SERPL-MCNC: 1.3 MG/L (ref 0–8.2)
DIFFERENTIAL METHOD: ABNORMAL
EOSINOPHIL # BLD AUTO: 0.2 K/UL (ref 0–0.5)
EOSINOPHIL NFR BLD: 4.6 % (ref 0–8)
ERYTHROCYTE [DISTWIDTH] IN BLOOD BY AUTOMATED COUNT: 13.4 % (ref 11.5–14.5)
ERYTHROCYTE [SEDIMENTATION RATE] IN BLOOD BY WESTERGREN METHOD: 70 MM/HR (ref 0–36)
EST. GFR  (AFRICAN AMERICAN): >60 ML/MIN/1.73 M^2
EST. GFR  (NON AFRICAN AMERICAN): >60 ML/MIN/1.73 M^2
GLUCOSE SERPL-MCNC: 98 MG/DL (ref 70–110)
HCT VFR BLD AUTO: 32 % (ref 37–48.5)
HGB BLD-MCNC: 10.2 G/DL (ref 12–16)
IMM GRANULOCYTES # BLD AUTO: 0.01 K/UL (ref 0–0.04)
IMM GRANULOCYTES NFR BLD AUTO: 0.2 % (ref 0–0.5)
LYMPHOCYTES # BLD AUTO: 1.6 K/UL (ref 1–4.8)
LYMPHOCYTES NFR BLD: 35.7 % (ref 18–48)
MCH RBC QN AUTO: 32.7 PG (ref 27–31)
MCHC RBC AUTO-ENTMCNC: 31.9 G/DL (ref 32–36)
MCV RBC AUTO: 103 FL (ref 82–98)
MONOCYTES # BLD AUTO: 0.5 K/UL (ref 0.3–1)
MONOCYTES NFR BLD: 11.5 % (ref 4–15)
NEUTROPHILS # BLD AUTO: 2.1 K/UL (ref 1.8–7.7)
NEUTROPHILS NFR BLD: 47.3 % (ref 38–73)
NRBC BLD-RTO: 0 /100 WBC
PLATELET # BLD AUTO: 187 K/UL (ref 150–450)
PMV BLD AUTO: 10.3 FL (ref 9.2–12.9)
POTASSIUM SERPL-SCNC: 4.7 MMOL/L (ref 3.5–5.1)
PROT SERPL-MCNC: 8.2 G/DL (ref 6–8.4)
RBC # BLD AUTO: 3.12 M/UL (ref 4–5.4)
SODIUM SERPL-SCNC: 137 MMOL/L (ref 136–145)
WBC # BLD AUTO: 4.34 K/UL (ref 3.9–12.7)

## 2021-06-18 PROCEDURE — 99214 PR OFFICE/OUTPT VISIT, EST, LEVL IV, 30-39 MIN: ICD-10-PCS | Mod: S$GLB,,, | Performed by: PSYCHIATRY & NEUROLOGY

## 2021-06-18 PROCEDURE — 86140 C-REACTIVE PROTEIN: CPT | Performed by: EMERGENCY MEDICINE

## 2021-06-18 PROCEDURE — 99223 1ST HOSP IP/OBS HIGH 75: CPT | Mod: ,,, | Performed by: PODIATRIST

## 2021-06-18 PROCEDURE — 99284 PR EMERGENCY DEPT VISIT,LEVEL IV: ICD-10-PCS | Mod: ,,, | Performed by: EMERGENCY MEDICINE

## 2021-06-18 PROCEDURE — 99999 PR PBB SHADOW E&M-EST. PATIENT-LVL III: ICD-10-PCS | Mod: PBBFAC,,, | Performed by: PSYCHIATRY & NEUROLOGY

## 2021-06-18 PROCEDURE — 87040 BLOOD CULTURE FOR BACTERIA: CPT | Performed by: EMERGENCY MEDICINE

## 2021-06-18 PROCEDURE — 25000003 PHARM REV CODE 250: Performed by: EMERGENCY MEDICINE

## 2021-06-18 PROCEDURE — 85025 COMPLETE CBC W/AUTO DIFF WBC: CPT | Performed by: EMERGENCY MEDICINE

## 2021-06-18 PROCEDURE — 96366 THER/PROPH/DIAG IV INF ADDON: CPT

## 2021-06-18 PROCEDURE — 96368 THER/DIAG CONCURRENT INF: CPT

## 2021-06-18 PROCEDURE — 99999 PR PBB SHADOW E&M-EST. PATIENT-LVL III: CPT | Mod: PBBFAC,,, | Performed by: PSYCHIATRY & NEUROLOGY

## 2021-06-18 PROCEDURE — 99223 PR INITIAL HOSPITAL CARE,LEVL III: ICD-10-PCS | Mod: ,,, | Performed by: PODIATRIST

## 2021-06-18 PROCEDURE — 96365 THER/PROPH/DIAG IV INF INIT: CPT

## 2021-06-18 PROCEDURE — 63600175 PHARM REV CODE 636 W HCPCS: Performed by: EMERGENCY MEDICINE

## 2021-06-18 PROCEDURE — 85652 RBC SED RATE AUTOMATED: CPT | Performed by: EMERGENCY MEDICINE

## 2021-06-18 PROCEDURE — 99284 EMERGENCY DEPT VISIT MOD MDM: CPT | Mod: 25

## 2021-06-18 PROCEDURE — 80053 COMPREHEN METABOLIC PANEL: CPT | Performed by: EMERGENCY MEDICINE

## 2021-06-18 PROCEDURE — 99284 EMERGENCY DEPT VISIT MOD MDM: CPT | Mod: ,,, | Performed by: EMERGENCY MEDICINE

## 2021-06-18 PROCEDURE — 99214 OFFICE O/P EST MOD 30 MIN: CPT | Mod: S$GLB,,, | Performed by: PSYCHIATRY & NEUROLOGY

## 2021-06-18 RX ORDER — ACETAMINOPHEN 325 MG/1
650 TABLET ORAL
Status: COMPLETED | OUTPATIENT
Start: 2021-06-18 | End: 2021-06-18

## 2021-06-18 RX ORDER — CEPHALEXIN 500 MG/1
500 CAPSULE ORAL EVERY 8 HOURS
Qty: 21 CAPSULE | Refills: 0 | Status: SHIPPED | OUTPATIENT
Start: 2021-06-18 | End: 2021-06-25

## 2021-06-18 RX ORDER — CARBIDOPA AND LEVODOPA 25; 100 MG/1; MG/1
2 TABLET ORAL 4 TIMES DAILY
Qty: 240 TABLET | Refills: 11 | Status: SHIPPED | OUTPATIENT
Start: 2021-06-18 | End: 2022-06-18

## 2021-06-18 RX ADMIN — VANCOMYCIN HYDROCHLORIDE 2000 MG: 10 INJECTION, POWDER, LYOPHILIZED, FOR SOLUTION INTRAVENOUS at 11:06

## 2021-06-18 RX ADMIN — PIPERACILLIN SODIUM AND TAZOBACTAM SODIUM 4.5 G: 4; .5 INJECTION, POWDER, FOR SOLUTION INTRAVENOUS at 11:06

## 2021-06-18 RX ADMIN — ACETAMINOPHEN 650 MG: 325 TABLET ORAL at 11:06

## 2021-06-23 LAB
BACTERIA BLD CULT: NORMAL
BACTERIA BLD CULT: NORMAL

## 2021-08-09 ENCOUNTER — HOSPITAL ENCOUNTER (EMERGENCY)
Facility: HOSPITAL | Age: 76
Discharge: HOME | End: 2021-08-09
Attending: EMERGENCY MEDICINE
Payer: MEDICARE

## 2021-08-09 VITALS
DIASTOLIC BLOOD PRESSURE: 77 MMHG | OXYGEN SATURATION: 100 % | BODY MASS INDEX: 28 KG/M2 | HEIGHT: 71 IN | TEMPERATURE: 99 F | SYSTOLIC BLOOD PRESSURE: 177 MMHG | HEART RATE: 75 BPM | WEIGHT: 200 LBS | RESPIRATION RATE: 16 BRPM

## 2021-08-09 DIAGNOSIS — R10.13 EPIGASTRIC PAIN: Primary | ICD-10-CM

## 2021-08-09 LAB
ALBUMIN SERPL BCP-MCNC: 3.6 G/DL (ref 3.5–5.2)
ALP SERPL-CCNC: 54 U/L (ref 55–135)
ALT SERPL W/O P-5'-P-CCNC: <5 U/L (ref 10–44)
ANION GAP SERPL CALC-SCNC: 9 MMOL/L (ref 8–16)
AST SERPL-CCNC: 9 U/L (ref 10–40)
BASOPHILS # BLD AUTO: 0.02 K/UL (ref 0–0.2)
BASOPHILS NFR BLD: 0.5 % (ref 0–1.9)
BILIRUB SERPL-MCNC: 1 MG/DL (ref 0.1–1)
BILIRUB UR QL STRIP: NEGATIVE
BUN SERPL-MCNC: 20 MG/DL (ref 8–23)
BUN SERPL-MCNC: 23 MG/DL (ref 6–30)
CALCIUM SERPL-MCNC: 9.3 MG/DL (ref 8.7–10.5)
CHLORIDE SERPL-SCNC: 106 MMOL/L (ref 95–110)
CHLORIDE SERPL-SCNC: 106 MMOL/L (ref 95–110)
CLARITY UR REFRACT.AUTO: CLEAR
CO2 SERPL-SCNC: 24 MMOL/L (ref 23–29)
COLOR UR AUTO: YELLOW
CREAT SERPL-MCNC: 0.8 MG/DL (ref 0.5–1.4)
CREAT SERPL-MCNC: 0.9 MG/DL (ref 0.5–1.4)
DIFFERENTIAL METHOD: ABNORMAL
EOSINOPHIL # BLD AUTO: 0.1 K/UL (ref 0–0.5)
EOSINOPHIL NFR BLD: 2.3 % (ref 0–8)
ERYTHROCYTE [DISTWIDTH] IN BLOOD BY AUTOMATED COUNT: 12.7 % (ref 11.5–14.5)
EST. GFR  (AFRICAN AMERICAN): >60 ML/MIN/1.73 M^2
EST. GFR  (NON AFRICAN AMERICAN): >60 ML/MIN/1.73 M^2
GLUCOSE SERPL-MCNC: 109 MG/DL (ref 70–110)
GLUCOSE SERPL-MCNC: 110 MG/DL (ref 70–110)
GLUCOSE UR QL STRIP: NEGATIVE
HCT VFR BLD AUTO: 33.5 % (ref 37–48.5)
HCT VFR BLD CALC: 35 %PCV (ref 36–54)
HGB BLD-MCNC: 10.4 G/DL (ref 12–16)
HGB UR QL STRIP: NEGATIVE
IMM GRANULOCYTES # BLD AUTO: 0.01 K/UL (ref 0–0.04)
IMM GRANULOCYTES NFR BLD AUTO: 0.3 % (ref 0–0.5)
KETONES UR QL STRIP: ABNORMAL
LACTATE SERPL-SCNC: 1.2 MMOL/L (ref 0.5–2.2)
LEUKOCYTE ESTERASE UR QL STRIP: NEGATIVE
LIPASE SERPL-CCNC: 11 U/L (ref 4–60)
LYMPHOCYTES # BLD AUTO: 1.3 K/UL (ref 1–4.8)
LYMPHOCYTES NFR BLD: 32.7 % (ref 18–48)
MCH RBC QN AUTO: 31.8 PG (ref 27–31)
MCHC RBC AUTO-ENTMCNC: 31 G/DL (ref 32–36)
MCV RBC AUTO: 102 FL (ref 82–98)
MONOCYTES # BLD AUTO: 0.5 K/UL (ref 0.3–1)
MONOCYTES NFR BLD: 11.8 % (ref 4–15)
NEUTROPHILS # BLD AUTO: 2.1 K/UL (ref 1.8–7.7)
NEUTROPHILS NFR BLD: 52.4 % (ref 38–73)
NITRITE UR QL STRIP: NEGATIVE
NRBC BLD-RTO: 0 /100 WBC
PH UR STRIP: 5 [PH] (ref 5–8)
PLATELET # BLD AUTO: 151 K/UL (ref 150–450)
PMV BLD AUTO: 10.1 FL (ref 9.2–12.9)
POC IONIZED CALCIUM: 1.14 MMOL/L (ref 1.06–1.42)
POC TCO2 (MEASURED): 24 MMOL/L (ref 23–29)
POTASSIUM BLD-SCNC: 3.8 MMOL/L (ref 3.5–5.1)
POTASSIUM SERPL-SCNC: 3.8 MMOL/L (ref 3.5–5.1)
PROT SERPL-MCNC: 7.8 G/DL (ref 6–8.4)
PROT UR QL STRIP: NEGATIVE
RBC # BLD AUTO: 3.27 M/UL (ref 4–5.4)
SAMPLE: ABNORMAL
SODIUM BLD-SCNC: 142 MMOL/L (ref 136–145)
SODIUM SERPL-SCNC: 139 MMOL/L (ref 136–145)
SP GR UR STRIP: 1.03 (ref 1–1.03)
URN SPEC COLLECT METH UR: ABNORMAL
WBC # BLD AUTO: 3.91 K/UL (ref 3.9–12.7)

## 2021-08-09 PROCEDURE — 99284 EMERGENCY DEPT VISIT MOD MDM: CPT | Mod: ,,, | Performed by: EMERGENCY MEDICINE

## 2021-08-09 PROCEDURE — 81003 URINALYSIS AUTO W/O SCOPE: CPT | Performed by: EMERGENCY MEDICINE

## 2021-08-09 PROCEDURE — 82330 ASSAY OF CALCIUM: CPT

## 2021-08-09 PROCEDURE — 93010 ELECTROCARDIOGRAM REPORT: CPT | Mod: ,,, | Performed by: INTERNAL MEDICINE

## 2021-08-09 PROCEDURE — 99284 PR EMERGENCY DEPT VISIT,LEVEL IV: ICD-10-PCS | Mod: ,,, | Performed by: EMERGENCY MEDICINE

## 2021-08-09 PROCEDURE — 80053 COMPREHEN METABOLIC PANEL: CPT | Performed by: EMERGENCY MEDICINE

## 2021-08-09 PROCEDURE — 83690 ASSAY OF LIPASE: CPT | Performed by: EMERGENCY MEDICINE

## 2021-08-09 PROCEDURE — 25000003 PHARM REV CODE 250: Performed by: EMERGENCY MEDICINE

## 2021-08-09 PROCEDURE — 83605 ASSAY OF LACTIC ACID: CPT | Performed by: EMERGENCY MEDICINE

## 2021-08-09 PROCEDURE — 93010 EKG 12-LEAD: ICD-10-PCS | Mod: ,,, | Performed by: INTERNAL MEDICINE

## 2021-08-09 PROCEDURE — 25500020 PHARM REV CODE 255: Performed by: EMERGENCY MEDICINE

## 2021-08-09 PROCEDURE — 80047 BASIC METABLC PNL IONIZED CA: CPT

## 2021-08-09 PROCEDURE — 93005 ELECTROCARDIOGRAM TRACING: CPT

## 2021-08-09 PROCEDURE — 85025 COMPLETE CBC W/AUTO DIFF WBC: CPT | Performed by: EMERGENCY MEDICINE

## 2021-08-09 PROCEDURE — 99285 EMERGENCY DEPT VISIT HI MDM: CPT | Mod: 25

## 2021-08-09 RX ORDER — MAG HYDROX/ALUMINUM HYD/SIMETH 200-200-20
5 SUSPENSION, ORAL (FINAL DOSE FORM) ORAL
Status: COMPLETED | OUTPATIENT
Start: 2021-08-09 | End: 2021-08-09

## 2021-08-09 RX ORDER — FAMOTIDINE 20 MG/1
20 TABLET, FILM COATED ORAL
Status: COMPLETED | OUTPATIENT
Start: 2021-08-09 | End: 2021-08-09

## 2021-08-09 RX ADMIN — FAMOTIDINE 20 MG: 20 TABLET ORAL at 05:08

## 2021-08-09 RX ADMIN — IOHEXOL 85 ML: 350 INJECTION, SOLUTION INTRAVENOUS at 05:08

## 2021-08-09 RX ADMIN — ALUMINUM HYDROXIDE, MAGNESIUM HYDROXIDE, AND SIMETHICONE 5 ML: 200; 200; 20 SUSPENSION ORAL at 05:08

## 2021-08-19 ENCOUNTER — HOSPITAL ENCOUNTER (INPATIENT)
Facility: HOSPITAL | Age: 76
LOS: 1 days | Discharge: HOME HEALTH | DRG: 057 | End: 2021-08-22
Attending: EMERGENCY MEDICINE | Admitting: INTERNAL MEDICINE
Payer: MEDICARE

## 2021-08-19 DIAGNOSIS — R55 SYNCOPE: ICD-10-CM

## 2021-08-19 DIAGNOSIS — R55 SYNCOPE, UNSPECIFIED SYNCOPE TYPE: Primary | ICD-10-CM

## 2021-08-19 DIAGNOSIS — R33.9 URINARY RETENTION: ICD-10-CM

## 2021-08-19 DIAGNOSIS — R07.9 CHEST PAIN: ICD-10-CM

## 2021-08-19 PROBLEM — F03.90 DEMENTING NEUROLOGICAL DISEASE OR SYNDROME: Status: ACTIVE | Noted: 2021-08-19

## 2021-08-19 LAB
ALBUMIN SERPL BCP-MCNC: 3.7 G/DL (ref 3.5–5.2)
ALP SERPL-CCNC: 58 U/L (ref 55–135)
ALT SERPL W/O P-5'-P-CCNC: 5 U/L (ref 10–44)
ANION GAP SERPL CALC-SCNC: 10 MMOL/L (ref 8–16)
AST SERPL-CCNC: 11 U/L (ref 10–40)
BASOPHILS # BLD AUTO: 0.02 K/UL (ref 0–0.2)
BASOPHILS NFR BLD: 0.5 % (ref 0–1.9)
BILIRUB SERPL-MCNC: 1 MG/DL (ref 0.1–1)
BILIRUB UR QL STRIP: NEGATIVE
BNP SERPL-MCNC: 26 PG/ML (ref 0–99)
BUN SERPL-MCNC: 18 MG/DL (ref 8–23)
BUN SERPL-MCNC: 22 MG/DL (ref 6–30)
CALCIUM SERPL-MCNC: 9.1 MG/DL (ref 8.7–10.5)
CHLORIDE SERPL-SCNC: 104 MMOL/L (ref 95–110)
CHLORIDE SERPL-SCNC: 105 MMOL/L (ref 95–110)
CLARITY UR REFRACT.AUTO: CLEAR
CO2 SERPL-SCNC: 25 MMOL/L (ref 23–29)
COLOR UR AUTO: YELLOW
CREAT SERPL-MCNC: 0.9 MG/DL (ref 0.5–1.4)
CREAT SERPL-MCNC: 0.9 MG/DL (ref 0.5–1.4)
CTP QC/QA: YES
DIFFERENTIAL METHOD: ABNORMAL
EOSINOPHIL # BLD AUTO: 0.1 K/UL (ref 0–0.5)
EOSINOPHIL NFR BLD: 2.2 % (ref 0–8)
ERYTHROCYTE [DISTWIDTH] IN BLOOD BY AUTOMATED COUNT: 13 % (ref 11.5–14.5)
EST. GFR  (AFRICAN AMERICAN): >60 ML/MIN/1.73 M^2
EST. GFR  (NON AFRICAN AMERICAN): >60 ML/MIN/1.73 M^2
GLUCOSE SERPL-MCNC: 127 MG/DL (ref 70–110)
GLUCOSE SERPL-MCNC: 131 MG/DL (ref 70–110)
GLUCOSE UR QL STRIP: NEGATIVE
HCT VFR BLD AUTO: 32.7 % (ref 37–48.5)
HCT VFR BLD CALC: 33 %PCV (ref 36–54)
HGB BLD-MCNC: 10.8 G/DL (ref 12–16)
HGB UR QL STRIP: NEGATIVE
IMM GRANULOCYTES # BLD AUTO: 0.03 K/UL (ref 0–0.04)
IMM GRANULOCYTES NFR BLD AUTO: 0.7 % (ref 0–0.5)
KETONES UR QL STRIP: NEGATIVE
LEUKOCYTE ESTERASE UR QL STRIP: NEGATIVE
LYMPHOCYTES # BLD AUTO: 1 K/UL (ref 1–4.8)
LYMPHOCYTES NFR BLD: 24.4 % (ref 18–48)
MCH RBC QN AUTO: 32.5 PG (ref 27–31)
MCHC RBC AUTO-ENTMCNC: 33 G/DL (ref 32–36)
MCV RBC AUTO: 99 FL (ref 82–98)
MONOCYTES # BLD AUTO: 0.5 K/UL (ref 0.3–1)
MONOCYTES NFR BLD: 11.6 % (ref 4–15)
NEUTROPHILS # BLD AUTO: 2.5 K/UL (ref 1.8–7.7)
NEUTROPHILS NFR BLD: 60.6 % (ref 38–73)
NITRITE UR QL STRIP: NEGATIVE
NRBC BLD-RTO: 0 /100 WBC
PH UR STRIP: 5 [PH] (ref 5–8)
PLATELET # BLD AUTO: 148 K/UL (ref 150–450)
PMV BLD AUTO: 9.6 FL (ref 9.2–12.9)
POC IONIZED CALCIUM: 1.09 MMOL/L (ref 1.06–1.42)
POC TCO2 (MEASURED): 25 MMOL/L (ref 23–29)
POTASSIUM BLD-SCNC: 4.1 MMOL/L (ref 3.5–5.1)
POTASSIUM SERPL-SCNC: 4.5 MMOL/L (ref 3.5–5.1)
PROT SERPL-MCNC: 8.3 G/DL (ref 6–8.4)
PROT UR QL STRIP: NEGATIVE
RBC # BLD AUTO: 3.32 M/UL (ref 4–5.4)
SAMPLE: ABNORMAL
SARS-COV-2 RDRP RESP QL NAA+PROBE: NEGATIVE
SODIUM BLD-SCNC: 140 MMOL/L (ref 136–145)
SODIUM SERPL-SCNC: 140 MMOL/L (ref 136–145)
SP GR UR STRIP: 1.02 (ref 1–1.03)
TROPONIN I SERPL DL<=0.01 NG/ML-MCNC: <0.006 NG/ML (ref 0–0.03)
TROPONIN I SERPL DL<=0.01 NG/ML-MCNC: <0.006 NG/ML (ref 0–0.03)
URN SPEC COLLECT METH UR: NORMAL
WBC # BLD AUTO: 4.05 K/UL (ref 3.9–12.7)

## 2021-08-19 PROCEDURE — 81003 URINALYSIS AUTO W/O SCOPE: CPT

## 2021-08-19 PROCEDURE — 76775 US EXAM ABDO BACK WALL LIM: CPT | Mod: 26,,, | Performed by: EMERGENCY MEDICINE

## 2021-08-19 PROCEDURE — 99285 EMERGENCY DEPT VISIT HI MDM: CPT | Mod: CS,,, | Performed by: EMERGENCY MEDICINE

## 2021-08-19 PROCEDURE — 82330 ASSAY OF CALCIUM: CPT

## 2021-08-19 PROCEDURE — G0378 HOSPITAL OBSERVATION PER HR: HCPCS

## 2021-08-19 PROCEDURE — 76775 PR  US, RETROPERITNL ABD,  LTD: ICD-10-PCS | Mod: 26,,, | Performed by: EMERGENCY MEDICINE

## 2021-08-19 PROCEDURE — 99220 PR INITIAL OBSERVATION CARE,LEVL III: CPT | Mod: ,,, | Performed by: PHYSICIAN ASSISTANT

## 2021-08-19 PROCEDURE — 85025 COMPLETE CBC W/AUTO DIFF WBC: CPT

## 2021-08-19 PROCEDURE — 96372 THER/PROPH/DIAG INJ SC/IM: CPT

## 2021-08-19 PROCEDURE — 83880 ASSAY OF NATRIURETIC PEPTIDE: CPT

## 2021-08-19 PROCEDURE — 63600175 PHARM REV CODE 636 W HCPCS: Performed by: PHYSICIAN ASSISTANT

## 2021-08-19 PROCEDURE — 93010 ELECTROCARDIOGRAM REPORT: CPT | Mod: ,,, | Performed by: INTERNAL MEDICINE

## 2021-08-19 PROCEDURE — 99285 EMERGENCY DEPT VISIT HI MDM: CPT | Mod: 25

## 2021-08-19 PROCEDURE — 80047 BASIC METABLC PNL IONIZED CA: CPT

## 2021-08-19 PROCEDURE — 99220 PR INITIAL OBSERVATION CARE,LEVL III: ICD-10-PCS | Mod: ,,, | Performed by: PHYSICIAN ASSISTANT

## 2021-08-19 PROCEDURE — U0002 COVID-19 LAB TEST NON-CDC: HCPCS

## 2021-08-19 PROCEDURE — 51702 INSERT TEMP BLADDER CATH: CPT

## 2021-08-19 PROCEDURE — 93010 EKG 12-LEAD: ICD-10-PCS | Mod: ,,, | Performed by: INTERNAL MEDICINE

## 2021-08-19 PROCEDURE — 99285 PR EMERGENCY DEPT VISIT,LEVEL V: ICD-10-PCS | Mod: CS,,, | Performed by: EMERGENCY MEDICINE

## 2021-08-19 PROCEDURE — 80053 COMPREHEN METABOLIC PANEL: CPT

## 2021-08-19 PROCEDURE — 93005 ELECTROCARDIOGRAM TRACING: CPT | Mod: 59

## 2021-08-19 PROCEDURE — 84484 ASSAY OF TROPONIN QUANT: CPT

## 2021-08-19 RX ORDER — CARBIDOPA AND LEVODOPA 25; 100 MG/1; MG/1
1 TABLET ORAL 4 TIMES DAILY
Status: DISCONTINUED | OUTPATIENT
Start: 2021-08-20 | End: 2021-08-22 | Stop reason: HOSPADM

## 2021-08-19 RX ORDER — POLYETHYLENE GLYCOL 3350 17 G/17G
17 POWDER, FOR SOLUTION ORAL DAILY PRN
Status: DISCONTINUED | OUTPATIENT
Start: 2021-08-19 | End: 2021-08-22 | Stop reason: HOSPADM

## 2021-08-19 RX ORDER — GLUCAGON 1 MG
1 KIT INJECTION
Status: DISCONTINUED | OUTPATIENT
Start: 2021-08-19 | End: 2021-08-22 | Stop reason: HOSPADM

## 2021-08-19 RX ORDER — TALC
6 POWDER (GRAM) TOPICAL NIGHTLY PRN
Status: DISCONTINUED | OUTPATIENT
Start: 2021-08-19 | End: 2021-08-22 | Stop reason: HOSPADM

## 2021-08-19 RX ORDER — IBUPROFEN 200 MG
24 TABLET ORAL
Status: DISCONTINUED | OUTPATIENT
Start: 2021-08-19 | End: 2021-08-22 | Stop reason: HOSPADM

## 2021-08-19 RX ORDER — RIBOFLAVIN (VITAMIN B2) 100 MG
400 TABLET ORAL DAILY
Status: DISCONTINUED | OUTPATIENT
Start: 2021-08-20 | End: 2021-08-22 | Stop reason: HOSPADM

## 2021-08-19 RX ORDER — AMLODIPINE BESYLATE 5 MG/1
5 TABLET ORAL DAILY
Status: DISCONTINUED | OUTPATIENT
Start: 2021-08-20 | End: 2021-08-21

## 2021-08-19 RX ORDER — IBUPROFEN 200 MG
16 TABLET ORAL
Status: DISCONTINUED | OUTPATIENT
Start: 2021-08-19 | End: 2021-08-22 | Stop reason: HOSPADM

## 2021-08-19 RX ORDER — IPRATROPIUM BROMIDE AND ALBUTEROL SULFATE 2.5; .5 MG/3ML; MG/3ML
3 SOLUTION RESPIRATORY (INHALATION) EVERY 4 HOURS PRN
Status: DISCONTINUED | OUTPATIENT
Start: 2021-08-19 | End: 2021-08-22 | Stop reason: HOSPADM

## 2021-08-19 RX ORDER — SODIUM CHLORIDE 0.9 % (FLUSH) 0.9 %
10 SYRINGE (ML) INJECTION EVERY 8 HOURS PRN
Status: DISCONTINUED | OUTPATIENT
Start: 2021-08-19 | End: 2021-08-22 | Stop reason: HOSPADM

## 2021-08-19 RX ORDER — LEVOTHYROXINE SODIUM 50 UG/1
50 TABLET ORAL
Status: DISCONTINUED | OUTPATIENT
Start: 2021-08-20 | End: 2021-08-22 | Stop reason: HOSPADM

## 2021-08-19 RX ORDER — ONDANSETRON 8 MG/1
8 TABLET, ORALLY DISINTEGRATING ORAL EVERY 8 HOURS PRN
Status: DISCONTINUED | OUTPATIENT
Start: 2021-08-19 | End: 2021-08-22 | Stop reason: HOSPADM

## 2021-08-19 RX ORDER — ENOXAPARIN SODIUM 100 MG/ML
40 INJECTION SUBCUTANEOUS EVERY 24 HOURS
Status: DISCONTINUED | OUTPATIENT
Start: 2021-08-19 | End: 2021-08-22 | Stop reason: HOSPADM

## 2021-08-19 RX ORDER — ACETAMINOPHEN 325 MG/1
650 TABLET ORAL EVERY 4 HOURS PRN
Status: DISCONTINUED | OUTPATIENT
Start: 2021-08-19 | End: 2021-08-22 | Stop reason: HOSPADM

## 2021-08-19 RX ADMIN — ENOXAPARIN SODIUM 40 MG: 100 INJECTION SUBCUTANEOUS at 10:08

## 2021-08-20 LAB
ANION GAP SERPL CALC-SCNC: 8 MMOL/L (ref 8–16)
AV INDEX (PROSTH): 0.6
AV MEAN GRADIENT: 3 MMHG
AV PEAK GRADIENT: 8 MMHG
AV VALVE AREA: 2.02 CM2
AV VELOCITY RATIO: 0.6
BASOPHILS # BLD AUTO: 0.03 K/UL (ref 0–0.2)
BASOPHILS NFR BLD: 0.7 % (ref 0–1.9)
BUN SERPL-MCNC: 15 MG/DL (ref 8–23)
CALCIUM SERPL-MCNC: 8.8 MG/DL (ref 8.7–10.5)
CHLORIDE SERPL-SCNC: 104 MMOL/L (ref 95–110)
CO2 SERPL-SCNC: 25 MMOL/L (ref 23–29)
CREAT SERPL-MCNC: 0.8 MG/DL (ref 0.5–1.4)
CV ECHO LV RWT: 0.44 CM
DIFFERENTIAL METHOD: ABNORMAL
DOP CALC AO PEAK VEL: 1.4 M/S
DOP CALC AO VTI: 26.9 CM
DOP CALC LVOT AREA: 3.4 CM2
DOP CALC LVOT DIAMETER: 2.07 CM
DOP CALC LVOT PEAK VEL: 0.84 M/S
DOP CALC LVOT STROKE VOLUME: 54.32 CM3
DOP CALCLVOT PEAK VEL VTI: 16.15 CM
E WAVE DECELERATION TIME: 243.37 MSEC
E/A RATIO: 0.64
E/E' RATIO: 11.87 M/S
ECHO LV POSTERIOR WALL: 0.97 CM (ref 0.6–1.1)
EJECTION FRACTION: 60 %
EOSINOPHIL # BLD AUTO: 0.1 K/UL (ref 0–0.5)
EOSINOPHIL NFR BLD: 3 % (ref 0–8)
ERYTHROCYTE [DISTWIDTH] IN BLOOD BY AUTOMATED COUNT: 13.1 % (ref 11.5–14.5)
EST. GFR  (AFRICAN AMERICAN): >60 ML/MIN/1.73 M^2
EST. GFR  (NON AFRICAN AMERICAN): >60 ML/MIN/1.73 M^2
FRACTIONAL SHORTENING: 27 % (ref 28–44)
GLUCOSE SERPL-MCNC: 79 MG/DL (ref 70–110)
HCT VFR BLD AUTO: 28.9 % (ref 37–48.5)
HGB BLD-MCNC: 9.5 G/DL (ref 12–16)
IMM GRANULOCYTES # BLD AUTO: 0.01 K/UL (ref 0–0.04)
IMM GRANULOCYTES NFR BLD AUTO: 0.2 % (ref 0–0.5)
INTERVENTRICULAR SEPTUM: 1.01 CM (ref 0.6–1.1)
LA MAJOR: 5.22 CM
LA MINOR: 5.41 CM
LA WIDTH: 4.59 CM
LEFT ATRIUM SIZE: 4.24 CM
LEFT ATRIUM VOLUME MOD: 64.21 CM3
LEFT ATRIUM VOLUME: 87.89 CM3
LEFT INTERNAL DIMENSION IN SYSTOLE: 3.18 CM (ref 2.1–4)
LEFT VENTRICLE DIASTOLIC VOLUME: 86.74 ML
LEFT VENTRICLE SYSTOLIC VOLUME: 40.42 ML
LEFT VENTRICULAR INTERNAL DIMENSION IN DIASTOLE: 4.38 CM (ref 3.5–6)
LEFT VENTRICULAR MASS: 144.73 G
LV LATERAL E/E' RATIO: 14.83 M/S
LV SEPTAL E/E' RATIO: 9.89 M/S
LYMPHOCYTES # BLD AUTO: 1.6 K/UL (ref 1–4.8)
LYMPHOCYTES NFR BLD: 40 % (ref 18–48)
MAGNESIUM SERPL-MCNC: 1.7 MG/DL (ref 1.6–2.6)
MCH RBC QN AUTO: 32.1 PG (ref 27–31)
MCHC RBC AUTO-ENTMCNC: 32.9 G/DL (ref 32–36)
MCV RBC AUTO: 98 FL (ref 82–98)
MONOCYTES # BLD AUTO: 0.6 K/UL (ref 0.3–1)
MONOCYTES NFR BLD: 14.4 % (ref 4–15)
MV MEAN GRADIENT: 1 MMHG
MV PEAK A VEL: 1.38 M/S
MV PEAK E VEL: 0.89 M/S
MV PEAK GRADIENT: 8 MMHG
MV STENOSIS PRESSURE HALF TIME: 70.58 MS
MV VALVE AREA P 1/2 METHOD: 3.12 CM2
NEUTROPHILS # BLD AUTO: 1.7 K/UL (ref 1.8–7.7)
NEUTROPHILS NFR BLD: 41.7 % (ref 38–73)
NRBC BLD-RTO: 0 /100 WBC
PHOSPHATE SERPL-MCNC: 2.7 MG/DL (ref 2.7–4.5)
PISA TR MAX VEL: 1.88 M/S
PLATELET # BLD AUTO: 148 K/UL (ref 150–450)
PMV BLD AUTO: 9.9 FL (ref 9.2–12.9)
POTASSIUM SERPL-SCNC: 3.4 MMOL/L (ref 3.5–5.1)
RA MAJOR: 3.92 CM
RA WIDTH: 3.44 CM
RBC # BLD AUTO: 2.96 M/UL (ref 4–5.4)
RIGHT VENTRICULAR END-DIASTOLIC DIMENSION: 3.55 CM
RV TISSUE DOPPLER FREE WALL SYSTOLIC VELOCITY 1 (APICAL 4 CHAMBER VIEW): 11 CM/S
SINUS: 3.33 CM
SODIUM SERPL-SCNC: 137 MMOL/L (ref 136–145)
TDI LATERAL: 0.06 M/S
TDI SEPTAL: 0.09 M/S
TDI: 0.08 M/S
TR MAX PG: 14 MMHG
TRICUSPID ANNULAR PLANE SYSTOLIC EXCURSION: 1.95 CM
TSH SERPL DL<=0.005 MIU/L-ACNC: 1.13 UIU/ML (ref 0.4–4)
WBC # BLD AUTO: 4.02 K/UL (ref 3.9–12.7)

## 2021-08-20 PROCEDURE — 84443 ASSAY THYROID STIM HORMONE: CPT | Performed by: PHYSICIAN ASSISTANT

## 2021-08-20 PROCEDURE — 96374 THER/PROPH/DIAG INJ IV PUSH: CPT | Mod: 59

## 2021-08-20 PROCEDURE — 99226 PR SUBSEQUENT OBSERVATION CARE,LEVEL III: CPT | Mod: ,,, | Performed by: PHYSICIAN ASSISTANT

## 2021-08-20 PROCEDURE — 25500020 PHARM REV CODE 255: Performed by: INTERNAL MEDICINE

## 2021-08-20 PROCEDURE — 36415 COLL VENOUS BLD VENIPUNCTURE: CPT | Performed by: PHYSICIAN ASSISTANT

## 2021-08-20 PROCEDURE — 97165 OT EVAL LOW COMPLEX 30 MIN: CPT

## 2021-08-20 PROCEDURE — 84100 ASSAY OF PHOSPHORUS: CPT | Performed by: PHYSICIAN ASSISTANT

## 2021-08-20 PROCEDURE — 99226 PR SUBSEQUENT OBSERVATION CARE,LEVEL III: ICD-10-PCS | Mod: ,,, | Performed by: PHYSICIAN ASSISTANT

## 2021-08-20 PROCEDURE — 25000003 PHARM REV CODE 250: Performed by: PHYSICIAN ASSISTANT

## 2021-08-20 PROCEDURE — 83735 ASSAY OF MAGNESIUM: CPT | Performed by: PHYSICIAN ASSISTANT

## 2021-08-20 PROCEDURE — 63600175 PHARM REV CODE 636 W HCPCS: Performed by: PHYSICIAN ASSISTANT

## 2021-08-20 PROCEDURE — 80048 BASIC METABOLIC PNL TOTAL CA: CPT | Performed by: PHYSICIAN ASSISTANT

## 2021-08-20 PROCEDURE — 97161 PT EVAL LOW COMPLEX 20 MIN: CPT

## 2021-08-20 PROCEDURE — 97116 GAIT TRAINING THERAPY: CPT

## 2021-08-20 PROCEDURE — G0378 HOSPITAL OBSERVATION PER HR: HCPCS

## 2021-08-20 PROCEDURE — 85025 COMPLETE CBC W/AUTO DIFF WBC: CPT | Performed by: PHYSICIAN ASSISTANT

## 2021-08-20 PROCEDURE — 97530 THERAPEUTIC ACTIVITIES: CPT

## 2021-08-20 PROCEDURE — 96372 THER/PROPH/DIAG INJ SC/IM: CPT | Mod: 59

## 2021-08-20 RX ORDER — POTASSIUM CHLORIDE 7.45 MG/ML
40 INJECTION INTRAVENOUS ONCE
Status: COMPLETED | OUTPATIENT
Start: 2021-08-20 | End: 2021-08-20

## 2021-08-20 RX ADMIN — POTASSIUM CHLORIDE 40 MEQ: 7.46 INJECTION, SOLUTION INTRAVENOUS at 05:08

## 2021-08-20 RX ADMIN — CARBIDOPA AND LEVODOPA 1 TABLET: 25; 100 TABLET ORAL at 05:08

## 2021-08-20 RX ADMIN — LEVOTHYROXINE SODIUM 50 MCG: 50 TABLET ORAL at 05:08

## 2021-08-20 RX ADMIN — HUMAN ALBUMIN MICROSPHERES AND PERFLUTREN 0.66 MG: 10; .22 INJECTION, SOLUTION INTRAVENOUS at 08:08

## 2021-08-20 RX ADMIN — Medication 250 MG: at 10:08

## 2021-08-20 RX ADMIN — Medication 400 MG: at 10:08

## 2021-08-20 RX ADMIN — ENOXAPARIN SODIUM 40 MG: 100 INJECTION SUBCUTANEOUS at 04:08

## 2021-08-20 RX ADMIN — MELATONIN TAB 3 MG 6 MG: 3 TAB at 01:08

## 2021-08-20 RX ADMIN — MELATONIN TAB 3 MG 6 MG: 3 TAB at 08:08

## 2021-08-20 RX ADMIN — CARBIDOPA AND LEVODOPA 1 TABLET: 25; 100 TABLET ORAL at 09:08

## 2021-08-20 RX ADMIN — CARBIDOPA AND LEVODOPA 1 TABLET: 25; 100 TABLET ORAL at 02:08

## 2021-08-21 LAB
ANION GAP SERPL CALC-SCNC: 9 MMOL/L (ref 8–16)
BASOPHILS # BLD AUTO: 0.01 K/UL (ref 0–0.2)
BASOPHILS NFR BLD: 0.2 % (ref 0–1.9)
BUN SERPL-MCNC: 12 MG/DL (ref 8–23)
CALCIUM SERPL-MCNC: 9.3 MG/DL (ref 8.7–10.5)
CHLORIDE SERPL-SCNC: 102 MMOL/L (ref 95–110)
CO2 SERPL-SCNC: 24 MMOL/L (ref 23–29)
CREAT SERPL-MCNC: 0.9 MG/DL (ref 0.5–1.4)
DIFFERENTIAL METHOD: ABNORMAL
EOSINOPHIL # BLD AUTO: 0.1 K/UL (ref 0–0.5)
EOSINOPHIL NFR BLD: 2.9 % (ref 0–8)
ERYTHROCYTE [DISTWIDTH] IN BLOOD BY AUTOMATED COUNT: 13 % (ref 11.5–14.5)
EST. GFR  (AFRICAN AMERICAN): >60 ML/MIN/1.73 M^2
EST. GFR  (NON AFRICAN AMERICAN): >60 ML/MIN/1.73 M^2
GLUCOSE SERPL-MCNC: 133 MG/DL (ref 70–110)
HCT VFR BLD AUTO: 32.2 % (ref 37–48.5)
HGB BLD-MCNC: 10 G/DL (ref 12–16)
IMM GRANULOCYTES # BLD AUTO: 0.01 K/UL (ref 0–0.04)
IMM GRANULOCYTES NFR BLD AUTO: 0.2 % (ref 0–0.5)
LYMPHOCYTES # BLD AUTO: 1.3 K/UL (ref 1–4.8)
LYMPHOCYTES NFR BLD: 28.2 % (ref 18–48)
MAGNESIUM SERPL-MCNC: 1.7 MG/DL (ref 1.6–2.6)
MCH RBC QN AUTO: 31.7 PG (ref 27–31)
MCHC RBC AUTO-ENTMCNC: 31.1 G/DL (ref 32–36)
MCV RBC AUTO: 102 FL (ref 82–98)
MONOCYTES # BLD AUTO: 0.6 K/UL (ref 0.3–1)
MONOCYTES NFR BLD: 13.3 % (ref 4–15)
NEUTROPHILS # BLD AUTO: 2.5 K/UL (ref 1.8–7.7)
NEUTROPHILS NFR BLD: 55.2 % (ref 38–73)
NRBC BLD-RTO: 0 /100 WBC
PHOSPHATE SERPL-MCNC: 2.1 MG/DL (ref 2.7–4.5)
PLATELET # BLD AUTO: 168 K/UL (ref 150–450)
PMV BLD AUTO: 9.3 FL (ref 9.2–12.9)
POTASSIUM SERPL-SCNC: 4.1 MMOL/L (ref 3.5–5.1)
RBC # BLD AUTO: 3.15 M/UL (ref 4–5.4)
SODIUM SERPL-SCNC: 135 MMOL/L (ref 136–145)
WBC # BLD AUTO: 4.5 K/UL (ref 3.9–12.7)

## 2021-08-21 PROCEDURE — 25000003 PHARM REV CODE 250: Performed by: PHYSICIAN ASSISTANT

## 2021-08-21 PROCEDURE — 85025 COMPLETE CBC W/AUTO DIFF WBC: CPT | Performed by: PHYSICIAN ASSISTANT

## 2021-08-21 PROCEDURE — 84100 ASSAY OF PHOSPHORUS: CPT | Performed by: PHYSICIAN ASSISTANT

## 2021-08-21 PROCEDURE — 63600175 PHARM REV CODE 636 W HCPCS: Performed by: PHYSICIAN ASSISTANT

## 2021-08-21 PROCEDURE — 83735 ASSAY OF MAGNESIUM: CPT | Performed by: PHYSICIAN ASSISTANT

## 2021-08-21 PROCEDURE — 99225 PR SUBSEQUENT OBSERVATION CARE,LEVEL II: ICD-10-PCS | Mod: ,,, | Performed by: PHYSICIAN ASSISTANT

## 2021-08-21 PROCEDURE — 11000001 HC ACUTE MED/SURG PRIVATE ROOM

## 2021-08-21 PROCEDURE — 80048 BASIC METABOLIC PNL TOTAL CA: CPT | Performed by: PHYSICIAN ASSISTANT

## 2021-08-21 PROCEDURE — 36415 COLL VENOUS BLD VENIPUNCTURE: CPT | Performed by: PHYSICIAN ASSISTANT

## 2021-08-21 PROCEDURE — 96372 THER/PROPH/DIAG INJ SC/IM: CPT

## 2021-08-21 PROCEDURE — 99225 PR SUBSEQUENT OBSERVATION CARE,LEVEL II: CPT | Mod: ,,, | Performed by: PHYSICIAN ASSISTANT

## 2021-08-21 RX ORDER — AMLODIPINE BESYLATE 5 MG/1
5 TABLET ORAL NIGHTLY
Status: DISCONTINUED | OUTPATIENT
Start: 2021-08-22 | End: 2021-08-22 | Stop reason: HOSPADM

## 2021-08-21 RX ADMIN — AMLODIPINE BESYLATE 5 MG: 5 TABLET ORAL at 09:08

## 2021-08-21 RX ADMIN — LEVOTHYROXINE SODIUM 50 MCG: 50 TABLET ORAL at 05:08

## 2021-08-21 RX ADMIN — ENOXAPARIN SODIUM 40 MG: 100 INJECTION SUBCUTANEOUS at 05:08

## 2021-08-21 RX ADMIN — Medication 250 MG: at 09:08

## 2021-08-21 RX ADMIN — Medication 400 MG: at 09:08

## 2021-08-21 RX ADMIN — CARBIDOPA AND LEVODOPA 1 TABLET: 25; 100 TABLET ORAL at 09:08

## 2021-08-21 RX ADMIN — CARBIDOPA AND LEVODOPA 1 TABLET: 25; 100 TABLET ORAL at 02:08

## 2021-08-21 RX ADMIN — CARBIDOPA AND LEVODOPA 1 TABLET: 25; 100 TABLET ORAL at 05:08

## 2021-08-22 VITALS
OXYGEN SATURATION: 99 % | WEIGHT: 199.94 LBS | HEART RATE: 90 BPM | TEMPERATURE: 98 F | BODY MASS INDEX: 27.89 KG/M2 | SYSTOLIC BLOOD PRESSURE: 144 MMHG | DIASTOLIC BLOOD PRESSURE: 64 MMHG | RESPIRATION RATE: 18 BRPM

## 2021-08-22 LAB
ANION GAP SERPL CALC-SCNC: 10 MMOL/L (ref 8–16)
BASOPHILS # BLD AUTO: 0.01 K/UL (ref 0–0.2)
BASOPHILS NFR BLD: 0.2 % (ref 0–1.9)
BUN SERPL-MCNC: 13 MG/DL (ref 8–23)
CALCIUM SERPL-MCNC: 9.2 MG/DL (ref 8.7–10.5)
CHLORIDE SERPL-SCNC: 101 MMOL/L (ref 95–110)
CO2 SERPL-SCNC: 25 MMOL/L (ref 23–29)
CREAT SERPL-MCNC: 0.9 MG/DL (ref 0.5–1.4)
DIFFERENTIAL METHOD: ABNORMAL
EOSINOPHIL # BLD AUTO: 0.1 K/UL (ref 0–0.5)
EOSINOPHIL NFR BLD: 2.2 % (ref 0–8)
ERYTHROCYTE [DISTWIDTH] IN BLOOD BY AUTOMATED COUNT: 13 % (ref 11.5–14.5)
EST. GFR  (AFRICAN AMERICAN): >60 ML/MIN/1.73 M^2
EST. GFR  (NON AFRICAN AMERICAN): >60 ML/MIN/1.73 M^2
GLUCOSE SERPL-MCNC: 118 MG/DL (ref 70–110)
HCT VFR BLD AUTO: 31.1 % (ref 37–48.5)
HGB BLD-MCNC: 9.7 G/DL (ref 12–16)
IMM GRANULOCYTES # BLD AUTO: 0.01 K/UL (ref 0–0.04)
IMM GRANULOCYTES NFR BLD AUTO: 0.2 % (ref 0–0.5)
LYMPHOCYTES # BLD AUTO: 0.9 K/UL (ref 1–4.8)
LYMPHOCYTES NFR BLD: 22.8 % (ref 18–48)
MAGNESIUM SERPL-MCNC: 1.7 MG/DL (ref 1.6–2.6)
MCH RBC QN AUTO: 31.9 PG (ref 27–31)
MCHC RBC AUTO-ENTMCNC: 31.2 G/DL (ref 32–36)
MCV RBC AUTO: 102 FL (ref 82–98)
MONOCYTES # BLD AUTO: 0.6 K/UL (ref 0.3–1)
MONOCYTES NFR BLD: 13.8 % (ref 4–15)
NEUTROPHILS # BLD AUTO: 2.5 K/UL (ref 1.8–7.7)
NEUTROPHILS NFR BLD: 60.8 % (ref 38–73)
NRBC BLD-RTO: 0 /100 WBC
PHOSPHATE SERPL-MCNC: 2.5 MG/DL (ref 2.7–4.5)
PLATELET # BLD AUTO: 159 K/UL (ref 150–450)
PMV BLD AUTO: 10.2 FL (ref 9.2–12.9)
POTASSIUM SERPL-SCNC: 4 MMOL/L (ref 3.5–5.1)
RBC # BLD AUTO: 3.04 M/UL (ref 4–5.4)
SODIUM SERPL-SCNC: 136 MMOL/L (ref 136–145)
WBC # BLD AUTO: 4.13 K/UL (ref 3.9–12.7)

## 2021-08-22 PROCEDURE — 83735 ASSAY OF MAGNESIUM: CPT | Performed by: PHYSICIAN ASSISTANT

## 2021-08-22 PROCEDURE — 85025 COMPLETE CBC W/AUTO DIFF WBC: CPT | Performed by: PHYSICIAN ASSISTANT

## 2021-08-22 PROCEDURE — 36415 COLL VENOUS BLD VENIPUNCTURE: CPT | Performed by: PHYSICIAN ASSISTANT

## 2021-08-22 PROCEDURE — 25000003 PHARM REV CODE 250: Performed by: PHYSICIAN ASSISTANT

## 2021-08-22 PROCEDURE — 80048 BASIC METABOLIC PNL TOTAL CA: CPT | Performed by: PHYSICIAN ASSISTANT

## 2021-08-22 PROCEDURE — 99239 HOSP IP/OBS DSCHRG MGMT >30: CPT | Mod: ,,, | Performed by: PHYSICIAN ASSISTANT

## 2021-08-22 PROCEDURE — S0166 INJ OLANZAPINE 2.5MG: HCPCS | Performed by: PHYSICIAN ASSISTANT

## 2021-08-22 PROCEDURE — 84100 ASSAY OF PHOSPHORUS: CPT | Performed by: PHYSICIAN ASSISTANT

## 2021-08-22 PROCEDURE — 99239 PR HOSPITAL DISCHARGE DAY,>30 MIN: ICD-10-PCS | Mod: ,,, | Performed by: PHYSICIAN ASSISTANT

## 2021-08-22 RX ORDER — AMLODIPINE BESYLATE 5 MG/1
5 TABLET ORAL NIGHTLY
Qty: 30 TABLET | Refills: 11 | Status: SHIPPED | OUTPATIENT
Start: 2021-08-22 | End: 2022-08-22

## 2021-08-22 RX ORDER — OLANZAPINE 10 MG/2ML
2.5 INJECTION, POWDER, FOR SOLUTION INTRAMUSCULAR ONCE
Status: COMPLETED | OUTPATIENT
Start: 2021-08-22 | End: 2021-08-22

## 2021-08-22 RX ORDER — OLANZAPINE 10 MG/2ML
2.5 INJECTION, POWDER, FOR SOLUTION INTRAMUSCULAR ONCE AS NEEDED
Status: DISCONTINUED | OUTPATIENT
Start: 2021-08-22 | End: 2021-08-22 | Stop reason: HOSPADM

## 2021-08-22 RX ADMIN — OLANZAPINE 2.5 MG: 10 INJECTION, POWDER, LYOPHILIZED, FOR SOLUTION INTRAMUSCULAR at 03:08

## 2021-08-22 RX ADMIN — CARBIDOPA AND LEVODOPA 1 TABLET: 25; 100 TABLET ORAL at 09:08

## 2021-08-22 RX ADMIN — Medication 250 MG: at 09:08

## 2021-08-22 RX ADMIN — CARBIDOPA AND LEVODOPA 1 TABLET: 25; 100 TABLET ORAL at 06:08

## 2021-08-22 RX ADMIN — LEVOTHYROXINE SODIUM 50 MCG: 50 TABLET ORAL at 06:08

## 2021-08-22 RX ADMIN — Medication 400 MG: at 09:08

## 2021-08-22 RX ADMIN — MELATONIN TAB 3 MG 6 MG: 3 TAB at 12:08

## 2021-08-22 RX ADMIN — CARBIDOPA AND LEVODOPA 1 TABLET: 25; 100 TABLET ORAL at 02:08

## 2021-08-23 ENCOUNTER — TELEPHONE (OUTPATIENT)
Dept: UROLOGY | Facility: CLINIC | Age: 76
End: 2021-08-23

## 2021-08-24 ENCOUNTER — PATIENT OUTREACH (OUTPATIENT)
Dept: ADMINISTRATIVE | Facility: CLINIC | Age: 76
End: 2021-08-24

## 2021-08-24 PROCEDURE — G0180 PR HOME HEALTH MD CERTIFICATION: ICD-10-PCS | Mod: ,,, | Performed by: INTERNAL MEDICINE

## 2021-08-24 PROCEDURE — G0180 MD CERTIFICATION HHA PATIENT: HCPCS | Mod: ,,, | Performed by: INTERNAL MEDICINE

## 2021-08-24 RX ORDER — PNV NO.95/FERROUS FUM/FOLIC AC 28MG-0.8MG
1000 TABLET ORAL DAILY
COMMUNITY
End: 2021-09-21

## 2021-08-26 ENCOUNTER — OFFICE VISIT (OUTPATIENT)
Dept: HOME HEALTH SERVICES | Facility: CLINIC | Age: 76
End: 2021-08-26
Payer: MEDICARE

## 2021-08-26 DIAGNOSIS — R55 SYNCOPE, UNSPECIFIED SYNCOPE TYPE: ICD-10-CM

## 2021-08-26 DIAGNOSIS — R33.9 URINARY RETENTION: ICD-10-CM

## 2021-08-26 DIAGNOSIS — G20.A1 PARKINSON'S DISEASE: ICD-10-CM

## 2021-08-26 DIAGNOSIS — E03.9 ACQUIRED HYPOTHYROIDISM: ICD-10-CM

## 2021-08-26 DIAGNOSIS — R29.898 BILATERAL LEG WEAKNESS: ICD-10-CM

## 2021-08-26 DIAGNOSIS — L97.509 ULCER OF TOE, UNSPECIFIED LATERALITY, UNSPECIFIED ULCER STAGE: ICD-10-CM

## 2021-08-26 DIAGNOSIS — L89.92 PRESSURE INJURY, STAGE 2, UNSPECIFIED LOCATION: ICD-10-CM

## 2021-08-26 PROCEDURE — 1160F RVW MEDS BY RX/DR IN RCRD: CPT | Mod: CPTII,S$GLB,, | Performed by: NURSE PRACTITIONER

## 2021-08-26 PROCEDURE — 99496 TRANSJ CARE MGMT HIGH F2F 7D: CPT | Mod: S$GLB,,, | Performed by: NURSE PRACTITIONER

## 2021-08-26 PROCEDURE — 3288F FALL RISK ASSESSMENT DOCD: CPT | Mod: CPTII,S$GLB,, | Performed by: NURSE PRACTITIONER

## 2021-08-26 PROCEDURE — 3077F SYST BP >= 140 MM HG: CPT | Mod: CPTII,S$GLB,, | Performed by: NURSE PRACTITIONER

## 2021-08-26 PROCEDURE — 1101F PT FALLS ASSESS-DOCD LE1/YR: CPT | Mod: CPTII,S$GLB,, | Performed by: NURSE PRACTITIONER

## 2021-08-26 PROCEDURE — 1101F PR PT FALLS ASSESS DOC 0-1 FALLS W/OUT INJ PAST YR: ICD-10-PCS | Mod: CPTII,S$GLB,, | Performed by: NURSE PRACTITIONER

## 2021-08-26 PROCEDURE — 3288F PR FALLS RISK ASSESSMENT DOCUMENTED: ICD-10-PCS | Mod: CPTII,S$GLB,, | Performed by: NURSE PRACTITIONER

## 2021-08-26 PROCEDURE — 99497 PR ADVNCD CARE PLAN 30 MIN: ICD-10-PCS | Mod: S$GLB,,, | Performed by: NURSE PRACTITIONER

## 2021-08-26 PROCEDURE — 3080F DIAST BP >= 90 MM HG: CPT | Mod: CPTII,S$GLB,, | Performed by: NURSE PRACTITIONER

## 2021-08-26 PROCEDURE — 1160F PR REVIEW ALL MEDS BY PRESCRIBER/CLIN PHARMACIST DOCUMENTED: ICD-10-PCS | Mod: CPTII,S$GLB,, | Performed by: NURSE PRACTITIONER

## 2021-08-26 PROCEDURE — 3080F PR MOST RECENT DIASTOLIC BLOOD PRESSURE >= 90 MM HG: ICD-10-PCS | Mod: CPTII,S$GLB,, | Performed by: NURSE PRACTITIONER

## 2021-08-26 PROCEDURE — 1159F MED LIST DOCD IN RCRD: CPT | Mod: CPTII,S$GLB,, | Performed by: NURSE PRACTITIONER

## 2021-08-26 PROCEDURE — 1126F AMNT PAIN NOTED NONE PRSNT: CPT | Mod: CPTII,S$GLB,, | Performed by: NURSE PRACTITIONER

## 2021-08-26 PROCEDURE — 99497 ADVNCD CARE PLAN 30 MIN: CPT | Mod: S$GLB,,, | Performed by: NURSE PRACTITIONER

## 2021-08-26 PROCEDURE — 3077F PR MOST RECENT SYSTOLIC BLOOD PRESSURE >= 140 MM HG: ICD-10-PCS | Mod: CPTII,S$GLB,, | Performed by: NURSE PRACTITIONER

## 2021-08-26 PROCEDURE — 99496 TRANSITIONAL CARE MANAGE SERVICE 7 DAY DISCHARGE: ICD-10-PCS | Mod: S$GLB,,, | Performed by: NURSE PRACTITIONER

## 2021-08-26 PROCEDURE — 1159F PR MEDICATION LIST DOCUMENTED IN MEDICAL RECORD: ICD-10-PCS | Mod: CPTII,S$GLB,, | Performed by: NURSE PRACTITIONER

## 2021-08-26 PROCEDURE — 1126F PR PAIN SEVERITY QUANTIFIED, NO PAIN PRESENT: ICD-10-PCS | Mod: CPTII,S$GLB,, | Performed by: NURSE PRACTITIONER

## 2021-08-27 ENCOUNTER — OFFICE VISIT (OUTPATIENT)
Dept: NEUROLOGY | Facility: CLINIC | Age: 76
End: 2021-08-27
Payer: MEDICAID

## 2021-08-27 DIAGNOSIS — G20.A1 PARKINSON'S DISEASE: Primary | ICD-10-CM

## 2021-08-27 PROCEDURE — 99499 NO LOS: ICD-10-PCS | Mod: 95,,, | Performed by: PSYCHIATRY & NEUROLOGY

## 2021-08-27 PROCEDURE — 99499 UNLISTED E&M SERVICE: CPT | Mod: 95,,, | Performed by: PSYCHIATRY & NEUROLOGY

## 2021-09-01 ENCOUNTER — PATIENT MESSAGE (OUTPATIENT)
Dept: PSYCHIATRY | Facility: CLINIC | Age: 76
End: 2021-09-01

## 2021-09-02 ENCOUNTER — PATIENT MESSAGE (OUTPATIENT)
Dept: PSYCHIATRY | Facility: CLINIC | Age: 76
End: 2021-09-02

## 2021-09-03 ENCOUNTER — PATIENT MESSAGE (OUTPATIENT)
Dept: NEUROLOGY | Facility: CLINIC | Age: 76
End: 2021-09-03

## 2021-09-09 ENCOUNTER — OFFICE VISIT (OUTPATIENT)
Dept: UROLOGY | Facility: CLINIC | Age: 76
End: 2021-09-09
Payer: MEDICARE

## 2021-09-09 VITALS — DIASTOLIC BLOOD PRESSURE: 68 MMHG | HEART RATE: 78 BPM | SYSTOLIC BLOOD PRESSURE: 104 MMHG

## 2021-09-09 DIAGNOSIS — Z73.6 LIMITATION OF ACTIVITY DUE TO DISABILITY: Primary | ICD-10-CM

## 2021-09-09 DIAGNOSIS — R55 SYNCOPE, UNSPECIFIED SYNCOPE TYPE: ICD-10-CM

## 2021-09-09 DIAGNOSIS — R33.9 URINARY RETENTION: ICD-10-CM

## 2021-09-09 PROCEDURE — 99213 OFFICE O/P EST LOW 20 MIN: CPT | Mod: PBBFAC | Performed by: NURSE PRACTITIONER

## 2021-09-09 PROCEDURE — 51702 INSERT TEMP BLADDER CATH: CPT | Mod: PBBFAC | Performed by: NURSE PRACTITIONER

## 2021-09-09 PROCEDURE — 51702 PR INSERTION OF TEMPORARY INDWELLING BLADDER CATHETER, SIMPLE: ICD-10-PCS | Mod: S$GLB,,, | Performed by: NURSE PRACTITIONER

## 2021-09-09 PROCEDURE — 99999 PR PBB SHADOW E&M-EST. PATIENT-LVL III: ICD-10-PCS | Mod: PBBFAC,,, | Performed by: NURSE PRACTITIONER

## 2021-09-09 PROCEDURE — 51702 INSERT TEMP BLADDER CATH: CPT | Mod: S$GLB,,, | Performed by: NURSE PRACTITIONER

## 2021-09-09 PROCEDURE — 99213 PR OFFICE/OUTPT VISIT, EST, LEVL III, 20-29 MIN: ICD-10-PCS | Mod: 25,S$GLB,, | Performed by: NURSE PRACTITIONER

## 2021-09-09 PROCEDURE — 99999 PR PBB SHADOW E&M-EST. PATIENT-LVL III: CPT | Mod: PBBFAC,,, | Performed by: NURSE PRACTITIONER

## 2021-09-09 PROCEDURE — 99213 OFFICE O/P EST LOW 20 MIN: CPT | Mod: 25,S$GLB,, | Performed by: NURSE PRACTITIONER

## 2021-09-16 ENCOUNTER — CARE AT HOME (OUTPATIENT)
Dept: HOME HEALTH SERVICES | Facility: CLINIC | Age: 76
End: 2021-09-16
Payer: MEDICARE

## 2021-09-16 DIAGNOSIS — L89.92 PRESSURE INJURY, STAGE 2, UNSPECIFIED LOCATION: ICD-10-CM

## 2021-09-16 DIAGNOSIS — K59.00 CONSTIPATION, UNSPECIFIED CONSTIPATION TYPE: ICD-10-CM

## 2021-09-16 DIAGNOSIS — R33.9 URINARY RETENTION: ICD-10-CM

## 2021-09-16 DIAGNOSIS — R53.81 DEBILITY: ICD-10-CM

## 2021-09-16 DIAGNOSIS — G20.A1 PARKINSON'S DISEASE: ICD-10-CM

## 2021-09-16 DIAGNOSIS — R41.82 ALTERED MENTAL STATUS, UNSPECIFIED ALTERED MENTAL STATUS TYPE: ICD-10-CM

## 2021-09-16 PROCEDURE — 99350 PR HOME VISIT,ESTAB PATIENT,LEVEL IV: ICD-10-PCS | Mod: ,,, | Performed by: NURSE PRACTITIONER

## 2021-09-16 PROCEDURE — 1111F DSCHRG MED/CURRENT MED MERGE: CPT | Mod: CPTII,,, | Performed by: NURSE PRACTITIONER

## 2021-09-16 PROCEDURE — 1111F PR DISCHARGE MEDS RECONCILED W/ CURRENT OUTPATIENT MED LIST: ICD-10-PCS | Mod: CPTII,,, | Performed by: NURSE PRACTITIONER

## 2021-09-16 PROCEDURE — 99350 HOME/RES VST EST HIGH MDM 60: CPT | Mod: ,,, | Performed by: NURSE PRACTITIONER

## 2021-09-16 RX ORDER — CIPROFLOXACIN 500 MG/1
500 TABLET ORAL 2 TIMES DAILY
Qty: 14 TABLET | Refills: 0 | Status: SHIPPED | OUTPATIENT
Start: 2021-09-16 | End: 2021-09-23

## 2021-09-18 VITALS
TEMPERATURE: 98 F | SYSTOLIC BLOOD PRESSURE: 136 MMHG | RESPIRATION RATE: 18 BRPM | OXYGEN SATURATION: 97 % | DIASTOLIC BLOOD PRESSURE: 81 MMHG | HEART RATE: 68 BPM

## 2021-09-18 PROBLEM — K59.00 CONSTIPATION: Status: ACTIVE | Noted: 2021-09-18

## 2021-09-18 PROBLEM — R53.81 DEBILITY: Status: ACTIVE | Noted: 2021-09-18

## 2021-09-18 PROBLEM — R41.82 ALTERED MENTAL STATE: Status: ACTIVE | Noted: 2021-09-18

## 2021-09-20 ENCOUNTER — EXTERNAL HOME HEALTH (OUTPATIENT)
Dept: HOME HEALTH SERVICES | Facility: HOSPITAL | Age: 76
End: 2021-09-20
Payer: MEDICARE

## 2021-09-20 PROCEDURE — 81001 URINALYSIS AUTO W/SCOPE: CPT | Performed by: NURSE PRACTITIONER

## 2021-09-20 PROCEDURE — 87086 URINE CULTURE/COLONY COUNT: CPT | Performed by: NURSE PRACTITIONER

## 2021-09-21 ENCOUNTER — LAB VISIT (OUTPATIENT)
Dept: LAB | Facility: HOSPITAL | Age: 76
End: 2021-09-21
Attending: NURSE PRACTITIONER
Payer: MEDICARE

## 2021-09-21 VITALS
HEART RATE: 81 BPM | OXYGEN SATURATION: 99 % | RESPIRATION RATE: 20 BRPM | SYSTOLIC BLOOD PRESSURE: 155 MMHG | TEMPERATURE: 98 F | DIASTOLIC BLOOD PRESSURE: 95 MMHG

## 2021-09-21 DIAGNOSIS — R33.9 RETENTION OF URINE, UNSPECIFIED: ICD-10-CM

## 2021-09-21 DIAGNOSIS — N39.0 URINARY TRACT INFECTION, SITE NOT SPECIFIED: Primary | ICD-10-CM

## 2021-09-21 LAB
BACTERIA #/AREA URNS AUTO: ABNORMAL /HPF
BILIRUB UR QL STRIP: NEGATIVE
CLARITY UR REFRACT.AUTO: ABNORMAL
COLOR UR AUTO: ABNORMAL
GLUCOSE UR QL STRIP: NEGATIVE
HGB UR QL STRIP: NEGATIVE
KETONES UR QL STRIP: NEGATIVE
LEUKOCYTE ESTERASE UR QL STRIP: ABNORMAL
MICROSCOPIC COMMENT: ABNORMAL
NITRITE UR QL STRIP: POSITIVE
PH UR STRIP: 5 [PH] (ref 5–8)
PROT UR QL STRIP: NEGATIVE
RBC #/AREA URNS AUTO: 2 /HPF (ref 0–4)
SP GR UR STRIP: 1.02 (ref 1–1.03)
SQUAMOUS #/AREA URNS AUTO: 2 /HPF
URN SPEC COLLECT METH UR: ABNORMAL
WBC #/AREA URNS AUTO: 15 /HPF (ref 0–5)

## 2021-09-22 ENCOUNTER — CARE AT HOME (OUTPATIENT)
Dept: HOME HEALTH SERVICES | Facility: CLINIC | Age: 76
End: 2021-09-22
Payer: MEDICARE

## 2021-09-22 DIAGNOSIS — L97.509 ULCER OF TOE, UNSPECIFIED LATERALITY, UNSPECIFIED ULCER STAGE: ICD-10-CM

## 2021-09-22 DIAGNOSIS — R53.81 DEBILITY: ICD-10-CM

## 2021-09-22 DIAGNOSIS — F03.91 DEMENTIA WITH BEHAVIORAL DISTURBANCE, UNSPECIFIED DEMENTIA TYPE: ICD-10-CM

## 2021-09-22 DIAGNOSIS — R33.9 URINARY RETENTION: ICD-10-CM

## 2021-09-22 DIAGNOSIS — L89.92 PRESSURE INJURY, STAGE 2, UNSPECIFIED LOCATION: ICD-10-CM

## 2021-09-22 DIAGNOSIS — R41.82 ALTERED MENTAL STATUS, UNSPECIFIED ALTERED MENTAL STATUS TYPE: ICD-10-CM

## 2021-09-22 LAB
BACTERIA UR CULT: NORMAL
BACTERIA UR CULT: NORMAL

## 2021-09-22 PROCEDURE — 99350 HOME/RES VST EST HIGH MDM 60: CPT | Mod: S$GLB,,, | Performed by: NURSE PRACTITIONER

## 2021-09-22 PROCEDURE — 99350 PR HOME VISIT,ESTAB PATIENT,LEVEL IV: ICD-10-PCS | Mod: S$GLB,,, | Performed by: NURSE PRACTITIONER

## 2021-09-23 ENCOUNTER — DOCUMENT SCAN (OUTPATIENT)
Dept: HOME HEALTH SERVICES | Facility: HOSPITAL | Age: 76
End: 2021-09-23
Payer: MEDICARE

## 2021-09-23 ENCOUNTER — OFFICE VISIT (OUTPATIENT)
Dept: PODIATRY | Facility: CLINIC | Age: 76
End: 2021-09-23
Payer: MEDICARE

## 2021-09-23 VITALS
BODY MASS INDEX: 27.89 KG/M2 | DIASTOLIC BLOOD PRESSURE: 65 MMHG | WEIGHT: 199.94 LBS | SYSTOLIC BLOOD PRESSURE: 118 MMHG | HEART RATE: 81 BPM

## 2021-09-23 DIAGNOSIS — M20.42 HAMMERTOE, BILATERAL: ICD-10-CM

## 2021-09-23 DIAGNOSIS — M20.41 HAMMERTOE, BILATERAL: ICD-10-CM

## 2021-09-23 DIAGNOSIS — E11.49 TYPE II DIABETES MELLITUS WITH NEUROLOGICAL MANIFESTATIONS: Primary | ICD-10-CM

## 2021-09-23 DIAGNOSIS — L97.512 TOE ULCER, RIGHT, WITH FAT LAYER EXPOSED: ICD-10-CM

## 2021-09-23 DIAGNOSIS — B35.1 ONYCHOMYCOSIS DUE TO DERMATOPHYTE: ICD-10-CM

## 2021-09-23 DIAGNOSIS — L84 CORNS/CALLOSITIES: ICD-10-CM

## 2021-09-23 DIAGNOSIS — Z89.412 LEFT GREAT TOE AMPUTEE: ICD-10-CM

## 2021-09-23 DIAGNOSIS — L97.522 TOE ULCER, LEFT, WITH FAT LAYER EXPOSED: ICD-10-CM

## 2021-09-23 PROCEDURE — 11042 WOUND DEBRIDEMENT: ICD-10-PCS | Mod: 59,S$GLB,, | Performed by: PODIATRIST

## 2021-09-23 PROCEDURE — 3078F DIAST BP <80 MM HG: CPT | Mod: CPTII,S$GLB,, | Performed by: PODIATRIST

## 2021-09-23 PROCEDURE — 11721 PR DEBRIDEMENT OF NAILS, 6 OR MORE: ICD-10-PCS | Mod: 59,Q7,S$GLB, | Performed by: PODIATRIST

## 2021-09-23 PROCEDURE — 1159F MED LIST DOCD IN RCRD: CPT | Mod: CPTII,S$GLB,, | Performed by: PODIATRIST

## 2021-09-23 PROCEDURE — 1125F AMNT PAIN NOTED PAIN PRSNT: CPT | Mod: CPTII,S$GLB,, | Performed by: PODIATRIST

## 2021-09-23 PROCEDURE — 99999 PR PBB SHADOW E&M-EST. PATIENT-LVL III: CPT | Mod: PBBFAC,,, | Performed by: PODIATRIST

## 2021-09-23 PROCEDURE — 99204 OFFICE O/P NEW MOD 45 MIN: CPT | Mod: 25,S$GLB,, | Performed by: PODIATRIST

## 2021-09-23 PROCEDURE — 1160F PR REVIEW ALL MEDS BY PRESCRIBER/CLIN PHARMACIST DOCUMENTED: ICD-10-PCS | Mod: CPTII,S$GLB,, | Performed by: PODIATRIST

## 2021-09-23 PROCEDURE — 3074F PR MOST RECENT SYSTOLIC BLOOD PRESSURE < 130 MM HG: ICD-10-PCS | Mod: CPTII,S$GLB,, | Performed by: PODIATRIST

## 2021-09-23 PROCEDURE — 3074F SYST BP LT 130 MM HG: CPT | Mod: CPTII,S$GLB,, | Performed by: PODIATRIST

## 2021-09-23 PROCEDURE — 99204 PR OFFICE/OUTPT VISIT, NEW, LEVL IV, 45-59 MIN: ICD-10-PCS | Mod: 25,S$GLB,, | Performed by: PODIATRIST

## 2021-09-23 PROCEDURE — 99999 PR PBB SHADOW E&M-EST. PATIENT-LVL III: ICD-10-PCS | Mod: PBBFAC,,, | Performed by: PODIATRIST

## 2021-09-23 PROCEDURE — 3078F PR MOST RECENT DIASTOLIC BLOOD PRESSURE < 80 MM HG: ICD-10-PCS | Mod: CPTII,S$GLB,, | Performed by: PODIATRIST

## 2021-09-23 PROCEDURE — 11056 PARNG/CUTG B9 HYPRKR LES 2-4: CPT | Mod: Q7,S$GLB,, | Performed by: PODIATRIST

## 2021-09-23 PROCEDURE — 11721 DEBRIDE NAIL 6 OR MORE: CPT | Mod: 59,Q7,S$GLB, | Performed by: PODIATRIST

## 2021-09-23 PROCEDURE — 11056 ROUTINE FOOT CARE: ICD-10-PCS | Mod: Q7,S$GLB,, | Performed by: PODIATRIST

## 2021-09-23 PROCEDURE — 11042 DBRDMT SUBQ TIS 1ST 20SQCM/<: CPT | Mod: 59,S$GLB,, | Performed by: PODIATRIST

## 2021-09-23 PROCEDURE — 1159F PR MEDICATION LIST DOCUMENTED IN MEDICAL RECORD: ICD-10-PCS | Mod: CPTII,S$GLB,, | Performed by: PODIATRIST

## 2021-09-23 PROCEDURE — 1160F RVW MEDS BY RX/DR IN RCRD: CPT | Mod: CPTII,S$GLB,, | Performed by: PODIATRIST

## 2021-09-23 PROCEDURE — 1125F PR PAIN SEVERITY QUANTIFIED, PAIN PRESENT: ICD-10-PCS | Mod: CPTII,S$GLB,, | Performed by: PODIATRIST

## 2021-09-24 VITALS
DIASTOLIC BLOOD PRESSURE: 70 MMHG | TEMPERATURE: 98 F | OXYGEN SATURATION: 99 % | HEART RATE: 77 BPM | SYSTOLIC BLOOD PRESSURE: 123 MMHG | RESPIRATION RATE: 20 BRPM

## 2021-10-03 ENCOUNTER — DOCUMENT SCAN (OUTPATIENT)
Dept: HOME HEALTH SERVICES | Facility: HOSPITAL | Age: 76
End: 2021-10-03
Payer: MEDICARE

## 2021-10-06 ENCOUNTER — DOCUMENT SCAN (OUTPATIENT)
Dept: HOME HEALTH SERVICES | Facility: HOSPITAL | Age: 76
End: 2021-10-06

## 2021-10-06 ENCOUNTER — DOCUMENT SCAN (OUTPATIENT)
Dept: HOME HEALTH SERVICES | Facility: HOSPITAL | Age: 76
End: 2021-10-06
Payer: MEDICARE

## 2021-10-21 ENCOUNTER — CARE AT HOME (OUTPATIENT)
Dept: HOME HEALTH SERVICES | Facility: CLINIC | Age: 76
End: 2021-10-21
Payer: MEDICARE

## 2021-10-21 VITALS
DIASTOLIC BLOOD PRESSURE: 62 MMHG | OXYGEN SATURATION: 100 % | TEMPERATURE: 99 F | RESPIRATION RATE: 20 BRPM | HEART RATE: 73 BPM | SYSTOLIC BLOOD PRESSURE: 125 MMHG

## 2021-10-21 DIAGNOSIS — E46 MALNUTRITION, UNSPECIFIED TYPE: ICD-10-CM

## 2021-10-21 DIAGNOSIS — R33.9 URINARY RETENTION: ICD-10-CM

## 2021-10-21 DIAGNOSIS — R53.81 DEBILITY: ICD-10-CM

## 2021-10-21 DIAGNOSIS — G20.A1 PARKINSON'S DISEASE: ICD-10-CM

## 2021-10-21 DIAGNOSIS — K59.00 CONSTIPATION, UNSPECIFIED CONSTIPATION TYPE: ICD-10-CM

## 2021-10-21 DIAGNOSIS — R41.82 ALTERED MENTAL STATUS, UNSPECIFIED ALTERED MENTAL STATUS TYPE: ICD-10-CM

## 2021-10-21 DIAGNOSIS — G89.4 CHRONIC PAIN DISORDER: ICD-10-CM

## 2021-10-21 DIAGNOSIS — L98.429 STAGE 3 SKIN ULCER OF SACRAL REGION: Primary | ICD-10-CM

## 2021-10-21 PROCEDURE — 99350 HOME/RES VST EST HIGH MDM 60: CPT | Mod: S$GLB,,, | Performed by: NURSE PRACTITIONER

## 2021-10-21 PROCEDURE — 99497 ADVNCD CARE PLAN 30 MIN: CPT | Mod: S$GLB,,, | Performed by: NURSE PRACTITIONER

## 2021-10-21 PROCEDURE — 99497 PR ADVNCD CARE PLAN 30 MIN: ICD-10-PCS | Mod: S$GLB,,, | Performed by: NURSE PRACTITIONER

## 2021-10-21 PROCEDURE — 99350 PR HOME VISIT,ESTAB PATIENT,LEVEL IV: ICD-10-PCS | Mod: S$GLB,,, | Performed by: NURSE PRACTITIONER

## 2021-10-21 RX ORDER — LACTULOSE 10 G/15ML
20 SOLUTION ORAL 2 TIMES DAILY PRN
Qty: 1 ML | Refills: 0 | Status: SHIPPED | OUTPATIENT
Start: 2021-10-21 | End: 2021-10-31

## 2021-10-22 ENCOUNTER — CARE AT HOME (OUTPATIENT)
Dept: HOME HEALTH SERVICES | Facility: CLINIC | Age: 76
End: 2021-10-22
Payer: MEDICARE

## 2021-10-22 DIAGNOSIS — F03.91 DEMENTIA WITH BEHAVIORAL DISTURBANCE, UNSPECIFIED DEMENTIA TYPE: Primary | ICD-10-CM

## 2021-10-22 DIAGNOSIS — L98.429 STAGE 3 SKIN ULCER OF SACRAL REGION: ICD-10-CM

## 2021-10-22 DIAGNOSIS — Z51.5 PALLIATIVE CARE ENCOUNTER: ICD-10-CM

## 2021-10-22 DIAGNOSIS — G20.A1 PARKINSON'S DISEASE: ICD-10-CM

## 2021-10-22 DIAGNOSIS — R33.9 URINARY RETENTION: ICD-10-CM

## 2021-10-22 PROCEDURE — 99441 PR PHYSICIAN TELEPHONE EVALUATION 5-10 MIN: CPT | Mod: 95,,, | Performed by: NURSE PRACTITIONER

## 2021-10-22 PROCEDURE — 99441 PR PHYSICIAN TELEPHONE EVALUATION 5-10 MIN: ICD-10-PCS | Mod: 95,,, | Performed by: NURSE PRACTITIONER

## 2021-10-23 PROCEDURE — G0179 MD RECERTIFICATION HHA PT: HCPCS | Mod: ,,, | Performed by: INTERNAL MEDICINE

## 2021-10-23 PROCEDURE — G0179 PR HOME HEALTH MD RECERTIFICATION: ICD-10-PCS | Mod: ,,, | Performed by: INTERNAL MEDICINE

## 2021-10-24 PROBLEM — Z51.5 PALLIATIVE CARE ENCOUNTER: Status: ACTIVE | Noted: 2021-10-24

## 2021-10-25 ENCOUNTER — LAB VISIT (OUTPATIENT)
Dept: LAB | Facility: HOSPITAL | Age: 76
End: 2021-10-25
Payer: MEDICARE

## 2021-10-25 DIAGNOSIS — R33.9 RETENTION OF URINE, UNSPECIFIED: Primary | ICD-10-CM

## 2021-10-25 LAB
BACTERIA #/AREA URNS AUTO: ABNORMAL /HPF
BILIRUB UR QL STRIP: NEGATIVE
CLARITY UR REFRACT.AUTO: ABNORMAL
COLOR UR AUTO: YELLOW
GLUCOSE UR QL STRIP: NEGATIVE
HGB UR QL STRIP: ABNORMAL
HYALINE CASTS UR QL AUTO: 0 /LPF
KETONES UR QL STRIP: NEGATIVE
LEUKOCYTE ESTERASE UR QL STRIP: ABNORMAL
MICROSCOPIC COMMENT: ABNORMAL
NITRITE UR QL STRIP: NEGATIVE
PH UR STRIP: 5 [PH] (ref 5–8)
PROT UR QL STRIP: ABNORMAL
RBC #/AREA URNS AUTO: >100 /HPF (ref 0–4)
SP GR UR STRIP: 1.02 (ref 1–1.03)
URN SPEC COLLECT METH UR: ABNORMAL
WBC #/AREA URNS AUTO: >100 /HPF (ref 0–5)
WBC CLUMPS UR QL AUTO: ABNORMAL

## 2021-10-25 PROCEDURE — 87086 URINE CULTURE/COLONY COUNT: CPT | Performed by: NURSE PRACTITIONER

## 2021-10-25 PROCEDURE — 87088 URINE BACTERIA CULTURE: CPT | Performed by: NURSE PRACTITIONER

## 2021-10-25 PROCEDURE — 87077 CULTURE AEROBIC IDENTIFY: CPT | Performed by: NURSE PRACTITIONER

## 2021-10-25 PROCEDURE — 87186 SC STD MICRODIL/AGAR DIL: CPT | Performed by: NURSE PRACTITIONER

## 2021-10-25 PROCEDURE — 81001 URINALYSIS AUTO W/SCOPE: CPT | Performed by: NURSE PRACTITIONER

## 2021-10-26 ENCOUNTER — TELEPHONE (OUTPATIENT)
Dept: HOME HEALTH SERVICES | Facility: CLINIC | Age: 76
End: 2021-10-26
Payer: MEDICARE

## 2021-10-26 DIAGNOSIS — N39.0 URINARY TRACT INFECTION WITHOUT HEMATURIA, SITE UNSPECIFIED: Primary | ICD-10-CM

## 2021-10-26 RX ORDER — CIPROFLOXACIN 500 MG/1
500 TABLET ORAL EVERY 12 HOURS
Qty: 14 TABLET | Refills: 0 | Status: SHIPPED | OUTPATIENT
Start: 2021-10-26 | End: 2021-11-02

## 2021-10-27 ENCOUNTER — TELEPHONE (OUTPATIENT)
Dept: HOME HEALTH SERVICES | Facility: CLINIC | Age: 76
End: 2021-10-27
Payer: MEDICARE

## 2021-10-27 DIAGNOSIS — N39.0 URINARY TRACT INFECTION WITHOUT HEMATURIA, SITE UNSPECIFIED: Primary | ICD-10-CM

## 2021-10-27 LAB
BACTERIA UR CULT: ABNORMAL
BACTERIA UR CULT: ABNORMAL

## 2021-10-27 RX ORDER — AMOXICILLIN AND CLAVULANATE POTASSIUM 875; 125 MG/1; MG/1
1 TABLET, FILM COATED ORAL EVERY 12 HOURS
Qty: 14 TABLET | Refills: 0 | Status: SHIPPED | OUTPATIENT
Start: 2021-10-27 | End: 2021-11-03

## 2021-10-28 ENCOUNTER — DOCUMENT SCAN (OUTPATIENT)
Dept: HOME HEALTH SERVICES | Facility: HOSPITAL | Age: 76
End: 2021-10-28
Payer: MEDICARE

## 2021-10-28 ENCOUNTER — TELEPHONE (OUTPATIENT)
Dept: HOME HEALTH SERVICES | Facility: CLINIC | Age: 76
End: 2021-10-28
Payer: MEDICARE

## 2021-10-28 ENCOUNTER — EXTERNAL HOME HEALTH (OUTPATIENT)
Dept: HOME HEALTH SERVICES | Facility: HOSPITAL | Age: 76
End: 2021-10-28
Payer: MEDICARE

## 2021-11-03 ENCOUNTER — DOCUMENT SCAN (OUTPATIENT)
Dept: HOME HEALTH SERVICES | Facility: HOSPITAL | Age: 76
End: 2021-11-03
Payer: MEDICARE

## 2022-01-26 ENCOUNTER — DOCUMENT SCAN (OUTPATIENT)
Dept: HOME HEALTH SERVICES | Facility: HOSPITAL | Age: 77
End: 2022-01-26
Payer: MEDICARE

## 2022-02-21 ENCOUNTER — PATIENT MESSAGE (OUTPATIENT)
Dept: NEUROLOGY | Facility: CLINIC | Age: 77
End: 2022-02-21
Payer: MEDICARE

## 2022-05-05 ENCOUNTER — PATIENT MESSAGE (OUTPATIENT)
Dept: RESEARCH | Facility: HOSPITAL | Age: 77
End: 2022-05-05
Payer: MEDICARE

## 2022-09-15 ENCOUNTER — HOSPITAL ENCOUNTER (EMERGENCY)
Age: 77
Discharge: HOME | End: 2022-09-15
Attending: EMERGENCY MEDICINE
Payer: MEDICARE

## 2022-09-15 ENCOUNTER — APPOINTMENT (OUTPATIENT)
Dept: GENERAL RADIOLOGY | Age: 77
End: 2022-09-15
Payer: MEDICARE

## 2022-09-15 VITALS
WEIGHT: 159 LBS | HEART RATE: 77 BPM | BODY MASS INDEX: 27.14 KG/M2 | DIASTOLIC BLOOD PRESSURE: 55 MMHG | SYSTOLIC BLOOD PRESSURE: 122 MMHG | OXYGEN SATURATION: 99 % | HEIGHT: 64 IN | TEMPERATURE: 97.1 F | RESPIRATION RATE: 16 BRPM

## 2022-09-15 DIAGNOSIS — U07.1 COVID-19: Primary | ICD-10-CM

## 2022-09-15 LAB
SARS-COV-2, NAAT: DETECTED
SOURCE: ABNORMAL

## 2022-09-15 PROCEDURE — 99284 EMERGENCY DEPT VISIT MOD MDM: CPT

## 2022-09-15 PROCEDURE — 87635 SARS-COV-2 COVID-19 AMP PRB: CPT

## 2022-09-15 PROCEDURE — 71045 X-RAY EXAM CHEST 1 VIEW: CPT

## 2022-09-15 PROCEDURE — 6370000000 HC RX 637 (ALT 250 FOR IP): Performed by: EMERGENCY MEDICINE

## 2022-09-15 RX ORDER — ANASTROZOLE 1 MG/1
TABLET ORAL
COMMUNITY
Start: 2020-03-27

## 2022-09-15 RX ORDER — GUAIFENESIN/DEXTROMETHORPHAN 100-10MG/5
5 SYRUP ORAL 4 TIMES DAILY PRN
Qty: 120 ML | Refills: 0 | Status: SHIPPED | OUTPATIENT
Start: 2022-09-15 | End: 2022-09-25

## 2022-09-15 RX ORDER — NABUMETONE 750 MG/1
TABLET, FILM COATED ORAL
COMMUNITY
Start: 2019-11-18

## 2022-09-15 RX ORDER — BENAZEPRIL/HYDROCHLOROTHIAZIDE 20 MG-25MG
TABLET ORAL
COMMUNITY
Start: 2022-06-06

## 2022-09-15 RX ORDER — AMLODIPINE BESYLATE 5 MG/1
TABLET ORAL
COMMUNITY
Start: 2022-07-21

## 2022-09-15 RX ORDER — BENZONATATE 100 MG/1
100 CAPSULE ORAL 3 TIMES DAILY PRN
Qty: 30 CAPSULE | Refills: 0 | Status: SHIPPED | OUTPATIENT
Start: 2022-09-15 | End: 2022-09-25

## 2022-09-15 RX ORDER — ONDANSETRON 4 MG/1
4 TABLET, ORALLY DISINTEGRATING ORAL ONCE
Status: COMPLETED | OUTPATIENT
Start: 2022-09-15 | End: 2022-09-15

## 2022-09-15 RX ORDER — ONDANSETRON 4 MG/1
4 TABLET, ORALLY DISINTEGRATING ORAL EVERY 8 HOURS PRN
Qty: 15 TABLET | Refills: 0 | Status: SHIPPED | OUTPATIENT
Start: 2022-09-15

## 2022-09-15 RX ADMIN — ONDANSETRON 4 MG: 4 TABLET, ORALLY DISINTEGRATING ORAL at 11:19

## 2022-09-15 ASSESSMENT — PAIN DESCRIPTION - DESCRIPTORS: DESCRIPTORS: ACHING

## 2022-09-15 ASSESSMENT — PAIN - FUNCTIONAL ASSESSMENT
PAIN_FUNCTIONAL_ASSESSMENT: NONE - DENIES PAIN
PAIN_FUNCTIONAL_ASSESSMENT: 0-10

## 2022-09-15 ASSESSMENT — PAIN DESCRIPTION - PAIN TYPE: TYPE: ACUTE PAIN

## 2022-09-15 ASSESSMENT — PAIN SCALES - GENERAL: PAINLEVEL_OUTOF10: 3

## 2022-09-15 ASSESSMENT — PAIN DESCRIPTION - FREQUENCY: FREQUENCY: CONTINUOUS

## 2022-09-15 ASSESSMENT — PAIN DESCRIPTION - LOCATION: LOCATION: LEG

## 2022-09-15 ASSESSMENT — PAIN DESCRIPTION - ORIENTATION: ORIENTATION: RIGHT

## 2022-09-15 NOTE — ED PROVIDER NOTES
EMERGENCY DEPARTMENT ENCOUNTER      CHIEF COMPLAINT:   Cough  Diarrhea  Nausea    HPI: Edward Tee is a 69 y.o. female who presents to the emergency department, complaining of a cough, nausea and diarrhea. The patient's symptoms started 4 days prior to arrival.  She states she has had an intermittent mildly productive cough. She has had malaise. She has had nausea as well as several episodes of watery diarrhea. She denies associated abdominal pain. Her throat has been sore. However, she can eat, drink and swallow. She took a home COVID test on Monday that was negative. Symptoms are intermittent. There are no exacerbating or alleviating factors. She denies fevers, chills, neck stiffness, chest pain, shortness of breath, abdominal pain, rash or any other complaints. REVIEW OF SYSTEMS:   Constitutional: See HPI  Eyes:  Denies change in visual acuity  HENT: See HPI  Respiratory: See HPI  Cardiovascular:  Denies chest pain or edema  GI: See HPI  :  Denies dysuria  Musculoskeletal:  Denies back pain or joint pain  Integument:  Denies rash  Neurologic:  Denies headache, focal weakness or sensory changes  \"Remaining review of systems reviewed and negative. I have reviewed the nursing triage documentation and agree unless otherwise noted below. \"      PAST MEDICAL HISTORY:   Past Medical History:   Diagnosis Date    Cancer ([facility] [address]     Hypertension        CURRENT MEDICATIONS:   Home medications reviewed. SURGICAL HISTORY:   Past Surgical History:   Procedure Laterality Date    BREAST LUMPECTOMY Right     JOINT REPLACEMENT         FAMILY HISTORY:   No family history on file.     SOCIAL HISTORY:   Social History     Socioeconomic History    Marital status:      Spouse name: Not on file    Number of children: Not on file    Years of education: Not on file    Highest education level: Not on file   Occupational History    Not on file   Tobacco Use    Smoking status: Former     Types: Cigarettes Smokeless tobacco: Never   Substance and Sexual Activity    Alcohol use: Not on file     Comment: rare    Drug use: Never    Sexual activity: Not on file   Other Topics Concern    Not on file   Social History Narrative    Not on file     Social Determinants of Health     Financial Resource Strain: Not on file   Food Insecurity: Not on file   Transportation Needs: Not on file   Physical Activity: Not on file   Stress: Not on file   Social Connections: Not on file   Intimate Partner Violence: Not on file   Housing Stability: Not on file       ALLERGIES: Patient has no known allergies. PHYSICAL EXAM:  VITAL SIGNS:   ED Triage Vitals [09/15/22 1101]   Enc Vitals Group      BP (!) 122/55      Heart Rate 77      Resp 16      Temp 97.1 °F (36.2 °C)      Temp Source Infrared      SpO2 99 %      Weight 159 lb (72.1 kg)      Height 5' 3.5\" (1.613 m)      Head Circumference       Peak Flow       Pain Score       Pain Loc       Pain Edu? Excl. in [address]? Constitutional:  Non-toxic appearance, appears not to feel well  HENT: Normocephalic, Atraumatic, Bilateral external ears normal, Oropharynx moist, posterior pharynx is erythematous with no oral exudates, Nose normal.  Eyes:  PERRL,Conjunctiva normal, No discharge. Neck: Normal range of motion, No tenderness, Supple, No stridor, No lymphadenopathy . Cardiovascular:  Normal heart rate, Normal rhythm  Pulmonary/Chest:  Normal breath sounds, No respiratory distress, No wheezing  Abdomen: Bowel sounds normal, Soft, No tenderness, No masses, No pulsatile masses  Extremities:  Normal range of motion, Intact distal pulses, No edema, No tenderness  Skin:  Warm, Dry, No erythema, No rash      EKG Interpretation  None    Radiology / Procedures:  XR CHEST PORTABLE (Final result)  Result time 09/15/22 11:51:37  Final result by Curtis Camacho MD (09/15/22 11:51:37)                Impression:    No evidence of acute cardiopulmonary disease.              Narrative:    EXAMINATION: ONE XRAY VIEW OF THE CHEST     9/15/2022 11:18 am     COMPARISON:   None. HISTORY:   ORDERING SYSTEM PROVIDED HISTORY: Cough   TECHNOLOGIST PROVIDED HISTORY:   Reason for exam:->Cough   Reason for Exam: Cough   Additional signs and symptoms: nausea, diarrhea   Relevant Medical/Surgical History: hx rt breast lumpectomy     FINDINGS:   The cardiomediastinal silhouette is normal in size and contour. No focal   airspace disease. Left basilar subsegmental atelectasis. No pleural   effusion or pneumothorax. No evidence of acute osseous abnormality. Labs Reviewed   COVID-19, RAPID - Abnormal; Notable for the following components:       Result Value    SARS-CoV-2, NAAT DETECTED (*)     All other components within normal limits       ED COURSE & MEDICAL DECISION MAKING:  Pertinent Labs & Imaging studies reviewed. (See chart for details)  On exam, the patient is afebrile and nontoxic appearing. She is hemodynamically stable and neurologically intact. Rapid COVID is positive. The patient was treated with Zofran with improvement. I suspect that the patient has symptoms secondary to COVID-19. I have a low suspicion for peritonsillar abscess, epiglottitis, Rishabh's angina, pneumonia, or sepsis. I feel that the patient is stable for outpatient management with follow up in 2-3 days. The patient is given return precautions. The patient verbalized understanding, was agreeable with plan, and the patient was discharged home in stable condition. Clinical Impression:  1. COVID-19        Disposition referral (if applicable):   [place] [facility]  [address]  [phone]    Schedule an appointment as soon as possible for a visit       [address]  [phone]  Go to   If symptoms worsen    Disposition medications (if applicable):  Discharge Medication List as of 9/15/2022 12:17 PM        START taking these medications Details   benzonatate (TESSALON PERLES) 100 MG capsule Take 1 capsule by mouth 3 times daily as needed for Cough, Disp-30 capsule, R-0Normal      ondansetron (ZOFRAN ODT) 4 MG disintegrating tablet Take 1 tablet by mouth every 8 hours as needed for Nausea, Disp-15 tablet, R-0Normal      guaiFENesin-dextromethorphan (ROBITUSSIN DM) 100-10 MG/5ML syrup Take 5 mLs by mouth 4 times daily as needed for Cough, Disp-120 mL, R-0Normal               Comment: Please note this report has been produced using speech recognition software and may contain errors related to that system including errors in grammar, punctuation, and spelling, as well as words and phrases that may be inappropriate. If there are any questions or concerns please feel free to contact the dictating provider for clarification.               Pamela Mnazo MD  09/18/22 1600

## 2022-09-15 NOTE — ED NOTES
The patient presents to the er today with complaints of \" not feeling well since Monday. \"  She reports of taking an at home COVID-19 test on Monday which was negative. She reports nausea, right leg aching, cough, and diarrhea. The call light is within reach.                 Ines Waters RN  09/15/22 [id number]

## 2022-09-15 NOTE — ED NOTES
Discharge instructions given with prescription verbalized understanding pt is ambulatory out       Najma Benavidez RN  09/15/22 2759

## 2023-01-08 ENCOUNTER — PATIENT MESSAGE (OUTPATIENT)
Dept: RESEARCH | Facility: HOSPITAL | Age: 78
End: 2023-01-08
Payer: MEDICARE

## 2023-01-30 ENCOUNTER — PATIENT MESSAGE (OUTPATIENT)
Dept: RESEARCH | Facility: HOSPITAL | Age: 78
End: 2023-01-30
Payer: MEDICARE

## 2023-03-17 ENCOUNTER — PATIENT MESSAGE (OUTPATIENT)
Dept: INFECTIOUS DISEASES | Facility: CLINIC | Age: 78
End: 2023-03-17
Payer: MEDICARE

## 2023-05-25 NOTE — PLAN OF CARE
Impression: Dermatochalasis of right upper eyelid: H02.831. Plan: Dermatochalasis bilateral UL's- Patient is not symptomatic. Explained how it may become visually significant. The patient is advised to contact us for any change or obstruction of vision. Problem: Physical Therapy Goal  Goal: Physical Therapy Goal  Goals to be met by: 2018     Patient will increase functional independence with mobility by performin. Supine to sit with Maximum Assistance  2. Rolling to Left with Maximum Assistance.  3. Sit to stand transfer with Maximum Assistance  4. Sitting at edge of bed x5 minutes with Moderate Assistance    Outcome: Ongoing (interventions implemented as appropriate)  Goals established on initial evaluation    Mike Calvin DPT  1/10/2018

## 2025-01-13 ENCOUNTER — PATIENT MESSAGE (OUTPATIENT)
Facility: CLINIC | Age: 80
End: 2025-01-13
Payer: MEDICARE

## 2025-02-23 ENCOUNTER — HOSPITAL ENCOUNTER (EMERGENCY)
Age: 80
Discharge: HOME | End: 2025-02-23
Payer: MEDICARE

## 2025-02-23 VITALS
SYSTOLIC BLOOD PRESSURE: 162 MMHG | OXYGEN SATURATION: 97 % | RESPIRATION RATE: 17 BRPM | WEIGHT: 165.1 LBS | DIASTOLIC BLOOD PRESSURE: 71 MMHG | HEIGHT: 63 IN | HEART RATE: 93 BPM | TEMPERATURE: 97.2 F | BODY MASS INDEX: 29.25 KG/M2

## 2025-02-23 DIAGNOSIS — M25.531 ACUTE PAIN OF RIGHT WRIST: Primary | ICD-10-CM

## 2025-02-23 PROCEDURE — 99283 EMERGENCY DEPT VISIT LOW MDM: CPT

## 2025-02-23 RX ORDER — IBUPROFEN 600 MG/1
600 TABLET, FILM COATED ORAL EVERY 8 HOURS PRN
Qty: 60 TABLET | Refills: 1 | Status: SHIPPED | OUTPATIENT
Start: 2025-02-23

## 2025-02-23 ASSESSMENT — LIFESTYLE VARIABLES
HOW MANY STANDARD DRINKS CONTAINING ALCOHOL DO YOU HAVE ON A TYPICAL DAY: PATIENT DOES NOT DRINK
HOW OFTEN DO YOU HAVE A DRINK CONTAINING ALCOHOL: NEVER

## 2025-02-23 ASSESSMENT — PAIN SCALES - GENERAL: PAINLEVEL_OUTOF10: 3

## 2025-02-23 ASSESSMENT — PAIN DESCRIPTION - LOCATION: LOCATION: WRIST

## 2025-02-23 ASSESSMENT — PAIN DESCRIPTION - ORIENTATION: ORIENTATION: RIGHT

## 2025-02-23 ASSESSMENT — PAIN - FUNCTIONAL ASSESSMENT: PAIN_FUNCTIONAL_ASSESSMENT: 0-10

## 2025-02-23 NOTE — ED NOTES
Patient discharge with instructions and prescriptions x1 w/ wrist splint. Pt verbalized understanding.

## 2025-02-23 NOTE — ED PROVIDER NOTES
Emergency Department Encounter  Location: [facility] EMERGENCY DEPARTMENT    Patient: Yesenia Carcamo  MRN: [id number]  : 1945  Date of evaluation: 2025  ED Provider: Anish Gardner DO    History from : Patient  Limitations to history : None    Chief Complaint:    Wrist Pain (R, NKI. Ongoing 2-3 weeks that it has been bothering her. Concerned for arthritis )    [place]:  Yesenia Carcamo is a 79 y.o. female that presents to the emergency department with right wrist pain for the last 3 weeks.  Patient states that pain was worsened yesterday when she was picking up boxes at the grocery store.  Nothing makes pain better or worse.  She denies any injury.  She believes she could have arthritis.  She is right-handed.      Past Medical History:   Diagnosis Date    Cancer ([facility])     Hypertension      Past Surgical History:   Procedure Laterality Date    BREAST LUMPECTOMY Right     JOINT REPLACEMENT       History reviewed. No pertinent family history.  Social History     Socioeconomic History    Marital status:      Spouse name: Not on file    Number of children: Not on file    Years of education: Not on file    Highest education level: Not on file   Occupational History    Not on file   Tobacco Use    Smoking status: Former     Types: Cigarettes    Smokeless tobacco: Never   Substance and Sexual Activity    Alcohol use: Not on file     Comment: rare    Drug use: Never    Sexual activity: Not on file   Other Topics Concern    Not on file   Social History Narrative    Not on file     Social Determinants of Health     Financial Resource Strain: Low Risk  (2023)    Received from [facility]    Overall Financial Resource Strain (CARDIA)     Difficulty of Paying Living Expenses: Not very hard   Food Insecurity: No Food Insecurity (2024)    Received from [facility]    Hunger Vital Sign     Worried About Running Out of Food in the Last Year: Never true     Ran Out of Food in the Last

## 2025-02-26 NOTE — NURSING
Call placed to [facility] - noted pt cool/clammy - checked blood glucose 213 - BP low -pt c/o HA - was medicated at 6pm - orders obtained -    - c/w home sildenafil 20mg TID